# Patient Record
Sex: FEMALE | Race: WHITE | NOT HISPANIC OR LATINO | Employment: OTHER | URBAN - NONMETROPOLITAN AREA
[De-identification: names, ages, dates, MRNs, and addresses within clinical notes are randomized per-mention and may not be internally consistent; named-entity substitution may affect disease eponyms.]

---

## 2017-01-30 ENCOUNTER — COMMUNICATION - GICH (OUTPATIENT)
Dept: FAMILY MEDICINE | Facility: OTHER | Age: 77
End: 2017-01-30

## 2017-01-30 DIAGNOSIS — K21.00 GASTRO-ESOPHAGEAL REFLUX DISEASE WITH ESOPHAGITIS: ICD-10-CM

## 2017-03-16 ENCOUNTER — HISTORY (OUTPATIENT)
Dept: FAMILY MEDICINE | Facility: OTHER | Age: 77
End: 2017-03-16

## 2017-03-16 ENCOUNTER — OFFICE VISIT - GICH (OUTPATIENT)
Dept: FAMILY MEDICINE | Facility: OTHER | Age: 77
End: 2017-03-16

## 2017-03-16 DIAGNOSIS — M51.369 OTHER INTERVERTEBRAL DISC DEGENERATION, LUMBAR REGION: ICD-10-CM

## 2017-03-16 DIAGNOSIS — G89.29 OTHER CHRONIC PAIN: ICD-10-CM

## 2017-03-16 DIAGNOSIS — M25.561 PAIN IN RIGHT KNEE: ICD-10-CM

## 2017-03-16 DIAGNOSIS — M25.562 PAIN IN LEFT KNEE: ICD-10-CM

## 2017-03-16 DIAGNOSIS — E78.5 HYPERLIPIDEMIA: ICD-10-CM

## 2017-03-16 DIAGNOSIS — E55.9 VITAMIN D DEFICIENCY: ICD-10-CM

## 2017-03-16 DIAGNOSIS — E11.9 TYPE 2 DIABETES MELLITUS WITHOUT COMPLICATIONS (H): ICD-10-CM

## 2017-03-16 DIAGNOSIS — K21.9 GASTRO-ESOPHAGEAL REFLUX DISEASE WITHOUT ESOPHAGITIS: ICD-10-CM

## 2017-03-16 DIAGNOSIS — Z96.653 PRESENCE OF BOTH ARTIFICIAL KNEE JOINTS: ICD-10-CM

## 2017-03-16 DIAGNOSIS — Z12.31 ENCOUNTER FOR SCREENING MAMMOGRAM FOR MALIGNANT NEOPLASM OF BREAST: ICD-10-CM

## 2017-03-16 DIAGNOSIS — L82.0 INFLAMED SEBORRHEIC KERATOSIS: ICD-10-CM

## 2017-03-16 DIAGNOSIS — M79.672 PAIN OF LEFT FOOT: ICD-10-CM

## 2017-03-16 LAB
A/G RATIO - HISTORICAL: 1.5 (ref 1–2)
ABSOLUTE BASOPHILS - HISTORICAL: 0.1 THOU/CU MM
ABSOLUTE EOSINOPHILS - HISTORICAL: 0.2 THOU/CU MM
ABSOLUTE LYMPHOCYTES - HISTORICAL: 2.7 THOU/CU MM (ref 0.9–2.9)
ABSOLUTE MONOCYTES - HISTORICAL: 0.5 THOU/CU MM
ABSOLUTE NEUTROPHILS - HISTORICAL: 3.1 THOU/CU MM (ref 1.7–7)
ALBUMIN SERPL-MCNC: 4.8 G/DL (ref 3.5–5.7)
ALP SERPL-CCNC: 55 IU/L (ref 34–104)
ALT (SGPT) - HISTORICAL: 21 IU/L (ref 7–52)
ANION GAP - HISTORICAL: 8 (ref 5–18)
AST SERPL-CCNC: 20 IU/L (ref 13–39)
BASOPHILS # BLD AUTO: 1.3 %
BILIRUB SERPL-MCNC: 0.4 MG/DL (ref 0.3–1)
BUN SERPL-MCNC: 13 MG/DL (ref 7–25)
BUN/CREAT RATIO - HISTORICAL: 24
CALCIUM SERPL-MCNC: 9.9 MG/DL (ref 8.6–10.3)
CHLORIDE SERPLBLD-SCNC: 102 MMOL/L (ref 98–107)
CHOL/HDL RATIO - HISTORICAL: 4.36
CHOLESTEROL TOTAL: 257 MG/DL
CO2 SERPL-SCNC: 26 MMOL/L (ref 21–31)
CREAT SERPL-MCNC: 0.55 MG/DL (ref 0.7–1.3)
EOSINOPHIL NFR BLD AUTO: 2.9 %
ERYTHROCYTE [DISTWIDTH] IN BLOOD BY AUTOMATED COUNT: 11.4 % (ref 11.5–15.5)
ESTIMATED AVERAGE GLUCOSE: 134 MG/DL
GFR IF NOT AFRICAN AMERICAN - HISTORICAL: >60 ML/MIN/1.73M2
GLOBULIN - HISTORICAL: 3.3 G/DL (ref 2–3.7)
GLUCOSE SERPL-MCNC: 117 MG/DL (ref 70–105)
HCT VFR BLD AUTO: 40.8 % (ref 33–51)
HDLC SERPL-MCNC: 59 MG/DL (ref 23–92)
HEMOGLOBIN A1C MONITORING (POCT) - HISTORICAL: 6.3 % (ref 4–6.2)
HEMOGLOBIN: 14.2 G/DL (ref 12–16)
LDLC SERPL CALC-MCNC: 165 MG/DL
LYMPHOCYTES NFR BLD AUTO: 41.2 % (ref 20–44)
MCH RBC QN AUTO: 31 PG (ref 26–34)
MCHC RBC AUTO-ENTMCNC: 34.8 G/DL (ref 32–36)
MCV RBC AUTO: 89 FL (ref 80–100)
MONOCYTES NFR BLD AUTO: 8.1 %
NEUTROPHILS NFR BLD AUTO: 46.5 % (ref 42–72)
NON-HDL CHOLESTEROL - HISTORICAL: 198 MG/DL
PATIENT STATUS - HISTORICAL: ABNORMAL
PLATELET # BLD AUTO: 244 THOU/CU MM (ref 140–440)
PMV BLD: 8.3 FL (ref 6.5–11)
POTASSIUM SERPL-SCNC: 3.4 MMOL/L (ref 3.5–5.1)
PROT SERPL-MCNC: 8.1 G/DL (ref 6.4–8.9)
RED BLOOD COUNT - HISTORICAL: 4.58 MIL/CU MM (ref 4–5.2)
SODIUM SERPL-SCNC: 136 MMOL/L (ref 133–143)
TRIGL SERPL-MCNC: 163 MG/DL
TSH - HISTORICAL: 1.98 UIU/ML (ref 0.34–5.6)
VITAMIN D TOTAL - HISTORICAL: 31.4 NG/ML
WHITE BLOOD COUNT - HISTORICAL: 6.6 THOU/CU MM (ref 4.5–11)

## 2017-03-17 LAB
ALB RAND URINE - HISTORICAL: 17.4 MG/L
CREATININE, URINE - HISTORICAL: 1.09 G/L
MICROALBUMIN, RAND UR - HISTORICAL: 16 MG/G CREAT

## 2017-03-21 ENCOUNTER — HISTORY (OUTPATIENT)
Dept: RADIOLOGY | Facility: OTHER | Age: 77
End: 2017-03-21

## 2017-03-21 ENCOUNTER — HOSPITAL ENCOUNTER (OUTPATIENT)
Dept: RADIOLOGY | Facility: OTHER | Age: 77
End: 2017-03-21
Attending: FAMILY MEDICINE

## 2017-03-21 DIAGNOSIS — Z12.31 ENCOUNTER FOR SCREENING MAMMOGRAM FOR MALIGNANT NEOPLASM OF BREAST: ICD-10-CM

## 2017-03-31 ENCOUNTER — AMBULATORY - GICH (OUTPATIENT)
Dept: SCHEDULING | Facility: OTHER | Age: 77
End: 2017-03-31

## 2017-05-03 ENCOUNTER — COMMUNICATION - GICH (OUTPATIENT)
Dept: FAMILY MEDICINE | Facility: OTHER | Age: 77
End: 2017-05-03

## 2017-08-06 ENCOUNTER — COMMUNICATION - GICH (OUTPATIENT)
Dept: FAMILY MEDICINE | Facility: OTHER | Age: 77
End: 2017-08-06

## 2017-08-06 DIAGNOSIS — M94.0 CHONDROCOSTAL JUNCTION SYNDROME: ICD-10-CM

## 2017-08-06 DIAGNOSIS — K21.00 GASTRO-ESOPHAGEAL REFLUX DISEASE WITH ESOPHAGITIS: ICD-10-CM

## 2017-08-06 DIAGNOSIS — M51.369 OTHER INTERVERTEBRAL DISC DEGENERATION, LUMBAR REGION: ICD-10-CM

## 2017-08-06 DIAGNOSIS — M53.3 SACROCOCCYGEAL DISORDERS, NOT ELSEWHERE CLASSIFIED: ICD-10-CM

## 2017-09-05 ENCOUNTER — COMMUNICATION - GICH (OUTPATIENT)
Dept: FAMILY MEDICINE | Facility: OTHER | Age: 77
End: 2017-09-05

## 2017-09-14 ENCOUNTER — AMBULATORY - GICH (OUTPATIENT)
Dept: SCHEDULING | Facility: OTHER | Age: 77
End: 2017-09-14

## 2017-09-18 ENCOUNTER — OFFICE VISIT - GICH (OUTPATIENT)
Dept: FAMILY MEDICINE | Facility: OTHER | Age: 77
End: 2017-09-18

## 2017-09-18 ENCOUNTER — HISTORY (OUTPATIENT)
Dept: FAMILY MEDICINE | Facility: OTHER | Age: 77
End: 2017-09-18

## 2017-09-18 DIAGNOSIS — E11.9 TYPE 2 DIABETES MELLITUS WITHOUT COMPLICATIONS (H): ICD-10-CM

## 2017-09-18 DIAGNOSIS — M51.369 OTHER INTERVERTEBRAL DISC DEGENERATION, LUMBAR REGION: ICD-10-CM

## 2017-09-18 DIAGNOSIS — M54.16 RADICULOPATHY OF LUMBAR REGION: ICD-10-CM

## 2017-09-18 LAB
ESTIMATED AVERAGE GLUCOSE: 134 MG/DL
HEMOGLOBIN A1C MONITORING (POCT) - HISTORICAL: 6.3 % (ref 4–6.2)

## 2017-09-18 ASSESSMENT — PATIENT HEALTH QUESTIONNAIRE - PHQ9: SUM OF ALL RESPONSES TO PHQ QUESTIONS 1-9: 8

## 2017-09-27 ENCOUNTER — HOSPITAL ENCOUNTER (OUTPATIENT)
Dept: RADIOLOGY | Facility: OTHER | Age: 77
End: 2017-09-27
Attending: FAMILY MEDICINE

## 2017-09-27 ENCOUNTER — AMBULATORY - GICH (OUTPATIENT)
Dept: FAMILY MEDICINE | Facility: OTHER | Age: 77
End: 2017-09-27

## 2017-09-27 DIAGNOSIS — M51.369 OTHER INTERVERTEBRAL DISC DEGENERATION, LUMBAR REGION: ICD-10-CM

## 2017-09-27 DIAGNOSIS — M54.16 RADICULOPATHY OF LUMBAR REGION: ICD-10-CM

## 2017-12-27 NOTE — PROGRESS NOTES
Patient Information     Patient Name MRN Sex Maliha Medley [id number] Female 1940      Progress Notes by Paulina Joseph at 2017  8:31 AM     Author:  Paulina Joseph Service:  (none) Author Type:  (none)     Filed:  2017  8:31 AM Date of Service:  2017  8:31 AM Status:  Signed     :  Paulina Joseph            [place] Protocol    A. Pre-procedure verification complete yes  1-relevant information / documentation available, reviewed and properly matched to the patient; 2-consent accurate and complete, 3-equipment and supplies available    B. Site marking complete Yes  Site marked if not in continuous attendance with patient    C. TIME OUT completed yes  Time Out was conducted just prior to starting procedure to verify the eight required elements: 1-patient identity, 2-consent accurate and complete, 3-position, 4-correct side/site marked (if applicable), 5-procedure, 6-relevant images / results properly labeled and displayed (if applicable), 7-antibiotics / irrigation fluids (if applicable), 8-safety precautions.

## 2017-12-27 NOTE — PROGRESS NOTES
Patient Information     Patient Name MRN Sex Maliha Medley [id number] Female 1940      Progress Notes by Paulina Joseph at 2017  8:31 AM     Author:  Paulina Joseph Service:  (none) Author Type:  (none)     Filed:  2017  8:31 AM Date of Service:  2017  8:31 AM Status:  Signed     :  Paulina Joseph            Falls Risk Criteria:    Age 65 and older or under age 4        Sensory deficits    Poor vision    Use of ambulatory aides    Impaired judgment    Unable to walk independently    Meets High Risk criteria for falls:  Yes             1.  Do you have dizziness or vertigo?    no                    2.  Do you need help standing or walking?   no                 3.  Have you fallen within the last 6 months?    no           4.  Has the patient been fasting?      no       If any risks are marked Yes, the following interventions are utilized:    Do not leave patient unattended     Assist patient in the dressing room and bathroom    Have ambulatory aides available throughout procedure    Involve patient s family if available

## 2017-12-28 NOTE — TELEPHONE ENCOUNTER
Patient Information     Patient Name MRN Maliha Chamberlain [id number] Female 1940      Telephone Encounter by Florence Machuca RN at 2017  9:24 AM     Author:  Florence Machuca RN Service:  (none) Author Type:  NURS- Registered Nurse     Filed:  2017  9:26 AM Encounter Date:  2017 Status:  Signed     :  Florence Machuca RN (NURS- Registered Nurse)            Proton Pump Inhibitors    Office visit in the past 12 months or per provider note.    Last visit with YOKO CHAVARRIA was on: 2017 in [facility]  Next visit with YOKO CHAVARRIA is on: No future appointment listed with this provider  Next visit with Family Practice is on: No future appointment listed in this department    Max refill for 12 months from last office visit or per provider note.  Nsaids    Office visit in the past 12 months or per provider note.    Max refill for 12 months from last office visit or per provider note.  Prescription refilled per RN Medication Refill Policy.................... FLORENCE MACHUCA RN ....................  2017   9:25 AM      '

## 2017-12-28 NOTE — TELEPHONE ENCOUNTER
Patient Information     Patient Name MRN Sex Maliha Medley [id number] Female 1940      Telephone Encounter by Leena Francis at 2017  4:54 PM     Author:  Leena Francis Service:  (none) Author Type:  NURS- Student Practical Nurse     Filed:  2017  4:55 PM Encounter Date:  2017 Status:  Signed     :  Leena Francis (NURS- Student Practical Nurse)            Left message to call back  ....................  2017   4:54 PM   Leena Francis LPN............................ 2017 4:55 PM

## 2017-12-28 NOTE — TELEPHONE ENCOUNTER
Patient Information     Patient Name MRN Sex Maliha Medley [id number] Female 1940      Telephone Encounter by Tuan Cui LPN at 2017  8:46 AM     Author:  Tuan Cui LPN Service:  (none) Author Type:  NURS- Licensed Practical Nurse     Filed:  2017  8:48 AM Encounter Date:  2017 Status:  Signed     :  Tuan Cui LPN (NURS- Licensed Practical Nurse)            Patient called back and I let her know the below information and transferred her to the appointment line to schedule.  Tuan Cui LPN ..............2017 8:47 AM

## 2017-12-28 NOTE — TELEPHONE ENCOUNTER
Patient Information     Patient Name MRN Maliha Chamberlain [id number] Female 1940      Telephone Encounter by Sofia Vargas at 2017  2:20 PM     Author:  Sofia Vargas Service:  (none) Author Type:  (none)     Filed:  2017  2:21 PM Encounter Date:  2017 Status:  Signed     :  Sofia Vargas            Called pt in regards to the message. No answer at this time will try again later today.  Sofia Pablo LPN

## 2017-12-28 NOTE — PROGRESS NOTES
Patient Information     Patient Name MRN Maliha Chamberlain [id number] Female 1940      Progress Notes by Paulina Joseph at 2017  8:31 AM     Author:  Paulina Joseph Service:  (none) Author Type:  (none)     Filed:  2017  8:31 AM Date of Service:  2017  8:31 AM Status:  Signed     :  Paulina Joseph            RECOVERY TIME  Some numbness may be present 2-4 hours after the injection, which could impair your normal driving, reflexes.  You will need someone to drive you home from your exam due to the effects of certain medications.    You may experience numbness and/or relief of your pain for up to 4-6 hours after the injection.  Your usual symptoms may return the night of the procedure and may possible be more severe than usual a day or two following.  Please keep track of your pain over the next several days and report how long the relief lasts to the doctor who referred you for this procedure.    The beneficial effects of the steroids usually require 2 to 3 days to take effect, buy may take as long as 5 to 7 days.  If there is no change in the pain, then investigation can be focused on other possible sources of your pain.  In either case, the information is useful to the doctor who referred you for this procedure.    POSSIBLE SIDE EFFECTS  Facial flushing (redness), occasional low grade fevers of 99.5F or less, hiccups, insomnia, headaches, increased heart rate, abdominal cramping, and/or a bloating feeling are side effects of the steroid medications and will go away 3 to 4 days after the injection.    Diabetic Patients  The steroids you have received may significantly increase your blood sugar levels.  Monitor your blood sugar level closely (4-6 times per day) for a period of 4 days or until your blood sugar level normalizes.  If your blood sugar level elevates significantly or you experience confusion, dizziness, sweating, please notify our primary physician and make him/her  aware that you have received steroids.

## 2017-12-28 NOTE — PROGRESS NOTES
Patient Information     Patient Name MRN Sex Maliha Medley [id number] Female 1940      Progress Notes by Nicanor Mike MD at 2017  3:15 PM     Author:  Nicanor Mike MD Service:  (none) Author Type:  Physician     Filed:  2017  5:26 PM Encounter Date:  2017 Status:  Signed     :  Nicanor Mike MD (Physician)            Nursing Notes:   Olivia Jean-Baptiste  2017  3:35 PM  Signed  She is here as she would like to get orders for a back injection and to follow up on her diabetes.  Previous A1C is at goal of <8  HEMOGLOBIN A1C MONITORING (POCT) (%)    Date Value   2017 6.3 (H)     Urine microalbumin:creatine: 2017  Foot exam 2017  Eye exam 2017    Patient is not a current smoker  Patient is not on a daily aspirin  Patient is not on a Statin.  Blood pressure today of   is not at the goal of <139/89 for diabetics.    Olivia Jerilyn LPN..............2017 3:26 PM        SUBJECTIVE:  Maliha Rivera  is a 77 y.o. female who comes in today for follow-up of diabetes. I last saw her in March. Last hemoglobin A1c was 6.3%. Labs at that time were fine except for an elevated cholesterol and LDL. She has not tolerated statins in the past. She has not been checking as her meter .     We discussed sending her to Dr. Carson for consultation for her knees. She saw him in April and was told that her knees look fine and no further intervention was necessary.    She wonders about an injection for her back. She was seen at [facility] in neurosurgery for lumbar foraminal stenosis causing radiculopathy on the left side and had left L4-5 and L5-S1 transforaminal epidural steroid injection done on . Surgically, Dr. oB recommended a 2 level TLIF at L4-L5 and L5-S1.  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 Her busy season is coming up and she like to repeat her shot before work starts social get through the season okay. She  did not want to wait as she could not get into [facility] for the injections for right some time until she was well into the season and would be too busy. She would like to do the injection at [facility] here if possible.    Past Medical, Family, and Social History reviewed and updated as noted below.   ROS is negative except as noted above       Allergies     Allergen  Reactions     Lipitor [Atorvastatin] Myalgia   ,   Family History       Problem   Relation Age of Onset     Heart Disease  Mother      Heart Disease  Father      Heart Disease  Sister      Cancer  Other      female cancer     ,   Current Outpatient Prescriptions on File Prior to Visit       Medication  Sig Dispense Refill     Melatonin 5 mg tab Take 1 tablet by mouth at bedtime if needed for Sleep.  0     meloxicam 15 mg tablet TAKE ONE TABLET BY MOUTH ONCE DAILY 90 tablet 1     MISCELLANEOUS MEDICAL SUPPLY (GRADUATED COMPRESSION STOCKINGS) For personal use. Length: calf Strength: 16-20 mmHg Please measure. 1 Packet 1     omeprazole (PRILOSEC) 40 mg Delayed-Release capsule TAKE ONE CAPSULE BY MOUTH ONCE DAILY 90 capsule 1     Psyllium Seed-Sucrose (METAMUCIL, SUGAR,) powder Take 3 tsp by mouth once daily if needed for Constipation.  0     No current facility-administered medications on file prior to visit.    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  She is taking fish oil + other ingredients she states helps with DM  On ASA  Patient does not want to take lisinopril  Does not tolerate statins and has discontinued pravastatin; try niacin  No retinopathy on eye exam 4/2013 and 12/2014        Delayed  "gastric emptying  03/26/2014     Gastroesophageal reflux disease  12/13/2013     Degeneration of intervertebral disc of lumbar region  01/15/2010     History of artificial joint  04/24/2008     HLD (hyperlipidemia)  07/22/2004     Overview:   IMO Update 10/11      ,   Past Surgical History:      Procedure  Laterality Date     APPENDECTOMY       Arthroscopic debridement and subacromial decompression, left shoulder Left 1/31/07     CARDIAC CATHETERIZATION  2004    mild disease       CARPAL TUNNEL RELEASE Bilateral 2006     CATARACT EXTRACTION W/  INTRAOCULAR LENS IMPLANT Bilateral 1996     COLONOSCOPY  2003,2011    normal       ESOPHAGOGASTRODUODENOSCOPY  2/3/2016            KNEE ARTHROSCOPY Right 2007    Joseluis Thomas M.D.        LAP CHOLECYSTECTOMY  3/11/01    Dr. Salas       Lumbar L4-5, L5-S1 DIANNE Left 06/05/2017    Dr. Joiner, [facility]       PA ERCP BILIARY OR PANC DUCT STENT EXCHANGE W DIL AND WIRE  2001    stent removed.        TONSILLECTOMY      age 12       TOTAL KNEE ARTHROPLASTY Right 02/27/2008      TOTAL KNEE ARTHROPLASTY Left 2010    Dr. Carson       TUBAL LIGATION      and   Social History     Substance Use Topics       Smoking status: Never Smoker     Smokeless tobacco: Never Used     Alcohol use No     OBJECTIVE:  /84  Pulse 60  Ht 1.594 m (5' 2.75\")  Wt 80.3 kg (177 lb)  Breastfeeding? No  BMI 31.6 kg/m2   EXAM:  Alert and cooperative, no distress. Back exam was not repeated.    Results for orders placed or performed in visit on 09/18/17      HEMOGLOBIN A1C MONITORING (POCT)      Result  Value Ref Range    HEMOGLOBIN A1C MONITORING (POCT) 6.3 (H) 4.0 - 6.2 %    ESTIMATED AVERAGE GLUCOSE  134 mg/dL      ASSESSMENT/Plan :      Maliha was seen today for follow up.    Diagnoses and all orders for this visit:    Controlled type 2 diabetes mellitus without complication, without long-term current use of insulin (HC)  -     HEMOGLOBIN A1C MONITORING (POCT); Future  -     HEMOGLOBIN A1C MONITORING " (POCT)    Degeneration of intervertebral disc of lumbar region  -     XR INJ EPIDURAL TRANSFORAMINAL LUMBAR 2 LEV LEFT; Future    Lumbar radiculopathy  -     XR INJ EPIDURAL TRANSFORAMINAL LUMBAR 2 LEV LEFT; Future      outside workup and notes were reviewed. Referred for L4-5 and L5-S1 left transforaminal epidural steroid injection. Discussed this in detail with her.    Will notify of lab results when available. Continue current medications.     A total of 15 minutes was spent with the patient, greater than 50% of the time was spent in counseling/discussion of the aforementioned concerns.     Nicanor Mike MD

## 2017-12-28 NOTE — TELEPHONE ENCOUNTER
Patient Information     Patient Name MRN Sex Maliha Medley [id number] Female 1940      Telephone Encounter by Sofia Vargas at 2017  3:18 PM     Author:  Sofia Vargas Service:  (none) Author Type:  (none)     Filed:  2017  3:19 PM Encounter Date:  2017 Status:  Signed     :  Sofia Vargas            Please advise. Pt is wanting a referral for back injections.  Sofia Pablo LPN

## 2017-12-28 NOTE — TELEPHONE ENCOUNTER
Patient Information     Patient Name MRN Sex Maliha Medley [id number] Female 1940      Telephone Encounter by Nicanor Mike MD at 2017  4:26 PM     Author:  Nicanor Mike MD Service:  (none) Author Type:  Physician     Filed:  2017  4:27 PM Encounter Date:  2017 Status:  Signed     :  Nicanor Mike MD (Physician)            She would need to be seen and evaluated first. She is also due for 6 month follow up of diabetes.   Nicanor Mike MD ....................  2017   4:27 PM

## 2017-12-30 NOTE — NURSING NOTE
Patient Information     Patient Name MRN Sex Maliha Medley [id number] Female 1940      Nursing Note by Olivia Jean-Baptiste at 2017  3:15 PM     Author:  Olivia Jean-Baptiste Service:  (none) Author Type:  (none)     Filed:  2017  3:35 PM Encounter Date:  2017 Status:  Signed     :  Olivia Jean-Baptiste            She is here as she would like to get orders for a back injection and to follow up on her diabetes.  Previous A1C is at goal of <8  HEMOGLOBIN A1C MONITORING (POCT) (%)    Date Value   2017 6.3 (H)     Urine microalbumin:creatine: 2017  Foot exam 2017  Eye exam 2017    Patient is not a current smoker  Patient is not on a daily aspirin  Patient is not on a Statin.  Blood pressure today of   is not at the goal of <139/89 for diabetics.    Olivia Jean-Baptiste LPN..............2017 3:26 PM

## 2018-01-03 NOTE — TELEPHONE ENCOUNTER
Patient Information     Patient Name MRN Sex Maliha Medley [id number] Female 1940      Telephone Encounter by Lance Lee RN at 2017  9:56 AM     Author:  Lance Lee RN Service:  (none) Author Type:  NURS- Registered Nurse     Filed:  2017 10:00 AM Encounter Date:  2017 Status:  Signed     :  Lance Lee RN (NURS- Registered Nurse)            Proton Pump Inhibitors    Office visit in the past 12 months or per provider note.    Last visit with [facility]: [facility]  Next visit with YOKO MIKE is on: No future appointment listed with this provider  Next visit with Family Practice is on: No future appointment listed in this department    Max refill for 12 months from last office visit or per provider note.    Last office visit to address GERD as well as omeprazole was on 2016 with Dr. Mike. No changes with regards to omeprazole or GERD noted in office visit notes. Will refill rx as per protocol.    Prescription refilled per RN Medication Refill Policy.................... Lance Lee RN ....................  2017   9:59 AM

## 2018-01-03 NOTE — PROGRESS NOTES
Patient Information     Patient Name MRN Sex Maliha Medley [id number] Female 1940      Progress Notes by Nicanor Mike MD at 3/16/2017  3:15 PM     Author:  Nicanor Mike MD Service:  (none) Author Type:  Physician     Filed:  3/16/2017  6:17 PM Encounter Date:  3/16/2017 Status:  Signed     :  Nicanor Mike MD (Physician)            There are no exam notes on file for this visit.    SUBJECTIVE:  Maliha Rivera  is a 76 y.o. female who comes in today for complete evaluation. She has a history of diet-controlled diabetes. She has GERD and is on omeprazole 40 mg daily. She was referred last year for general surgery consultation and had EGD that was essentially negative and they recommended she continue with PPI.    She has a history in the past of chronic low back pain for which she takes meloxicam. We referred her to physical therapy for right SI joint dysfunction and costochondritis last summer.    She is up-to-date on immunizations but has not had Zostavax. She has not had a mammogram since .    She was seen around [place] time in the emergency room at [place] for abdominal pain and was found to have a mild enteritis. It got better. She still takes metamucil regularly. She rarely takes a laxative.     She has some right sided chest discomfort.     She is having some left foot pain. It will be sore after sitting. It can be on the bottom or across the instep. Some swelling.     She has trouble with her hips.    She will have a shooting pain down her right arm, worse when her arm is out away from her body. Worse with straightening it. She did fall a month ago but didn't think she injured it.     She did go to [place] for awhile.     Past Medical, Family, and Social History reviewed and updated as noted below.   ROS is negative except as noted above       Allergies     Allergen  Reactions     Lipitor [Atorvastatin] Myalgia   ,   Family History       Problem   Relation Age of  Onset     Heart Disease  Mother      Heart Disease  Father      Heart Disease  Sister      Cancer  Other      female cancer     ,   Current Outpatient Prescriptions on File Prior to Visit       Medication  Sig Dispense Refill     meloxicam 15 mg tablet Take 1 tablet by mouth once daily. 90 tablet 3     MISCELLANEOUS MEDICAL SUPPLY (GRADUATED COMPRESSION STOCKINGS) For personal use. Length: calf Strength: 16-20 mmHg Please measure. 1 Packet 1     omeprazole (PRILOSEC) 40 mg Delayed-Release capsule TAKE ONE CAPSULE BY MOUTH ONCE DAILY 90 capsule 0     No current facility-administered medications on file prior to visit.    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  She is taking fish oil + other ingredients she states helps with DM  On ASA  Patient does not want to take lisinopril  Does not tolerate statins and has discontinued pravastatin; try niacin  No retinopathy on eye exam 4/2013 and 12/2014        Delayed gastric emptying  03/26/2014     Gastroesophageal reflux disease  12/13/2013     Degeneration of intervertebral disc of lumbar region  01/15/2010     History of artificial joint  04/24/2008     HLD (hyperlipidemia)  07/22/2004     Overview:   IMO Update 10/11      ,   Past Surgical History       Procedure   Laterality Date     Cataract extraction w/  intraocular lens implant  Bilateral 1996     Lap cholecystectomy   3/11/01     Dr. Salas       Appendectomy        Tubal ligation        Pr ercp biliary or panc duct stent exchange w dil and wire   2001     stent removed.        Cardiac catheterization   2004     mild disease       Colonoscopy    "2003,2011     normal       Knee arthroscopy  Right 2007     Joseluis Thomas M.D.        Carpal tunnel release  Bilateral 2006     Tonsillectomy        age 12       Arthroscopic debridement and subacromial decompression, left shoulder  Left 1/31/07     Total knee arthroplasty  Right 02/27/2008      Total knee arthroplasty  Left 2010     Dr. Carson       Esophagogastroduodenoscopy   2/3/2016            and   Social History     Substance Use Topics       Smoking status: Never Smoker     Smokeless tobacco: Never Used     Alcohol use No     OBJECTIVE:  Visit Vitals       /84     Pulse 70     Ht 1.594 m (5' 2.75\")     Wt 83.9 kg (185 lb)     Breastfeeding No     BMI 33.03 kg/m2      EXAM:  General Appearance: Pleasant, alert, appropriate appearance for age. No acute distress  Head Exam: Normal. Normocephalic, atraumatic.  Eye Exam: PERRLA, EOMI, conjunctiva, sclerae normal.  Ear Exam: Normal TM's bilaterally. Normal auditory canals and external ears. Non-tender.  Nose Exam: Normal external nose, mucus membranes, and septum.  OroPharynx Exam:  Dental hygiene adequate. Normal buccal mucose. Normal pharynx.  Neck Exam:  Supple, no masses or nodes. No bruits  Thyroid Exam: No nodules or enlargement.  Chest/Respiratory Exam: Normal chest wall and respirations. Clear to auscultation. She has decreased rotational range of motion of the thoracic spine to the left and this report reproduces her right chest symptoms.  Breast Exam: No dimpling, nipple retraction or discharge. No masses or nodes.  Cardiovascular Exam: Regular rate and rhythm. S1, S2, no murmur, click, gallop, or rubs.  Gastrointestinal Exam: Soft, non-tender, no masses or organomegaly.  Lymphatic Exam: Non-palpable nodes in neck, clavicular, axillary, or inguinal regions.  Musculoskeletal Exam: Back is straight and non-tender, full ROM of upper and lower extremities. Knee exam appears normal. Some discomfort with internal/external rotation of the hips.  Foot " Exam: Left and right foot: good pedal pulses, no lesions, nail hygiene good.  Some tenderness to palpation over the left foot in the area of the proximal dorsum.  Skin: no rash or abnormalities, other than an irritated seborrheic keratosis on her left upper arm that was frozen serially times 2 with liquid nitrogen. She has 2 irritated nevi on dorsum of her neck. After informed consent obtained, these areas were cleansed with ChloraPrep, skin anesthesia with 1% lidocaine with epinephrine, and removed at the skin surface with iris scissors and the base treated lightly with hand-held cautery. They did not appear worrisome so were not submitted for pathology. Wound was dressed with a Band-Aid and wound instructions given.  Neurologic Exam:  normal gross motor, tone coordination and no tremor.  Psychiatric Exam: Alert and oriented - appropriate affect.     Results for orders placed or performed in visit on 03/16/17      COMP METABOLIC PANEL      Result  Value Ref Range    SODIUM 136 133 - 143 mmol/L    POTASSIUM 3.4 (L) 3.5 - 5.1 mmol/L    CHLORIDE 102 98 - 107 mmol/L    CO2,TOTAL 26 21 - 31 mmol/L    ANION GAP 8 5 - 18                    GLUCOSE 117 (H) 70 - 105 mg/dL    CALCIUM 9.9 8.6 - 10.3 mg/dL    BUN 13 7 - 25 mg/dL    CREATININE 0.55 (L) 0.70 - 1.30 mg/dL    BUN/CREAT RATIO           24                    GFR if African American >60 >60 ml/min/1.73m2    GFR if not African American >60 >60 ml/min/1.73m2    ALBUMIN 4.8 3.5 - 5.7 g/dL    PROTEIN,TOTAL 8.1 6.4 - 8.9 g/dL    GLOBULIN                  3.3 2.0 - 3.7 g/dL    A/G RATIO 1.5 1.0 - 2.0                    BILIRUBIN,TOTAL 0.4 0.3 - 1.0 mg/dL    ALK PHOSPHATASE 55 34 - 104 IU/L    ALT (SGPT) 21 7 - 52 IU/L    AST (SGOT) 20 13 - 39 IU/L   LIPID PANEL      Result  Value Ref Range    CHOLESTEROL,TOTAL 257 (H) <200 mg/dL    TRIGLYCERIDES 163 (H) <150 mg/dL    HDL CHOLESTEROL 59 23 - 92 mg/dL    NON-HDL CHOLESTEROL 198 (H) <145 mg/dl    CHOL/HDL RATIO             4.36 <4.50                    LDL CHOLESTEROL 165 (H) <100 mg/dL    PATIENT STATUS            FASTING                   TSH      Result  Value Ref Range    TSH 1.98 0.34 - 5.60 uIU/mL   VITAMIN D 25 (DEFICIENCY)      Result  Value Ref Range    VITAMIN D TOTAL AFL 31.4   ng/mL   CBC WITH AUTO DIFFERENTIAL      Result  Value Ref Range    WHITE BLOOD COUNT         6.6 4.5 - 11.0 thou/cu mm    RED BLOOD COUNT           4.58 4.00 - 5.20 mil/cu mm    HEMOGLOBIN                14.2 12.0 - 16.0 g/dL    HEMATOCRIT                40.8 33.0 - 51.0 %    MCV                       89 80 - 100 fL    MCH                       31.0 26.0 - 34.0 pg    MCHC                      34.8 32.0 - 36.0 g/dL    RDW                       11.4 (L) 11.5 - 15.5 %    PLATELET COUNT            244 140 - 440 thou/cu mm    MPV                       8.3 6.5 - 11.0 fL    NEUTROPHILS               46.5 42.0 - 72.0 %    LYMPHOCYTES               41.2 20.0 - 44.0 %    MONOCYTES                 8.1 <12.0 %    EOSINOPHILS               2.9 <8.0 %    BASOPHILS                 1.3 <3.0 %    ABSOLUTE NEUTROPHILS      3.1 1.7 - 7.0 thou/cu mm    ABSOLUTE LYMPHOCYTES      2.7 0.9 - 2.9 thou/cu mm    ABSOLUTE MONOCYTES        0.5 <0.9 thou/cu mm    ABSOLUTE EOSINOPHILS      0.2 <0.5 thou/cu mm    ABSOLUTE BASOPHILS        0.1 <0.3 thou/cu mm      ASSESSMENT/Plan :      Maliha was seen today for medication management.    Diagnoses and all orders for this visit:    Controlled type 2 diabetes mellitus without complication, without long-term current use of insulin (HC)  -     CBC AND DIFFERENTIAL; Future  -     COMP METABOLIC PANEL; Future  -     HEMOGLOBIN A1C MONITORING (POCT); Future  -     LIPID PANEL; Future  -     TSH; Future  -     MICROALBUMIN RANDOM URINE; Future  -     CBC AND DIFFERENTIAL  -     COMP METABOLIC PANEL  -     HEMOGLOBIN A1C MONITORING (POCT)  -     LIPID PANEL  -     TSH  -     CBC WITH AUTO DIFFERENTIAL  -     MICROALBUMIN RANDOM  URINE    Hyperlipidemia, unspecified hyperlipidemia type  -     COMP METABOLIC PANEL; Future  -     LIPID PANEL; Future  -     COMP METABOLIC PANEL  -     LIPID PANEL    Degeneration of intervertebral disc of lumbar region  -     CBC AND DIFFERENTIAL; Future  -     CBC AND DIFFERENTIAL  -     CBC WITH AUTO DIFFERENTIAL    Gastroesophageal reflux disease without esophagitis  -     CBC AND DIFFERENTIAL; Future  -     CBC AND DIFFERENTIAL  -     CBC WITH AUTO DIFFERENTIAL    Screening mammogram, encounter for  -     XR MAMMO BILAT SCREENING; Future    Left foot pain  -     AMB CONSULT TO PODIATRY/ANKLE AND FOOT SURGERY; Future    Chronic pain of both knees  -     AMB CONSULT TO ORTHOPEDICS - AFFILIATE ONLY; Future    History of total knee arthroplasty, bilateral  -     AMB CONSULT TO ORTHOPEDICS - AFFILIATE ONLY; Future    Vitamin D deficiency  -     VITAMIN D 25 (DEFICIENCY); Future  -     VITAMIN D 25 (DEFICIENCY)    Seborrheic keratoses, inflamed  -     DE DESTROY BENIGN LESIONS UP TO 14 LESIONS  -     DE REMOVAL OF SKIN TAGS UP TO 15    Will notify of lab results when available. Discussed diet, exercise and healthy lifestyle changes. Continue current medications.     Dr. Carson for her knee and hip pain.    Referral to podiatry for her left foot pain.    Mammogram is scheduled.    A total of 40 minutes was spent with the patient, greater than 50% of the time was spent in counseling/discussion of the aforementioned concerns.     Nicanor Mike MD

## 2018-01-03 NOTE — PROGRESS NOTES
Patient Information     Patient Name MRN Sex Maliha Medley [id number] Female 1940      Progress Notes by Hannah Roper at 3/21/2017 10:53 AM     Author:  Hannah Roper Service:  (none) Author Type:  (none)     Filed:  3/21/2017 10:53 AM Date of Service:  3/21/2017 10:53 AM Status:  Signed     :  Hannah Roper            Falls Risk Criteria:    Age 65 and older or under age 4        Sensory deficits    Poor vision    Use of ambulatory aides    Impaired judgment    Unable to walk independently    Meets High Risk criteria for falls:  Yes             1.  Do you have dizziness or vertigo?    no                    2.  Do you need help standing or walking?   no                 3.  Have you fallen within the last 6 months?    no           4.  Has the patient been fasting?      no       If any risks are marked Yes, the following interventions are utilized:    Do not leave patient unattended     Assist patient in the dressing room and bathroom    Have ambulatory aides available throughout procedure    Involve patient s family if available

## 2018-01-04 NOTE — TELEPHONE ENCOUNTER
Patient Information     Patient Name MRN Sex Maliha Medley [id number] Female 1940      Telephone Encounter by Matilda Steward at 5/3/2017  8:39 AM     Author:  Matilda Steward Service:  (none) Author Type:  (none)     Filed:  5/3/2017  8:42 AM Encounter Date:  5/3/2017 Status:  Signed     :  Matilda Steward            Patient calling and states that she saw Dr. Carson for her knees.  She states that he sent her to another doctor who did an MRI and is now talking about having surgery done.  Patient would like to discuss this with Nicanor Mike MD and get his opinion on this.  Advised patient to get records from the other provider she saw and make follow up appointment with Nicanor Mike MD.  Matilda Steward LPN........................5/3/2017  8:42 AM

## 2018-01-23 ENCOUNTER — OFFICE VISIT - GICH (OUTPATIENT)
Dept: FAMILY MEDICINE | Facility: OTHER | Age: 78
End: 2018-01-23

## 2018-01-23 ENCOUNTER — HISTORY (OUTPATIENT)
Dept: FAMILY MEDICINE | Facility: OTHER | Age: 78
End: 2018-01-23

## 2018-01-23 DIAGNOSIS — L60.0 INGROWING NAIL: ICD-10-CM

## 2018-01-23 DIAGNOSIS — M19.072 PRIMARY OSTEOARTHRITIS, LEFT ANKLE AND FOOT: ICD-10-CM

## 2018-01-23 DIAGNOSIS — E11.9 TYPE 2 DIABETES MELLITUS WITHOUT COMPLICATIONS (H): ICD-10-CM

## 2018-01-23 DIAGNOSIS — M79.2 NEURALGIA AND NEURITIS, UNSPECIFIED (CODE): ICD-10-CM

## 2018-01-23 DIAGNOSIS — M54.16 RADICULOPATHY OF LUMBAR REGION: ICD-10-CM

## 2018-01-23 DIAGNOSIS — I10 ESSENTIAL (PRIMARY) HYPERTENSION: ICD-10-CM

## 2018-01-23 DIAGNOSIS — M51.369 OTHER INTERVERTEBRAL DISC DEGENERATION, LUMBAR REGION: ICD-10-CM

## 2018-01-23 LAB
A/G RATIO - HISTORICAL: 1.2 (ref 1–2)
ALBUMIN SERPL-MCNC: 4.1 G/DL (ref 3.5–5.7)
ALP SERPL-CCNC: 55 IU/L (ref 34–104)
ALT (SGPT) - HISTORICAL: 17 IU/L (ref 7–52)
ANION GAP - HISTORICAL: 8 (ref 5–18)
AST SERPL-CCNC: 17 IU/L (ref 13–39)
BILIRUB SERPL-MCNC: 0.3 MG/DL (ref 0.3–1)
BUN SERPL-MCNC: 12 MG/DL (ref 7–25)
BUN/CREAT RATIO - HISTORICAL: 18
CALCIUM SERPL-MCNC: 8.8 MG/DL (ref 8.6–10.3)
CHLORIDE SERPLBLD-SCNC: 105 MMOL/L (ref 98–107)
CHOL/HDL RATIO - HISTORICAL: 5.94
CHOLESTEROL TOTAL: 208 MG/DL
CO2 SERPL-SCNC: 25 MMOL/L (ref 21–31)
CREAT SERPL-MCNC: 0.65 MG/DL (ref 0.7–1.3)
ESTIMATED AVERAGE GLUCOSE: 160 MG/DL
GFR IF NOT AFRICAN AMERICAN - HISTORICAL: >60 ML/MIN/1.73M2
GLOBULIN - HISTORICAL: 3.4 G/DL (ref 2–3.7)
GLUCOSE SERPL-MCNC: 181 MG/DL (ref 70–105)
HDLC SERPL-MCNC: 35 MG/DL (ref 23–92)
HEMOGLOBIN A1C MONITORING (POCT) - HISTORICAL: 7.2 % (ref 4–6.2)
LDLC SERPL CALC-MCNC: 122 MG/DL
NON-HDL CHOLESTEROL - HISTORICAL: 173 MG/DL
POTASSIUM SERPL-SCNC: 4.1 MMOL/L (ref 3.5–5.1)
PROT SERPL-MCNC: 7.5 G/DL (ref 6.4–8.9)
PROVIDER ORDERDED STATUS - HISTORICAL: ABNORMAL
SODIUM SERPL-SCNC: 138 MMOL/L (ref 133–143)
TRIGL SERPL-MCNC: 257 MG/DL

## 2018-01-24 ENCOUNTER — COMMUNICATION - GICH (OUTPATIENT)
Dept: FAMILY MEDICINE | Facility: OTHER | Age: 78
End: 2018-01-24

## 2018-01-24 DIAGNOSIS — E11.9 TYPE 2 DIABETES MELLITUS WITHOUT COMPLICATIONS (H): ICD-10-CM

## 2018-01-26 ENCOUNTER — DOCUMENTATION ONLY (OUTPATIENT)
Dept: FAMILY MEDICINE | Facility: OTHER | Age: 78
End: 2018-01-26

## 2018-01-26 VITALS
BODY MASS INDEX: 32.78 KG/M2 | SYSTOLIC BLOOD PRESSURE: 126 MMHG | DIASTOLIC BLOOD PRESSURE: 84 MMHG | WEIGHT: 185 LBS | HEART RATE: 70 BPM | HEIGHT: 63 IN

## 2018-01-26 VITALS
HEIGHT: 63 IN | WEIGHT: 177 LBS | DIASTOLIC BLOOD PRESSURE: 84 MMHG | HEART RATE: 60 BPM | SYSTOLIC BLOOD PRESSURE: 152 MMHG | BODY MASS INDEX: 31.36 KG/M2

## 2018-01-26 RX ORDER — MELOXICAM 15 MG/1
1 TABLET ORAL DAILY
COMMUNITY
Start: 2017-08-08 | End: 2018-12-05

## 2018-01-26 RX ORDER — OMEPRAZOLE 40 MG/1
1 CAPSULE, DELAYED RELEASE ORAL DAILY
COMMUNITY
Start: 2017-08-08 | End: 2020-02-19

## 2018-01-28 ASSESSMENT — PATIENT HEALTH QUESTIONNAIRE - PHQ9: SUM OF ALL RESPONSES TO PHQ QUESTIONS 1-9: 8

## 2018-01-29 ENCOUNTER — COMMUNICATION - GICH (OUTPATIENT)
Dept: FAMILY MEDICINE | Facility: OTHER | Age: 78
End: 2018-01-29

## 2018-02-01 ENCOUNTER — HISTORY (OUTPATIENT)
Dept: FAMILY MEDICINE | Facility: OTHER | Age: 78
End: 2018-02-01

## 2018-02-01 ENCOUNTER — OFFICE VISIT - GICH (OUTPATIENT)
Dept: FAMILY MEDICINE | Facility: OTHER | Age: 78
End: 2018-02-01

## 2018-02-01 DIAGNOSIS — I10 ESSENTIAL (PRIMARY) HYPERTENSION: ICD-10-CM

## 2018-02-01 DIAGNOSIS — M54.16 RADICULOPATHY OF LUMBAR REGION: ICD-10-CM

## 2018-02-01 DIAGNOSIS — E11.9 TYPE 2 DIABETES MELLITUS WITHOUT COMPLICATIONS (H): ICD-10-CM

## 2018-02-01 DIAGNOSIS — M51.369 OTHER INTERVERTEBRAL DISC DEGENERATION, LUMBAR REGION: ICD-10-CM

## 2018-02-08 ENCOUNTER — TRANSFERRED RECORDS (OUTPATIENT)
Dept: HEALTH INFORMATION MANAGEMENT | Facility: OTHER | Age: 78
End: 2018-02-08

## 2018-02-09 VITALS
BODY MASS INDEX: 31.96 KG/M2 | WEIGHT: 179 LBS | HEART RATE: 80 BPM | DIASTOLIC BLOOD PRESSURE: 74 MMHG | SYSTOLIC BLOOD PRESSURE: 172 MMHG

## 2018-02-09 VITALS
HEART RATE: 68 BPM | SYSTOLIC BLOOD PRESSURE: 126 MMHG | DIASTOLIC BLOOD PRESSURE: 62 MMHG | WEIGHT: 176.6 LBS | BODY MASS INDEX: 31.53 KG/M2

## 2018-02-12 ENCOUNTER — TRANSFERRED RECORDS (OUTPATIENT)
Dept: HEALTH INFORMATION MANAGEMENT | Facility: OTHER | Age: 78
End: 2018-02-12

## 2018-02-13 NOTE — NURSING NOTE
Patient Information     Patient Name MRN Sex Maliha Medley [id number] Female 1940      Nursing Note by Olivia Jean-Baptiste at 2018  4:30 PM     Author:  Olivia Jean-Baptiste Service:  (none) Author Type:  (none)     Filed:  2018  4:41 PM Encounter Date:  2018 Status:  Signed     :  Olivia Jean-Baptiste            Previous A1C is at goal of <8  HEMOGLOBIN A1C MONITORING (POCT) (%)    Date Value   2017 6.3 (H)     Urine microalbumin:creatine: 2017  Foot exam 2017  Eye exam a little less than a year ago    Tobacco User no  Patient is not on a daily aspirin  Patient is not on a Statin.  Blood pressure today of   is not at the goal of <139/89 for diabetics.    Olivia Jean-Baptiste LPN..............2018 4:36 PM

## 2018-02-13 NOTE — PATIENT INSTRUCTIONS
Patient Information     Patient Name MRN Sex Maliha Medley [id number] Female 1940      Patient Instructions by Nicanor Mike MD at 2018  4:30 PM     Author:  Nicanor Mike MD Service:  (none) Author Type:  Physician     Filed:  2018  5:39 PM Encounter Date:  2018 Status:  Signed     :  Nicanor Mike MD (Physician)            Check blood pressure at home.  140/90 or less is good. 130/80 or less is great.     Begin Lisinopril 10 mg daily for blood pressure.     Begin gabapentin 300 mg twice daily, but start that in a week, for the back and the leg.    They will call about the appointment at [facility] and the foot doctor.

## 2018-02-13 NOTE — PROGRESS NOTES
Patient Information     Patient Name MRN Sex Maliha Medley [id number] Female 1940      Progress Notes by Nicanor Mike MD at 2018 11:30 AM     Author:  Nicanor Mike MD Service:  (none) Author Type:  Physician     Filed:  2018  6:27 PM Encounter Date:  2018 Status:  Signed     :  Nicanor Mike MD (Physician)            Nursing Notes:   Olivia Jean-Baptiste  2018 11:53 AM  Signed  She is here today to follow up on her BP.  Olivia Jean-Baptiste LPN..................2018   11:49 AM    Previous A1C is at goal of <8  HEMOGLOBIN A1C MONITORING (POCT) (%)    Date Value   2018 7.2 (H)     Urine microalbumin:creatine: 3/16/2017  Foot exam 2018  Eye exam 2017    Tobacco User no  Patient is on a daily aspirin  Patient is not on a Statin.  Blood pressure today of 126/62 is at the goal of <139/89 for diabetics.    Olivia Jean-Baptiste LPN..............2018 11:52 AM        SUBJECTIVE:  Maliha Rivera  is a 77 y.o. female who comes in today for follow-up of her blood pressure. He is quite high when she was here last time and because of diabetes with elected to place her on 10 mg of lisinopril daily. She's been checking her blood pressure at home and has been running fairly low. In the first week that she was on the medication, her blood pressures got down as low as 70 systolic and sometimes were in the 80s and 90s. Now they seem to be running in the range of 100-120 systolic. She really hasn't had much for lightheadedness. On further history, she had been taking TheraFlu for a respiratory infection and now is off of that. That has a decongestant in it which likely was falsely elevating her blood pressure.    Past Medical, Family, and Social History reviewed and updated as noted below.   ROS is negative except as noted above       Allergies     Allergen  Reactions     Lipitor [Atorvastatin] Myalgia   ,   Family History       Problem   Relation Age of Onset     Heart Disease   Mother      Heart Disease  Father      Heart Disease  Sister      Cancer  Other      female cancer     ,   Current Outpatient Prescriptions on File Prior to Visit       Medication  Sig Dispense Refill     aspirin (ECOTRIN) 81 mg enteric coated tablet Take 1 tablet by mouth once daily with a meal.  0     blood sugar diagnostic (FREESTYLE LITE STRIPS) strip Dispense item covered by pt ins. E11.9 NIDDM type II - Test 1 time/day 100 Each 3     blood-glucose meter (FREESTYLE SYSTEM KIT) Dispense meter, test strips, lancets covered by pt ins. E11.9 NIDDM type II - Test 1 time/day 1 Device 0     gabapentin (NEURONTIN) 300 mg capsule Take 1 capsule by mouth 2 times daily. 60 capsule 5     lancets (FREESTYLE LANCETS) 28 gauge misc As directed. Dispense item covered by pt ins. E11.9 NIDDM type II - Test 1 time/day 100 Each 3     Melatonin 5 mg tab Take 1 tablet by mouth at bedtime if needed for Sleep.  0     meloxicam 15 mg tablet TAKE ONE TABLET BY MOUTH ONCE DAILY 90 tablet 1     MISCELLANEOUS MEDICAL SUPPLY (GRADUATED COMPRESSION STOCKINGS) For personal use. Length: calf Strength: 16-20 mmHg Please measure. 1 Packet 1     omeprazole (PRILOSEC) 40 mg Delayed-Release capsule TAKE ONE CAPSULE BY MOUTH ONCE DAILY 90 capsule 1     Psyllium Seed-Sucrose (METAMUCIL, SUGAR,) powder Take 3 tsp by mouth once daily if needed for Constipation.  0     No current facility-administered medications on file prior to visit.    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  She is taking fish oil + other ingredients she states helps with DM  On  ASA  Patient does not want to take lisinopril  Does not tolerate statins and has discontinued pravastatin; try niacin  No retinopathy on eye exam 4/2013 and 12/2014        Delayed gastric emptying  03/26/2014     Gastroesophageal reflux disease  12/13/2013     Degeneration of intervertebral disc of lumbar region  01/15/2010     History of artificial joint  04/24/2008     HLD (hyperlipidemia)  07/22/2004     Overview:   IMO Update 10/11      ,   Past Surgical History:      Procedure  Laterality Date     APPENDECTOMY       Arthroscopic debridement and subacromial decompression, left shoulder Left 1/31/07     CARDIAC CATHETERIZATION  2004    mild disease       CARPAL TUNNEL RELEASE Bilateral 2006     CATARACT EXTRACTION W/  INTRAOCULAR LENS IMPLANT Bilateral 1996     COLONOSCOPY  2003,2011    normal       ESOPHAGOGASTRODUODENOSCOPY  2/3/2016            KNEE ARTHROSCOPY Right 2007    Joseluis Thomas M.D.        LAP CHOLECYSTECTOMY  3/11/01    Dr. Salas       Lumbar L4-5, L5-S1 DIANNE Left 06/05/2017    Dr. Joiner, [facility]       OR ERCP BILIARY OR PANC DUCT STENT EXCHANGE W DIL AND WIRE  2001    stent removed.        TONSILLECTOMY      age 12       TOTAL KNEE ARTHROPLASTY Right 02/27/2008      TOTAL KNEE ARTHROPLASTY Left 2010    Dr. Carson       TUBAL LIGATION      and   Social History     Substance Use Topics       Smoking status: Never Smoker     Smokeless tobacco: Never Used     Alcohol use No     OBJECTIVE:  /62 (Cuff Site: Right Arm, Position: Sitting, Cuff Size: Adult Large)  Pulse 68  Wt 80.1 kg (176 lb 9.6 oz)  Breastfeeding? No  BMI 31.53 kg/m2   EXAM:  Alert and cooperative, no distress. Blood pressure today is normal. Log is reviewed.  ASSESSMENT/Plan :      Maliha was seen today for follow up.    Diagnoses and all orders for this visit:    Degeneration of intervertebral disc of lumbar region    Hypertension  -     lisinopril (PRINIVIL; ZESTRIL) 5 mg tablet; Take 1 tablet by mouth once  daily.    Lumbar radiculopathy    Controlled type 2 diabetes mellitus without complication, without long-term current use of insulin (HC)      at this point, she would benefit from being on an Ace because of having diabetes but likely would get by with a lower dose so prescription for 5 mg tablets is given. She'll continue to monitor blood pressure at home and keep in touch with her progress.    She has an appointment coming up with the [facility] for consultation pursuant to her last visit which was reviewed with her again today.    Nicanor iMke MD

## 2018-02-13 NOTE — TELEPHONE ENCOUNTER
Patient Information     Patient Name MRN Sex Maliha Medley [id number] Female 1940      Telephone Encounter by Adela Bhagat RN at 2018  1:30 PM     Author:  Adela Bhagat RN Service:  (none) Author Type:  NURS- Registered Nurse     Filed:  2018  1:35 PM Encounter Date:  2018 Status:  Signed     :  Adela Bhagat RN (NURS- Registered Nurse)            Diabetic Supplies    Office visit in the past 12 months.    Last visit with YOKO CHAVARRIA was on: 2018 [facility]  Next visit with YOKO CHAVARRIA is on: No future appointment listed with this provider  Next visit with Family Practice is on: No future appointment listed in this department    Max refill for 12 months from last office visit.  Always add ICD-9 code.    Needs not be listed on Med List to fill.  Need new RX every 12 months or if exceeds the limitations set by Medicare, then every 6 months.  Also when testing frequency is changed is there a need to obtain a new order.  A refill request does not need to be approved by the ordering physician-a beneficiary or their caregiver may request refills.  Physicians are not required to fill out additional forms such as home testing results for suppliers or provide additional documentation unless the supplier is audited and the  is requesting such documentation.      Prescription refilled per RN Medication Refill Policy.................... Adela Bhagat RN ....................  2018   1:35 PM

## 2018-02-13 NOTE — PROGRESS NOTES
"Patient Information     Patient Name MRN Sex Maliha Medley [id number] Female 1940      Progress Notes by Nicanor Mike MD at 2018  4:30 PM     Author:  Nicanor Mike MD Service:  (none) Author Type:  Physician     Filed:  2018  6:52 PM Encounter Date:  2018 Status:  Signed     :  Nicanor Mike MD (Physician)            Nursing Notes:   Olivia Jean-Baptiste  2018  4:41 PM  Signed  Previous A1C is at goal of <8  HEMOGLOBIN A1C MONITORING (POCT) (%)    Date Value   2017 6.3 (H)     Urine microalbumin:creatine: 2017  Foot exam 2017  Eye exam a little less than a year ago    Tobacco User no  Patient is not on a daily aspirin  Patient is not on a Statin.  Blood pressure today of   is not at the goal of <139/89 for diabetics.    Olivia Jean-Baptiste LPN..............2018 4:36 PM      SUBJECTIVE:  Maliha Rivera  is a 77 y.o. female who comes in today for evaluation of back pain. I last saw her in September. At that time her history was as follows: \"She wonders about an injection for her back. 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 Surgically, Dr. Bo recommended a 2 level TLIF at L4-L5 and L5-S1.  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 Her busy season is coming up and she like to repeat her shot before work starts social get through the season okay. She did not want to wait as she could not get into [facility] for the injections for right some time until she was well into the season and would be too busy.\"  We referred her for left L5-S1 transforaminal epidural steroid injection on 2017.    She is having pain in her left foot.     She has type 2 diabetes and her last A1c was 6.3% in March.  She has not been checking a lot, but it has been high. Her blood " pressure has been high as    Past Medical, Family, and Social History reviewed and updated as noted below.   ROS is negative except as noted above       Allergies     Allergen  Reactions     Lipitor [Atorvastatin] Myalgia   ,   Family History       Problem   Relation Age of Onset     Heart Disease  Mother      Heart Disease  Father      Heart Disease  Sister      Cancer  Other      female cancer     ,   Current Outpatient Prescriptions on File Prior to Visit       Medication  Sig Dispense Refill     Melatonin 5 mg tab Take 1 tablet by mouth at bedtime if needed for Sleep.  0     meloxicam 15 mg tablet TAKE ONE TABLET BY MOUTH ONCE DAILY 90 tablet 1     MISCELLANEOUS MEDICAL SUPPLY (GRADUATED COMPRESSION STOCKINGS) For personal use. Length: calf Strength: 16-20 mmHg Please measure. 1 Packet 1     omeprazole (PRILOSEC) 40 mg Delayed-Release capsule TAKE ONE CAPSULE BY MOUTH ONCE DAILY 90 capsule 1     Psyllium Seed-Sucrose (METAMUCIL, SUGAR,) powder Take 3 tsp by mouth once daily if needed for Constipation.  0     No current facility-administered medications on file prior to visit.    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  She is taking fish oil + other ingredients she states helps with DM  On ASA  Patient does not want to take lisinopril  Does not tolerate statins and has discontinued pravastatin; try niacin  No retinopathy on eye exam 4/2013 and 12/2014        Delayed gastric emptying  03/26/2014     Gastroesophageal reflux disease  12/13/2013     Degeneration of intervertebral disc of lumbar region  01/15/2010      History of artificial joint  04/24/2008     HLD (hyperlipidemia)  07/22/2004     Overview:   IMO Update 10/11      ,   Past Surgical History:      Procedure  Laterality Date     APPENDECTOMY       Arthroscopic debridement and subacromial decompression, left shoulder Left 1/31/07     CARDIAC CATHETERIZATION  2004    mild disease       CARPAL TUNNEL RELEASE Bilateral 2006     CATARACT EXTRACTION W/  INTRAOCULAR LENS IMPLANT Bilateral 1996     COLONOSCOPY  2003,2011    normal       ESOPHAGOGASTRODUODENOSCOPY  2/3/2016            KNEE ARTHROSCOPY Right 2007    Joseluis Thomas M.D.        LAP CHOLECYSTECTOMY  3/11/01    Dr. Salas       Lumbar L4-5, L5-S1 DIANNE Left 06/05/2017    Dr. Joiner, [facility]       [facility] ERCP BILIARY OR PANC DUCT STENT EXCHANGE W DIL AND WIRE  2001    stent removed.        TONSILLECTOMY      age 12       TOTAL KNEE ARTHROPLASTY Right 02/27/2008      TOTAL KNEE ARTHROPLASTY Left 2010    Dr. Carson       TUBAL LIGATION      and   Social History     Substance Use Topics       Smoking status: Never Smoker     Smokeless tobacco: Never Used     Alcohol use No     OBJECTIVE:  /74 (Cuff Site: Right Arm, Position: Sitting, Cuff Size: Adult Large)  Pulse 80  Wt 81.2 kg (179 lb)  Breastfeeding? No  BMI 31.96 kg/m2   EXAM:  Alert and cooperative, no distress. Examination of the left foot reveals some arthritic changes but. She has a ridged onychomycotic great toenail on the left with some tunnel deformity laterally.Normal sensory testing with #10 monofilament. Back exam is unchanged.    Results for orders placed or performed in visit on 01/23/18      HEMOGLOBIN A1C MONITORING (POCT)      Result  Value Ref Range    HEMOGLOBIN A1C MONITORING (POCT) 7.2 (H) 4.0 - 6.2 %    ESTIMATED AVERAGE GLUCOSE  160 mg/dL   COMP METABOLIC PANEL      Result  Value Ref Range    SODIUM 138 133 - 143 mmol/L    POTASSIUM 4.1 3.5 - 5.1 mmol/L    CHLORIDE 105 98 - 107 mmol/L    CO2,TOTAL 25 21 - 31 mmol/L    ANION GAP 8 5 - 18                     GLUCOSE 181 (H) 70 - 105 mg/dL    CALCIUM 8.8 8.6 - 10.3 mg/dL    BUN 12 7 - 25 mg/dL    CREATININE 0.65 (L) 0.70 - 1.30 mg/dL    BUN/CREAT RATIO           18                    GFR if African American >60 >60 ml/min/1.73m2    GFR if not African American >60 >60 ml/min/1.73m2    ALBUMIN 4.1 3.5 - 5.7 g/dL    PROTEIN,TOTAL 7.5 6.4 - 8.9 g/dL    GLOBULIN                  3.4 2.0 - 3.7 g/dL    A/G RATIO 1.2 1.0 - 2.0                    BILIRUBIN,TOTAL 0.3 0.3 - 1.0 mg/dL    ALK PHOSPHATASE 55 34 - 104 IU/L    ALT (SGPT) 17 7 - 52 IU/L    AST (SGOT) 17 13 - 39 IU/L   LIPID PANEL      Result  Value Ref Range    CHOLESTEROL,TOTAL 208 (H) <200 mg/dL    TRIGLYCERIDES 257 (H) <150 mg/dL    HDL CHOLESTEROL 35 23 - 92 mg/dL    NON-HDL CHOLESTEROL 173 (H) <145 mg/dl    CHOL/HDL RATIO            5.94 (H) <4.50                    LDL CHOLESTEROL 122 (H) <100 mg/dL    PROVIDER ORDERED STATUS RANDOM       ASSESSMENT/Plan :      Maliha was seen today for follow up.    Diagnoses and all orders for this visit:    Controlled type 2 diabetes mellitus without complication, without long-term current use of insulin (HC)  -     HEMOGLOBIN A1C MONITORING (POCT); Future  -     COMP METABOLIC PANEL; Future  -     LIPID PANEL; Future  -     HEMOGLOBIN A1C MONITORING (POCT)  -     COMP METABOLIC PANEL  -     LIPID PANEL    Degeneration of intervertebral disc of lumbar region    Lumbar radiculopathy  -     AMB CONSULT TO SPINE SURGEON; Future  -     gabapentin (NEURONTIN) 300 mg capsule; Take 1 capsule by mouth 2 times daily.    Ingrown left big toenail  -     AMB CONSULT TO PODIATRY/ANKLE AND FOOT SURGERY; Future    Arthritis of left foot  -     AMB CONSULT TO PODIATRY/ANKLE AND FOOT SURGERY; Future    Neuropathic pain  -     gabapentin (NEURONTIN) 300 mg capsule; Take 1 capsule by mouth 2 times daily.    Hypertension  -     lisinopril (PRINIVIL; ZESTRIL) 10 mg tablet; Take 1 tablet by mouth once daily.      Will notify of lab  results when available. Discussed diet, exercise and healthy lifestyle changes. We'll begin lisinopril 10 mg daily for hypertension. Discussed low salt diet.    Encouraged her to check her blood sugars regularly. She has reasonable control with diet alone but we did discuss starting metformin. Recommended beginning an aspirin today. She has had an intolerance to statins in the past but might consider that again in the future with perhaps something like pravastatin which might be better tolerated. Since her starting 2 new medications this time, we'll discuss at her next visit.    Referred back to podiatry for evaluation of her toenail and consideration of other intervention for her foot as the injections that she Did not seem to be helpful.    She had no help from the injections into her lumbar spine and so she is interested in proceeding with consultation with the spine surgeon. She requests a referral to the [facility] and that is arranged today and referral is made.    A total of 25 minutes was spent with the patient, greater than 50% of the time was spent in counseling/discussion of the aforementioned concerns.     Nicanor Mike MD

## 2018-02-13 NOTE — NURSING NOTE
Patient Information     Patient Name MRN Maliha Chamberlain [id number] Female 1940      Nursing Note by Olivia Jean-Baptiste at 2018 11:30 AM     Author:  Olivia Jean-Baptiste Service:  (none) Author Type:  (none)     Filed:  2018 11:53 AM Encounter Date:  2018 Status:  Signed     :  Olivia Jean-Baptiste            She is here today to follow up on her BP.  Olivia Jean-Baptiste LPN..................2018   11:49 AM    Previous A1C is at goal of <8  HEMOGLOBIN A1C MONITORING (POCT) (%)    Date Value   2018 7.2 (H)     Urine microalbumin:creatine: 3/16/2017  Foot exam 2018  Eye exam 2017    Tobacco User no  Patient is on a daily aspirin  Patient is not on a Statin.  Blood pressure today of 126/62 is at the goal of <139/89 for diabetics.    Olivia Jean-Baptiste LPN..............2018 11:52 AM

## 2018-02-13 NOTE — TELEPHONE ENCOUNTER
Patient Information     Patient Name MRN Maliha Chamberlain [id number] Female 1940      Telephone Encounter by Sofia Novoa at 2018 10:43 AM     Author:  Sofia Novoa Service:  (none) Author Type:  (none)     Filed:  2018 10:56 AM Encounter Date:  2018 Status:  Signed     :  Sofia Novoa            Pt is wondering if Dr. Mike can recommend someone that removes toenails. Please advise.  Sofia Novoa

## 2018-02-13 NOTE — TELEPHONE ENCOUNTER
Patient Information     Patient Name MRN Sex Maliha Medley [id number] Female 1940      Telephone Encounter by Sofia Novoa at 2018  1:02 PM     Author:  Sofia Novoa Service:  (none) Author Type:  (none)     Filed:  2018  1:03 PM Encounter Date:  2018 Status:  Signed     :  Sofia Novoa            After verifying patients name and date of birth with pt. Pt was notified of message below.  Sofia Novoa

## 2018-02-13 NOTE — TELEPHONE ENCOUNTER
Patient Information     Patient Name MRN Sex Maliha Medley [id number] Female 1940      Telephone Encounter by Nicanor Mike MD at 2018 11:50 AM     Author:  Nicanor Mike MD Service:  (none) Author Type:  Physician     Filed:  2018 11:50 AM Encounter Date:  2018 Status:  Signed     :  Nicanor Mike MD (Physician)            [facility] in [place].  Nicanor Mike MD ....................  2018   11:50 AM

## 2018-02-14 ENCOUNTER — TELEPHONE (OUTPATIENT)
Dept: FAMILY MEDICINE | Facility: OTHER | Age: 78
End: 2018-02-14

## 2018-02-14 NOTE — TELEPHONE ENCOUNTER
The patient was given an appointment on Feb 22 nd.  Olivia Jean-Baptiste LPN..................2/14/2018   3:39 PM

## 2018-02-14 NOTE — TELEPHONE ENCOUNTER
[facility]- Patient requesting a work in appointment for preop for surgery on 2/28/28. [place]    Abimbola Amado

## 2018-02-20 ENCOUNTER — DOCUMENTATION ONLY (OUTPATIENT)
Dept: FAMILY MEDICINE | Facility: OTHER | Age: 78
End: 2018-02-20

## 2018-02-22 ENCOUNTER — OFFICE VISIT (OUTPATIENT)
Dept: FAMILY MEDICINE | Facility: OTHER | Age: 78
End: 2018-02-22
Attending: FAMILY MEDICINE
Payer: MEDICARE

## 2018-02-22 VITALS
RESPIRATION RATE: 18 BRPM | TEMPERATURE: 98.6 F | HEIGHT: 63 IN | BODY MASS INDEX: 31.36 KG/M2 | DIASTOLIC BLOOD PRESSURE: 84 MMHG | WEIGHT: 177 LBS | HEART RATE: 60 BPM | SYSTOLIC BLOOD PRESSURE: 124 MMHG | OXYGEN SATURATION: 99 %

## 2018-02-22 DIAGNOSIS — E11.9 CONTROLLED TYPE 2 DIABETES MELLITUS WITHOUT COMPLICATION, WITHOUT LONG-TERM CURRENT USE OF INSULIN (H): ICD-10-CM

## 2018-02-22 DIAGNOSIS — Z01.818 PRE-OP EXAM: Primary | ICD-10-CM

## 2018-02-22 DIAGNOSIS — M51.369 DEGENERATION OF INTERVERTEBRAL DISC OF LUMBAR REGION: ICD-10-CM

## 2018-02-22 DIAGNOSIS — K21.9 GASTROESOPHAGEAL REFLUX DISEASE WITHOUT ESOPHAGITIS: ICD-10-CM

## 2018-02-22 DIAGNOSIS — Z01.812 ENCOUNTER FOR PREPROCEDURAL LABORATORY EXAMINATION: ICD-10-CM

## 2018-02-22 DIAGNOSIS — E78.5 HYPERLIPIDEMIA, UNSPECIFIED HYPERLIPIDEMIA TYPE: ICD-10-CM

## 2018-02-22 DIAGNOSIS — D68.9 COAGULATION DEFECT (H): ICD-10-CM

## 2018-02-22 LAB
ALBUMIN UR-MCNC: NEGATIVE MG/DL
ANION GAP SERPL CALCULATED.3IONS-SCNC: 9 MMOL/L (ref 3–14)
APPEARANCE UR: CLEAR
APTT PPP: 31 SEC (ref 26–39)
BACTERIA #/AREA URNS HPF: ABNORMAL /HPF
BASOPHILS # BLD AUTO: 0.1 10E9/L (ref 0–0.2)
BASOPHILS NFR BLD AUTO: 0.9 %
BILIRUB UR QL STRIP: NEGATIVE
BUN SERPL-MCNC: 12 MG/DL (ref 7–25)
CALCIUM SERPL-MCNC: 9.8 MG/DL (ref 8.6–10.3)
CHLORIDE SERPL-SCNC: 104 MMOL/L (ref 98–107)
CO2 SERPL-SCNC: 28 MMOL/L (ref 21–31)
COLOR UR AUTO: YELLOW
CREAT SERPL-MCNC: 0.58 MG/DL (ref 0.6–1.2)
DIFFERENTIAL METHOD BLD: NORMAL
EOSINOPHIL # BLD AUTO: 0.2 10E9/L (ref 0–0.7)
EOSINOPHIL NFR BLD AUTO: 4 %
ERYTHROCYTE [DISTWIDTH] IN BLOOD BY AUTOMATED COUNT: 12.6 % (ref 10–15)
GFR SERPL CREATININE-BSD FRML MDRD: >90 ML/MIN/1.7M2
GLUCOSE SERPL-MCNC: 115 MG/DL (ref 70–105)
GLUCOSE UR STRIP-MCNC: NEGATIVE MG/DL
HCT VFR BLD AUTO: 37.2 % (ref 35–47)
HGB BLD-MCNC: 12.9 G/DL (ref 11.7–15.7)
HGB UR QL STRIP: NEGATIVE
IMM GRANULOCYTES # BLD: 0 10E9/L (ref 0–0.4)
IMM GRANULOCYTES NFR BLD: 0.2 %
INR PPP: 1.05 (ref 0–1.3)
KETONES UR STRIP-MCNC: NEGATIVE MG/DL
LEUKOCYTE ESTERASE UR QL STRIP: ABNORMAL
LYMPHOCYTES # BLD AUTO: 2 10E9/L (ref 0.8–5.3)
LYMPHOCYTES NFR BLD AUTO: 36.5 %
MCH RBC QN AUTO: 29.8 PG (ref 26.5–33)
MCHC RBC AUTO-ENTMCNC: 34.7 G/DL (ref 31.5–36.5)
MCV RBC AUTO: 86 FL (ref 78–100)
MONOCYTES # BLD AUTO: 0.4 10E9/L (ref 0–1.3)
MONOCYTES NFR BLD AUTO: 7.6 %
NEUTROPHILS # BLD AUTO: 2.8 10E9/L (ref 1.6–8.3)
NEUTROPHILS NFR BLD AUTO: 50.8 %
NITRATE UR QL: NEGATIVE
PH UR STRIP: 5.5 PH (ref 5–7)
PLATELET # BLD AUTO: 188 10E9/L (ref 150–450)
POTASSIUM SERPL-SCNC: 4 MMOL/L (ref 3.5–5.1)
RBC # BLD AUTO: 4.33 10E12/L (ref 3.8–5.2)
RBC #/AREA URNS AUTO: ABNORMAL /HPF
SODIUM SERPL-SCNC: 141 MMOL/L (ref 134–144)
SOURCE: ABNORMAL
SP GR UR STRIP: 1.01 (ref 1–1.03)
UROBILINOGEN UR STRIP-ACNC: 0.2 EU/DL (ref 0.2–1)
WBC # BLD AUTO: 5.5 10E9/L (ref 4–11)
WBC #/AREA URNS AUTO: ABNORMAL /HPF

## 2018-02-22 PROCEDURE — G0463 HOSPITAL OUTPT CLINIC VISIT: HCPCS | Mod: 25

## 2018-02-22 PROCEDURE — 93005 ELECTROCARDIOGRAM TRACING: CPT | Performed by: FAMILY MEDICINE

## 2018-02-22 PROCEDURE — 85610 PROTHROMBIN TIME: CPT | Performed by: FAMILY MEDICINE

## 2018-02-22 PROCEDURE — 99214 OFFICE O/P EST MOD 30 MIN: CPT | Mod: 25 | Performed by: FAMILY MEDICINE

## 2018-02-22 PROCEDURE — 85730 THROMBOPLASTIN TIME PARTIAL: CPT | Performed by: FAMILY MEDICINE

## 2018-02-22 PROCEDURE — 80048 BASIC METABOLIC PNL TOTAL CA: CPT | Performed by: FAMILY MEDICINE

## 2018-02-22 PROCEDURE — 81001 URINALYSIS AUTO W/SCOPE: CPT | Performed by: FAMILY MEDICINE

## 2018-02-22 PROCEDURE — 85025 COMPLETE CBC W/AUTO DIFF WBC: CPT | Performed by: FAMILY MEDICINE

## 2018-02-22 PROCEDURE — 36415 COLL VENOUS BLD VENIPUNCTURE: CPT | Performed by: FAMILY MEDICINE

## 2018-02-22 PROCEDURE — 93010 ELECTROCARDIOGRAM REPORT: CPT | Performed by: FAMILY MEDICINE

## 2018-02-22 RX ORDER — ASPIRIN 81 MG/1
81 TABLET ORAL
COMMUNITY
Start: 2018-01-23 | End: 2020-02-19

## 2018-02-22 RX ORDER — LANCETS 28 GAUGE
EACH MISCELLANEOUS
Status: ON HOLD | COMMUNITY
Start: 2018-01-24 | End: 2023-07-19

## 2018-02-22 RX ORDER — GABAPENTIN 300 MG/1
300 CAPSULE ORAL
COMMUNITY
Start: 2018-01-23 | End: 2018-12-05

## 2018-02-22 RX ORDER — LISINOPRIL 5 MG/1
5 TABLET ORAL
COMMUNITY
Start: 2018-02-01 | End: 2018-04-18

## 2018-02-22 RX ORDER — INSULIN PUMP SYRINGE, 3 ML
EACH MISCELLANEOUS
Status: ON HOLD | COMMUNITY
Start: 2018-01-24 | End: 2023-07-19

## 2018-02-22 ASSESSMENT — PAIN SCALES - GENERAL: PAINLEVEL: MODERATE PAIN (5)

## 2018-02-22 NOTE — Clinical Note
Marko France MD  [facility].  [facility] FAX:[phone]

## 2018-02-22 NOTE — PROGRESS NOTES
"Nursing Notes:   Olivia Jean-Baptiste LPN  2/22/2018 11:22 AM  Signed  Date of Surgery: 02/28/2018  Type of Surgery: Decompression levels L4-S1  Surgeon: Dr France  Hospital:  [facility]  Fax: [phone]    Fever/Chills or other infectious symptoms in pastmonth: no  >10lb weight loss in past two months: no    Health Care Directive/Code status:  no  Hx of blood transfusions:    no   Td up to date:  yes  History of VRE/MRSA:   no     Preoperative Evaluation: Obstructive Sleep Apnea screening    S:Snore -  Do you snore loudly? (louder than talking or loud enough to be heard through closed doors) yes  T: Tired - Do you often feel tired, fatigued, or sleepy during the daytime? yes  O: Observed - Has anyone ever observed you stop breathing during your sleep? no  P: Pressure - Do you have or are you being treated for high blood pressure? yes  B: BMI - BMI greater than 35kg/m2? no  A: Age - Age over 50 years old? yes  N: Neck - Neck circumference greater than 40 cm? no  G: Gender - Gender: Male? no    Total number of \"YES\" responses:  4    Scoring: Low risk of GRADY 0-2  At Risk of GRADY: >3 High Risk of GRADY: 5-8    Olivia Jean-Baptiste LPN..................2/22/2018   11:13 AM      Previous A1C is at goal of <8  No results found for: A1C  Urine microalbumin:creatine: 03/16/2017  Foot exam 01/23/2018  Eye exam 03/23/2017    Tobacco User no  Patient is on a daily aspirin  Patient is not on a Statin.  Blood pressure today of BP:    HR:  is at the goal of <139/89 for diabetics.    Olivia Jean-Baptiste LPN..................2/22/2018   11:19 AM              ----------------- PREOPERATIVE EXAM ------------------  2/22/2018    SUBJECTIVE:  Maliha Rivera is a 77 year old female here for preoperative optimization.    Preoperative risk assessment consultationwas requested on Maliha Rivera  by Marko France MD  prior to lumbar decompression L4-S1.   This is scheduled for February 28, 2018 at [facility]. I am asked to " see this patient for preoperative clearance prior to this procedure.     She has a history of lumbar foraminal stenosis causing left-sided radiculopathy she has had multiple injections.  She was seen at [facility] and Dr. France has recommended decompression from L4-S1.    She has type 2 diabetes it is reasonably well-controlled with a hemoglobin A1c of 7.2% done a month ago. She has hypertension. She has stopped aspirin.     Patient Active Problem List    Diagnosis Date Noted     Controlled diabetes mellitus type II without complication (H) 09/22/2015     Priority: Medium     Overview:   Overview:   3/2014:  A1c fine off metformin - will discontinue as patient does not want to take it.  She is taking fish oil + other ingredients she states helps with DM  On ASA  Patient does not want to take lisinopril  Does not tolerate statins and has discontinued pravastatin; try niacin  No retinopathy on eye exam 4/2013 and 12/2014       Neurofibroma of thigh 09/22/2015     Priority: Medium     Delayed gastric emptying 03/26/2014     Priority: Medium     Gastroesophageal reflux disease 12/13/2013     Priority: Medium     Degeneration of intervertebral disc of lumbar region 01/15/2010     Priority: Medium     History of artificial joint 04/24/2008     Priority: Medium     HLD (hyperlipidemia) 07/22/2004     Priority: Medium     Overview:   Overview:   IMO Update 10/11         Past Medical History:   Diagnosis Date     Encounter for screening for cardiovascular disorders     4/8/15,Lexiscan cardiolite [facility]       Past Surgical History:   Procedure Laterality Date     APPENDECTOMY OPEN      No Comments Provided     ARTHROSCOPY KNEE      2007,Joseluis Thomas M.D.     COLONOSCOPY      2003,2011,normal     ESOPHAGOSCOPY, GASTROSCOPY, DUODENOSCOPY (EGD), COMBINED      2/3/2016     LAPAROSCOPIC CHOLECYSTECTOMY      3/11/01,Dr. Salas     LAPAROSCOPIC TUBAL LIGATION      No Comments Provided     OTHER SURGICAL  HISTORY      1996,[id number],CATARACT EXTRACTION W/  INTRAOCULAR LENS IMPLANT,Bilateral     OTHER SURGICAL HISTORY      2001,55624.0,[facility] ERCP BILIARY OR PANC DUCT STENT EXCHANGE W DIL AND WIRE,stent removed.     OTHER SURGICAL HISTORY      2004,[id number],CARDIAC CATHETERIZATION,mild disease     OTHER SURGICAL HISTORY      1/31/07,[id number],OTHER,Left     OTHER SURGICAL HISTORY      02/27/2008,[id number],TOTAL KNEE ARTHROPLASTY,Right     OTHER SURGICAL HISTORY      2010,[id number],TOTAL KNEE ARTHROPLASTY,Left,Dr. Carson     OTHER SURGICAL HISTORY      06/05/2017,[id number],OTHER,Left,Dr. Joiner, [facility]     RELEASE CARPAL TUNNEL      2006     TONSILLECTOMY      age 12       Current Outpatient Prescriptions   Medication Sig Dispense Refill     aspirin EC 81 MG EC tablet Take 81 mg by mouth       blood glucose monitoring (FREESTYLE LITE) test strip Dispense item covered by pt ins. E11.9 NIDDM type II - Test 1 time/day       Blood Glucose Monitoring Suppl (FIFTY50 GLUCOSE METER 2.0) W/DEVICE KIT Dispense meter, test strips, lancets covered by pt ins. E11.9 NIDDM type II - Test 1 time/day       gabapentin (NEURONTIN) 300 MG capsule Take 300 mg by mouth       blood glucose monitoring (FREESTYLE) lancets As directed. Dispense item covered by pt ins. E11.9 NIDDM type II - Test 1 time/day       lisinopril (PRINIVIL/ZESTRIL) 5 MG tablet Take 5 mg by mouth       meloxicam (MOBIC) 15 MG tablet Take 1 tablet by mouth daily       COMPRESSION STOCKINGS For personal use. Length: calf Strength: 16-20 mmHg Please measure.       omeprazole (PRILOSEC) 40 MG capsule Take 1 capsule by mouth daily       PSYLLIUM PO        Recent use of: aspirin    Allergies:  Allergies   Allergen Reactions     Atorvastatin Muscle Pain (Myalgia)     Latex allergy  No    Family History   Problem Relation Age of Onset     HEART DISEASE Mother      Heart Disease     HEART DISEASE Father      Heart Disease     HEART DISEASE Sister      Heart Disease     CANCER Other       "Cancer,female cancer       Denies family hx of bleeding tendencies,anesthesia complications, or other problems with surgery.    Social History   Substance Use Topics     Smoking status: Never Smoker     Smokeless tobacco: Never Used     Alcohol use No       ROS:    Surgical:  patient denies previous complications from prior surgeries including but not limited to prolonged bleeding, anesthesiacomplications, dysrhythmias, surgical wound infections, or prolonged hospital stay.       -------------------------------------------------------------    PHYSICAL EXAM:  /84 (BP Location: Right arm, Patient Position: Sitting, Cuff Size: Adult Large)  Pulse 60  Temp 98.6  F (37  C) (Tympanic)  Resp 18  Ht 5' 2.5\" (1.588 m)  Wt 177 lb (80.3 kg)  SpO2 99%  Breastfeeding? No  BMI 31.86 kg/m2    EXAM:  GeneralAppearance: Pleasant, alert, appropriate appearance for age. No acute distress  Head Exam: Normal. Normocephalic, atraumatic.  Eyes: PERRL, EOMI  Ears: Normal TM's bilaterally. Normal auditory canals and externalears.   OroPharynx: Dental hygiene adequate. Normal buccal mucosa. Normal pharynx.  Neck: Supple, no masses or nodes, no lymphadenopathy.  No thyromegaly.  Lungs: Normal chest wall and respirations. Clear toauscultation, no wheezes or crackles.  Cardiovascular: Regular rate and rhythm. S1, S2, no murmurs.  Gastrointestinal: Soft, nontender, no abnormal masses or organomegaly. BS normal   Musculoskeletal: No edema.  Skin: no concerning or new rashes.  Neurologic Exam: CN 2-12 grossly intact.  Normal gait. normal gross motor movement, tone, and coordination. No tremor.  Psychiatric Exam: Alert and oriented,appropriate affect.      EKG: Normal sinus rhythm with a rate of 65.  Old Q waves noted in 3 and aVF but no acute ST or T-wave changes noted.  Normal axis and intervals.  ---------------------------------------------------------------  Results for orders placed or performed in visit on 02/22/18   CBC with " platelets differential   Result Value Ref Range    WBC 5.5 4.0 - 11.0 10e9/L    RBC Count 4.33 3.8 - 5.2 10e12/L    Hemoglobin 12.9 11.7 - 15.7 g/dL    Hematocrit 37.2 35.0 - 47.0 %    MCV 86 78 - 100 fl    MCH 29.8 26.5 - 33.0 pg    MCHC 34.7 31.5 - 36.5 g/dL    RDW 12.6 10.0 - 15.0 %    Platelet Count 188 150 - 450 10e9/L    Diff Method Automated Method     % Neutrophils 50.8 %    % Lymphocytes 36.5 %    % Monocytes 7.6 %    % Eosinophils 4.0 %    % Basophils 0.9 %    % Immature Granulocytes 0.2 %    Absolute Neutrophil 2.8 1.6 - 8.3 10e9/L    Absolute Lymphocytes 2.0 0.8 - 5.3 10e9/L    Absolute Monocytes 0.4 0.0 - 1.3 10e9/L    Absolute Eosinophils 0.2 0.0 - 0.7 10e9/L    Absolute Basophils 0.1 0.0 - 0.2 10e9/L    Abs Immature Granulocytes 0.0 0 - 0.4 10e9/L   Basic metabolic panel   Result Value Ref Range    Sodium 141 134 - 144 mmol/L    Potassium 4.0 3.5 - 5.1 mmol/L    Chloride 104 98 - 107 mmol/L    Carbon Dioxide 28 21 - 31 mmol/L    Anion Gap 9 3 - 14 mmol/L    Glucose 115 (H) 70 - 105 mg/dL    Urea Nitrogen 12 7 - 25 mg/dL    Creatinine 0.58 (L) 0.60 - 1.20 mg/dL    GFR Estimate >90 >60 mL/min/1.7m2    GFR Estimate If Black >90 >60 mL/min/1.7m2    Calcium 9.8 8.6 - 10.3 mg/dL   *UA reflex to Microscopic   Result Value Ref Range    Color Urine Yellow     Appearance Urine Clear     Glucose Urine Negative NEG^Negative mg/dL    Bilirubin Urine Negative NEG^Negative    Ketones Urine Negative NEG^Negative mg/dL    Specific Gravity Urine 1.015 1.003 - 1.035    Blood Urine Negative NEG^Negative    pH Urine 5.5 5.0 - 7.0 pH    Protein Albumin Urine Negative NEG^Negative mg/dL    Urobilinogen Urine 0.2 0.2 - 1.0 EU/dL    Nitrite Urine Negative NEG^Negative    Leukocyte Esterase Urine Small (A) NEG^Negative    Source Midstream Urine    Partial thromboplastin time   Result Value Ref Range    PTT 31 26 - 39 sec   INR   Result Value Ref Range    INR 1.05 0 - 1.3   Urine Microscopic   Result Value Ref Range    WBC  Urine O - 2 OTO2^O - 2 /HPF    RBC Urine O - 2 OTO2^O - 2 /HPF    Bacteria Urine Few (A) NEG^Negative /HPF       ASSESSEMENT AND PLAN:    No family history of problems with bleeding or anesthetia. Patient is able to tolerate greater than 4 METs of activity without any cardiopulmonary symptoms. ASA PS class 2 and nocardiopulmonary workup is neccessary for the current procedure. Please contact the office with any questions or concerns.    Diagnoses and all orders for this visit:    Pre-op exam  -     CBC with platelets differential  -     Basic metabolic panel  -     EKG 12-lead, tracing only  -     *UA reflex to Microscopic  -     Urine Microscopic    Degeneration of intervertebral disc of lumbar region    Controlled type 2 diabetes mellitus without complication, without long-term current use of insulin (H)  -     CBC with platelets differential  -     Basic metabolic panel  -     EKG 12-lead, tracing only  -     *UA reflex to Microscopic    Gastroesophageal reflux disease without esophagitis    Hyperlipidemia, unspecified hyperlipidemia type    Encounter for preprocedural laboratory examination  -     Partial thromboplastin time  -     INR    Coagulation defect (H)   -     Partial thromboplastin time  -     INR    Other orders  -     Cancel: Hemoglobin A1c; Future        PRE OPRECOMMENDATIONS:  Patient is on chronic pain medications NO;   Patient is onantiplatlet/anticoagulation aspirin  Other medications that need adjustment perioperatively YES.    Stop aspirin 1 week prior to surgery.  OK to take regularmedications with a sip of water the  morning of surgery.     Other:  Patient was advised to call our office and the surgical services with any change in condition or new symptoms if they were to develop between today and their surgical date.  Especially any cardiopulmonarysymptoms or symptoms concerning for an infection.          Nicanor Mike

## 2018-02-22 NOTE — LETTER
February 26, 2018      Maliha Rivera  [address] [place] [id number]        Dear Maliha,     Your lab tests were all fine.  Your complete blood count and urinalysis are normal. Your comprehensive metabolic panel (a test that looks at liver and kidney function, blood sugar, electrolytes, and nutritional status) was normal. Your coagulation tests requested by the surgeon were fine.    It was a pleasure seeing you the other day.  If you have any questions, please don't hesitate to call us.       Sincerely,        Nicanor Mike MD                                      Results for orders placed or performed in visit on 02/22/18   CBC with platelets differential   Result Value Ref Range    WBC 5.5 4.0 - 11.0 10e9/L    RBC Count 4.33 3.8 - 5.2 10e12/L    Hemoglobin 12.9 11.7 - 15.7 g/dL    Hematocrit 37.2 35.0 - 47.0 %    MCV 86 78 - 100 fl    MCH 29.8 26.5 - 33.0 pg    MCHC 34.7 31.5 - 36.5 g/dL    RDW 12.6 10.0 - 15.0 %    Platelet Count 188 150 - 450 10e9/L    Diff Method Automated Method     % Neutrophils 50.8 %    % Lymphocytes 36.5 %    % Monocytes 7.6 %    % Eosinophils 4.0 %    % Basophils 0.9 %    % Immature Granulocytes 0.2 %    Absolute Neutrophil 2.8 1.6 - 8.3 10e9/L    Absolute Lymphocytes 2.0 0.8 - 5.3 10e9/L    Absolute Monocytes 0.4 0.0 - 1.3 10e9/L    Absolute Eosinophils 0.2 0.0 - 0.7 10e9/L    Absolute Basophils 0.1 0.0 - 0.2 10e9/L    Abs Immature Granulocytes 0.0 0 - 0.4 10e9/L   Basic metabolic panel   Result Value Ref Range    Sodium 141 134 - 144 mmol/L    Potassium 4.0 3.5 - 5.1 mmol/L    Chloride 104 98 - 107 mmol/L    Carbon Dioxide 28 21 - 31 mmol/L    Anion Gap 9 3 - 14 mmol/L    Glucose 115 (H) 70 - 105 mg/dL    Urea Nitrogen 12 7 - 25 mg/dL    Creatinine 0.58 (L) 0.60 - 1.20 mg/dL    GFR Estimate >90 >60 mL/min/1.7m2    GFR Estimate If Black >90 >60 mL/min/1.7m2    Calcium 9.8 8.6 - 10.3 mg/dL   *UA reflex to Microscopic   Result Value Ref Range    Color Urine Yellow      Appearance Urine Clear     Glucose Urine Negative NEG^Negative mg/dL    Bilirubin Urine Negative NEG^Negative    Ketones Urine Negative NEG^Negative mg/dL    Specific Gravity Urine 1.015 1.003 - 1.035    Blood Urine Negative NEG^Negative    pH Urine 5.5 5.0 - 7.0 pH    Protein Albumin Urine Negative NEG^Negative mg/dL    Urobilinogen Urine 0.2 0.2 - 1.0 EU/dL    Nitrite Urine Negative NEG^Negative    Leukocyte Esterase Urine Small (A) NEG^Negative    Source Midstream Urine    Partial thromboplastin time   Result Value Ref Range    PTT 31 26 - 39 sec   INR   Result Value Ref Range    INR 1.05 0 - 1.3   Urine Microscopic   Result Value Ref Range    WBC Urine O - 2 OTO2^O - 2 /HPF    RBC Urine O - 2 OTO2^O - 2 /HPF    Bacteria Urine Few (A) NEG^Negative /HPF   EKG 12-lead, tracing only    Narrative    EKG Interpretation:   Rhythm: Sinus  Rate: 65  Axis: Normal  Conduction: Normal  QRS: Q waves in lead III, aVF  ST Segments: Normal  T-wave: Normal  Chambers: Normal  PACs Present: No  PVCs Present: No    Impression:   Abnormal EKG.  Inferior infarct pattern.  Comparison unavailable.    Signed, Duran Meza MD  Internal Medicine & Pediatrics  2/23/2018  4:14 PM

## 2018-02-22 NOTE — MR AVS SNAPSHOT
"              After Visit Summary   2/22/2018    Maliha Rivera    MRN: [id number]           Patient Information     Date Of Birth          1940        Visit Information        Provider Department      2/22/2018 11:00 AM Nicanor Mike MD [facility]        Today's Diagnoses     Pre-op exam    -  1    Degeneration of intervertebral disc of lumbar region        Controlled type 2 diabetes mellitus without complication, without long-term current use of insulin (H)        Gastroesophageal reflux disease without esophagitis        Hyperlipidemia, unspecified hyperlipidemia type        Encounter for preprocedural laboratory examination        Coagulation defect (H)            Follow-ups after your visit        Who to contact     If you have questions or need follow up information about today's clinic visit or your schedule please contact [facility] directly at [phone].  Normal or non-critical lab and imaging results will be communicated to you by OrbFlexhart, letter or phone within 4 business days after the clinic has received the results. If you do not hear from us within 7 days, please contact the clinic through MyChart or phone. If you have a critical or abnormal lab result, we will notify you by phone as soon as possible.  Submit refill requests through [employer] or call your pharmacy and they will forward the refill request to us. Please allow 3 business days for your refill to be completed.          Additional Information About Your Visit        MyChart Information     [employer] lets you send messages to your doctor, view your test results, renew your prescriptions, schedule appointments and more. To sign up, go to [email or web address] . Click on \"Log in\" on the left side of the screen, which will take you to the Welcome page. Then click on \"Sign up Now\" on the right side of the page.     You will be asked to enter the access code listed below, as well as some " "personal information. Please follow the directions to create your username and password.     Your access code is: [email or web address]  Expires: 2018  1:04 PM     Your access code will  in 90 days. If you need help or a new code, please call your [place] clinic or [phone].        Care EveryWhere ID     This is your Care EveryWhere ID. This could be used by other organizations to access your [place] medical records  [id number]        Your Vitals Were     Pulse Temperature Respirations Height Pulse Oximetry Breastfeeding?    60 98.6  F (37  C) (Tympanic) 18 5' 2.5\" (1.588 m) 99% No    BMI (Body Mass Index)                   31.86 kg/m2            Blood Pressure from Last 3 Encounters:   18 124/84   18 126/62   18 172/74    Weight from Last 3 Encounters:   18 177 lb (80.3 kg)   18 176 lb 9.6 oz (80.1 kg)   18 179 lb (81.2 kg)              We Performed the Following     *UA reflex to Microscopic     Basic metabolic panel     CBC with platelets differential     EKG 12-lead, tracing only     INR     Partial thromboplastin time     Urine Microscopic        Primary Care Provider Office Phone # Fax #    Nicanor DON MD Rashid [phone] [address] [facility] [address]        Equal Access to Services     [facility]: Hadii aad humberto hadasho Soenrique, waaxda luqadaha, qaybta kaalmada tiny, henry lentz . So [facility] [phone].    ATENCIÓN: Si habla español, tiene a khan disposición servicios gratuitos de asistencia lingüística. Llame al [phone].    We comply with applicable federal civil rights laws and [place] laws. We do not discriminate on the basis of race, color, national origin, age, disability, sex, sexual orientation, or gender identity.            Thank you!     Thank you for choosing [facility] AND [facility]  for your care. Our goal is always to provide you with excellent care. Hearing back from " our patients is one way we can continue to improve our services. Please take a few minutes to complete the written survey that you may receive in the mail after your visit with us. Thank you!             Your Updated Medication List - Protect others around you: Learn how to safely use, store and throw away your medicines at www.disposemymeds.org.          This list is accurate as of 2/22/18  1:04 PM.  Always use your most recent med list.                   Brand Name Dispense Instructions for use Diagnosis    aspirin EC 81 MG EC tablet      Take 81 mg by mouth        blood glucose monitoring lancets      As directed. Dispense item covered by pt ins. E11.9 NIDDM type II - Test 1 time/day        COMPRESSION STOCKINGS      For personal use. Length: calf Strength: 16-20 mmHg Please measure.        FIFTY50 GLUCOSE METER 2.0 W/DEVICE Kit      Dispense meter, test strips, lancets covered by pt ins. E11.9 NIDDM type II - Test 1 time/day        FREESTYLE LITE test strip   Generic drug:  blood glucose monitoring      Dispense item covered by pt ins. E11.9 NIDDM type II - Test 1 time/day        gabapentin 300 MG capsule    NEURONTIN     Take 300 mg by mouth        lisinopril 5 MG tablet    PRINIVIL/ZESTRIL     Take 5 mg by mouth        meloxicam 15 MG tablet    MOBIC     Take 1 tablet by mouth daily        omeprazole 40 MG capsule    priLOSEC     Take 1 capsule by mouth daily        PSYLLIUM PO

## 2018-02-22 NOTE — NURSING NOTE
"Date of Surgery: 02/28/2018  Type of Surgery: Decompression levels L4-S1  Surgeon: Dr France  Hospital:  [facility]  Fax: [phone]    Fever/Chills or other infectious symptoms in pastmonth: no  >10lb weight loss in past two months: no    Health Care Directive/Code status:  no  Hx of blood transfusions:    no   Td up to date:  yes  History of VRE/MRSA:   no     Preoperative Evaluation: Obstructive Sleep Apnea screening    S:Snore -  Do you snore loudly? (louder than talking or loud enough to be heard through closed doors) yes  T: Tired - Do you often feel tired, fatigued, or sleepy during the daytime? yes  O: Observed - Has anyone ever observed you stop breathing during your sleep? no  P: Pressure - Do you have or are you being treated for high blood pressure? yes  B: BMI - BMI greater than 35kg/m2? no  A: Age - Age over 50 years old? yes  N: Neck - Neck circumference greater than 40 cm? no  G: Gender - Gender: Male? no    Total number of \"YES\" responses:  4    Scoring: Low risk of GRADY 0-2  At Risk of GRADY: >3 High Risk of GRADY: 5-8    Olivia Jean-Baptiste LPN..................2/22/2018   11:13 AM      Previous A1C is at goal of <8  No results found for: A1C  Urine microalbumin:creatine: 03/16/2017  Foot exam 01/23/2018  Eye exam 03/23/2017    Tobacco User no  Patient is on a daily aspirin  Patient is not on a Statin.  Blood pressure today of BP:    HR:  is at the goal of <139/89 for diabetics.    Olivia Jean-Baptiste LPN..................2/22/2018   11:19 AM            "

## 2018-03-01 ENCOUNTER — TELEPHONE (OUTPATIENT)
Dept: FAMILY MEDICINE | Facility: OTHER | Age: 78
End: 2018-03-01

## 2018-03-01 NOTE — TELEPHONE ENCOUNTER
Please call patient regarding hospital follow up work in, per Carlotta RUIZ. Preferably by 3-6-18.

## 2018-03-01 NOTE — TELEPHONE ENCOUNTER
Spoke with  and gave patient apt on 3/6 at 2 pm. Instructed  to call if that doesn't work.  Judy Dupont LPN .............3/1/2018  9:01 AM

## 2018-03-06 ENCOUNTER — OFFICE VISIT (OUTPATIENT)
Dept: FAMILY MEDICINE | Facility: OTHER | Age: 78
End: 2018-03-06
Attending: FAMILY MEDICINE
Payer: COMMERCIAL

## 2018-03-06 VITALS
DIASTOLIC BLOOD PRESSURE: 60 MMHG | HEART RATE: 76 BPM | WEIGHT: 183 LBS | BODY MASS INDEX: 32.94 KG/M2 | SYSTOLIC BLOOD PRESSURE: 132 MMHG

## 2018-03-06 DIAGNOSIS — E11.9 CONTROLLED TYPE 2 DIABETES MELLITUS WITHOUT COMPLICATION, WITHOUT LONG-TERM CURRENT USE OF INSULIN (H): Primary | ICD-10-CM

## 2018-03-06 DIAGNOSIS — Z48.89 ENCOUNTER FOR POSTOPERATIVE WOUND CHECK: ICD-10-CM

## 2018-03-06 DIAGNOSIS — Z12.31 ENCOUNTER FOR SCREENING MAMMOGRAM FOR BREAST CANCER: ICD-10-CM

## 2018-03-06 PROCEDURE — G0463 HOSPITAL OUTPT CLINIC VISIT: HCPCS

## 2018-03-06 PROCEDURE — 99213 OFFICE O/P EST LOW 20 MIN: CPT | Performed by: FAMILY MEDICINE

## 2018-03-06 RX ORDER — AMOXICILLIN 250 MG
1 CAPSULE ORAL
COMMUNITY
Start: 2018-03-01 | End: 2018-04-18

## 2018-03-06 RX ORDER — DIAZEPAM 5 MG
TABLET ORAL
COMMUNITY
Start: 2017-03-20 | End: 2018-04-18

## 2018-03-06 RX ORDER — OXYCODONE HYDROCHLORIDE 5 MG/1
2.5-5 TABLET ORAL
COMMUNITY
Start: 2018-03-01 | End: 2018-04-18

## 2018-03-06 RX ORDER — METHOCARBAMOL 500 MG/1
250 TABLET, FILM COATED ORAL
COMMUNITY
Start: 2018-03-01 | End: 2018-04-18

## 2018-03-06 RX ORDER — ACETAMINOPHEN 325 MG/1
650 TABLET ORAL
COMMUNITY
Start: 2018-03-01 | End: 2019-04-25

## 2018-03-06 ASSESSMENT — PAIN SCALES - GENERAL: PAINLEVEL: NO PAIN (0)

## 2018-03-06 NOTE — NURSING NOTE
She is here today for a hospital follow up after surgery.  Olivia Jean-Baptiste LPN..................3/6/2018   2:04 PM      Previous A1C is at goal of <8  No results found for: A1C  Urine microalbumin:creatine: 03/16/2017  Foot exam 01/23/2018  Eye exam 03/23/2017    Tobacco User no  Patient is on a daily aspirin  Patient is on a Statin.  Blood pressure today of BP:    HR:  is not at the goal of <139/89 for diabetics.    Olivia Jean-Baptiste LPN..................3/6/2018   2:07 PM

## 2018-03-06 NOTE — PROGRESS NOTES
Nursing Notes:   Olivia Jean-Baptiste LPN  3/6/2018  2:08 PM  Signed  She is here today for a hospital follow up after surgery.  Olivia BURNETTRosales Jerilynlatha GOMEZ..................3/6/2018   2:04 PM      Previous A1C is at goal of <8  No results found for: A1C  Urine microalbumin:creatine: 03/16/2017  Foot exam 01/23/2018  Eye exam 03/23/2017    Tobacco User no  Patient is on a daily aspirin  Patient is on a Statin.  Blood pressure today of BP:    HR:  is not at the goal of <139/89 for diabetics.    Olivia Jean-Baptiste LPN..................3/6/2018   2:07 PM            SUBJECTIVE:  Maliha Rivera  is a 77 year old female Iwho comes in today for evaluation of his right ear.  Who comes in today for follow-up after recent hospitalization for lumbar decompression at L4-5 on 2/28/2018.  She did well and went home from the hospital sooner than was expected.  She did not need any insulin while she was hospitalized.  Blood sugars are reviewed from her hospitalization.    She is feeling well. She has no more leg pain.  She has still been taking the pain medication.   Blood sugars have been running 130-144.     Past Medical, Family, and Social History reviewed and updated as noted below.   ROS is negative except as noted above       Allergies   Allergen Reactions     Atorvastatin Muscle Pain (Myalgia)     Cefazolin Nausea and Vomiting   ,   Family History   Problem Relation Age of Onset     HEART DISEASE Mother      Heart Disease     HEART DISEASE Father      Heart Disease     HEART DISEASE Sister      Heart Disease     CANCER Other      Cancer,female cancer   ,   Current Outpatient Prescriptions   Medication     acetaminophen (TYLENOL) 325 MG tablet     diazepam (VALIUM) 5 MG tablet     methocarbamol (ROBAXIN) 500 MG tablet     oxyCODONE IR (ROXICODONE) 5 MG tablet     senna-docusate (SENOKOT-S;PERICOLACE) 8.6-50 MG per tablet     aspirin EC 81 MG EC tablet     blood glucose monitoring (FREESTYLE LITE) test strip     Blood Glucose  Monitoring Suppl (FIFTY50 GLUCOSE METER 2.0) W/DEVICE KIT     gabapentin (NEURONTIN) 300 MG capsule     blood glucose monitoring (FREESTYLE) lancets     lisinopril (PRINIVIL/ZESTRIL) 5 MG tablet     meloxicam (MOBIC) 15 MG tablet     COMPRESSION STOCKINGS     omeprazole (PRILOSEC) 40 MG capsule     PSYLLIUM PO     No current facility-administered medications for this visit.    ,   Past Medical History:   Diagnosis Date     Encounter for screening for cardiovascular disorders     4/8/15,Lexiscan cardiolite Phill Ruiz   ,   Patient Active Problem List    Diagnosis Date Noted     Controlled diabetes mellitus type II without complication (H) 09/22/2015     Priority: Medium     Overview:   Overview:   3/2014:  A1c fine off metformin - will discontinue as patient does not want to take it.  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     COLONOSCOPY      2003,2011,normal     ESOPHAGOSCOPY, GASTROSCOPY, DUODENOSCOPY (EGD), COMBINED      2/3/2016     LAPAROSCOPIC CHOLECYSTECTOMY      3/11/01,Dr. Salas     LAPAROSCOPIC TUBAL LIGATION      No Comments Provided     OTHER SURGICAL HISTORY      1996,[id number],CATARACT EXTRACTION W/  INTRAOCULAR  LENS IMPLANT,Bilateral     OTHER SURGICAL HISTORY      2001,62133.0,HI ERCP BILIARY OR PANC DUCT STENT EXCHANGE W DIL AND WIRE,stent removed.     OTHER SURGICAL HISTORY      2004,[id number],CARDIAC CATHETERIZATION,mild disease     OTHER SURGICAL HISTORY      1/31/07,449363,OTHER,Left     OTHER SURGICAL HISTORY      02/27/2008,[id number],TOTAL KNEE ARTHROPLASTY,Right     OTHER SURGICAL HISTORY      2010,[id number],TOTAL KNEE ARTHROPLASTY,Left,Dr. Carson     OTHER SURGICAL HISTORY      06/05/2017,[id number],OTHER,Left,Dr. Joiner, [facility]     RELEASE CARPAL TUNNEL      2006     TONSILLECTOMY      age 12    and   Social History   Substance Use Topics     Smoking status: Never Smoker     Smokeless tobacco: Never Used     Alcohol use No     OBJECTIVE:  /60 (BP Location: Right arm, Patient Position: Sitting, Cuff Size: Adult Large)  Pulse 76  Wt 183 lb (83 kg)  Breastfeeding? No  BMI 32.94 kg/m2   EXAM:  Alert and cooperative, no distress.  Examination of her low back reveals a healing laminectomy scar minimal erythema superior aspect of the scar.  2 Steri-Strips are still in place.  Wound appears to be healing well.  ASSESSMENT/Plan :    Maliha was seen today for hospital f/u.    Diagnoses and all orders for this visit:    Controlled type 2 diabetes mellitus without complication, without long-term current use of insulin (H)    Encounter for screening mammogram for breast cancer  -     MA Screening Digital Bilateral; Future    Encounter for postoperative wound check    She will follow-up with the surgeon as scheduled.  No change in medications today.  She is pleased with how she did.  Discussion today with regard to activity and recommended that she be extremely careful so she does not fall outside.  Advised her to follow the directions for activity as outlined by the surgical team.    A total of 15 minutes was spent with the patient, greater than50% of the time was spent in counseling/discussion of the aforementioned concerns.      Nicanor Mike MD

## 2018-03-06 NOTE — MR AVS SNAPSHOT
"              After Visit Summary   3/6/2018    Maliha Rivera    MRN: [id number]           Patient Information     Date Of Birth          1940        Visit Information        Provider Department      3/6/2018 2:00 PM Nicanor Mike MD [facility]        Today's Diagnoses     Controlled type 2 diabetes mellitus without complication, without long-term current use of insulin (H)    -  1    Encounter for screening mammogram for breast cancer        Encounter for postoperative wound check           Follow-ups after your visit        Future tests that were ordered for you today     Open Future Orders        Priority Expected Expires Ordered    MA Screening Digital Bilateral Routine  3/6/2019 3/6/2018            Who to contact     If you have questions or need follow up information about today's clinic visit or your schedule please contact [facility] directly at [phone].  Normal or non-critical lab and imaging results will be communicated to you by Viddseehart, letter or phone within 4 business days after the clinic has received the results. If you do not hear from us within 7 days, please contact the clinic through Viddseehart or phone. If you have a critical or abnormal lab result, we will notify you by phone as soon as possible.  Submit refill requests through [employer] or call your pharmacy and they will forward the refill request to us. Please allow 3 business days for your refill to be completed.          Additional Information About Your Visit        Viddseehart Information     [employer] lets you send messages to your doctor, view your test results, renew your prescriptions, schedule appointments and more. To sign up, go to [email or web address] . Click on \"Log in\" on the left side of the screen, which will take you to the Welcome page. Then click on \"Sign up Now\" on the right side of the page.     You will be asked to enter the access code listed below, as well as " some personal information. Please follow the directions to create your username and password.     Your access code is: [email or web address]  Expires: 2018  1:04 PM     Your access code will  in 90 days. If you need help or a new code, please call your [place] clinic or [phone].        Care EveryWhere ID     This is your Care EveryWhere ID. This could be used by other organizations to access your [place] medical records  [id number]        Your Vitals Were     Pulse Breastfeeding? BMI (Body Mass Index)             76 No 32.94 kg/m2          Blood Pressure from Last 3 Encounters:   18 132/60   18 124/84   18 126/62    Weight from Last 3 Encounters:   18 183 lb (83 kg)   18 177 lb (80.3 kg)   18 176 lb 9.6 oz (80.1 kg)               Primary Care Provider Office Phone # Fax #    Nicanor DON MD Rashid [phone]       [address] [facility] [address]        Equal Access to Services     [facility]: Hadii aad ku hadasho Soenrique, waaxda luqadaha, qaybta kaalmada tiny, henry lentz . So [facility] [phone].    ATENCIÓN: Si habla español, tiene a khan disposición servicios gratuitos de asistencia lingüística. LlSelect [facility] Specialty [facility] [phone].    We comply with applicable federal civil rights laws and [place] laws. We do not discriminate on the basis of race, color, national origin, age, disability, sex, sexual orientation, or gender identity.            Thank you!     Thank you for choosing [facility]  for your care. Our goal is always to provide you with excellent care. Hearing back from our patients is one way we can continue to improve our services. Please take a few minutes to complete the written survey that you may receive in the mail after your visit with us. Thank you!             Your Updated Medication List - Protect others around you: Learn how to safely use, store and throw away your medicines at  www.disposemymeds.org.          This list is accurate as of 3/6/18  5:27 PM.  Always use your most recent med list.                   Brand Name Dispense Instructions for use Diagnosis    acetaminophen 325 MG tablet    TYLENOL     Take 650 mg by mouth        aspirin EC 81 MG EC tablet      Take 81 mg by mouth        blood glucose monitoring lancets      As directed. Dispense item covered by pt ins. E11.9 NIDDM type II - Test 1 time/day        COMPRESSION STOCKINGS      For personal use. Length: calf Strength: 16-20 mmHg Please measure.        diazepam 5 MG tablet    VALIUM          FIFTY50 GLUCOSE METER 2.0 W/DEVICE Kit      Dispense meter, test strips, lancets covered by pt ins. E11.9 NIDDM type II - Test 1 time/day        FREESTYLE LITE test strip   Generic drug:  blood glucose monitoring      Dispense item covered by pt ins. E11.9 NIDDM type II - Test 1 time/day        gabapentin 300 MG capsule    NEURONTIN     Take 300 mg by mouth        lisinopril 5 MG tablet    PRINIVIL/ZESTRIL     Take 5 mg by mouth        meloxicam 15 MG tablet    MOBIC     Take 1 tablet by mouth daily        methocarbamol 500 MG tablet    ROBAXIN     Take 250 mg by mouth        omeprazole 40 MG capsule    priLOSEC     Take 1 capsule by mouth daily        oxyCODONE IR 5 MG tablet    ROXICODONE     Take 2.5-5 mg by mouth        PSYLLIUM PO           senna-docusate 8.6-50 MG per tablet    SENOKOT-S;PERICOLACE     Take 1 tablet by mouth

## 2018-03-16 ENCOUNTER — HOSPITAL ENCOUNTER (OUTPATIENT)
Dept: MAMMOGRAPHY | Facility: OTHER | Age: 78
Discharge: HOME | End: 2018-03-16
Attending: FAMILY MEDICINE | Admitting: FAMILY MEDICINE
Payer: MEDICARE

## 2018-03-16 DIAGNOSIS — Z12.31 ENCOUNTER FOR SCREENING MAMMOGRAM FOR BREAST CANCER: ICD-10-CM

## 2018-03-16 PROCEDURE — 77067 SCR MAMMO BI INCL CAD: CPT

## 2018-04-09 ENCOUNTER — TRANSFERRED RECORDS (OUTPATIENT)
Dept: HEALTH INFORMATION MANAGEMENT | Facility: OTHER | Age: 78
End: 2018-04-09

## 2018-04-16 ENCOUNTER — NURSE TRIAGE (OUTPATIENT)
Dept: FAMILY MEDICINE | Facility: OTHER | Age: 78
End: 2018-04-16

## 2018-04-16 NOTE — TELEPHONE ENCOUNTER
"Patient presents as a walk in for a blood pressure check. States blood pressure today 140/70. Was running high all day, up to the 200's systolic. Writer took vitals as follows: Blood pressure-158/76 Pulse-69, O2 sats-98% on RA. Triage completed as noted below:    Reason for Disposition    [1] BP  >= 140/90 AND [2] taking BP medications    Additional Information    Negative: Difficult to awaken or acting confused  (e.g., disoriented, slurred speech)    Negative: Severe difficulty breathing (e.g., struggling for each breath, speaks in single words)    Negative: [1] Weakness of the face, arm or leg on one side of the body AND [2] new onset    Negative: [1] Numbness (i.e., loss of sensation) of the face, arm or leg on one side of the body AND [2] new onset    Negative: [1] Chest pain lasts > 5 minutes AND [2] history of heart disease  (i.e., heart attack, bypass surgery, angina, angioplasty, CHF)    Negative: [1] Chest pain AND [2] took nitrogylcerin AND [3] pain was not relieved    Negative: Sounds like a life-threatening emergency to the triager    Negative: Symptom is main concern  (e.g., headache, chest pain)    Negative: Low blood pressure is main concern    Negative: [1] BP  >= 160 / 100 AND [2] cardiac or neurologic symptoms    (e.g., chest pain, difficulty breathing, unsteady gait, blurred vision)    Negative: [1] Pregnant AND [2] new hand or face swelling    Negative: [1] Pregnant > 20 weeks AND [2] BP  >= 140/90    Negative: [1] BP  >= 200/120  AND [2] having NO cardiac or neurologic symptoms    Negative: [1] BP  >= 180/110 AND [2] missed most recent dose of blood pressure medication    Negative: BP  >= 180/110    Negative: Ran out of BP medications    Negative: BP  >= 160/100    Negative: [1] Taking BP medications AND [2] feels is having side effects (e.g., impotence, cough, dizzy upon standing)    Answer Assessment - Initial Assessment Questions  1. BLOOD PRESSURE: \"What is the blood pressure?\" \"Did you take " "at least two measurements 5 minutes apart?\"      140/70, has taken several measurements. Did take pressure when here-146/81 with her cuff at time in exam room  2. ONSET: \"When did you take your blood pressure?\"      See above  3. HOW: \"How did you obtain the blood pressure?\" (e.g., visiting nurse, automatic home BP monitor)      Wrist blood pressure cuff  4. HISTORY: \"Do you have a history of high blood pressure?\"      Not really. Just the last 2 or 3 months.   5. MEDICATIONS: \"Are you taking any medications for blood pressure?\" \"Have you missed any doses recently?\"      Lisinopril 5 mg daily-hasn't missed any doses recently  6. OTHER SYMPTOMS: \"Do you have any symptoms?\" (e.g., headache, chest pain, blurred vision, difficulty breathing, weakness)      Weakness, lightheaded  7. PREGNANCY: \"Is there any chance you are pregnant?\" \"When was your last menstrual period?\"      N/A    Protocols used: HIGH BLOOD PRESSURE-ADULT-AH    Disposition is for patient to be seen by PCP for a routine office visit. Patient is in agreement with plan of care. Was assisted in scheduling an appointment with scheduling staff for 0945 on Wednesday 4/18/18 with PCP. Patient happy with plan of care. Will call back as needed as noted in care advice, or present to the ED if after clinic hours. Patient's blood pressure cuff wasn't functioning well while she was here. Writer did suggest patient obtain a new cuff. Patient states she will do so. Writer will close this encounter at this time.    Lance Lee RN on 4/16/2018 at 3:45 PM  "

## 2018-04-16 NOTE — TELEPHONE ENCOUNTER
Patient presents as a walk in for a blood pressure check. States blood pressure today 140/70. Was running high all day, up to the 200's systolic. Writer took vitals as follows: Blood pressure-158/76 Pulse-69, O2 sats-98% on RA. Triage completed as noted below:

## 2018-04-18 ENCOUNTER — OFFICE VISIT (OUTPATIENT)
Dept: FAMILY MEDICINE | Facility: OTHER | Age: 78
End: 2018-04-18
Attending: FAMILY MEDICINE
Payer: COMMERCIAL

## 2018-04-18 VITALS
SYSTOLIC BLOOD PRESSURE: 148 MMHG | BODY MASS INDEX: 32.4 KG/M2 | HEART RATE: 64 BPM | WEIGHT: 180 LBS | DIASTOLIC BLOOD PRESSURE: 70 MMHG

## 2018-04-18 DIAGNOSIS — E11.9 CONTROLLED TYPE 2 DIABETES MELLITUS WITHOUT COMPLICATION, WITHOUT LONG-TERM CURRENT USE OF INSULIN (H): ICD-10-CM

## 2018-04-18 DIAGNOSIS — I10 BENIGN ESSENTIAL HYPERTENSION: Primary | ICD-10-CM

## 2018-04-18 PROCEDURE — 99213 OFFICE O/P EST LOW 20 MIN: CPT | Performed by: FAMILY MEDICINE

## 2018-04-18 PROCEDURE — G0463 HOSPITAL OUTPT CLINIC VISIT: HCPCS

## 2018-04-18 RX ORDER — NEBULIZER AND COMPRESSOR
EACH MISCELLANEOUS
Qty: 1 KIT | Refills: 0 | Status: SHIPPED | OUTPATIENT
Start: 2018-04-18

## 2018-04-18 RX ORDER — LISINOPRIL 10 MG/1
10 TABLET ORAL DAILY
Qty: 90 TABLET | Refills: 11 | Status: SHIPPED | OUTPATIENT
Start: 2018-04-18 | End: 2018-12-05

## 2018-04-18 NOTE — MR AVS SNAPSHOT
After Visit Summary   4/18/2018    Maliha Rivera    MRN: [id number]           Patient Information     Date Of Birth          1940        Visit Information        Provider Department      4/18/2018 9:45 AM Nicanor Mike MD [facility]        Today's Diagnoses     Benign essential hypertension    -  1    Controlled type 2 diabetes mellitus without complication, without long-term current use of insulin (H)           Follow-ups after your visit        Who to contact     If you have questions or need follow up information about today's clinic visit or your schedule please contact [facility] directly at [phone].  Normal or non-critical lab and imaging results will be communicated to you by MyChart, letter or phone within 4 business days after the clinic has received the results. If you do not hear from us within 7 days, please contact the clinic through MyChart or phone. If you have a critical or abnormal lab result, we will notify you by phone as soon as possible.  Submit refill requests through [employer] or call your pharmacy and they will forward the refill request to us. Please allow 3 business days for your refill to be completed.          Additional Information About Your Visit        Care EveryWhere ID     This is your Care EveryWhere ID. This could be used by other organizations to access your [facility] medical records  [id number]        Your Vitals Were     Pulse BMI (Body Mass Index)                64 32.4 kg/m2           Blood Pressure from Last 3 Encounters:   04/18/18 148/70   03/06/18 132/60   02/22/18 124/84    Weight from Last 3 Encounters:   04/18/18 180 lb (81.6 kg)   03/06/18 183 lb (83 kg)   02/22/18 177 lb (80.3 kg)              Today, you had the following     No orders found for display         Today's Medication Changes          These changes are accurate as of 4/18/18 11:05 AM.  If you have any questions, ask your nurse or  doctor.               Start taking these medicines.        Dose/Directions    Adult Blood Pressure Cuff Lg Kit   Used for:  Benign essential hypertension   Started by:  Nicanor Mike MD        Use for home blood pressure monitoring as directed.   Quantity:  1 kit   Refills:  0         These medicines have changed or have updated prescriptions.        Dose/Directions    lisinopril 10 MG tablet   Commonly known as:  PRINIVIL/ZESTRIL   This may have changed:    - medication strength  - how much to take  - when to take this   Used for:  Benign essential hypertension   Changed by:  Niacnor Mike MD        Dose:  10 mg   Take 1 tablet (10 mg) by mouth daily   Quantity:  90 tablet   Refills:  11         Stop taking these medicines if you haven't already. Please contact your care team if you have questions.     diazepam 5 MG tablet   Commonly known as:  VALIUM   Stopped by:  Nicanor Mike MD           methocarbamol 500 MG tablet   Commonly known as:  ROBAXIN   Stopped by:  Nicanor Mike MD           oxyCODONE IR 5 MG tablet   Commonly known as:  ROXICODONE   Stopped by:  Nicanor Mike MD           PSYLLIUM PO   Stopped by:  Nicanro Mike MD           senna-docusate 8.6-50 MG per tablet   Commonly known as:  SENOKOT-S;PERICOLACE   Stopped by:  Nicanor Mike MD                Where to get your medicines      These medications were sent to [facility] [address] [place] [id number]     Phone:  [phone]     Adult Blood Pressure Cuff Lg Kit    lisinopril 10 MG tablet                Primary Care Provider Office Phone # Fax #    Nicanor Mike [phone]       [address] [facility] [address]        Equal Access to Services     [facility] Hospital[facility] : Hadii veronica Saenz, izzy guzman, qahenry yarbrough. So [facility] [phone].    ATENCIÓN: Si  sravan dennis, tiene a khan disposición servicios gratuitos de asistencia lingüística. Nitin davis [phone].    We comply with applicable federal civil rights laws and [place] laws. We do not discriminate on the basis of race, color, national origin, age, disability, sex, sexual orientation, or gender identity.            Thank you!     Thank you for choosing [facility] AND [facility]  for your care. Our goal is always to provide you with excellent care. Hearing back from our patients is one way we can continue to improve our services. Please take a few minutes to complete the written survey that you may receive in the mail after your visit with us. Thank you!             Your Updated Medication List - Protect others around you: Learn how to safely use, store and throw away your medicines at www.disposemymeds.org.          This list is accurate as of 4/18/18 11:05 AM.  Always use your most recent med list.                   Brand Name Dispense Instructions for use Diagnosis    acetaminophen 325 MG tablet    TYLENOL     Take 650 mg by mouth        Adult Blood Pressure Cuff Lg Kit     1 kit    Use for home blood pressure monitoring as directed.    Benign essential hypertension       aspirin EC 81 MG EC tablet      Take 81 mg by mouth        blood glucose monitoring lancets      As directed. Dispense item covered by pt ins. E11.9 NIDDM type II - Test 1 time/day        COMPRESSION STOCKINGS      For personal use. Length: calf Strength: 16-20 mmHg Please measure.        FIFTY50 GLUCOSE METER 2.0 w/Device Kit      Dispense meter, test strips, lancets covered by pt ins. E11.9 NIDDM type II - Test 1 time/day        FREESTYLE LITE test strip   Generic drug:  blood glucose monitoring      Dispense item covered by pt ins. E11.9 NIDDM type II - Test 1 time/day        gabapentin 300 MG capsule    NEURONTIN     Take 300 mg by mouth        lisinopril 10 MG tablet    PRINIVIL/ZESTRIL    90 tablet    Take 1 tablet (10 mg) by  mouth daily    Benign essential hypertension       meloxicam 15 MG tablet    MOBIC     Take 1 tablet by mouth daily        omeprazole 40 MG capsule    priLOSEC     Take 1 capsule by mouth daily

## 2018-04-18 NOTE — NURSING NOTE
Previous A1C is at goal of <8    Urine microalbumin:creatine: 3/16/17  Foot exam 1/23/18  Eye exam 3/23/17    Tobacco User NO  Patient is not on a daily aspirin  Patient is on a Statin.  Blood pressure today of:     BP Readings from Last 1 Encounters:   04/18/18 154/80      is not at the goal of <139/89 for diabetics.    Deanna Cruz LPN on 4/18/2018 at 10:17 AM

## 2018-04-18 NOTE — PROGRESS NOTES
Nursing Notes:   Deanna Cruz LPN  4/18/2018 10:15 AM  Signed  Patient comes in to the clinic today to follow up on her blood pressure.    Deanna Cruz LPN  4/18/2018 10:18 AM  Signed  Previous A1C is at goal of <8    Urine microalbumin:creatine: 3/16/17  Foot exam 1/23/18  Eye exam 3/23/17    Tobacco User NO  Patient is not on a daily aspirin  Patient is on a Statin.  Blood pressure today of:     BP Readings from Last 1 Encounters:   04/18/18 154/80      is not at the goal of <139/89 for diabetics.    Deanna Cruz LPN on 4/18/2018 at 10:17 AM        SUBJECTIVE:  Maliha Rivera  is a 77 year old female who comes in today for concerns about blood pressure.  She is currently on lisinopril 5 mg daily. She has been checking her blood pressure at home and it is been elevated.    She has type 2 diabetes which has been well controlled.  Her last hemoglobin A1c was 7.2% on January 23, 2018.      I last saw her about 6 weeks ago after she had lumbar decompression L4-5 on 2/20/2018.  She was no longer having leg pain and was actually doing extremely well.  She had left great toenail removal done by the podiatrist on April 9.    Past Medical, Family, and Social History reviewed and updated as noted below.   ROS is negative except as noted above       Allergies   Allergen Reactions     Atorvastatin Muscle Pain (Myalgia)     Cefazolin Nausea and Vomiting   ,   Family History   Problem Relation Age of Onset     HEART DISEASE Mother      Heart Disease     HEART DISEASE Father      Heart Disease     HEART DISEASE Sister      Heart Disease     CANCER Other      Cancer,female cancer   ,   Current Outpatient Prescriptions   Medication     acetaminophen (TYLENOL) 325 MG tablet     aspirin EC 81 MG EC tablet     blood glucose monitoring (FREESTYLE LITE) test strip     blood glucose monitoring (FREESTYLE) lancets     Blood Glucose Monitoring Suppl (FIFTY50 GLUCOSE METER 2.0) W/DEVICE KIT     Blood Pressure Monitoring  (ADULT BLOOD PRESSURE CUFF LG) KIT     COMPRESSION STOCKINGS     gabapentin (NEURONTIN) 300 MG capsule     lisinopril (PRINIVIL/ZESTRIL) 10 MG tablet     meloxicam (MOBIC) 15 MG tablet     omeprazole (PRILOSEC) 40 MG capsule     [DISCONTINUED] lisinopril (PRINIVIL/ZESTRIL) 5 MG tablet     No current facility-administered medications for this visit.    ,   Past Medical History:   Diagnosis Date     Encounter for screening for cardiovascular disorders     4/8/15,Lexiscan cardiolite Phill Ruiz   ,   Patient Active Problem List    Diagnosis Date Noted     Benign essential hypertension 04/18/2018     Priority: Medium     Controlled diabetes mellitus type II without complication (H) 09/22/2015     Priority: Medium     Overview:   Overview:   3/2014:  A1c fine off metformin - will discontinue as patient does not want to take it.  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     COLONOSCOPY      2003,2011,normal     ESOPHAGOSCOPY, GASTROSCOPY, DUODENOSCOPY (EGD), COMBINED      2/3/2016     LAPAROSCOPIC CHOLECYSTECTOMY      3/11/01,Dr. Salas     LAPAROSCOPIC TUBAL LIGATION      No Comments Provided     OTHER SURGICAL HISTORY       1996,[id number],CATARACT EXTRACTION W/  INTRAOCULAR LENS IMPLANT,Bilateral     OTHER SURGICAL HISTORY      2001,37337.0,[place] [facility] BILIARY OR PANC DUCT STENT EXCHANGE W DIL AND WIRE,stent removed.     OTHER SURGICAL HISTORY      2004,[id number],CARDIAC CATHETERIZATION,mild disease     OTHER SURGICAL HISTORY      1/31/07,[id number],OTHER,Left     OTHER SURGICAL HISTORY      02/27/2008,[id number],TOTAL KNEE ARTHROPLASTY,Right     OTHER SURGICAL HISTORY      2010,[id number],TOTAL KNEE ARTHROPLASTY,Left,Dr. Carson     OTHER SURGICAL HISTORY      06/05/2017,[id number],OTHER,Left,Dr. Joiner, [facility]     RELEASE CARPAL TUNNEL      2006     TONSILLECTOMY      age 12    and   Social History   Substance Use Topics     Smoking status: Never Smoker     Smokeless tobacco: Never Used     Alcohol use No     OBJECTIVE:  /70 (BP Location: Right arm, Patient Position: Sitting, Cuff Size: Adult Large)  Pulse 64  Wt 180 lb (81.6 kg)  BMI 32.4 kg/m2   EXAM:  Alert cooperative, no distress.  Repeat blood pressures as noted.  Lungs are clear, no rales rhonchi or wheezes are heard.  Cardiac RRR without murmur.  She has no pedal edema.  ASSESSMENT/Plan :    Maliha was seen today for hypertension.    Diagnoses and all orders for this visit:    Benign essential hypertension  -     lisinopril (PRINIVIL/ZESTRIL) 10 MG tablet; Take 1 tablet (10 mg) by mouth daily  -     Blood Pressure Monitoring (ADULT BLOOD PRESSURE CUFF LG) KIT; Use for home blood pressure monitoring as directed.    Controlled type 2 diabetes mellitus without complication, without long-term current use of insulin (H)    Will increase lisinopril to 10 mg daily.  She will monitor blood pressure at home with a goal of 130/80 or less.  She will keep in touch with her progress.      Nicanor Mike MD

## 2018-05-17 ENCOUNTER — TRANSFERRED RECORDS (OUTPATIENT)
Dept: HEALTH INFORMATION MANAGEMENT | Facility: OTHER | Age: 78
End: 2018-05-17

## 2018-06-07 PROBLEM — Z96.653 STATUS POST TOTAL BILATERAL KNEE REPLACEMENT: Status: ACTIVE | Noted: 2017-03-31

## 2018-06-07 PROBLEM — M17.11 PRIMARY OSTEOARTHRITIS OF RIGHT KNEE: Status: ACTIVE | Noted: 2017-03-23

## 2018-06-07 PROBLEM — M54.16 LUMBAR RADICULOPATHY: Status: ACTIVE | Noted: 2018-01-23

## 2018-06-07 PROBLEM — M48.061 LUMBAR STENOSIS: Status: ACTIVE | Noted: 2018-03-01

## 2018-06-07 PROBLEM — M17.12 PRIMARY OSTEOARTHRITIS OF LEFT KNEE: Status: ACTIVE | Noted: 2017-03-23

## 2018-06-07 RX ORDER — LISINOPRIL 5 MG/1
TABLET ORAL
COMMUNITY
Start: 2018-02-01 | End: 2018-12-05

## 2018-06-08 ENCOUNTER — OFFICE VISIT (OUTPATIENT)
Dept: FAMILY MEDICINE | Facility: OTHER | Age: 78
End: 2018-06-08
Attending: PHYSICIAN ASSISTANT
Payer: MEDICARE

## 2018-06-08 VITALS
DIASTOLIC BLOOD PRESSURE: 50 MMHG | BODY MASS INDEX: 31.61 KG/M2 | SYSTOLIC BLOOD PRESSURE: 138 MMHG | WEIGHT: 175.6 LBS | HEART RATE: 72 BPM

## 2018-06-08 DIAGNOSIS — R73.09 ELEVATED GLUCOSE: ICD-10-CM

## 2018-06-08 DIAGNOSIS — I10 BENIGN ESSENTIAL HYPERTENSION: Primary | ICD-10-CM

## 2018-06-08 DIAGNOSIS — R53.83 FATIGUE, UNSPECIFIED TYPE: ICD-10-CM

## 2018-06-08 DIAGNOSIS — E78.5 HYPERLIPIDEMIA, UNSPECIFIED HYPERLIPIDEMIA TYPE: ICD-10-CM

## 2018-06-08 LAB
ANION GAP SERPL CALCULATED.3IONS-SCNC: 7 MMOL/L (ref 3–14)
BUN SERPL-MCNC: 12 MG/DL (ref 7–25)
CALCIUM SERPL-MCNC: 9.5 MG/DL (ref 8.6–10.3)
CHLORIDE SERPL-SCNC: 105 MMOL/L (ref 98–107)
CO2 SERPL-SCNC: 28 MMOL/L (ref 21–31)
CREAT SERPL-MCNC: 0.57 MG/DL (ref 0.6–1.2)
GFR SERPL CREATININE-BSD FRML MDRD: >90 ML/MIN/1.7M2
GLUCOSE SERPL-MCNC: 223 MG/DL (ref 70–105)
POTASSIUM SERPL-SCNC: 3.8 MMOL/L (ref 3.5–5.1)
SODIUM SERPL-SCNC: 140 MMOL/L (ref 134–144)
TSH SERPL DL<=0.05 MIU/L-ACNC: 1.63 IU/ML (ref 0.34–5.6)
VIT B12 SERPL-MCNC: 201 PG/ML (ref 180–914)

## 2018-06-08 PROCEDURE — 82607 VITAMIN B-12: CPT | Performed by: PHYSICIAN ASSISTANT

## 2018-06-08 PROCEDURE — 84443 ASSAY THYROID STIM HORMONE: CPT | Performed by: PHYSICIAN ASSISTANT

## 2018-06-08 PROCEDURE — 36415 COLL VENOUS BLD VENIPUNCTURE: CPT | Performed by: PHYSICIAN ASSISTANT

## 2018-06-08 PROCEDURE — 99213 OFFICE O/P EST LOW 20 MIN: CPT | Performed by: PHYSICIAN ASSISTANT

## 2018-06-08 PROCEDURE — 80048 BASIC METABOLIC PNL TOTAL CA: CPT | Performed by: PHYSICIAN ASSISTANT

## 2018-06-08 PROCEDURE — G0463 HOSPITAL OUTPT CLINIC VISIT: HCPCS

## 2018-06-08 PROCEDURE — 83036 HEMOGLOBIN GLYCOSYLATED A1C: CPT | Performed by: PHYSICIAN ASSISTANT

## 2018-06-08 RX ORDER — PRAVASTATIN SODIUM 20 MG
20 TABLET ORAL DAILY
Qty: 90 TABLET | Refills: 1 | Status: SHIPPED | OUTPATIENT
Start: 2018-06-08 | End: 2018-12-05

## 2018-06-08 ASSESSMENT — PATIENT HEALTH QUESTIONNAIRE - PHQ9: 5. POOR APPETITE OR OVEREATING: NOT AT ALL

## 2018-06-08 ASSESSMENT — ANXIETY QUESTIONNAIRES
5. BEING SO RESTLESS THAT IT IS HARD TO SIT STILL: NOT AT ALL
2. NOT BEING ABLE TO STOP OR CONTROL WORRYING: NOT AT ALL
7. FEELING AFRAID AS IF SOMETHING AWFUL MIGHT HAPPEN: NOT AT ALL
GAD7 TOTAL SCORE: 1
IF YOU CHECKED OFF ANY PROBLEMS ON THIS QUESTIONNAIRE, HOW DIFFICULT HAVE THESE PROBLEMS MADE IT FOR YOU TO DO YOUR WORK, TAKE CARE OF THINGS AT HOME, OR GET ALONG WITH OTHER PEOPLE: SOMEWHAT DIFFICULT
6. BECOMING EASILY ANNOYED OR IRRITABLE: SEVERAL DAYS
3. WORRYING TOO MUCH ABOUT DIFFERENT THINGS: NOT AT ALL
1. FEELING NERVOUS, ANXIOUS, OR ON EDGE: NOT AT ALL

## 2018-06-08 NOTE — PATIENT INSTRUCTIONS
Encouraged to monitor blood pressure a couple times a week. Return in 2-3 months for monitoring. Work on diet and exercise to decrease blood pressure. Weight loss is helpful.  Decrease salt intake.       Hyperlipidemia: Started on pravastatin 20 mg.  Monitor for muscle aches, body aches, upset stomach, diarrhea.  Return for recheck in 2-3 months with Dr. Mike.

## 2018-06-08 NOTE — NURSING NOTE
Patient presents to clinic for blood pressure, checking at home as been high.  Gris Roberts LPN ...... 6/8/2018 10:18 AM
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
## 2018-06-08 NOTE — MR AVS SNAPSHOT
After Visit Summary   6/8/2018    Maliha Rivera    MRN: [id number]           Patient Information     Date Of Birth          1940        Visit Information        Provider Department      6/8/2018 10:45 AM Juliet Chavez PA-C [facility]        Today's Diagnoses     Benign essential hypertension    -  1    Fatigue, unspecified type        Hyperlipidemia, unspecified hyperlipidemia type          Care Instructions    Encouraged to monitor blood pressure a couple times a week. Return in 2-3 months for monitoring. Work on diet and exercise to decrease blood pressure. Weight loss is helpful.  Decrease salt intake.       Hyperlipidemia: Started on pravastatin 20 mg.  Monitor for muscle aches, body aches, upset stomach, diarrhea.  Return for recheck in 2-3 months with Dr. Mike.            Follow-ups after your visit        Follow-up notes from your care team     Return if symptoms worsen or fail to improve.      Who to contact     If you have questions or need follow up information about today's clinic visit or your schedule please contact [facility] directly at [phone].  Normal or non-critical lab and imaging results will be communicated to you by MyChart, letter or phone within 4 business days after the clinic has received the results. If you do not hear from us within 7 days, please contact the clinic through MyChart or phone. If you have a critical or abnormal lab result, we will notify you by phone as soon as possible.  Submit refill requests through [employer] or call your pharmacy and they will forward the refill request to us. Please allow 3 business days for your refill to be completed.          Additional Information About Your Visit        Care EveryWhere ID     This is your Care EveryWhere ID. This could be used by other organizations to access your [facility] medical records  [id number]        Your Vitals Were     Pulse BMI (Body Mass  Index)                72 31.61 kg/m2           Blood Pressure from Last 3 Encounters:   06/08/18 138/50   04/18/18 148/70   03/06/18 132/60    Weight from Last 3 Encounters:   06/08/18 175 lb 9.6 oz (79.7 kg)   04/18/18 180 lb (81.6 kg)   03/06/18 183 lb (83 kg)              We Performed the Following     Basic metabolic panel     TSH     Vitamin B12          Today's Medication Changes          These changes are accurate as of 6/8/18 10:48 AM.  If you have any questions, ask your nurse or doctor.               Start taking these medicines.        Dose/Directions    pravastatin 20 MG tablet   Commonly known as:  PRAVACHOL   Used for:  Hyperlipidemia, unspecified hyperlipidemia type   Started by:  Juliet Chavez PA-C        Dose:  20 mg   Take 1 tablet (20 mg) by mouth daily   Quantity:  90 tablet   Refills:  1            Where to get your medicines      These medications were sent to [facility] [address] [place] [id number]     Phone:  [phone]     pravastatin 20 MG tablet                Primary Care Provider Office Phone # Fax #    Nicanor DON MD Rashid [phone]       [address] [facility] [address]        Equal Access to Services     NEELA MCCAIN AH: Hadii aad ku hadasho Soomaali, waaxda luqadaha, qaybta kaalmada adeegyada, henry morton. So [facility] [phone].    ATENCIÓN: Si habla español, tiene a khan disposición servicios gratuitos de asistencia lingüística. Llame al [phone].    We comply with applicable federal civil rights laws and [place] laws. We do not discriminate on the basis of race, color, national origin, age, disability, sex, sexual orientation, or gender identity.            Thank you!     Thank you for choosing [facility] [place]  for your care. Our goal is always to provide you with excellent care. Hearing back from our patients is one way we can  continue to improve our services. Please take a few minutes to complete the written survey that you may receive in the mail after your visit with us. Thank you!             Your Updated Medication List - Protect others around you: Learn how to safely use, store and throw away your medicines at www.disposemymeds.org.          This list is accurate as of 6/8/18 10:48 AM.  Always use your most recent med list.                   Brand Name Dispense Instructions for use Diagnosis    acetaminophen 325 MG tablet    TYLENOL     Take 650 mg by mouth        Adult Blood Pressure Cuff Lg Kit     1 kit    Use for home blood pressure monitoring as directed.    Benign essential hypertension       aspirin 81 MG EC tablet      Take 81 mg by mouth        blood glucose monitoring lancets      As directed. Dispense item covered by pt ins. E11.9 NIDDM type II - Test 1 time/day        COMPRESSION STOCKINGS      For personal use. Length: calf Strength: 16-20 mmHg Please measure.        FIFTY50 GLUCOSE METER 2.0 w/Device Kit      Dispense meter, test strips, lancets covered by pt ins. E11.9 NIDDM type II - Test 1 time/day        FREESTYLE LITE test strip   Generic drug:  blood glucose monitoring      Dispense item covered by pt ins. E11.9 NIDDM type II - Test 1 time/day        gabapentin 300 MG capsule    NEURONTIN     Take 300 mg by mouth        * lisinopril 5 MG tablet    PRINIVIL/ZESTRIL          * lisinopril 10 MG tablet    PRINIVIL/ZESTRIL    90 tablet    Take 1 tablet (10 mg) by mouth daily    Benign essential hypertension       meloxicam 15 MG tablet    MOBIC     Take 1 tablet by mouth daily        omeprazole 40 MG capsule    priLOSEC     Take 1 capsule by mouth daily        pravastatin 20 MG tablet    PRAVACHOL    90 tablet    Take 1 tablet (20 mg) by mouth daily    Hyperlipidemia, unspecified hyperlipidemia type       * Notice:  This list has 2 medication(s) that are the same as other medications prescribed for you. Read the  directions carefully, and ask your doctor or other care provider to review them with you.

## 2018-06-09 ASSESSMENT — ANXIETY QUESTIONNAIRES: GAD7 TOTAL SCORE: 1

## 2018-07-15 ENCOUNTER — TRANSFERRED RECORDS (OUTPATIENT)
Dept: HEALTH INFORMATION MANAGEMENT | Facility: OTHER | Age: 78
End: 2018-07-15

## 2018-07-24 NOTE — PROGRESS NOTES
Patient Information     Patient Name  Maliha Rivera MRN  [id number] Sex  Female   1940      Letter by Nicanor Mike MD at      Author:  Nicanor Mike MD Service:  (none) Author Type:  (none)    Filed:   Encounter Date:  2017 Status:  (Other)           Maliha Rivera  [address] [facility] [address]          2017    Dear Maliha:    Your blood test was fine and indicates good control of diabetes.    It was a pleasure seeing you the other day.  If you have any questions, please don't hesitate to call us.     Sincerely,          Nicanor Mike MD      Results for orders placed or performed in visit on 17      HEMOGLOBIN A1C MONITORING (POCT)      Result  Value Ref Range    HEMOGLOBIN A1C MONITORING (POCT) 6.3 (H) 4.0 - 6.2 %    ESTIMATED AVERAGE GLUCOSE  134 mg/dL

## 2018-07-24 NOTE — PROGRESS NOTES
Patient Information     Patient Name  Maliha Rivera MRN  [id number] Sex  Female   1940      Letter by Nicanor Mike MD at      Author:  Nicanor Mike MD Service:  (none) Author Type:  (none)    Filed:   Encounter Date:  2018 Status:  (Other)           Maliha Rivera  [address] [facility] [address]          2018    Dear Ms. Rivera:    Your labs are back. Your comprehensive metabolic panel (a test that looks at liver and kidney function, blood sugar, electrolytes, and nutritional status) was normal with the exception of an elevated blood sugar.  Your diabetes control is fair, but is not as good as our last check, so we may consider adding metformin as a diabetes medication in the near future, but we will keep an eye on things for now.    Your cholesterol is a bit better but still high.    We'd like to see you for follow up in a month or so to make sure your blood pressure is coming down.    It was a pleasure seeing you the other day.  If you have any questions, please don't hesitate to call us.     Sincerely,          Nicanor Mike MD                          .  Results for orders placed or performed in visit on 18      HEMOGLOBIN A1C MONITORING (POCT)      Result  Value Ref Range    HEMOGLOBIN A1C MONITORING (POCT) 7.2 (H) 4.0 - 6.2 %    ESTIMATED AVERAGE GLUCOSE  160 mg/dL   COMP METABOLIC PANEL      Result  Value Ref Range    SODIUM 138 133 - 143 mmol/L    POTASSIUM 4.1 3.5 - 5.1 mmol/L    CHLORIDE 105 98 - 107 mmol/L    CO2,TOTAL 25 21 - 31 mmol/L    ANION GAP 8 5 - 18                    GLUCOSE 181 (H) 70 - 105 mg/dL    CALCIUM 8.8 8.6 - 10.3 mg/dL    BUN 12 7 - 25 mg/dL    CREATININE 0.65 (L) 0.70 - 1.30 mg/dL    BUN/CREAT RATIO           18                    GFR if African American >60 >60 ml/min/1.73m2    GFR if not African American >60 >60 ml/min/1.73m2    ALBUMIN 4.1 3.5 - 5.7 g/dL    PROTEIN,TOTAL 7.5 6.4 - 8.9 g/dL    GLOBULIN                  3.4  2.0 - 3.7 g/dL    A/G RATIO 1.2 1.0 - 2.0                    BILIRUBIN,TOTAL 0.3 0.3 - 1.0 mg/dL    ALK PHOSPHATASE 55 34 - 104 IU/L    ALT (SGPT) 17 7 - 52 IU/L    AST (SGOT) 17 13 - 39 IU/L   LIPID PANEL      Result  Value Ref Range    CHOLESTEROL,TOTAL 208 (H) <200 mg/dL    TRIGLYCERIDES 257 (H) <150 mg/dL    HDL CHOLESTEROL 35 23 - 92 mg/dL    NON-HDL CHOLESTEROL 173 (H) <145 mg/dl    CHOL/HDL RATIO            5.94 (H) <4.50                    LDL CHOLESTEROL 122 (H) <100 mg/dL    PROVIDER ORDERED STATUS RANDOM

## 2018-07-24 NOTE — PROGRESS NOTES
Patient Information     Patient Name  Maliha Rivera MRN  [id number] Sex  Female   1940      Letter by Nicanor Mike MD at      Author:  Nicanor Mike MD Service:  (none) Author Type:  (none)    Filed:   Encounter Date:  3/16/2017 Status:  (Other)           Maliha Rivera  [address] [facility] [address]          2017    Dear Ms. Rivera:    Your blood tests looked ok. Your complete blood count was normal. Your comprehensive metabolic panel (a test that looks at liver and kidney function, blood sugar, electrolytes, and nutritional status) was normal. Your thyroid is fine.  Your diabetes control is good.  Your cholesterol is high, and I know that you have had trouble tolerating statin medications in the past.  Following a low cholesterol diet and trying to increase your exercise as you can will be helpful. We can talk about other options for cholesterol medications in the future.    It was a pleasure seeing you the other day.  If you have any questions, please don't hesitate to call us.     Sincerely,          Nicanor Mike MD                                  Results for orders placed or performed in visit on 17      COMP METABOLIC PANEL      Result  Value Ref Range    SODIUM 136 133 - 143 mmol/L    POTASSIUM 3.4 (L) 3.5 - 5.1 mmol/L    CHLORIDE 102 98 - 107 mmol/L    CO2,TOTAL 26 21 - 31 mmol/L    ANION GAP 8 5 - 18                    GLUCOSE 117 (H) 70 - 105 mg/dL    CALCIUM 9.9 8.6 - 10.3 mg/dL    BUN 13 7 - 25 mg/dL    CREATININE 0.55 (L) 0.70 - 1.30 mg/dL    BUN/CREAT RATIO           24                    GFR if African American >60 >60 ml/min/1.73m2    GFR if not African American >60 >60 ml/min/1.73m2    ALBUMIN 4.8 3.5 - 5.7 g/dL    PROTEIN,TOTAL 8.1 6.4 - 8.9 g/dL    GLOBULIN                  3.3 2.0 - 3.7 g/dL    A/G RATIO 1.5 1.0 - 2.0                    BILIRUBIN,TOTAL 0.4 0.3 - 1.0 mg/dL    ALK PHOSPHATASE 55 34 - 104 IU/L    ALT (SGPT) 21 7 - 52 IU/L    AST  (SGOT) 20 13 - 39 IU/L   HEMOGLOBIN A1C MONITORING (POCT)      Result  Value Ref Range    HEMOGLOBIN A1C MONITORING (POCT) 6.3 (H) 4.0 - 6.2 %    ESTIMATED AVERAGE GLUCOSE  134 mg/dL   LIPID PANEL      Result  Value Ref Range    CHOLESTEROL,TOTAL 257 (H) <200 mg/dL    TRIGLYCERIDES 163 (H) <150 mg/dL    HDL CHOLESTEROL 59 23 - 92 mg/dL    NON-HDL CHOLESTEROL 198 (H) <145 mg/dl    CHOL/HDL RATIO            4.36 <4.50                    LDL CHOLESTEROL 165 (H) <100 mg/dL    PATIENT STATUS            FASTING                   TSH      Result  Value Ref Range    TSH 1.98 0.34 - 5.60 uIU/mL   VITAMIN D 25 (DEFICIENCY)      Result  Value Ref Range    VITAMIN D TOTAL AFL 31.4   ng/mL   CBC WITH AUTO DIFFERENTIAL      Result  Value Ref Range    WHITE BLOOD COUNT         6.6 4.5 - 11.0 thou/cu mm    RED BLOOD COUNT           4.58 4.00 - 5.20 mil/cu mm    HEMOGLOBIN                14.2 12.0 - 16.0 g/dL    HEMATOCRIT                40.8 33.0 - 51.0 %    MCV                       89 80 - 100 fL    MCH                       31.0 26.0 - 34.0 pg    MCHC                      34.8 32.0 - 36.0 g/dL    RDW                       11.4 (L) 11.5 - 15.5 %    PLATELET COUNT            244 140 - 440 thou/cu mm    MPV                       8.3 6.5 - 11.0 fL    NEUTROPHILS               46.5 42.0 - 72.0 %    LYMPHOCYTES               41.2 20.0 - 44.0 %    MONOCYTES                 8.1 <12.0 %    EOSINOPHILS               2.9 <8.0 %    BASOPHILS                 1.3 <3.0 %    ABSOLUTE NEUTROPHILS      3.1 1.7 - 7.0 thou/cu mm    ABSOLUTE LYMPHOCYTES      2.7 0.9 - 2.9 thou/cu mm    ABSOLUTE MONOCYTES        0.5 <0.9 thou/cu mm    ABSOLUTE EOSINOPHILS      0.2 <0.5 thou/cu mm    ABSOLUTE BASOPHILS        0.1 <0.3 thou/cu mm

## 2018-12-05 ENCOUNTER — OFFICE VISIT (OUTPATIENT)
Dept: FAMILY MEDICINE | Facility: OTHER | Age: 78
End: 2018-12-05
Attending: FAMILY MEDICINE
Payer: MEDICARE

## 2018-12-05 VITALS
HEART RATE: 70 BPM | BODY MASS INDEX: 30.42 KG/M2 | SYSTOLIC BLOOD PRESSURE: 128 MMHG | TEMPERATURE: 98.2 F | WEIGHT: 169 LBS | DIASTOLIC BLOOD PRESSURE: 72 MMHG

## 2018-12-05 DIAGNOSIS — E78.5 HYPERLIPIDEMIA, UNSPECIFIED HYPERLIPIDEMIA TYPE: ICD-10-CM

## 2018-12-05 DIAGNOSIS — K21.9 GASTROESOPHAGEAL REFLUX DISEASE WITHOUT ESOPHAGITIS: ICD-10-CM

## 2018-12-05 DIAGNOSIS — N32.81 OVERACTIVE BLADDER: ICD-10-CM

## 2018-12-05 DIAGNOSIS — E53.8 VITAMIN B12 DEFICIENCY (NON ANEMIC): ICD-10-CM

## 2018-12-05 DIAGNOSIS — Z96.659 LOOSE TOTAL KNEE ARTHROPLASTY, SUBSEQUENT ENCOUNTER: ICD-10-CM

## 2018-12-05 DIAGNOSIS — Z96.653 STATUS POST TOTAL BILATERAL KNEE REPLACEMENT: ICD-10-CM

## 2018-12-05 DIAGNOSIS — E11.9 TYPE 2 DIABETES MELLITUS WITHOUT COMPLICATION, WITHOUT LONG-TERM CURRENT USE OF INSULIN (H): Primary | ICD-10-CM

## 2018-12-05 DIAGNOSIS — M19.072 ARTHRITIS OF FOOT, LEFT: ICD-10-CM

## 2018-12-05 DIAGNOSIS — M54.16 LUMBAR RADICULOPATHY: ICD-10-CM

## 2018-12-05 DIAGNOSIS — T84.038D LOOSE TOTAL KNEE ARTHROPLASTY, SUBSEQUENT ENCOUNTER: ICD-10-CM

## 2018-12-05 DIAGNOSIS — G62.9 PERIPHERAL POLYNEUROPATHY: ICD-10-CM

## 2018-12-05 DIAGNOSIS — I10 BENIGN ESSENTIAL HYPERTENSION: ICD-10-CM

## 2018-12-05 LAB
ALBUMIN SERPL-MCNC: 4.3 G/DL (ref 3.5–5.7)
ALBUMIN UR-MCNC: NEGATIVE MG/DL
ALP SERPL-CCNC: 64 U/L (ref 34–104)
ALT SERPL W P-5'-P-CCNC: 13 U/L (ref 7–52)
ANION GAP SERPL CALCULATED.3IONS-SCNC: 8 MMOL/L (ref 3–14)
APPEARANCE UR: CLEAR
AST SERPL W P-5'-P-CCNC: 16 U/L (ref 13–39)
BASOPHILS # BLD AUTO: 0 10E9/L (ref 0–0.2)
BASOPHILS NFR BLD AUTO: 0.6 %
BILIRUB SERPL-MCNC: 0.3 MG/DL (ref 0.3–1)
BILIRUB UR QL STRIP: NEGATIVE
BUN SERPL-MCNC: 11 MG/DL (ref 7–25)
CALCIUM SERPL-MCNC: 9.1 MG/DL (ref 8.6–10.3)
CHLORIDE SERPL-SCNC: 102 MMOL/L (ref 98–107)
CHOLEST SERPL-MCNC: 231 MG/DL
CO2 SERPL-SCNC: 29 MMOL/L (ref 21–31)
COLOR UR AUTO: YELLOW
CREAT SERPL-MCNC: 0.68 MG/DL (ref 0.6–1.2)
DIFFERENTIAL METHOD BLD: NORMAL
EOSINOPHIL # BLD AUTO: 0.2 10E9/L (ref 0–0.7)
EOSINOPHIL NFR BLD AUTO: 3.6 %
ERYTHROCYTE [DISTWIDTH] IN BLOOD BY AUTOMATED COUNT: 12.5 % (ref 10–15)
GFR SERPL CREATININE-BSD FRML MDRD: ABNORMAL ML/MIN/1.7M2
GLUCOSE SERPL-MCNC: 215 MG/DL (ref 70–105)
GLUCOSE UR STRIP-MCNC: NEGATIVE MG/DL
HBA1C MFR BLD: 6.1 % (ref 4–6)
HCT VFR BLD AUTO: 36.6 % (ref 35–47)
HDLC SERPL-MCNC: 50 MG/DL (ref 23–92)
HGB BLD-MCNC: 12.2 G/DL (ref 11.7–15.7)
HGB UR QL STRIP: NEGATIVE
IMM GRANULOCYTES # BLD: 0 10E9/L (ref 0–0.4)
IMM GRANULOCYTES NFR BLD: 0.2 %
KETONES UR STRIP-MCNC: NEGATIVE MG/DL
LDLC SERPL CALC-MCNC: ABNORMAL MG/DL
LEUKOCYTE ESTERASE UR QL STRIP: ABNORMAL
LYMPHOCYTES # BLD AUTO: 1.9 10E9/L (ref 0.8–5.3)
LYMPHOCYTES NFR BLD AUTO: 36.3 %
MCH RBC QN AUTO: 28.9 PG (ref 26.5–33)
MCHC RBC AUTO-ENTMCNC: 33.3 G/DL (ref 31.5–36.5)
MCV RBC AUTO: 87 FL (ref 78–100)
MONOCYTES # BLD AUTO: 0.4 10E9/L (ref 0–1.3)
MONOCYTES NFR BLD AUTO: 7.2 %
NEUTROPHILS # BLD AUTO: 2.8 10E9/L (ref 1.6–8.3)
NEUTROPHILS NFR BLD AUTO: 52.1 %
NITRATE UR QL: NEGATIVE
NON-SQ EPI CELLS #/AREA URNS LPF: NORMAL /LPF
NONHDLC SERPL-MCNC: 181 MG/DL
PH UR STRIP: 5.5 PH (ref 5–9)
PLATELET # BLD AUTO: 220 10E9/L (ref 150–450)
POTASSIUM SERPL-SCNC: 3.7 MMOL/L (ref 3.5–5.1)
PROT SERPL-MCNC: 7.2 G/DL (ref 6.4–8.9)
RBC # BLD AUTO: 4.22 10E12/L (ref 3.8–5.2)
RBC #/AREA URNS AUTO: NORMAL /HPF
SODIUM SERPL-SCNC: 139 MMOL/L (ref 134–144)
SOURCE: ABNORMAL
SP GR UR STRIP: 1.02 (ref 1–1.03)
TRIGL SERPL-MCNC: 428 MG/DL
TSH SERPL DL<=0.05 MIU/L-ACNC: 2.42 IU/ML (ref 0.34–5.6)
UROBILINOGEN UR STRIP-ACNC: 0.2 EU/DL (ref 0.2–1)
VIT B12 SERPL-MCNC: 241 PG/ML (ref 180–914)
WBC # BLD AUTO: 5.3 10E9/L (ref 4–11)
WBC #/AREA URNS AUTO: NORMAL /HPF

## 2018-12-05 PROCEDURE — 81001 URINALYSIS AUTO W/SCOPE: CPT | Performed by: FAMILY MEDICINE

## 2018-12-05 PROCEDURE — G0463 HOSPITAL OUTPT CLINIC VISIT: HCPCS

## 2018-12-05 PROCEDURE — 83921 ORGANIC ACID SINGLE QUANT: CPT | Performed by: FAMILY MEDICINE

## 2018-12-05 PROCEDURE — 83036 HEMOGLOBIN GLYCOSYLATED A1C: CPT | Performed by: FAMILY MEDICINE

## 2018-12-05 PROCEDURE — 99214 OFFICE O/P EST MOD 30 MIN: CPT | Performed by: FAMILY MEDICINE

## 2018-12-05 PROCEDURE — 80053 COMPREHEN METABOLIC PANEL: CPT | Performed by: FAMILY MEDICINE

## 2018-12-05 PROCEDURE — 82607 VITAMIN B-12: CPT | Performed by: FAMILY MEDICINE

## 2018-12-05 PROCEDURE — 36415 COLL VENOUS BLD VENIPUNCTURE: CPT | Performed by: FAMILY MEDICINE

## 2018-12-05 PROCEDURE — 82043 UR ALBUMIN QUANTITATIVE: CPT | Performed by: FAMILY MEDICINE

## 2018-12-05 PROCEDURE — 80061 LIPID PANEL: CPT | Performed by: FAMILY MEDICINE

## 2018-12-05 PROCEDURE — 85025 COMPLETE CBC W/AUTO DIFF WBC: CPT | Performed by: FAMILY MEDICINE

## 2018-12-05 PROCEDURE — 84443 ASSAY THYROID STIM HORMONE: CPT | Performed by: FAMILY MEDICINE

## 2018-12-05 RX ORDER — GABAPENTIN 300 MG/1
300 CAPSULE ORAL 3 TIMES DAILY
Qty: 90 CAPSULE | Status: SHIPPED | OUTPATIENT
Start: 2018-12-05 | End: 2020-01-28

## 2018-12-05 RX ORDER — OMEPRAZOLE 40 MG/1
40 CAPSULE, DELAYED RELEASE ORAL
COMMUNITY
End: 2018-12-05

## 2018-12-05 RX ORDER — OXYBUTYNIN CHLORIDE 5 MG/1
5 TABLET, EXTENDED RELEASE ORAL DAILY
Qty: 30 TABLET | Refills: 1 | Status: SHIPPED | OUTPATIENT
Start: 2018-12-05 | End: 2021-03-23

## 2018-12-05 RX ORDER — MELOXICAM 15 MG/1
15 TABLET ORAL
COMMUNITY
End: 2018-12-05

## 2018-12-05 RX ORDER — PRAVASTATIN SODIUM 20 MG
20 TABLET ORAL DAILY
Qty: 90 TABLET | Refills: 3 | Status: SHIPPED | OUTPATIENT
Start: 2018-12-05 | End: 2019-04-25

## 2018-12-05 RX ORDER — GABAPENTIN 300 MG/1
300 CAPSULE ORAL
COMMUNITY
End: 2018-12-05

## 2018-12-05 RX ORDER — LISINOPRIL 5 MG/1
5 TABLET ORAL
COMMUNITY
End: 2018-12-05

## 2018-12-05 RX ORDER — LISINOPRIL 10 MG/1
10 TABLET ORAL DAILY
Qty: 90 TABLET | Refills: 11 | Status: SHIPPED | OUTPATIENT
Start: 2018-12-05 | End: 2019-04-25 | Stop reason: DRUGHIGH

## 2018-12-05 ASSESSMENT — PAIN SCALES - GENERAL: PAINLEVEL: MODERATE PAIN (4)

## 2018-12-05 NOTE — PROGRESS NOTES
Nursing Notes:   Marlene Castillo LPN  12/5/2018  1:51 PM  Signed  Patient here today for med refills. She is unsure what she needs to take or not. Also c/o R knee pain. Was seen by Alli and he said bone is deteriorating. Pt wants PCP opinion.     Previous A1C is at goal of <8  No results found for: A1C  Urine microalbumin:creatine: 3/2017  Foot exam 4/2018  Eye exam 2018    Tobacco User   Patient is not on a daily aspirin  Patient is on a Statin.  Blood pressure today of:     BP Readings from Last 1 Encounters:   12/05/18 128/72      is at the goal of <139/89 for diabetics.    Chief Complaint   Patient presents with     RECHECK     medication and also R knee pain          Medication Reconciliation: complete    JASON Lyn LPN on 12/5/2018 at 1:14 PM        SUBJECTIVE:  Maliha Rivera  is a 78 year old female who comes in today for complete evaluation renewal of medications.  She has type 2 diabetes and is currently not on medication for that.  She needs follow-up of same.  She has hyperlipidemia for which she is on pravastatin.  She takes gabapentin because of a chronic lumbar radiculopathy.  She has been following her blood pressure at home and has been off the lisinopril    She has right knee pain and saw Dr. Carson.  After a fall, there was some concern of loosening and bone scan demonstrated that possibility.  Would be a big procedure to have a revision and they elected to go with a brace. She got it at the clinic and it wore a sore on her leg.  Her leg hurts a lot to start when she gets up and then    She is up-to-date on immunizations.  She is up-to-date on colon screening.  She is up-to-date on mammogram.    She has pain in her left foot. She saw Dr. Vilchis and he did xray and she had arthritis. Did shots that didn't work and offered surgery.     Past Medical, Family, and Social History reviewed and updated as noted below.   ROS is negative except as noted above        Allergies   Allergen Reactions     Atorvastatin Muscle Pain (Myalgia)     Cefazolin Nausea and Vomiting   ,   Family History   Problem Relation Age of Onset     Heart Disease Mother      Heart Disease     Heart Disease Father      Heart Disease     Heart Disease Sister      Heart Disease     Cancer Other      Cancer,female cancer   ,   Current Outpatient Prescriptions   Medication     acetaminophen (TYLENOL) 325 MG tablet     aspirin EC 81 MG EC tablet     blood glucose monitoring (FREESTYLE LITE) test strip     blood glucose monitoring (FREESTYLE) lancets     Blood Glucose Monitoring Suppl (FIFTY50 GLUCOSE METER 2.0) W/DEVICE KIT     Blood Pressure Monitoring (ADULT BLOOD PRESSURE CUFF LG) KIT     COMPRESSION STOCKINGS     gabapentin (NEURONTIN) 300 MG capsule     lisinopril (PRINIVIL/ZESTRIL) 10 MG tablet     omeprazole (PRILOSEC) 40 MG capsule     order for DME     order for DME     oxybutynin ER (DITROPAN XL) 5 MG 24 hr tablet     pravastatin (PRAVACHOL) 20 MG tablet     PSYLLIUM PO     [DISCONTINUED] gabapentin (NEURONTIN) 300 MG capsule     [DISCONTINUED] gabapentin (NEURONTIN) 300 MG capsule     [DISCONTINUED] lisinopril (PRINIVIL/ZESTRIL) 10 MG tablet     [DISCONTINUED] lisinopril (PRINIVIL/ZESTRIL) 5 MG tablet     [DISCONTINUED] lisinopril (PRINIVIL/ZESTRIL) 5 MG tablet     [DISCONTINUED] pravastatin (PRAVACHOL) 20 MG tablet     No current facility-administered medications for this visit.    ,   Past Medical History:   Diagnosis Date     Encounter for screening for cardiovascular disorders     4/8/15,Lexiscan cardiolite [facility]   ,   Patient Active Problem List    Diagnosis Date Noted     Benign essential hypertension 04/18/2018     Priority: Medium     Lumbar stenosis 03/01/2018     Priority: Medium     Lumbar radiculopathy 01/23/2018     Priority: Medium     Status post total bilateral knee replacement 03/31/2017     Priority: Medium     Primary osteoarthritis of left knee 03/23/2017      Priority: Medium     DM w/o complication type II (H) 09/22/2015     Priority: Medium     Overview:   3/2014:  A1c fine off metformin - will discontinue as patient does not want to take it.  She is taking fish oil + other ingredients she states helps with DM  On ASA  Patient does not want to take lisinopril  Does not tolerate statins and has discontinued pravastatin; try niacin  No retinopathy on eye exam 4/2013 and 12/2014  Overview:   Overview:   3/2014:  A1c fine off metformin - will discontinue as patient does not want to take it.  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  Lipids:; declines statin - will try niacin and is on fish oil.  Mammogram:schedule  Pap: 3/2013 WNL - does not need again.  Tdap:3/2013  Vitamin D: takes supplement  pneumovax done.  Consider zoster vaccine.       Degenerative arthritis of knee 10/21/2010     Priority: Medium     Overview:   IMO Update 10/11       Cervicalgia 05/11/2010     Priority: Medium     Overview:   IMO Update 10/11       DDD (degenerative disc disease), lumbar 01/15/2010     Priority: Medium     Primary localized osteoarthrosis, lower leg 01/31/2008     Priority: Medium     Hyperlipidemia 07/22/2004     Priority: Medium     Overview:   IMO Update 10/11  Overview:   Overview:   IMO Update 10/11     ,   Past Surgical History:   Procedure Laterality Date     APPENDECTOMY OPEN      No Comments Provided      ARTHROSCOPY KNEE      2007,Joseluis Thomas M.D.     COLONOSCOPY      2003,2011,normal     ESOPHAGOSCOPY, GASTROSCOPY, DUODENOSCOPY (EGD), COMBINED      2/3/2016     LAPAROSCOPIC CHOLECYSTECTOMY      3/11/01,Dr. Salas     LAPAROSCOPIC TUBAL LIGATION      No Comments Provided     lumbar decompression L4-5  02/28/2018    Dr. France     OTHER SURGICAL HISTORY      1996,[id number],CATARACT EXTRACTION W/  INTRAOCULAR LENS IMPLANT,Bilateral     OTHER SURGICAL HISTORY      2001,36577.0,GA ERCP BILIARY OR PANC DUCT STENT EXCHANGE W DIL AND WIRE,stent removed.     OTHER SURGICAL HISTORY      2004,[id number],CARDIAC CATHETERIZATION,mild disease     OTHER SURGICAL HISTORY      1/31/07,[id number],OTHER,Left     OTHER SURGICAL HISTORY      02/27/2008,[id number],TOTAL KNEE ARTHROPLASTY,Right     OTHER SURGICAL HISTORY      2010,[id number],TOTAL KNEE ARTHROPLASTY,Left,Dr. Carson     OTHER SURGICAL HISTORY      06/05/2017,[id number],OTHER,Left,Dr. Joiner, [facility]     RELEASE CARPAL TUNNEL      2006     TONSILLECTOMY      age 12    and   Social History   Substance Use Topics     Smoking status: Never Smoker     Smokeless tobacco: Never Used     Alcohol use No     OBJECTIVE:  /72 (BP Location: Right arm, Patient Position: Sitting, Cuff Size: Adult Regular)  Pulse 70  Temp 98.2  F (36.8  C) (Temporal)  Wt 169 lb (76.7 kg)  BMI 30.42 kg/m2   EXAM:  General Appearance: Pleasant, alert, appropriate appearance for age. No acute distress  Head Exam: Normal. Normocephalic, atraumatic.  Eye Exam: PERRLA, EOMI, conjunctiva, sclerae normal.  Ear Exam: Normal TM's bilaterally. Normal auditory canals and externalears. Non-tender.  Nose Exam: Normal external nose, mucus membranes, and septum.  OroPharynx Exam:  Dental hygiene adequate. Normal buccal mucosa. Normal pharynx.  Neck Exam:  Supple, no masses or nodes. No bruits  Thyroid Exam: No nodules or enlargement.  Chest/Respiratory Exam: Normal chest wall and respirations. Clear to auscultation.  Breast Exam:  No dimpling, nipple retraction or discharge. No masses or nodes.  Cardiovascular Exam: Regular rate and rhythm. S1, S2, no murmur, click, gallop, or rubs.  Gastrointestinal Exam: Soft, non-tender, no masses or organomegaly..  Lymphatic Exam: Non-palpable nodes in neck,clavicular, axillary, or inguinal regions.  Musculoskeletal Exam: Back is straight and non-tender, full ROM of upper and lower extremities.  Foot Exam: Left and right foot: good pedal pulses, no lesions, nail hygiene good. Normal sensory testing with #10 monofilament.   Skin: no rash or abnormalities  Neurologic Exam:  normal gross motor, tone coordination and no tremor.  Psychiatric Exam: Alert and oriented - appropriate affect.     Results for orders placed or performed in visit on 12/05/18   *UA reflex to Microscopic   Result Value Ref Range    Color Urine Yellow     Appearance Urine Clear     Glucose Urine Negative NEG^Negative mg/dL    Bilirubin Urine Negative NEG^Negative    Ketones Urine Negative NEG^Negative mg/dL    Specific Gravity Urine 1.020 1.000 - 1.030    Blood Urine Negative NEG^Negative    pH Urine 5.5 5.0 - 9.0 pH    Protein Albumin Urine Negative NEG^Negative mg/dL    Urobilinogen Urine 0.2 0.2 - 1.0 EU/dL    Nitrite Urine Negative NEG^Negative    Leukocyte Esterase Urine Small (A) NEG^Negative    Source Midstream Urine    CBC with platelets differential   Result Value Ref Range    WBC 5.3 4.0 - 11.0 10e9/L    RBC Count 4.22 3.8 - 5.2 10e12/L    Hemoglobin 12.2 11.7 - 15.7 g/dL    Hematocrit 36.6 35.0 - 47.0 %    MCV 87 78 - 100 fl    MCH 28.9 26.5 - 33.0 pg    MCHC 33.3 31.5 - 36.5 g/dL    RDW 12.5 10.0 - 15.0 %    Platelet Count 220 150 - 450 10e9/L    Diff Method Automated Method     % Neutrophils 52.1 %    % Lymphocytes 36.3 %    % Monocytes 7.2 %    % Eosinophils 3.6 %    % Basophils 0.6 %    % Immature Granulocytes 0.2 %    Absolute Neutrophil 2.8 1.6 - 8.3 10e9/L    Absolute Lymphocytes 1.9 0.8 - 5.3 10e9/L    Absolute  Monocytes 0.4 0.0 - 1.3 10e9/L    Absolute Eosinophils 0.2 0.0 - 0.7 10e9/L    Absolute Basophils 0.0 0.0 - 0.2 10e9/L    Abs Immature Granulocytes 0.0 0 - 0.4 10e9/L   Comprehensive metabolic panel   Result Value Ref Range    Sodium 139 134 - 144 mmol/L    Potassium 3.7 3.5 - 5.1 mmol/L    Chloride 102 98 - 107 mmol/L    Carbon Dioxide 29 21 - 31 mmol/L    Anion Gap 8 3 - 14 mmol/L    Glucose 215 (H) 70 - 105 mg/dL    Urea Nitrogen 11 7 - 25 mg/dL    Creatinine 0.68 0.60 - 1.20 mg/dL    GFR Estimate Not Calculated >60 mL/min/1.7m2    GFR Estimate If Black Not Calculated >60 mL/min/1.7m2    Calcium 9.1 8.6 - 10.3 mg/dL    Bilirubin Total 0.3 0.3 - 1.0 mg/dL    Albumin 4.3 3.5 - 5.7 g/dL    Protein Total 7.2 6.4 - 8.9 g/dL    Alkaline Phosphatase 64 34 - 104 U/L    ALT 13 7 - 52 U/L    AST 16 13 - 39 U/L   Hemoglobin A1c   Result Value Ref Range    Hemoglobin A1C 6.1 (H) 4.0 - 6.0 %   Thyrotropin GH   Result Value Ref Range    Thyrotropin 2.42 0.34 - 5.60 IU/mL   Lipid Profile   Result Value Ref Range    Cholesterol 231 (H) <200 mg/dL    Triglycerides 428 (H) <150 mg/dL    HDL Cholesterol 50 23 - 92 mg/dL    LDL Cholesterol Calculated  <100 mg/dL     Cannot estimate LDL when triglyceride exceeds 400 mg/dL    Non HDL Cholesterol 181 (H) <130 mg/dL   Vitamin B12   Result Value Ref Range    Vitamin B12 241 180 - 914 pg/mL   Urine Microscopic   Result Value Ref Range    WBC Urine 0 - 5 OTO5^0 - 5 /HPF    RBC Urine O - 2 OTO2^O - 2 /HPF    Squamous Epithelial /LPF Urine Few FEW^Few /LPF      ASSESSMENT/Plan :    Maliha was seen today for recheck.    Diagnoses and all orders for this visit:    Type 2 diabetes mellitus without complication, without long-term current use of insulin (H)  -     CBC with platelets differential; Future  -     Comprehensive metabolic panel; Future  -     Hemoglobin A1c; Future  -     Albumin Random Urine Quantitative with Creat Ratio; Future  -     Thyrotropin GH; Future  -     Lipid Profile;  Future  -     order for DME; [facility] and/or insoles  -     CBC with platelets differential  -     Comprehensive metabolic panel  -     Hemoglobin A1c  -     Albumin Random Urine Quantitative with Creat Ratio  -     Thyrotropin GH  -     Lipid Profile  -     Urine Microscopic    Benign essential hypertension  -     lisinopril (PRINIVIL/ZESTRIL) 10 MG tablet; Take 1 tablet (10 mg) by mouth daily  -     CBC with platelets differential; Future  -     CBC with platelets differential    Hyperlipidemia, unspecified hyperlipidemia type  -     pravastatin (PRAVACHOL) 20 MG tablet; Take 1 tablet (20 mg) by mouth daily  -     Lipid Profile; Future  -     Lipid Profile    Lumbar radiculopathy  -     gabapentin (NEURONTIN) 300 MG capsule; Take 1 capsule (300 mg) by mouth 3 times daily    Status post total bilateral knee replacement    Loose total knee arthroplasty, subsequent encounter, right  -     order for DME; Equipment being ordered: [facility] knee brace    Peripheral polyneuropathy  -     order for DME; [facility] for diabetic shoes and/or insoles    Arthritis of foot, left  -     order for DME; [facility] diabetic shoes and/or insoles    Overactive bladder  -     oxybutynin ER (DITROPAN XL) 5 MG 24 hr tablet; Take 1 tablet (5 mg) by mouth daily  -     *UA reflex to Microscopic    Vitamin B12 deficiency (non anemic)  -     Vitamin B12; Future  -     Methylmalonic acid; Future  -     Vitamin B12  -     Methylmalonic acid    Gastroesophageal reflux disease without esophagitis      Will notify of lab results when available. Discussed diet, exercise and healthy lifestyle changes. Continue current medications.  She is on gabapentin for lumbar radiculopathy which does affect her feet.  She has significant arthritis in her left foot and I think  she would benefit from diabetic shoes and/or insoles.  For the loosening of her right total knee arthroplasty, will send her to [facility] to try a different type of brace.    For overactive bladder symptoms will do a trial of Ditropan XL and instructed on use.    A total of 25 minutes was spent with the patient, greater than 50% of the time was spent in counseling/discussion of the aforementioned concerns.     Nicanor Mike MD

## 2018-12-05 NOTE — NURSING NOTE
Patient here today for med refills. She is unsure what she needs to take or not. Also c/o R knee pain. Was seen by Alli and he said bone is deteriorating. Pt wants PCP opinion.     Previous A1C is at goal of <8  No results found for: A1C  Urine microalbumin:creatine: 3/2017  Foot exam 4/2018  Eye exam 2018    Tobacco User   Patient is not on a daily aspirin  Patient is on a Statin.  Blood pressure today of:     BP Readings from Last 1 Encounters:   12/05/18 128/72      is at the goal of <139/89 for diabetics.    Chief Complaint   Patient presents with     RECHECK     medication and also R knee pain          Medication Reconciliation: complete    JASON Lyn LPN on 12/5/2018 at 1:14 PM

## 2018-12-05 NOTE — MR AVS SNAPSHOT
"              After Visit Summary   12/5/2018    Maliha Rivera    MRN: [id number]           Patient Information     Date Of Birth          1940        Visit Information        Provider Department      12/5/2018 1:00 PM Nicanor Mike MD [facility]        Today's Diagnoses     Type 2 diabetes mellitus without complication, without long-term current use of insulin (H)    -  1    Benign essential hypertension        Hyperlipidemia, unspecified hyperlipidemia type        Lumbar radiculopathy        Status post total bilateral knee replacement        Loose total knee arthroplasty, subsequent encounter, right        Peripheral polyneuropathy        Arthritis of foot, left        Overactive bladder        Vitamin B12 deficiency (non anemic)        Gastroesophageal reflux disease without esophagitis           Follow-ups after your visit        Who to contact     If you have questions or need follow up information about today's clinic visit or your schedule please contact [facility] directly at [phone].  Normal or non-critical lab and imaging results will be communicated to you by MyChart, letter or phone within 4 business days after the clinic has received the results. If you do not hear from us within 7 days, please contact the clinic through [employer] or phone. If you have a critical or abnormal lab result, we will notify you by phone as soon as possible.  Submit refill requests through [employer] or call your pharmacy and they will forward the refill request to us. Please allow 3 business days for your refill to be completed.          Additional Information About Your Visit        MyChart Information     [employer] lets you send messages to your doctor, view your test results, renew your prescriptions, schedule appointments and more. To sign up, go to [email or web address] . Click on \"Log in\" on the left side of the screen, which will take you to the Welcome page. " "Then click on \"Sign up Now\" on the right side of the page.     You will be asked to enter the access code listed below, as well as some personal information. Please follow the directions to create your username and password.     Your access code is: [email or web address]  Expires: 3/5/2019  7:37 PM     Your access code will  in 90 days. If you need help or a new code, please call your [place] clinic or [phone].        Care EveryWhere ID     This is your Care EveryWhere ID. This could be used by other organizations to access your [place] medical records  [id number]        Your Vitals Were     Pulse Temperature BMI (Body Mass Index)             70 98.2  F (36.8  C) (Temporal) 30.42 kg/m2          Blood Pressure from Last 3 Encounters:   18 128/72   18 138/50   18 148/70    Weight from Last 3 Encounters:   18 169 lb (76.7 kg)   18 175 lb 9.6 oz (79.7 kg)   18 180 lb (81.6 kg)              We Performed the Following     *UA reflex to Microscopic     Albumin Random Urine Quantitative with Creat Ratio     CBC with platelets differential     Comprehensive metabolic panel     Hemoglobin A1c     Lipid Profile     Methylmalonic acid     Thyrotropin GH     Urine Microscopic     Vitamin B12          Today's Medication Changes          These changes are accurate as of 18  7:37 PM.  If you have any questions, ask your nurse or doctor.               Start taking these medicines.        Dose/Directions    order for DME   Used for:  Loose total knee arthroplasty, subsequent encounter   Started by:  Nicanor Mike MD        Equipment being ordered: Evaluate at [facility] for a knee brace   Quantity:  1 Units   Refills:  0       order for DME   Used for:  Type 2 diabetes mellitus without complication, without long-term current use of insulin (H), Peripheral polyneuropathy, Arthritis of foot, left   Started by:  Nicanor Mike MD        Equipment being ordered: [employer] [facility] [employer] to evaluate for diabetic shoes and/or insoles   Quantity:  1 Units   Refills:  prn       oxybutynin ER 5 MG 24 hr tablet   Commonly known as:  DITROPAN XL   Used for:  Overactive bladder   Started by:  Nicanor Mike MD        Dose:  5 mg   Take 1 tablet (5 mg) by mouth daily   Quantity:  30 tablet   Refills:  1         These medicines have changed or have updated prescriptions.        Dose/Directions    gabapentin 300 MG capsule   Commonly known as:  NEURONTIN   This may have changed:  when to take this   Used for:  Lumbar radiculopathy   Changed by:  Nicanor Mike MD        Dose:  300 mg   Take 1 capsule (300 mg) by mouth 3 times daily   Quantity:  90 capsule   Refills:  prn         Stop taking these medicines if you haven't already. Please contact your care team if you have questions.     meloxicam 15 MG tablet   Commonly known as:  MOBIC   Stopped by:  Nicanor Mike MD                Where to get your medicines      These medications were sent to [facility] [address] [place] [id number]     Phone:  [phone]     gabapentin 300 MG capsule    lisinopril 10 MG tablet    oxybutynin ER 5 MG 24 hr tablet    pravastatin 20 MG tablet         Some of these will need a paper prescription and others can be bought over the counter.  Ask your nurse if you have questions.     Bring a paper prescription for each of these medications     order for DME    order for DME                Primary Care Provider Office Phone # Fax #    Nicanor DON [phone]       [address] [facility] [address]        Equal Access to Services     [facility] AH: Hadii veronica yadavo Soenrique, waaxda luqadaha, qaybta kaalmada adeegyada, henry morton. So [facility] [phone].    ATENCIÓN: Si habla español, tiene a khan disposición servicios gratuitos de asistencia lingüística. Llame al  [phone].    We comply with applicable federal civil rights laws and [place] laws. We do not discriminate on the basis of race, color, national origin, age, disability, sex, sexual orientation, or gender identity.            Thank you!     Thank you for choosing [facility]  for your care. Our goal is always to provide you with excellent care. Hearing back from our patients is one way we can continue to improve our services. Please take a few minutes to complete the written survey that you may receive in the mail after your visit with us. Thank you!             Your Updated Medication List - Protect others around you: Learn how to safely use, store and throw away your medicines at www.disposemymeds.org.          This list is accurate as of 12/5/18  7:37 PM.  Always use your most recent med list.                   Brand Name Dispense Instructions for use Diagnosis    acetaminophen 325 MG tablet    TYLENOL     Take 650 mg by mouth        Adult Blood Pressure Cuff Lg Kit     1 kit    Use for home blood pressure monitoring as directed.    Benign essential hypertension       aspirin 81 MG EC tablet      Take 81 mg by mouth        blood glucose monitoring lancets      As directed. Dispense item covered by pt ins. E11.9 NIDDM type II - Test 1 time/day        COMPRESSION STOCKINGS      For personal use. Length: calf Strength: 16-20 mmHg Please measure.        FIFTY50 GLUCOSE METER 2.0 w/Device Kit      Dispense meter, test strips, lancets covered by pt ins. E11.9 NIDDM type II - Test 1 time/day        FREESTYLE LITE test strip   Generic drug:  blood glucose monitoring      Dispense item covered by pt ins. E11.9 NIDDM type II - Test 1 time/day        gabapentin 300 MG capsule    NEURONTIN    90 capsule    Take 1 capsule (300 mg) by mouth 3 times daily    Lumbar radiculopathy       lisinopril 10 MG tablet    PRINIVIL/ZESTRIL    90 tablet    Take 1 tablet (10 mg) by mouth daily    Benign essential  hypertension       omeprazole 40 MG DR capsule    priLOSEC     Take 1 capsule by mouth daily        order for DME     1 Units    Equipment being ordered: Evaluate at [facility] for a knee brace    Loose total knee arthroplasty, subsequent encounter       order for DME     1 Units    Equipment being ordered: [facility] to evaluate for diabetic shoes and/or insoles    Type 2 diabetes mellitus without complication, without long-term current use of insulin (H), Peripheral polyneuropathy, Arthritis of foot, left       oxybutynin ER 5 MG 24 hr tablet    DITROPAN XL    30 tablet    Take 1 tablet (5 mg) by mouth daily    Overactive bladder       pravastatin 20 MG tablet    PRAVACHOL    90 tablet    Take 1 tablet (20 mg) by mouth daily    Hyperlipidemia, unspecified hyperlipidemia type       PSYLLIUM PO      Take 3 tsp. by mouth

## 2018-12-05 NOTE — LETTER
December 5, 2018      Maliha Rivera  [address] [facility] [address]        Dear Maliha,     Your labs look good. Your complete blood count and urinalysis are normal. Your comprehensive metabolic panel (a test that looks at liver and kidney function, blood sugar, electrolytes, and nutritional status) was normal. Your cholesterol is ok. Your thyroid is fine.  Your diabetes control is good. Your vitamin B12 is normal.    It was a pleasure seeing you the other day.  If you have any questions, please don't hesitate to call us.       Sincerely,        Nicanor Mike MD                                      Results for orders placed or performed in visit on 12/05/18   *UA reflex to Microscopic   Result Value Ref Range    Color Urine Yellow     Appearance Urine Clear     Glucose Urine Negative NEG^Negative mg/dL    Bilirubin Urine Negative NEG^Negative    Ketones Urine Negative NEG^Negative mg/dL    Specific Gravity Urine 1.020 1.000 - 1.030    Blood Urine Negative NEG^Negative    pH Urine 5.5 5.0 - 9.0 pH    Protein Albumin Urine Negative NEG^Negative mg/dL    Urobilinogen Urine 0.2 0.2 - 1.0 EU/dL    Nitrite Urine Negative NEG^Negative    Leukocyte Esterase Urine Small (A) NEG^Negative    Source Midstream Urine    CBC with platelets differential   Result Value Ref Range    WBC 5.3 4.0 - 11.0 10e9/L    RBC Count 4.22 3.8 - 5.2 10e12/L    Hemoglobin 12.2 11.7 - 15.7 g/dL    Hematocrit 36.6 35.0 - 47.0 %    MCV 87 78 - 100 fl    MCH 28.9 26.5 - 33.0 pg    MCHC 33.3 31.5 - 36.5 g/dL    RDW 12.5 10.0 - 15.0 %    Platelet Count 220 150 - 450 10e9/L    Diff Method Automated Method     % Neutrophils 52.1 %    % Lymphocytes 36.3 %    % Monocytes 7.2 %    % Eosinophils 3.6 %    % Basophils 0.6 %    % Immature Granulocytes 0.2 %    Absolute Neutrophil 2.8 1.6 - 8.3 10e9/L    Absolute Lymphocytes 1.9 0.8 - 5.3 10e9/L    Absolute Monocytes 0.4 0.0 - 1.3 10e9/L    Absolute Eosinophils 0.2 0.0 - 0.7 10e9/L    Absolute  Basophils 0.0 0.0 - 0.2 10e9/L    Abs Immature Granulocytes 0.0 0 - 0.4 10e9/L   Comprehensive metabolic panel   Result Value Ref Range    Sodium 139 134 - 144 mmol/L    Potassium 3.7 3.5 - 5.1 mmol/L    Chloride 102 98 - 107 mmol/L    Carbon Dioxide 29 21 - 31 mmol/L    Anion Gap 8 3 - 14 mmol/L    Glucose 215 (H) 70 - 105 mg/dL    Urea Nitrogen 11 7 - 25 mg/dL    Creatinine 0.68 0.60 - 1.20 mg/dL    GFR Estimate Not Calculated >60 mL/min/1.7m2    GFR Estimate If Black Not Calculated >60 mL/min/1.7m2    Calcium 9.1 8.6 - 10.3 mg/dL    Bilirubin Total 0.3 0.3 - 1.0 mg/dL    Albumin 4.3 3.5 - 5.7 g/dL    Protein Total 7.2 6.4 - 8.9 g/dL    Alkaline Phosphatase 64 34 - 104 U/L    ALT 13 7 - 52 U/L    AST 16 13 - 39 U/L   Hemoglobin A1c   Result Value Ref Range    Hemoglobin A1C 6.1 (H) 4.0 - 6.0 %   Thyrotropin GH   Result Value Ref Range    Thyrotropin 2.42 0.34 - 5.60 IU/mL   Lipid Profile   Result Value Ref Range    Cholesterol 231 (H) <200 mg/dL    Triglycerides 428 (H) <150 mg/dL    HDL Cholesterol 50 23 - 92 mg/dL    LDL Cholesterol Calculated  <100 mg/dL     Cannot estimate LDL when triglyceride exceeds 400 mg/dL    Non HDL Cholesterol 181 (H) <130 mg/dL   Vitamin B12   Result Value Ref Range    Vitamin B12 241 180 - 914 pg/mL   Urine Microscopic   Result Value Ref Range    WBC Urine 0 - 5 OTO5^0 - 5 /HPF    RBC Urine O - 2 OTO2^O - 2 /HPF    Squamous Epithelial /LPF Urine Few FEW^Few /LPF

## 2018-12-06 LAB
CREAT UR-MCNC: 50 MG/DL
MICROALBUMIN UR-MCNC: <5 MG/L
MICROALBUMIN/CREAT UR: NORMAL MG/G CR (ref 0–25)

## 2018-12-08 LAB — METHYLMALONATE SERPL-SCNC: 0.16 UMOL/L (ref 0–0.4)

## 2018-12-11 NOTE — TELEPHONE ENCOUNTER
Patient Information     Patient Name MRN Maliha Chamberlain [id number] Female 1940      Telephone Encounter by Olivia Jean-Baptiste at 2018  9:49 AM     Author:  Olivia Jean-Baptiste Service:  (none) Author Type:  (none)     Filed:  2018  9:50 AM Encounter Date:  2018 Status:  Signed     :  Olivia Jean-Baptiste            The patient called and she talked to the pharmacist. She would like a freestyle meter and supplies faxed to the pharmacy.  Olivia Jean-Baptiste LPN..................2018   9:50 AM          
denies pain/discomfort

## 2018-12-19 ENCOUNTER — TRANSFERRED RECORDS (OUTPATIENT)
Dept: HEALTH INFORMATION MANAGEMENT | Facility: OTHER | Age: 78
End: 2018-12-19

## 2019-04-04 ENCOUNTER — TRANSFERRED RECORDS (OUTPATIENT)
Dept: HEALTH INFORMATION MANAGEMENT | Facility: OTHER | Age: 79
End: 2019-04-04

## 2019-04-10 ENCOUNTER — OFFICE VISIT (OUTPATIENT)
Dept: FAMILY MEDICINE | Facility: OTHER | Age: 79
End: 2019-04-10
Attending: FAMILY MEDICINE
Payer: MEDICARE

## 2019-04-10 VITALS
HEART RATE: 80 BPM | SYSTOLIC BLOOD PRESSURE: 156 MMHG | TEMPERATURE: 97.5 F | BODY MASS INDEX: 30.05 KG/M2 | RESPIRATION RATE: 16 BRPM | OXYGEN SATURATION: 98 % | HEIGHT: 63 IN | WEIGHT: 169.6 LBS | DIASTOLIC BLOOD PRESSURE: 78 MMHG

## 2019-04-10 DIAGNOSIS — E78.5 HYPERLIPIDEMIA, UNSPECIFIED HYPERLIPIDEMIA TYPE: ICD-10-CM

## 2019-04-10 DIAGNOSIS — E11.9 TYPE 2 DIABETES MELLITUS WITHOUT COMPLICATION, WITHOUT LONG-TERM CURRENT USE OF INSULIN (H): ICD-10-CM

## 2019-04-10 DIAGNOSIS — Z96.659 LOOSENING OF KNEE JOINT PROSTHESIS, SUBSEQUENT ENCOUNTER: ICD-10-CM

## 2019-04-10 DIAGNOSIS — M25.561 CHRONIC PAIN OF RIGHT KNEE: ICD-10-CM

## 2019-04-10 DIAGNOSIS — R82.71 ASYMPTOMATIC BACTERIURIA: ICD-10-CM

## 2019-04-10 DIAGNOSIS — G89.29 CHRONIC PAIN OF RIGHT KNEE: ICD-10-CM

## 2019-04-10 DIAGNOSIS — I10 BENIGN ESSENTIAL HYPERTENSION: ICD-10-CM

## 2019-04-10 DIAGNOSIS — Z96.653 STATUS POST TOTAL BILATERAL KNEE REPLACEMENT: ICD-10-CM

## 2019-04-10 DIAGNOSIS — K21.9 GASTROESOPHAGEAL REFLUX DISEASE WITHOUT ESOPHAGITIS: ICD-10-CM

## 2019-04-10 DIAGNOSIS — Z01.818 PREOPERATIVE EXAMINATION: Primary | ICD-10-CM

## 2019-04-10 DIAGNOSIS — T84.038D LOOSENING OF KNEE JOINT PROSTHESIS, SUBSEQUENT ENCOUNTER: ICD-10-CM

## 2019-04-10 LAB
ALBUMIN SERPL-MCNC: 4.4 G/DL (ref 3.5–5.7)
ALBUMIN UR-MCNC: NEGATIVE MG/DL
ALP SERPL-CCNC: 58 U/L (ref 34–104)
ALT SERPL W P-5'-P-CCNC: 14 U/L (ref 7–52)
ANION GAP SERPL CALCULATED.3IONS-SCNC: 8 MMOL/L (ref 3–14)
APPEARANCE UR: CLEAR
AST SERPL W P-5'-P-CCNC: 17 U/L (ref 13–39)
BACTERIA #/AREA URNS HPF: ABNORMAL /HPF
BASOPHILS # BLD AUTO: 0 10E9/L (ref 0–0.2)
BASOPHILS NFR BLD AUTO: 0.7 %
BILIRUB SERPL-MCNC: 0.3 MG/DL (ref 0.3–1)
BILIRUB UR QL STRIP: NEGATIVE
BUN SERPL-MCNC: 16 MG/DL (ref 7–25)
CALCIUM SERPL-MCNC: 9.4 MG/DL (ref 8.6–10.3)
CHLORIDE SERPL-SCNC: 102 MMOL/L (ref 98–107)
CO2 SERPL-SCNC: 29 MMOL/L (ref 21–31)
COLOR UR AUTO: YELLOW
CREAT SERPL-MCNC: 0.72 MG/DL (ref 0.6–1.2)
DIFFERENTIAL METHOD BLD: NORMAL
EOSINOPHIL # BLD AUTO: 0.2 10E9/L (ref 0–0.7)
EOSINOPHIL NFR BLD AUTO: 2.7 %
ERYTHROCYTE [DISTWIDTH] IN BLOOD BY AUTOMATED COUNT: 12.7 % (ref 10–15)
GFR SERPL CREATININE-BSD FRML MDRD: 78 ML/MIN/{1.73_M2}
GLUCOSE SERPL-MCNC: 221 MG/DL (ref 70–105)
GLUCOSE UR STRIP-MCNC: NEGATIVE MG/DL
HBA1C MFR BLD: 6.3 % (ref 4–6)
HCT VFR BLD AUTO: 38.7 % (ref 35–47)
HGB BLD-MCNC: 13 G/DL (ref 11.7–15.7)
HGB UR QL STRIP: NEGATIVE
IMM GRANULOCYTES # BLD: 0 10E9/L (ref 0–0.4)
IMM GRANULOCYTES NFR BLD: 0.2 %
KETONES UR STRIP-MCNC: NEGATIVE MG/DL
LEUKOCYTE ESTERASE UR QL STRIP: ABNORMAL
LYMPHOCYTES # BLD AUTO: 2 10E9/L (ref 0.8–5.3)
LYMPHOCYTES NFR BLD AUTO: 35.3 %
MCH RBC QN AUTO: 29.6 PG (ref 26.5–33)
MCHC RBC AUTO-ENTMCNC: 33.6 G/DL (ref 31.5–36.5)
MCV RBC AUTO: 88 FL (ref 78–100)
MONOCYTES # BLD AUTO: 0.5 10E9/L (ref 0–1.3)
MONOCYTES NFR BLD AUTO: 8.6 %
NEUTROPHILS # BLD AUTO: 2.9 10E9/L (ref 1.6–8.3)
NEUTROPHILS NFR BLD AUTO: 52.5 %
NITRATE UR QL: NEGATIVE
NON-SQ EPI CELLS #/AREA URNS LPF: ABNORMAL /LPF
PH UR STRIP: 5.5 PH (ref 5–9)
PLATELET # BLD AUTO: 219 10E9/L (ref 150–450)
POTASSIUM SERPL-SCNC: 3.6 MMOL/L (ref 3.5–5.1)
PROT SERPL-MCNC: 7.4 G/DL (ref 6.4–8.9)
RBC # BLD AUTO: 4.39 10E12/L (ref 3.8–5.2)
RBC #/AREA URNS AUTO: ABNORMAL /HPF
SODIUM SERPL-SCNC: 139 MMOL/L (ref 134–144)
SOURCE: ABNORMAL
SP GR UR STRIP: 1.02 (ref 1–1.03)
UROBILINOGEN UR STRIP-ACNC: 0.2 EU/DL (ref 0.2–1)
WBC # BLD AUTO: 5.6 10E9/L (ref 4–11)
WBC #/AREA URNS AUTO: ABNORMAL /HPF

## 2019-04-10 PROCEDURE — 87086 URINE CULTURE/COLONY COUNT: CPT | Performed by: FAMILY MEDICINE

## 2019-04-10 PROCEDURE — 93010 ELECTROCARDIOGRAM REPORT: CPT | Performed by: INTERNAL MEDICINE

## 2019-04-10 PROCEDURE — 93005 ELECTROCARDIOGRAM TRACING: CPT | Performed by: FAMILY MEDICINE

## 2019-04-10 PROCEDURE — 81001 URINALYSIS AUTO W/SCOPE: CPT | Performed by: FAMILY MEDICINE

## 2019-04-10 PROCEDURE — G0463 HOSPITAL OUTPT CLINIC VISIT: HCPCS | Mod: 25

## 2019-04-10 PROCEDURE — 36415 COLL VENOUS BLD VENIPUNCTURE: CPT | Performed by: FAMILY MEDICINE

## 2019-04-10 PROCEDURE — 83036 HEMOGLOBIN GLYCOSYLATED A1C: CPT | Performed by: FAMILY MEDICINE

## 2019-04-10 PROCEDURE — 85025 COMPLETE CBC W/AUTO DIFF WBC: CPT | Performed by: FAMILY MEDICINE

## 2019-04-10 PROCEDURE — G0463 HOSPITAL OUTPT CLINIC VISIT: HCPCS

## 2019-04-10 PROCEDURE — 80053 COMPREHEN METABOLIC PANEL: CPT | Performed by: FAMILY MEDICINE

## 2019-04-10 PROCEDURE — 99214 OFFICE O/P EST MOD 30 MIN: CPT | Performed by: FAMILY MEDICINE

## 2019-04-10 ASSESSMENT — PAIN SCALES - GENERAL: PAINLEVEL: EXTREME PAIN (9)

## 2019-04-10 ASSESSMENT — MIFFLIN-ST. JEOR: SCORE: 1215.17

## 2019-04-10 NOTE — LETTER
April 17, 2019      Maliha Rivera  [address] [facility] [address]        Dear Maliha,     Your urine culture did not grow any bacteria to indicate any infection.  This makes sense, since you weren't having any urinary symptoms.    It was a pleasure seeing you the other day.  If you have any questions, please don't hesitate to call us.       Sincerely,        Nicanor Mike MD

## 2019-04-10 NOTE — NURSING NOTE
"Chief Complaint   Patient presents with     Pre-Op Exam     second knee replacement 4/18/19       Initial /78   Pulse 80   Temp 97.5  F (36.4  C) (Temporal)   Resp 16   Ht 1.595 m (5' 2.8\")   Wt 76.9 kg (169 lb 9.6 oz)   SpO2 98%   Breastfeeding? No   BMI 30.24 kg/m   Estimated body mass index is 30.24 kg/m  as calculated from the following:    Height as of this encounter: 1.595 m (5' 2.8\").    Weight as of this encounter: 76.9 kg (169 lb 9.6 oz).  Medication Reconciliation: complete    Paty Erickson LPN    "

## 2019-04-10 NOTE — NURSING NOTE
"Chief Complaint   Patient presents with     Pre-Op Exam     second knee replacement 4/18/19       Initial /78   Pulse 80   Temp 97.5  F (36.4  C) (Temporal)   Resp 16   Ht 1.595 m (5' 2.8\")   Wt 76.9 kg (169 lb 9.6 oz)   SpO2 98%   Breastfeeding? No   BMI 30.24 kg/m   Estimated body mass index is 30.24 kg/m  as calculated from the following:    Height as of this encounter: 1.595 m (5' 2.8\").    Weight as of this encounter: 76.9 kg (169 lb 9.6 oz).  Medication Reconciliation: complete    Olivia Jean-Baptiste LPN     Date of Surgery: 4/18/19  Type of Surgery: knee replacement  Surgeon: Dr. Carson  [facility]:  [facility]    Fever/Chills or other infectious symptoms in pastmonth: no  >10lb weight loss in past two months: no    Health Care Directive/Code status:  no  Hx of blood transfusions:   yes   Td up to date:  yes  History of VRE/MRSA:  no     Preoperative Evaluation: Obstructive Sleep Apnea screening    S:Snore -  Do you snore loudly? (louder than talking or loud enough to be heard through closed doors)yes  T: Tired - Do you often feel tired, fatigued, or sleepy during the daytime?no  O: Observed - Has anyone ever observed you stop breathing during your sleep?no  P: Pressure - Do you have or are you being treated for high blood pressure?yes  B: BMI - BMI greater than 35kg/m2?no  A: Age - Age over 50 years old?yes  N: Neck - Neck circumference greater than 40 cm?no  G: Gender - Gender: Male?no    Total number of \"YES\" responses:  3    Scoring: Low risk of GRADY 0-2  At Risk of GRADY: >3 High Risk of GRADY: 5-8    Olivia Jean-Baptiste LPN........................4/10/2019  4:16 PM    Previous A1C is at goal of <8  Lab Results   Component Value Date    A1C 6.1 12/05/2018     Urine microalbumin:creatine: 12/5/18  Foot exam 4/18  Eye exam due    Tobacco User no  Patient is on a daily aspirin  Patient is on a Statin.  Blood pressure today of:     BP Readings from Last 1 Encounters:   04/10/19 156/78      is not " at the goal of <139/89 for diabetics.    Olivia Jean-Baptiste LPN on 4/10/2019 at 4:20 PM

## 2019-04-10 NOTE — LETTER
April 11, 2019      Maliha Rivera  [address] [facility] [address]        Dear Maliha,     Your labs look fine.  Your complete blood count and urinalysis are normal. Your comprehensive metabolic panel (a test that looks at liver and kidney function, blood sugar, electrolytes, and nutritional status) was normal, but your blood sugar was a bit elevated.  The 3 month blood sugar test was ok, so I don't think there is anything more we need to do.    You had a few bacteria in the urine, so I had them add a urine culture just to be sure you weren't percolating a bladder infection.    It was a pleasure seeing you the other day.  If you have any questions, please don't hesitate to call us.       Sincerely,        Nicanor Mike MD                                Results for orders placed or performed in visit on 04/10/19   *UA reflex to Microscopic   Result Value Ref Range    Color Urine Yellow     Appearance Urine Clear     Glucose Urine Negative NEG^Negative mg/dL    Bilirubin Urine Negative NEG^Negative    Ketones Urine Negative NEG^Negative mg/dL    Specific Gravity Urine 1.025 1.000 - 1.030    Blood Urine Negative NEG^Negative    pH Urine 5.5 5.0 - 9.0 pH    Protein Albumin Urine Negative NEG^Negative mg/dL    Urobilinogen Urine 0.2 0.2 - 1.0 EU/dL    Nitrite Urine Negative NEG^Negative    Leukocyte Esterase Urine Moderate (A) NEG^Negative    Source Midstream Urine    Comprehensive metabolic panel   Result Value Ref Range    Sodium 139 134 - 144 mmol/L    Potassium 3.6 3.5 - 5.1 mmol/L    Chloride 102 98 - 107 mmol/L    Carbon Dioxide 29 21 - 31 mmol/L    Anion Gap 8 3 - 14 mmol/L    Glucose 221 (H) 70 - 105 mg/dL    Urea Nitrogen 16 7 - 25 mg/dL    Creatinine 0.72 0.60 - 1.20 mg/dL    GFR Estimate 78 >60 mL/min/[1.73_m2]    GFR Estimate If Black >90 >60 mL/min/[1.73_m2]    Calcium 9.4 8.6 - 10.3 mg/dL    Bilirubin Total 0.3 0.3 - 1.0 mg/dL    Albumin 4.4 3.5 - 5.7 g/dL    Protein Total 7.4 6.4 - 8.9  g/dL    Alkaline Phosphatase 58 34 - 104 U/L    ALT 14 7 - 52 U/L    AST 17 13 - 39 U/L   Hemoglobin A1c   Result Value Ref Range    Hemoglobin A1C 6.3 (H) 4.0 - 6.0 %   CBC with platelets differential   Result Value Ref Range    WBC 5.6 4.0 - 11.0 10e9/L    RBC Count 4.39 3.8 - 5.2 10e12/L    Hemoglobin 13.0 11.7 - 15.7 g/dL    Hematocrit 38.7 35.0 - 47.0 %    MCV 88 78 - 100 fl    MCH 29.6 26.5 - 33.0 pg    MCHC 33.6 31.5 - 36.5 g/dL    RDW 12.7 10.0 - 15.0 %    Platelet Count 219 150 - 450 10e9/L    Diff Method Automated Method     % Neutrophils 52.5 %    % Lymphocytes 35.3 %    % Monocytes 8.6 %    % Eosinophils 2.7 %    % Basophils 0.7 %    % Immature Granulocytes 0.2 %    Absolute Neutrophil 2.9 1.6 - 8.3 10e9/L    Absolute Lymphocytes 2.0 0.8 - 5.3 10e9/L    Absolute Monocytes 0.5 0.0 - 1.3 10e9/L    Absolute Eosinophils 0.2 0.0 - 0.7 10e9/L    Absolute Basophils 0.0 0.0 - 0.2 10e9/L    Abs Immature Granulocytes 0.0 0 - 0.4 10e9/L   Urine Microscopic   Result Value Ref Range    WBC Urine 10-25 (A) OTO5^0 - 5 /HPF    RBC Urine O - 2 OTO2^O - 2 /HPF    Squamous Epithelial /LPF Urine Few FEW^Few /LPF    Bacteria Urine Many (A) NEG^Negative /HPF

## 2019-04-10 NOTE — PROGRESS NOTES
"Nursing Notes:   Paty Erickson LPN  4/10/2019  4:15 PM  Signed  Chief Complaint   Patient presents with     Pre-Op Exam     second knee replacement 4/18/19       Initial /78   Pulse 80   Temp 97.5  F (36.4  C) (Temporal)   Resp 16   Ht 1.595 m (5' 2.8\")   Wt 76.9 kg (169 lb 9.6 oz)   SpO2 98%   Breastfeeding? No   BMI 30.24 kg/m    Estimated body mass index is 30.24 kg/m  as calculated from the following:    Height as of this encounter: 1.595 m (5' 2.8\").    Weight as of this encounter: 76.9 kg (169 lb 9.6 oz).  Medication Reconciliation: complete    JASON Bridges Kelly K., LPN  4/10/2019  4:55 PM  Signed  Chief Complaint   Patient presents with     Pre-Op Exam     second knee replacement 4/18/19       Initial /78   Pulse 80   Temp 97.5  F (36.4  C) (Temporal)   Resp 16   Ht 1.595 m (5' 2.8\")   Wt 76.9 kg (169 lb 9.6 oz)   SpO2 98%   Breastfeeding? No   BMI 30.24 kg/m    Estimated body mass index is 30.24 kg/m  as calculated from the following:    Height as of this encounter: 1.595 m (5' 2.8\").    Weight as of this encounter: 76.9 kg (169 lb 9.6 oz).  Medication Reconciliation: complete    Olivia Jean-Baptiste LPN     Date of Surgery: 4/18/19  Type of Surgery: knee replacement  Surgeon: Dr. Carson  [facility]:  [facility]    Fever/Chills or other infectious symptoms in pastmonth: no  >10lb weight loss in past two months: no    Health Care Directive/Code status:  no  Hx of blood transfusions:   yes   Td up to date:  yes  History of VRE/MRSA:  no     Preoperative Evaluation: Obstructive Sleep Apnea screening    S:Snore -  Do you snore loudly? (louder than talking or loud enough to be heard through closed doors)yes  T: Tired - Do you often feel tired, fatigued, or sleepy during the daytime?no  O: Observed - Has anyone ever observed you stop breathing during your sleep?no  P: Pressure - Do you have or are you being treated for high blood pressure?yes  B: BMI - BMI " "greater than 35kg/m2?no  A: Age - Age over 50 years old?yes  N: Neck - Neck circumference greater than 40 cm?no  G: Gender - Gender: Male?no    Total number of \"YES\" responses:  3    Scoring: Low risk of GRADY 0-2  At Risk of GRADY: >3 High Risk of GRADY: 5-8    Olivia Jean-Baptiste LPN........................4/10/2019  4:16 PM    Previous A1C is at goal of <8  Lab Results   Component Value Date    A1C 6.1 12/05/2018     Urine microalbumin:creatine: 12/5/18  Foot exam 4/18  Eye exam due    Tobacco User no  Patient is on a daily aspirin  Patient is on a Statin.  Blood pressure today of:     BP Readings from Last 1 Encounters:   04/10/19 156/78      is not at the goal of <139/89 for diabetics.    Olivia Jean-Baptiste LPN on 4/10/2019 at 4:20 PM                  ----------------- PREOPERATIVE EXAM ------------------  4/10/2019    SUBJECTIVE:  Maliha Rivera is a 78 year old female here for preoperative optimization.    Preoperative risk assessment consultation was requested on Maliha Rivera by Shawn Carson MD prior to revision right total knee arthroplasty due to loosening of the tibial component.   This is scheduled for 4/18/19 at [facility] in [place]. I am asked to see this patient for preoperative clearance prior to this procedure.     She has controlled hypertension, GERD and hyperlipidemia. She has prediabetes. Hemoglobin A1C was 6.1%    Patient Active Problem List    Diagnosis Date Noted     Benign essential hypertension 04/18/2018     Priority: Medium     Lumbar stenosis 03/01/2018     Priority: Medium     Lumbar radiculopathy 01/23/2018     Priority: Medium     Status post total bilateral knee replacement 03/31/2017     Priority: Medium     Primary osteoarthritis of left knee 03/23/2017     Priority: Medium     DM w/o complication type II (H) 09/22/2015     Priority: Medium     Overview:   3/2014:  A1c fine off metformin - will discontinue as patient does not want to take it.  She is taking fish oil + other " ingredients she states helps with DM  On ASA  Patient does not want to take lisinopril  Does not tolerate statins and has discontinued pravastatin; try niacin  No retinopathy on eye exam 4/2013 and 12/2014  Overview:   Overview:   3/2014:  A1c fine off metformin - will discontinue as patient does not want to take it.  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  Lipids:; declines statin - will try niacin and is on fish oil.  Mammogram:schedule  Pap: 3/2013 WNL - does not need again.  Tdap:3/2013  Vitamin D: takes supplement  pneumovax done.  Consider zoster vaccine.       Degenerative arthritis of knee 10/21/2010     Priority: Medium     Overview:   IMO Update 10/11       Cervicalgia 05/11/2010     Priority: Medium     Overview:   IMO Update 10/11       DDD (degenerative disc disease), lumbar 01/15/2010     Priority: Medium     Primary localized osteoarthrosis, lower leg 01/31/2008     Priority: Medium     Hyperlipidemia 07/22/2004     Priority: Medium     Overview:   IMO Update 10/11  Overview:   Overview:   IMO Update 10/11         Past Medical History:   Diagnosis Date     Encounter for screening for cardiovascular disorders     4/8/15,Lexiscan cardiolite [facility]       Past Surgical History:   Procedure Laterality Date     APPENDECTOMY OPEN      No Comments Provided     ARTHROSCOPY KNEE      2007,Joseluis Thomas M.D.     COLONOSCOPY       2003,2011,normal     ESOPHAGOSCOPY, GASTROSCOPY, DUODENOSCOPY (EGD), COMBINED      2/3/2016     LAPAROSCOPIC CHOLECYSTECTOMY      3/11/01,Dr. Salas     LAPAROSCOPIC TUBAL LIGATION      No Comments Provided     lumbar decompression L4-5  02/28/2018    Dr. France     OTHER SURGICAL HISTORY      1996,[id number],CATARACT EXTRACTION W/  INTRAOCULAR LENS IMPLANT,Bilateral     OTHER SURGICAL HISTORY      2001,89656.0,ND ERCP BILIARY OR PANC DUCT STENT EXCHANGE W DIL AND WIRE,stent removed.     OTHER SURGICAL HISTORY      2004,[id number],CARDIAC CATHETERIZATION,mild disease     OTHER SURGICAL HISTORY      1/31/07,[id number],OTHER,Left     OTHER SURGICAL HISTORY      02/27/2008,[id number],TOTAL KNEE ARTHROPLASTY,Right     OTHER SURGICAL HISTORY      2010,[id number],TOTAL KNEE ARTHROPLASTY,Left,Dr. Carson     OTHER SURGICAL HISTORY      06/05/2017,[id number],OTHER,Left,Dr. Joiner, [facility]     RELEASE CARPAL TUNNEL      2006     TONSILLECTOMY      age 12       Family History   Problem Relation Age of Onset     Heart Disease Mother         Heart Disease     Heart Disease Father         Heart Disease     Heart Disease Sister         Heart Disease     Cancer Other         Cancer,female cancer       Social History     Tobacco Use     Smoking status: Never Smoker     Smokeless tobacco: Never Used   Substance Use Topics     Alcohol use: No     Drug use: Never     Comment: Drug use: No       Current Outpatient Medications   Medication Sig Dispense Refill     acetaminophen (TYLENOL) 325 MG tablet Take 650 mg by mouth       aspirin EC 81 MG EC tablet Take 81 mg by mouth       blood glucose monitoring (FREESTYLE LITE) test strip Dispense item covered by pt ins. E11.9 NIDDM type II - Test 1 time/day       blood glucose monitoring (FREESTYLE) lancets As directed. Dispense item covered by pt ins. E11.9 NIDDM type II - Test 1 time/day       Blood Glucose Monitoring Suppl (FIFTY50 GLUCOSE METER 2.0) W/DEVICE KIT Dispense meter, test strips, lancets covered by pt  "ins. E11.9 NIDDM type II - Test 1 time/day       Blood Pressure Monitoring (ADULT BLOOD PRESSURE CUFF LG) KIT Use for home blood pressure monitoring as directed. 1 kit 0     COMPRESSION STOCKINGS For personal use. Length: calf Strength: 16-20 mmHg Please measure.       gabapentin (NEURONTIN) 300 MG capsule Take 1 capsule (300 mg) by mouth 3 times daily 90 capsule prn     lisinopril (PRINIVIL/ZESTRIL) 10 MG tablet Take 1 tablet (10 mg) by mouth daily 90 tablet 11     omeprazole (PRILOSEC) 40 MG capsule Take 1 capsule by mouth daily       order for DME Equipment being ordered: Evaluate at [facility] for a knee brace 1 Units 0     order for DME Equipment being ordered: [facility] to evaluate for diabetic shoes and/or insoles 1 Units prn     oxybutynin ER (DITROPAN XL) 5 MG 24 hr tablet Take 1 tablet (5 mg) by mouth daily 30 tablet 1     pravastatin (PRAVACHOL) 20 MG tablet Take 1 tablet (20 mg) by mouth daily 90 tablet 3     PSYLLIUM PO Take 3 tsp. by mouth         Allergies:  Allergies   Allergen Reactions     Atorvastatin Muscle Pain (Myalgia)     Cefazolin Nausea and Vomiting       ROS:    Surgical:  patient denies previous complications from prior surgeries including but not limited to prolonged bleeding, anesthesia complications, dysrhythmias, surgical wound infections, or prolonged hospital stay.    Denies family hx of bleeding tendencies, anesthesia complications, or other problems with surgery.     -------------------------------------------------------------    PHYSICAL EXAM:  /78   Pulse 80   Temp 97.5  F (36.4  C) (Temporal)   Resp 16   Ht 1.595 m (5' 2.8\")   Wt 76.9 kg (169 lb 9.6 oz)   SpO2 98%   Breastfeeding? No   BMI 30.24 kg/m      EXAM:  General Appearance: Pleasant, alert, appropriate appearance for age. No acute distress  Head Exam: Normal. Normocephalic, atraumatic.  Eyes: PERRL, EOMI  Ears: Normal TM's bilaterally. Normal auditory canals and external ears. "   OroPharynx: Normal buccal mucosa. Normal pharynx.  Neck: Supple, no masses or nodes, no lymphadenopathy.  No thyromegaly.  Lungs: Normal chest wall and respirations. Clear to auscultation, no wheezes or crackles.  Cardiovascular: Regular rate and rhythm. S1, S2, no murmurs.  Gastrointestinal: Soft, nontender, no abnormal masses or organomegaly. BS normal   Musculoskeletal: No edema. Remainder per Dr. Carson.   Skin: no concerning or new rashes.  Neurologic Exam: CN 2-12 grossly intact.  Normal gait.  normal gross motor movement, tone, and coordination. No tremor.  Psychiatric Exam: Alert and oriented, appropriate affect.      EKG:  normal EKG, normal sinus rhythm  ---------------------------------------------------------------  LABS  Results for orders placed or performed in visit on 04/10/19   *UA reflex to Microscopic   Result Value Ref Range    Color Urine Yellow     Appearance Urine Clear     Glucose Urine Negative NEG^Negative mg/dL    Bilirubin Urine Negative NEG^Negative    Ketones Urine Negative NEG^Negative mg/dL    Specific Gravity Urine 1.025 1.000 - 1.030    Blood Urine Negative NEG^Negative    pH Urine 5.5 5.0 - 9.0 pH    Protein Albumin Urine Negative NEG^Negative mg/dL    Urobilinogen Urine 0.2 0.2 - 1.0 EU/dL    Nitrite Urine Negative NEG^Negative    Leukocyte Esterase Urine Moderate (A) NEG^Negative    Source Midstream Urine    Comprehensive metabolic panel   Result Value Ref Range    Sodium 139 134 - 144 mmol/L    Potassium 3.6 3.5 - 5.1 mmol/L    Chloride 102 98 - 107 mmol/L    Carbon Dioxide 29 21 - 31 mmol/L    Anion Gap 8 3 - 14 mmol/L    Glucose 221 (H) 70 - 105 mg/dL    Urea Nitrogen 16 7 - 25 mg/dL    Creatinine 0.72 0.60 - 1.20 mg/dL    GFR Estimate 78 >60 mL/min/[1.73_m2]    GFR Estimate If Black >90 >60 mL/min/[1.73_m2]    Calcium 9.4 8.6 - 10.3 mg/dL    Bilirubin Total 0.3 0.3 - 1.0 mg/dL    Albumin 4.4 3.5 - 5.7 g/dL    Protein Total 7.4 6.4 - 8.9 g/dL    Alkaline Phosphatase 58 34 -  104 U/L    ALT 14 7 - 52 U/L    AST 17 13 - 39 U/L   CBC with platelets differential   Result Value Ref Range    WBC 5.6 4.0 - 11.0 10e9/L    RBC Count 4.39 3.8 - 5.2 10e12/L    Hemoglobin 13.0 11.7 - 15.7 g/dL    Hematocrit 38.7 35.0 - 47.0 %    MCV 88 78 - 100 fl    MCH 29.6 26.5 - 33.0 pg    MCHC 33.6 31.5 - 36.5 g/dL    RDW 12.7 10.0 - 15.0 %    Platelet Count 219 150 - 450 10e9/L    Diff Method Automated Method     % Neutrophils 52.5 %    % Lymphocytes 35.3 %    % Monocytes 8.6 %    % Eosinophils 2.7 %    % Basophils 0.7 %    % Immature Granulocytes 0.2 %    Absolute Neutrophil 2.9 1.6 - 8.3 10e9/L    Absolute Lymphocytes 2.0 0.8 - 5.3 10e9/L    Absolute Monocytes 0.5 0.0 - 1.3 10e9/L    Absolute Eosinophils 0.2 0.0 - 0.7 10e9/L    Absolute Basophils 0.0 0.0 - 0.2 10e9/L    Abs Immature Granulocytes 0.0 0 - 0.4 10e9/L   Urine Microscopic   Result Value Ref Range    WBC Urine 10-25 (A) OTO5^0 - 5 /HPF    RBC Urine O - 2 OTO2^O - 2 /HPF    Squamous Epithelial /LPF Urine Few FEW^Few /LPF    Bacteria Urine Many (A) NEG^Negative /HPF       ASSESSEMENT AND PLAN:    Maliha was seen today for pre-op exam.    Diagnoses and all orders for this visit:    Preoperative examination  -     CBC with platelets differential; Future  -     Comprehensive metabolic panel; Future  -     EKG 12-lead, tracing only  -     *UA reflex to Microscopic  -     Comprehensive metabolic panel  -     CBC with platelets differential  -     Urine Microscopic  -     Urine Culture Aerobic Bacterial    Status post total bilateral knee replacement    Chronic pain of right knee    Benign essential hypertension  -     Comprehensive metabolic panel; Future  -     EKG 12-lead, tracing only  -     Comprehensive metabolic panel    Hyperlipidemia, unspecified hyperlipidemia type    Gastroesophageal reflux disease without esophagitis  -     CBC with platelets differential; Future  -     CBC with platelets differential    Loosening of knee joint prosthesis,  subsequent encounter    Asymptomatic bacteriuria  -     Urine Culture Aerobic Bacterial    Type 2 diabetes mellitus without complication, without long-term current use of insulin (H)  -     Hemoglobin A1c; Future  -     Hemoglobin A1c        PRE OP RECOMMENDATIONS:  Patient is on chronic pain medications no   Patient is on antiplatlet/anticoagulation medication yes  Other medications that need adjustment perioperatively no    Stop aspirin 1 week prior to surgery.  OK to take regular medications with a sip of water the  morning of surgery.     Other:  Patient was advised to call our office and the surgical services with any change in condition or new symptoms if they were to develop between today and their surgical date.  Especially any cardiopulmonary symptoms or symptoms concerning for an infection.    Should be at low risk for planned procedure.    Will do urine culture since has asymptomatic bacteruria and is having joint revision.    Random glucose is over 200 but A1C has been fine with diet alone.  May be worthwhile perioperatively to follow blood sugars and use correction insulin if needed.      Nicanor Mike 4/10/2019

## 2019-04-12 LAB
BACTERIA SPEC CULT: NORMAL
SPECIMEN SOURCE: NORMAL

## 2019-04-18 ENCOUNTER — TRANSFERRED RECORDS (OUTPATIENT)
Dept: HEALTH INFORMATION MANAGEMENT | Facility: OTHER | Age: 79
End: 2019-04-18

## 2019-04-25 ENCOUNTER — OFFICE VISIT (OUTPATIENT)
Dept: FAMILY MEDICINE | Facility: OTHER | Age: 79
End: 2019-04-25
Attending: FAMILY MEDICINE
Payer: COMMERCIAL

## 2019-04-25 VITALS
TEMPERATURE: 98.1 F | SYSTOLIC BLOOD PRESSURE: 138 MMHG | DIASTOLIC BLOOD PRESSURE: 60 MMHG | RESPIRATION RATE: 16 BRPM | WEIGHT: 166.6 LBS | BODY MASS INDEX: 29.7 KG/M2 | HEART RATE: 92 BPM

## 2019-04-25 DIAGNOSIS — E11.9 TYPE 2 DIABETES MELLITUS WITHOUT COMPLICATION, WITHOUT LONG-TERM CURRENT USE OF INSULIN (H): ICD-10-CM

## 2019-04-25 DIAGNOSIS — Z96.659 HISTORY OF REVISION OF TOTAL KNEE ARTHROPLASTY: Primary | ICD-10-CM

## 2019-04-25 DIAGNOSIS — Z98.890 POSTOPERATIVE STATE: ICD-10-CM

## 2019-04-25 DIAGNOSIS — I10 BENIGN ESSENTIAL HYPERTENSION: ICD-10-CM

## 2019-04-25 DIAGNOSIS — G89.18 ACUTE POST-OPERATIVE PAIN: ICD-10-CM

## 2019-04-25 PROCEDURE — 99213 OFFICE O/P EST LOW 20 MIN: CPT | Performed by: FAMILY MEDICINE

## 2019-04-25 PROCEDURE — G0463 HOSPITAL OUTPT CLINIC VISIT: HCPCS

## 2019-04-25 RX ORDER — MELOXICAM 15 MG/1
15 TABLET ORAL DAILY
COMMUNITY
End: 2019-06-05

## 2019-04-25 RX ORDER — AMOXICILLIN 250 MG
1 CAPSULE ORAL
COMMUNITY
Start: 2019-04-19 | End: 2020-02-19

## 2019-04-25 RX ORDER — LISINOPRIL 5 MG/1
5 TABLET ORAL DAILY
COMMUNITY
End: 2019-04-25

## 2019-04-25 RX ORDER — HYDROMORPHONE HYDROCHLORIDE 2 MG/1
TABLET ORAL
COMMUNITY
Start: 2019-04-20 | End: 2019-04-25

## 2019-04-25 RX ORDER — ACETAMINOPHEN 325 MG/1
650 TABLET ORAL
Status: ON HOLD | COMMUNITY
Start: 2019-04-19 | End: 2023-07-19

## 2019-04-25 RX ORDER — HYDROMORPHONE HYDROCHLORIDE 2 MG/1
2 TABLET ORAL EVERY 6 HOURS PRN
Qty: 30 TABLET | Refills: 0 | Status: SHIPPED | OUTPATIENT
Start: 2019-04-25 | End: 2020-02-19

## 2019-04-25 RX ORDER — LISINOPRIL 10 MG/1
10 TABLET ORAL DAILY
COMMUNITY
Start: 2019-04-25 | End: 2020-01-28

## 2019-04-25 RX ORDER — CALCIUM CARBONATE 500 MG/1
TABLET, CHEWABLE ORAL
COMMUNITY
Start: 2019-04-20 | End: 2020-02-19

## 2019-04-25 RX ORDER — TRAMADOL HYDROCHLORIDE 50 MG/1
TABLET ORAL
COMMUNITY
Start: 2019-04-20 | End: 2020-02-19

## 2019-04-25 ASSESSMENT — PAIN SCALES - GENERAL: PAINLEVEL: SEVERE PAIN (7)

## 2019-04-25 NOTE — PROGRESS NOTES
"Nursing Notes:   Olivia Jean-Baptiste LPN  4/25/2019  3:42 PM  Signed  Chief Complaint   Patient presents with     RECHECK     after right TKA       Initial /70   Pulse 92   Temp 98.1  F (36.7  C) (Temporal)   Resp 16   Wt 75.6 kg (166 lb 9.6 oz)   Breastfeeding? No   BMI 29.70 kg/m    Estimated body mass index is 29.7 kg/m  as calculated from the following:    Height as of 4/10/19: 1.595 m (5' 2.8\").    Weight as of this encounter: 75.6 kg (166 lb 9.6 oz).  Medication Reconciliation: complete    JASON Colmenares Kelly K., LPN  4/25/2019  3:58 PM  Signed  Chief Complaint   Patient presents with     RECHECK     after right TKA       Initial /70   Pulse 92   Temp 98.1  F (36.7  C) (Temporal)   Resp 16   Wt 75.6 kg (166 lb 9.6 oz)   Breastfeeding? No   BMI 29.70 kg/m    Estimated body mass index is 29.7 kg/m  as calculated from the following:    Height as of 4/10/19: 1.595 m (5' 2.8\").    Weight as of this encounter: 75.6 kg (166 lb 9.6 oz).  Medication Reconciliation: complete    Olivia Jean-Baptiste LPN     Previous A1C is at goal of <8  Lab Results   Component Value Date    A1C 6.3 04/10/2019    A1C 6.1 12/05/2018     Urine microalbumin:creatine: 12/05/2018  Foot exam 12/05/2018  Eye exam due    Tobacco User no  Patient is on a daily aspirin  Patient is on a Statin.  Blood pressure today of:     BP Readings from Last 1 Encounters:   04/25/19 150/70      is not at the goal of <139/89 for diabetics.    Olivia Jean-Baptiste LPN on 4/25/2019 at 3:57 PM        SUBJECTIVE:  Maliha Rivera  is a 78 year old female who comes in today for follow-up after recent revision of total knee arthroplasty done by Dr. Carson for loosening hardware.  This was done a week ago.  Her postop hemoglobin was 9.9.  She was sent home on some Dilaudid as well as tramadol.    Her blood sugars have been good. She has still been taking her pain medications and only has a few left. She sees Dr. Carson " assistant in a week.     Past Medical, Family, and Social History reviewed and updated as noted below.   ROS is negative except as noted above       Allergies   Allergen Reactions     Atorvastatin Muscle Pain (Myalgia)     Cefazolin Nausea and Vomiting   ,   Family History   Problem Relation Age of Onset     Heart Disease Mother         Heart Disease     Heart Disease Father         Heart Disease     Heart Disease Sister         Heart Disease     Cancer Other         Cancer,female cancer   ,   Current Outpatient Medications   Medication     acetaminophen (TYLENOL) 325 MG tablet     ANTACID CALCIUM 500 MG chewable tablet     aspirin EC 81 MG EC tablet     blood glucose monitoring (FREESTYLE LITE) test strip     blood glucose monitoring (FREESTYLE) lancets     Blood Glucose Monitoring Suppl (FIFTY50 GLUCOSE METER 2.0) W/DEVICE KIT     Blood Pressure Monitoring (ADULT BLOOD PRESSURE CUFF LG) KIT     COMPRESSION STOCKINGS     gabapentin (NEURONTIN) 300 MG capsule     HYDROmorphone (DILAUDID) 2 MG tablet     lisinopril (PRINIVIL/ZESTRIL) 5 MG tablet     melatonin 5 MG tablet     meloxicam (MOBIC) 15 MG tablet     omeprazole (PRILOSEC) 40 MG capsule     order for DME     order for DME     oxybutynin ER (DITROPAN XL) 5 MG 24 hr tablet     PSYLLIUM PO     senna-docusate (SENOKOT-S/PERICOLACE) 8.6-50 MG tablet     traMADol (ULTRAM) 50 MG tablet     No current facility-administered medications for this visit.    ,   Past Medical History:   Diagnosis Date     Encounter for screening for cardiovascular disorders     4/8/15,Lexiscan cardiolite [facility]   ,   Patient Active Problem List    Diagnosis Date Noted     Benign essential hypertension 04/18/2018     Priority: Medium     Lumbar stenosis 03/01/2018     Priority: Medium     Lumbar radiculopathy 01/23/2018     Priority: Medium     Status post total bilateral knee replacement 03/31/2017     Priority: Medium     Primary osteoarthritis of left knee 03/23/2017      Priority: Medium     DM w/o complication type II (H) 09/22/2015     Priority: Medium     Overview:   3/2014:  A1c fine off metformin - will discontinue as patient does not want to take it.  She is taking fish oil + other ingredients she states helps with DM  On ASA  Patient does not want to take lisinopril  Does not tolerate statins and has discontinued pravastatin; try niacin  No retinopathy on eye exam 4/2013 and 12/2014  Overview:   Overview:   3/2014:  A1c fine off metformin - will discontinue as patient does not want to take it.  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  Lipids:; declines statin - will try niacin and is on fish oil.  Mammogram:schedule  Pap: 3/2013 WNL - does not need again.  Tdap:3/2013  Vitamin D: takes supplement  pneumovax done.  Consider zoster vaccine.       Degenerative arthritis of knee 10/21/2010     Priority: Medium     Overview:   IMO Update 10/11       Cervicalgia 05/11/2010     Priority: Medium     Overview:   IMO Update 10/11       DDD (degenerative disc disease), lumbar 01/15/2010     Priority: Medium     Primary localized osteoarthrosis, lower leg 01/31/2008     Priority: Medium     Hyperlipidemia 07/22/2004     Priority: Medium     Overview:   IMO Update 10/11  Overview:   Overview:   IMO Update 10/11     ,   Past Surgical History:   Procedure Laterality Date     APPENDECTOMY OPEN      No Comments Provided      ARTHROSCOPY KNEE      2007,Joseluis Thomas M.D.     COLONOSCOPY      2003,2011,normal     ESOPHAGOSCOPY, GASTROSCOPY, DUODENOSCOPY (EGD), COMBINED      2/3/2016     LAPAROSCOPIC CHOLECYSTECTOMY      3/11/01,Dr. Salas     LAPAROSCOPIC TUBAL LIGATION      No Comments Provided     lumbar decompression L4-5  02/28/2018    Dr. France     OTHER SURGICAL HISTORY      1996,[id number],CATARACT EXTRACTION W/  INTRAOCULAR LENS IMPLANT,Bilateral     OTHER SURGICAL HISTORY      2001,75251.0,[place] ERCP BILIARY OR PANC DUCT STENT EXCHANGE W DIL AND WIRE,stent removed.     OTHER SURGICAL HISTORY      2004,[id number],CARDIAC CATHETERIZATION,mild disease     OTHER SURGICAL HISTORY      1/31/07,[id number],OTHER,Left     OTHER SURGICAL HISTORY      02/27/2008,[id number],TOTAL KNEE ARTHROPLASTY,Right     OTHER SURGICAL HISTORY      2010,[id number],TOTAL KNEE ARTHROPLASTY,Left,Dr. Carson     OTHER SURGICAL HISTORY      06/05/2017,[id number],OTHER,Left,Dr. Joiner, [facility]     RELEASE CARPAL TUNNEL      2006     Revision right total knee arthroplasty   04/18/2019    Dr. Carson.  Due to loose implant tibial component     TONSILLECTOMY      age 12    and   Social History     Tobacco Use     Smoking status: Never Smoker     Smokeless tobacco: Never Used   Substance Use Topics     Alcohol use: No     OBJECTIVE:  /60   Pulse 92   Temp 98.1  F (36.7  C) (Temporal)   Resp 16   Wt 75.6 kg (166 lb 9.6 oz)   Breastfeeding? No   BMI 29.70 kg/m     EXAM:  Alert cooperative, no distress.  Lungs are clear, no rales rhonchi or wheezes are heard.  Cardiac RRR without murmur.  Right knee dressing is intact and dry.  She has some postop swelling as expected.  Posterior thigh ecchymosis is noted.  Wearing compression socks on her right lower leg with good control of edema.  ASSESSMENT/Plan :    Maliha was seen today for recheck.    Diagnoses and all orders for this visit:    History of revision of total knee arthroplasty, right  -     HYDROmorphone (DILAUDID) 2 MG  tablet; Take 1 tablet (2 mg) by mouth every 6 hours as needed for severe pain    Postoperative state  -     HYDROmorphone (DILAUDID) 2 MG tablet; Take 1 tablet (2 mg) by mouth every 6 hours as needed for severe pain    Acute post-operative pain  -     HYDROmorphone (DILAUDID) 2 MG tablet; Take 1 tablet (2 mg) by mouth every 6 hours as needed for severe pain    Type 2 diabetes mellitus without complication, without long-term current use of insulin (H)    Benign essential hypertension      Renewed hydromorphone 2 mg #30.  Follow-up with orthopedics next week.  Continue with her other medications.  There was some confusion about her lisinopril but she should be on 10 mg.  That was corrected in her medication list.    A total of 15 minutes was spent with the patient, greater than 50% of the time was spent in counseling/discussion of the aforementioned concerns.     Nicanor Mike MD

## 2019-04-25 NOTE — NURSING NOTE
"Chief Complaint   Patient presents with     RECHECK     after right TKA       Initial /70   Pulse 92   Temp 98.1  F (36.7  C) (Temporal)   Resp 16   Wt 75.6 kg (166 lb 9.6 oz)   Breastfeeding? No   BMI 29.70 kg/m   Estimated body mass index is 29.7 kg/m  as calculated from the following:    Height as of 4/10/19: 1.595 m (5' 2.8\").    Weight as of this encounter: 75.6 kg (166 lb 9.6 oz).  Medication Reconciliation: complete    Olivia Jean-Baptiste LPN     Previous A1C is at goal of <8  Lab Results   Component Value Date    A1C 6.3 04/10/2019    A1C 6.1 12/05/2018     Urine microalbumin:creatine: 12/05/2018  Foot exam 12/05/2018  Eye exam due    Tobacco User no  Patient is on a daily aspirin  Patient is on a Statin.  Blood pressure today of:     BP Readings from Last 1 Encounters:   04/25/19 150/70      is not at the goal of <139/89 for diabetics.    Olivia Jean-Baptiste LPN on 4/25/2019 at 3:57 PM      "

## 2019-04-25 NOTE — NURSING NOTE
"Chief Complaint   Patient presents with     RECHECK     after right TKA       Initial /70   Pulse 92   Temp 98.1  F (36.7  C) (Temporal)   Resp 16   Wt 75.6 kg (166 lb 9.6 oz)   Breastfeeding? No   BMI 29.70 kg/m   Estimated body mass index is 29.7 kg/m  as calculated from the following:    Height as of 4/10/19: 1.595 m (5' 2.8\").    Weight as of this encounter: 75.6 kg (166 lb 9.6 oz).  Medication Reconciliation: complete    Olivia Jean-Baptiste LPN  "

## 2019-04-30 ENCOUNTER — TRANSFERRED RECORDS (OUTPATIENT)
Dept: HEALTH INFORMATION MANAGEMENT | Facility: OTHER | Age: 79
End: 2019-04-30

## 2019-06-04 ENCOUNTER — HOSPITAL ENCOUNTER (EMERGENCY)
Facility: OTHER | Age: 79
Discharge: HOME | End: 2019-06-05
Attending: EMERGENCY MEDICINE | Admitting: EMERGENCY MEDICINE
Payer: MEDICARE

## 2019-06-04 DIAGNOSIS — M79.18 MYOFASCIAL PAIN ON RIGHT SIDE: ICD-10-CM

## 2019-06-04 DIAGNOSIS — J98.11 ATELECTASIS: ICD-10-CM

## 2019-06-04 DIAGNOSIS — J06.9 UPPER RESPIRATORY TRACT INFECTION, UNSPECIFIED TYPE: ICD-10-CM

## 2019-06-04 LAB
ALBUMIN UR-MCNC: NEGATIVE MG/DL
APPEARANCE UR: CLEAR
BACTERIA #/AREA URNS HPF: ABNORMAL /HPF
BILIRUB UR QL STRIP: NEGATIVE
COLOR UR AUTO: YELLOW
GLUCOSE UR STRIP-MCNC: 500 MG/DL
HGB UR QL STRIP: NEGATIVE
KETONES UR STRIP-MCNC: NEGATIVE MG/DL
LEUKOCYTE ESTERASE UR QL STRIP: ABNORMAL
MUCOUS THREADS #/AREA URNS LPF: PRESENT /LPF
NITRATE UR QL: NEGATIVE
NON-SQ EPI CELLS #/AREA URNS LPF: ABNORMAL /LPF
PH UR STRIP: 6.5 PH (ref 5–9)
RBC #/AREA URNS AUTO: ABNORMAL /HPF
SOURCE: ABNORMAL
SP GR UR STRIP: 1.02 (ref 1–1.03)
UROBILINOGEN UR STRIP-ACNC: 0.2 EU/DL (ref 0.2–1)
WBC #/AREA URNS AUTO: ABNORMAL /HPF

## 2019-06-04 PROCEDURE — 87086 URINE CULTURE/COLONY COUNT: CPT | Performed by: EMERGENCY MEDICINE

## 2019-06-04 PROCEDURE — 93005 ELECTROCARDIOGRAM TRACING: CPT | Performed by: EMERGENCY MEDICINE

## 2019-06-04 PROCEDURE — 81001 URINALYSIS AUTO W/SCOPE: CPT | Performed by: EMERGENCY MEDICINE

## 2019-06-04 PROCEDURE — 99284 EMERGENCY DEPT VISIT MOD MDM: CPT | Mod: Z6 | Performed by: EMERGENCY MEDICINE

## 2019-06-04 PROCEDURE — 99285 EMERGENCY DEPT VISIT HI MDM: CPT | Mod: 25 | Performed by: EMERGENCY MEDICINE

## 2019-06-04 ASSESSMENT — MIFFLIN-ST. JEOR: SCORE: 1193.03

## 2019-06-04 NOTE — ED AVS SNAPSHOT
[facility]  [address]  [place] [address]  Phone:  [phone]  Fax:  [phone]                                    Maliha Rivera   MRN: [id number]    Department:  [facility]   Date of Visit:  6/4/2019           After Visit Summary Signature Page    I have received my discharge instructions, and my questions have been answered. I have discussed any challenges I see with this plan with the nurse or doctor.    ..........................................................................................................................................  Patient/Patient Representative Signature      ..........................................................................................................................................  Patient Representative Print Name and Relationship to Patient    ..................................................               ................................................  Date                                   Time    ..........................................................................................................................................  Reviewed by Signature/Title    ...................................................              ..............................................  Date                                               Time          22EPIC Rev 08/18

## 2019-06-05 ENCOUNTER — TELEPHONE (OUTPATIENT)
Dept: FAMILY MEDICINE | Facility: OTHER | Age: 79
End: 2019-06-05

## 2019-06-05 ENCOUNTER — APPOINTMENT (OUTPATIENT)
Dept: CT IMAGING | Facility: OTHER | Age: 79
End: 2019-06-05
Attending: EMERGENCY MEDICINE
Payer: MEDICARE

## 2019-06-05 VITALS
BODY MASS INDEX: 29.06 KG/M2 | WEIGHT: 164 LBS | OXYGEN SATURATION: 98 % | HEART RATE: 93 BPM | HEIGHT: 63 IN | TEMPERATURE: 100.2 F | SYSTOLIC BLOOD PRESSURE: 158 MMHG | RESPIRATION RATE: 18 BRPM | DIASTOLIC BLOOD PRESSURE: 67 MMHG

## 2019-06-05 DIAGNOSIS — M94.0 COSTOCHONDRITIS: ICD-10-CM

## 2019-06-05 DIAGNOSIS — M51.369 DEGENERATION OF INTERVERTEBRAL DISC OF LUMBAR REGION: ICD-10-CM

## 2019-06-05 DIAGNOSIS — M53.3 SACROILIAC DYSFUNCTION: Primary | ICD-10-CM

## 2019-06-05 LAB
ALBUMIN SERPL-MCNC: 4.1 G/DL (ref 3.5–5.7)
ALBUMIN UR-MCNC: NEGATIVE MG/DL
ALP SERPL-CCNC: 73 U/L (ref 34–104)
ALT SERPL W P-5'-P-CCNC: 11 U/L (ref 7–52)
ANION GAP SERPL CALCULATED.3IONS-SCNC: 11 MMOL/L (ref 3–14)
APPEARANCE UR: CLEAR
AST SERPL W P-5'-P-CCNC: 13 U/L (ref 13–39)
BACTERIA #/AREA URNS HPF: ABNORMAL /HPF
BASOPHILS # BLD AUTO: 0 10E9/L (ref 0–0.2)
BASOPHILS NFR BLD AUTO: 0.4 %
BILIRUB SERPL-MCNC: 0.4 MG/DL (ref 0.3–1)
BILIRUB UR QL STRIP: NEGATIVE
BUN SERPL-MCNC: 7 MG/DL (ref 7–25)
CALCIUM SERPL-MCNC: 9.3 MG/DL (ref 8.6–10.3)
CHLORIDE SERPL-SCNC: 103 MMOL/L (ref 98–107)
CO2 SERPL-SCNC: 23 MMOL/L (ref 21–31)
COLOR UR AUTO: YELLOW
CREAT SERPL-MCNC: 0.52 MG/DL (ref 0.6–1.2)
CRP SERPL-MCNC: 2 MG/L
D DIMER PPP DDU-MCNC: 728 NG/ML D-DU (ref 0–230)
DIFFERENTIAL METHOD BLD: ABNORMAL
EOSINOPHIL # BLD AUTO: 0.1 10E9/L (ref 0–0.7)
EOSINOPHIL NFR BLD AUTO: 1.2 %
ERYTHROCYTE [DISTWIDTH] IN BLOOD BY AUTOMATED COUNT: 13 % (ref 10–15)
GFR SERPL CREATININE-BSD FRML MDRD: >90 ML/MIN/{1.73_M2}
GLUCOSE SERPL-MCNC: 145 MG/DL (ref 70–105)
GLUCOSE UR STRIP-MCNC: NEGATIVE MG/DL
HCT VFR BLD AUTO: 32.5 % (ref 35–47)
HGB BLD-MCNC: 11.1 G/DL (ref 11.7–15.7)
HGB UR QL STRIP: NEGATIVE
IMM GRANULOCYTES # BLD: 0.1 10E9/L (ref 0–0.4)
IMM GRANULOCYTES NFR BLD: 0.6 %
KETONES UR STRIP-MCNC: NEGATIVE MG/DL
LEUKOCYTE ESTERASE UR QL STRIP: ABNORMAL
LYMPHOCYTES # BLD AUTO: 2.3 10E9/L (ref 0.8–5.3)
LYMPHOCYTES NFR BLD AUTO: 22.7 %
MCH RBC QN AUTO: 28.8 PG (ref 26.5–33)
MCHC RBC AUTO-ENTMCNC: 34.2 G/DL (ref 31.5–36.5)
MCV RBC AUTO: 84 FL (ref 78–100)
MONOCYTES # BLD AUTO: 0.7 10E9/L (ref 0–1.3)
MONOCYTES NFR BLD AUTO: 6.7 %
NEUTROPHILS # BLD AUTO: 6.9 10E9/L (ref 1.6–8.3)
NEUTROPHILS NFR BLD AUTO: 68.4 %
NITRATE UR QL: NEGATIVE
NON-SQ EPI CELLS #/AREA URNS LPF: ABNORMAL /LPF
PH UR STRIP: 7 PH (ref 5–9)
PLATELET # BLD AUTO: 237 10E9/L (ref 150–450)
POTASSIUM SERPL-SCNC: 3.4 MMOL/L (ref 3.5–5.1)
PROT SERPL-MCNC: 7 G/DL (ref 6.4–8.9)
RBC # BLD AUTO: 3.86 10E12/L (ref 3.8–5.2)
RBC #/AREA URNS AUTO: ABNORMAL /HPF
SODIUM SERPL-SCNC: 137 MMOL/L (ref 134–144)
SOURCE: ABNORMAL
SP GR UR STRIP: 1.01 (ref 1–1.03)
UROBILINOGEN UR STRIP-ACNC: 0.2 EU/DL (ref 0.2–1)
WBC # BLD AUTO: 10 10E9/L (ref 4–11)
WBC #/AREA URNS AUTO: ABNORMAL /HPF

## 2019-06-05 PROCEDURE — 85025 COMPLETE CBC W/AUTO DIFF WBC: CPT | Performed by: EMERGENCY MEDICINE

## 2019-06-05 PROCEDURE — 25000132 ZZH RX MED GY IP 250 OP 250 PS 637: Mod: GY | Performed by: EMERGENCY MEDICINE

## 2019-06-05 PROCEDURE — A9270 NON-COVERED ITEM OR SERVICE: HCPCS | Mod: GY | Performed by: EMERGENCY MEDICINE

## 2019-06-05 PROCEDURE — 93010 ELECTROCARDIOGRAM REPORT: CPT | Performed by: INTERNAL MEDICINE

## 2019-06-05 PROCEDURE — 25500064 ZZH RX 255 OP 636: Performed by: EMERGENCY MEDICINE

## 2019-06-05 PROCEDURE — 40000275 ZZH STATISTIC RCP TIME EA 10 MIN

## 2019-06-05 PROCEDURE — 85379 FIBRIN DEGRADATION QUANT: CPT | Performed by: EMERGENCY MEDICINE

## 2019-06-05 PROCEDURE — 36415 COLL VENOUS BLD VENIPUNCTURE: CPT | Performed by: EMERGENCY MEDICINE

## 2019-06-05 PROCEDURE — 80053 COMPREHEN METABOLIC PANEL: CPT | Performed by: EMERGENCY MEDICINE

## 2019-06-05 PROCEDURE — 81001 URINALYSIS AUTO W/SCOPE: CPT | Performed by: EMERGENCY MEDICINE

## 2019-06-05 PROCEDURE — 71260 CT THORAX DX C+: CPT

## 2019-06-05 PROCEDURE — 86140 C-REACTIVE PROTEIN: CPT | Performed by: EMERGENCY MEDICINE

## 2019-06-05 RX ORDER — METAXALONE 800 MG/1
400 TABLET ORAL 2 TIMES DAILY
Qty: 3 TABLET | Refills: 0 | Status: SHIPPED | OUTPATIENT
Start: 2019-06-05 | End: 2020-02-19

## 2019-06-05 RX ORDER — DIAZEPAM 10 MG/2ML
3 INJECTION, SOLUTION INTRAMUSCULAR; INTRAVENOUS ONCE
Status: DISCONTINUED | OUTPATIENT
Start: 2019-06-05 | End: 2019-06-05 | Stop reason: RX

## 2019-06-05 RX ADMIN — IOHEXOL 100 ML: 350 INJECTION, SOLUTION INTRAVENOUS at 03:25

## 2019-06-05 RX ADMIN — DIAZEPAM 2.5 MG: 5 TABLET ORAL at 03:29

## 2019-06-05 NOTE — ED NOTES
Patient daughter came out of room and stated patient was having pain in chest.  Provider notified.   EKG completed.

## 2019-06-05 NOTE — ED TRIAGE NOTES
Patient states she has had high BP recently, has not been taking her lisinopril. States her doctor said not to take it if she doesn't have too. Matilda Han RN on 6/4/2019 at 8:21 PM

## 2019-06-05 NOTE — ED NOTES
Patient having increased difficulty breathing and having a hard time getting her words out. Tachypnic, will slow breathing down with reminders to take slow deep breaths. LS clear. No signs of airway obstruction, provider at bedside to assess patient. Matilda Han RN on 6/5/2019 at 1:35 AM

## 2019-06-05 NOTE — ED NOTES
Patient got lightheaded and nauseas and CT. Provider updated, will give oral valium. IV not available. Matilda Han RN on 6/5/2019 at 3:26 AM

## 2019-06-05 NOTE — ED PROVIDER NOTES
Insurance does not pay for medication prescribed last evening.  Change it to Flexeril 5 mg 3 times daily #9 given.  She will follow-up if any problems.     Citlalli Escoto MD  06/05/19 1123

## 2019-06-05 NOTE — ED NOTES
Patient up to ambulate, feeling steady and ready to go home. Matilda Han RN on 6/5/2019 at 5:21 AM

## 2019-06-05 NOTE — DISCHARGE INSTRUCTIONS
Using incentive spirometry every hour while awake.  Use your walker while ambulating.  Metaxalone 800 mg take 1/2 tablet up to twice a day as needed for management of myofascial pain.  Return to the emergency department for increased shortness of breath, cough productive of purulent sputum, the development of a vesicular rash along your right flank, motor or sensory loss into the right leg

## 2019-06-05 NOTE — ED PROVIDER NOTES
"  History     Chief Complaint   Patient presents with     Back Pain     HPI  Maliha Rivera is a 78 year old female who is in the emergency department by private vehicle for evaluation of paralumbar pain.    Patient reports that she felt \"fine all day\".  She reports that around 1800 (roughly 30 minutes after her evening meal) she had acute onset of right paralumbar pain.  This came on while \"walking around the house\" on level terrain.  She describes the pain as sharp in its character.  It occupied a space that was approximately \"the size of my (open) and\".  And she reports that it resolves when she is lying down \"if I do not move\".  It is worst with lying on her right side.  There is no radiation across the pain to the midline.  There is no radiation up into the dorsal thorax.  There is no radiation into the inguinal crease or predental or the lower extremity.  Indeed she notes that she had previously been experiencing some pain just rostral to the knee and she reports the pain caused her to the side lying down and when she did she reports the pain abruptly left of the leg and moved into the paralumbar distribution.  Patient denies any new motor or sensory changes in the lower extremities.    No change in urinary frequency or urgency.  No dysuria.  No hematuria.  She reports maximum temperature tonight is been 102 degrees.  She has had chills and equivocates about the presence of dirk rigors..  No nausea or vomiting.  No abdominal distention.  She is not aware of any anterior abdominal pain.  Stools have reportedly been unremarkable.  There is been no recent productive cough and she reports the back pain is unchanged by deep breath.    Patient reports that a little bit later this evening she developed a right parasternal chest pain that is sharp in character.  It is made worse by deep breath and by thoracolumbar torsion although she cannot decide if it is worse to the right or to the left.  But she reports it " "is \"not really\" increased by chest palpation.  She reports that later still she felt that she had some swelling inside her throat and felt that it became \"very hard\".  \"It would not move\" the patient says referring to her trachea.  She denies dysphasia or diet aphasia she denies dysphonia and she denies dyspnea.  There is no stridor described by the patient.  She reports that sensation was relatively transient but it was accompanied by a sense of facial flushing which lasted for at least 1 hour.    There is no known history for coronary artery disease.  Risk factors are limited to borderline diabetes, hypertension managed on lisinopril which she takes on only a as needed basis and hyperlipidemia which is not medically managed (presumably it was at one point since she lists an allergy to Lipitor).  She is never been a smoker.  There is been no marked change in exercise tolerance that is not otherwise attributed to knee pain-the knee pain was present before her arthroplasty and has certainly been present since then.  3 weeks ago is the pain in the leg escalated she did have concern about a possible blood clot in the leg.  Ultrasound was performed no clot was found.  There is no prior history for thrombophilia.    The surgery to the knee was performed on 4/18/2019.  Patient is currently ambulating without a walker or cane, per family.  I would also add that the patient is believed by family to have not used her incentive spirometer after the surgery.        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  She is taking fish oil + other ingredients she states helps with DM  On ASA  Patient does not want to take lisinopril  Does not tolerate statins and has discontinued pravastatin; try niacin  No retinopathy on eye exam 4/2013 and 12/2014  Overview:   Overview:   3/2014:  A1c fine off metformin - will discontinue as patient does not want to take it.  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  Lipids:; declines statin - will try niacin and is on fish oil.  Mammogram:schedule  Pap: 3/2013 WNL - does not need again.  Tdap:3/2013  Vitamin D: takes supplement  pneumovax done.  Consider zoster vaccine.       Degenerative arthritis of knee 10/21/2010     Priority: Medium     Overview:   IMO Update 10/11       Cervicalgia 05/11/2010     Priority: Medium     Overview:   IMO Update 10/11       DDD (degenerative disc disease), lumbar 01/15/2010     Priority: Medium     Primary localized osteoarthrosis, lower leg 01/31/2008     Priority: Medium     Hyperlipidemia 07/22/2004     Priority: Medium     Overview:   IMO Update 10/11  Overview:   Overview:   IMO Update 10/11          Past Medical History:    Past Medical History:   Diagnosis  Date     Encounter for screening for cardiovascular disorders        Past Surgical History:    Past Surgical History:   Procedure Laterality Date     APPENDECTOMY OPEN      No Comments Provided     ARTHROSCOPY KNEE      2007,Joseluis Thomas M.D.     COLONOSCOPY      2003,2011,normal     ESOPHAGOSCOPY, GASTROSCOPY, DUODENOSCOPY (EGD), COMBINED      2/3/2016     LAPAROSCOPIC CHOLECYSTECTOMY      3/11/01,Dr. Salas     LAPAROSCOPIC TUBAL LIGATION      No Comments Provided     lumbar decompression L4-5  02/28/2018    Dr. France     OTHER SURGICAL HISTORY      1996,[id number],CATARACT EXTRACTION W/  INTRAOCULAR LENS IMPLANT,Bilateral     OTHER SURGICAL HISTORY      2001,60613.0,[facility] BILIARY OR PANC DUCT STENT EXCHANGE W DIL AND WIRE,stent removed.     OTHER SURGICAL HISTORY      2004,[id number],CARDIAC CATHETERIZATION,mild disease     OTHER SURGICAL HISTORY      1/31/07,[id number],OTHER,Left     OTHER SURGICAL HISTORY      02/27/2008,[id number],TOTAL KNEE ARTHROPLASTY,Right     OTHER SURGICAL HISTORY      2010,[id number],TOTAL KNEE ARTHROPLASTY,Left,Dr. Carson     OTHER SURGICAL HISTORY      06/05/2017,[id number],OTHER,Left,Dr. Joiner, [facility]     RELEASE CARPAL TUNNEL      2006     Revision right total knee arthroplasty   04/18/2019    Dr. Carson.  Due to loose implant tibial component     TONSILLECTOMY      age 12       Family History:    Family History   Problem Relation Age of Onset     Heart Disease Mother         Heart Disease     Heart Disease Father         Heart Disease     Heart Disease Sister         Heart Disease     Cancer Other         Cancer,female cancer       Social History:  Marital Status:  Unknown [6]  Social History     Tobacco Use     Smoking status: Never Smoker     Smokeless tobacco: Never Used   Substance Use Topics     Alcohol use: No     Drug use: Never     Comment: Drug use: No        Medications:      acetaminophen (TYLENOL) 325 MG tablet   aspirin EC 81 MG EC tablet   HYDROmorphone (DILAUDID) 2 MG tablet  "  metaxalone (SKELAXIN) 800 MG tablet   senna-docusate (SENOKOT-S/PERICOLACE) 8.6-50 MG tablet   ANTACID CALCIUM 500 MG chewable tablet   blood glucose monitoring (FREESTYLE LITE) test strip   blood glucose monitoring (FREESTYLE) lancets   Blood Glucose Monitoring Suppl (FIFTY50 GLUCOSE METER 2.0) W/DEVICE KIT   Blood Pressure Monitoring (ADULT BLOOD PRESSURE CUFF LG) KIT   COMPRESSION STOCKINGS   gabapentin (NEURONTIN) 300 MG capsule   lisinopril (PRINIVIL/ZESTRIL) 10 MG tablet   melatonin 5 MG tablet   meloxicam (MOBIC) 15 MG tablet   omeprazole (PRILOSEC) 40 MG capsule   order for DME   order for DME   oxybutynin ER (DITROPAN XL) 5 MG 24 hr tablet   PSYLLIUM PO   traMADol (ULTRAM) 50 MG tablet         Review of Systems as described above.    Physical Exam   BP: (!) 201/78  Pulse: 103  Temp: 100.7  F (38.2  C)  Resp: 18  Height: 160 cm (5' 3\")  Weight: 74.4 kg (164 lb)  SpO2: 98 %      Physical Exam The patient's skin is warm and dry. There are no petechiae or purpurae. There is no pallor. There is no jaundice. The skin is intact.  Nailbed capillary refill is brisk. Radial pulses are palpable and are 2+ bilaterally. Dorsalis pedis pulses are also brisk and equal. There is no peripheral edema.  Heart is regular in its rhythm. It is regular in its rate. The patient has a normal S1 and S2. No murmur is noted.   Lungs are clear to auscultation. Airflow is adequate. A few bibasilar rales are noted. These largely cleared once the patient sat up straight and increased tidal volumes. No other adventitious airway sounds noted. The patient is able to speak in full sentences. No lip pursing or expiratory grunting is noted. No accessory muscle use was noted.  Bowel tones are present. There is no high-pitched tinkling. There are no continuous rushes. Belly is not bloated or distended. There is no guarding. There is focal tenderness for the abdomen (see the attached diagram).  No suprapubic distention or tenderness is noted. " Meza sign is negative. McBurney's sign is negative.  Focal tenderness seemed rostral from and lateral to McBurney's point.  It is possible that this corresponds to the course of the right external oblique.  However given the patient's body habitus I could not confidently identify the edge of the external Bleich here.  Rovsing's sign is negative. Anthony's sign is negative, bilaterally. No discrete mass was identified. I did not appreciate any hepatomegaly  by palpation or by percussion. No ventral hernia was identified.   I can reproduce her back pain by palpation of the trigger points for the right gluteus medius and the right quadratus lumborum.  Percussion over the thoracic and lumbar vertebral segments is unremarkable.  Carotids are palpable. There is no auscultable bruit. Cervical AROM is adequate for flexion and rotation. There is no prominent cervical adenopathy or submandibular adenopathy present tonight.   The patient's mandible opened in a symmetric fashion. There is no trismus. There is no swelling of the lips, tongue, or the uvula. There were no intraoral mucosal lesions identified.  Conjunctivae are neither injected nor pallid. Sclerae are anicteric. Pupils are equal in size. They are 3 mm, OU. Gaze is conjugate. Purkinje images are well aligned.   Patient is awake and alert. Speech is fluent and clear. Cognition is sufficient to construct a linear narrative.. Affect is constricted. The patient is cooperative and follows simple commands    ED Course      ProceduresTwelve-lead EKG showed the patient to be in a sinus tachycardia with a ventricular response rate of 106.  No evidence for atrial or ventricular ectopy was identified.  The patient otherwise had normal axes and normal intervals.  Comparison is made with a prior tracing dated 4/10/2019.  She has a Q wave in lead III and a.  This is unchanged from the prior tracing.  The amplitude of the R waves in V5 and V6 is slightly increased today relative  to the prior tracing.  Otherwise no changes are appreciated.                   Results for orders placed or performed during the hospital encounter of 06/04/19 (from the past 24 hour(s))   UA reflex to Microscopic and Culture   Result Value Ref Range    Color Urine Yellow     Appearance Urine Clear     Glucose Urine 500 (A) NEG^Negative mg/dL    Bilirubin Urine Negative NEG^Negative    Ketones Urine Negative NEG^Negative mg/dL    Specific Gravity Urine 1.020 1.000 - 1.030    Blood Urine Negative NEG^Negative    pH Urine 6.5 5.0 - 9.0 pH    Protein Albumin Urine Negative NEG^Negative mg/dL    Urobilinogen Urine 0.2 0.2 - 1.0 EU/dL    Nitrite Urine Negative NEG^Negative    Leukocyte Esterase Urine Small (A) NEG^Negative    Source Midstream Urine    Urine Microscopic   Result Value Ref Range    WBC Urine 10-25 (A) OTO5^0 - 5 /HPF    RBC Urine O - 2 OTO2^O - 2 /HPF    Squamous Epithelial /LPF Urine Moderate (A) FEW^Few /LPF    Bacteria Urine Moderate (A) NEG^Negative /HPF    Mucous Urine Present (A) NEG^Negative /LPF   CBC with platelets differential   Result Value Ref Range    WBC 10.0 4.0 - 11.0 10e9/L    RBC Count 3.86 3.8 - 5.2 10e12/L    Hemoglobin 11.1 (L) 11.7 - 15.7 g/dL    Hematocrit 32.5 (L) 35.0 - 47.0 %    MCV 84 78 - 100 fl    MCH 28.8 26.5 - 33.0 pg    MCHC 34.2 31.5 - 36.5 g/dL    RDW 13.0 10.0 - 15.0 %    Platelet Count 237 150 - 450 10e9/L    Diff Method Automated Method     % Neutrophils 68.4 %    % Lymphocytes 22.7 %    % Monocytes 6.7 %    % Eosinophils 1.2 %    % Basophils 0.4 %    % Immature Granulocytes 0.6 %    Absolute Neutrophil 6.9 1.6 - 8.3 10e9/L    Absolute Lymphocytes 2.3 0.8 - 5.3 10e9/L    Absolute Monocytes 0.7 0.0 - 1.3 10e9/L    Absolute Eosinophils 0.1 0.0 - 0.7 10e9/L    Absolute Basophils 0.0 0.0 - 0.2 10e9/L    Abs Immature Granulocytes 0.1 0 - 0.4 10e9/L   D-Dimer (HI,GH)   Result Value Ref Range    D-Dimer ng/mL 728 (H) 0 - 230 ng/ml D-DU   Comprehensive metabolic panel   Result  Value Ref Range    Sodium 137 134 - 144 mmol/L    Potassium 3.4 (L) 3.5 - 5.1 mmol/L    Chloride 103 98 - 107 mmol/L    Carbon Dioxide 23 21 - 31 mmol/L    Anion Gap 11 3 - 14 mmol/L    Glucose 145 (H) 70 - 105 mg/dL    Urea Nitrogen 7 7 - 25 mg/dL    Creatinine 0.52 (L) 0.60 - 1.20 mg/dL    GFR Estimate >90 >60 mL/min/[1.73_m2]    GFR Estimate If Black >90 >60 mL/min/[1.73_m2]    Calcium 9.3 8.6 - 10.3 mg/dL    Bilirubin Total 0.4 0.3 - 1.0 mg/dL    Albumin 4.1 3.5 - 5.7 g/dL    Protein Total 7.0 6.4 - 8.9 g/dL    Alkaline Phosphatase 73 34 - 104 U/L    ALT 11 7 - 52 U/L    AST 13 13 - 39 U/L   CRP inflammation   Result Value Ref Range    CRP Inflammation 2.0 (H) <0.5 mg/L   *UA reflex to Microscopic   Result Value Ref Range    Color Urine Yellow     Appearance Urine Clear     Glucose Urine Negative NEG^Negative mg/dL    Bilirubin Urine Negative NEG^Negative    Ketones Urine Negative NEG^Negative mg/dL    Specific Gravity Urine 1.010 1.000 - 1.030    Blood Urine Negative NEG^Negative    pH Urine 7.0 5.0 - 9.0 pH    Protein Albumin Urine Negative NEG^Negative mg/dL    Urobilinogen Urine 0.2 0.2 - 1.0 EU/dL    Nitrite Urine Negative NEG^Negative    Leukocyte Esterase Urine Small (A) NEG^Negative    Source Midstream Urine    Urine Microscopic   Result Value Ref Range    WBC Urine 10-25 (A) OTO5^0 - 5 /HPF    RBC Urine O - 2 OTO2^O - 2 /HPF    Squamous Epithelial /LPF Urine Moderate (A) FEW^Few /LPF    Bacteria Urine Moderate (A) NEG^Negative /HPF   CT Chest Pulmonary Embolism w Contrast    Narrative    EXAM:    CT Angiography Chest With Contrast     EXAM DATE/TIME:    6/5/2019 3:07 AM     CLINICAL HISTORY:    78 years old, female; Signs and symptoms and abnormal findings; Abnormal   diagnostic tests; Elevated d-dimer; Shortness of breath; Additional info: Pe   suspected, intermediate prob, positive d-dimer     TECHNIQUE:    Imaging protocol: Axial computed tomographic angiography images of the chest   with intravenous  contrast using CT angiography protocol. Coronal and sagittal   reformatted images were created and reviewed.    3D rendering: MIP reconstructed images were created and reviewed.    Radiation optimization: All CT scans at this facility use at least one of   these dose optimization techniques: automated exposure control; mA and/or kV   adjustment per patient size (includes targeted exams where dose is matched to   clinical indication); or iterative reconstruction.    Contrast material: OMNIPAQUE 320; Contrast volume: 100 ml; Contrast route: IV;     COMPARISON:    No relevant prior studies available.     FINDINGS:    Pulmonary arteries: No intraluminal pulmonary arterial filling defect.    Aorta: Atherosclerotic arterial calcifications present. No aortic aneurysm. No   acute aortic dissection.    Lungs: Gravitational atelectatic changes are noted within the lungs   bilaterally. Scattered bilateral air trapping with hazy ground glass lung   parenchymal densities and scattered atelectasis consistent with expiratory   phase imaging. No acute focal dense air space consolidation or abnormal lung   parenchymal mass.    Pleural space: No right pleural effusion. No left pleural effusion. No   pneumothorax.    Heart: No significant pericardial effusion.    Mediastinum: No abnormal mediastinal mass or fluid collection.Small hiatal   hernia.    Liver: Fatty infiltration of liver.    Gallbladder and bile ducts: Evidence of prior cholecystectomy.    Stomach and bowel: Stomach is incompletely distended with secondary wall   prominence/thickening.    Lymph nodes: No pathologically enlarged lymph nodes.    Bones/joints: Osteopenia. Multilevel degenerative changes present within   spine. No acute fracture or neoplastic osseous lesion.    Soft tissues: Unremarkable.       Impression    IMPRESSION:   1. No pulmonary embolus.   2. Remainder of findings as described above.     THIS DOCUMENT HAS BEEN ELECTRONICALLY SIGNED BY RAYMOND ADKINS  "MD       Medications   iohexol (OMNIPAQUE) 350 mg/mL solution 100 mL (100 mLs Intravenous Given 6/5/19 0325)   diazepam (VALIUM) half-tab 2.5 mg (2.5 mg Oral Given 6/5/19 0329)       Assessments & Plan (with Medical Decision Making)   On recheck, the patient seemed to be sleeping comfortably.  It was the family's assessment that overall there had been some net improvement after receiving the diazepam.  We talked about dosing of the diazepam.  They explained that the patient at times is \"wobbly\" with ambulation.  So staying towards the lower end of the acceptable dosing schedule does seem appropriate here.    I reviewed the results of the CT scan with the patient and her family.  I reviewed the results of tonight's laboratory assessments with the patient and her family.  The patient did have some modest leukocyturia present tonight but we were never able to get a specimen that did not also have a large number of squamous epithelial cells.  Given the patient's absolute insistence that she has had no LUTS , I did not see that there was a great probative value in obtaining a catheterized urine at this time, since the current standard would be against the use of empiric antibiotics in asymptomatic leukocyturia/bacteriuria.  I did mention the possibility that the leukocytes in the urine could represent something going on in the lower got, but that seems to be refuted by the patient's history.  The one area in the abdomen that seems to be tender I think most likely corresponds to the lateral edge of the right external oblique rather than to any structure with in the abdominal viscera.  The CT scan did not show any evidence for pneumonic infiltrate nor for effusion or pneumothorax.  The groundglass areas were not, I think, consistent with developing ARDS..  She does have scattered areas of atelectasis.  Daughter reports that postoperatively the patient declined to make use of incentive spirometer.  We have discussed tonight " why I think that is important in minimizing the patient's risk for subsequent progression to pneumonia.    Also discussed the paralumbar discomfort the patient has denied.  There is no evidence for pyelonephritis based on history.  There is no evidence for ureteral stone based on the absence of colicky pain as well as the absence of hematuria or crystal urea tonight.  The CT did show the kidneys and although the study was done with contrast the proximal ureters do not show any evidence for hydronephrosis.  The patient is able to tolerate bringing her hips into a neutral position.  There is nothing I am finding tonight that seems suggestive of a psoas abscess.  Nor I finding anything causing me to be concerned that the patient's right paralumbar pain might be a manifestation of otherwise occult aortic dissection.  The pain is far enough away from the midline that I think discitis and epidural abscess can be comfortably excluded.    Family did apparently mentioned to the nurse that some of the patient's symptoms seemed, to them, to potentially be consistent with anxiety.  On this point I cannot completely disagree.    After the administration of the 2.5 mg of oral diazepam, the patient was much more comfortable.  We were able to have her sit up on the bedside and then walk.  She was able to do so with almost complete resolution of pain behavior and pain speech.  And while she seemed fairly stable and her gait I did suggest the family that having her return to the use of a walker while on muscle relaxants probably make some sense.  I would also note that the patient's blood pressure seemed to be resolving after administration of the diazepam/decrease in her discomfort -- with a decrease in her blood pressure down to 137/75.    I have reviewed the nursing notes.    I have reviewed the findings, diagnosis, plan and need for follow up with the patient.         Medication List      Started    metaxalone 800 MG  tablet  Commonly known as:  SKELAXIN  400 mg, Oral, 2 TIMES DAILY            Final diagnoses:   Myofascial pain on right side - Right quadratus lumborum and right gluteus medius.   Upper respiratory tract infection, unspecified type - Thought to most likely be viral   Atelectasis     Plan: Using incentive spirometry every hour while awake.  Use your walker while ambulating.  Metaxalone 800 mg take 1/2 tablet up to twice a day as needed for management of myofascial pain.  Return to the emergency department for increased shortness of breath, cough productive of purulent sputum, the development of a vesicular rash along your right flank, motor or sensory loss into the right leg    6/4/2019   [facility]     Gamaliel Jurado DO  06/05/19 1598

## 2019-06-05 NOTE — PROGRESS NOTES
Incentive Spirometry education completed.  Pt goal 1950 mls.  Pt achieved 1250 mls.  Pt instructed to perform 10/hr while awake with at least one deep breath and cough per hour until able to perform baseline activity.  RT will follow and re-assess as need.      Mary Rosenberg, RT on 6/5/2019 at 5:24 AM

## 2019-06-05 NOTE — ED TRIAGE NOTES
Patient states she had a R knee replacement about 5 weeks ago and had increased knee pain today and that has now resolved. Patient states she had sudden onset of R flank back pain. States she has been going to the bathroom okay, denies hx of kidney stones. Patient took dilaudid 2mg about 1800 and tylenol about 1330. Has been eating and drinking. Matilda Han RN on 6/4/2019 at 8:16 PM

## 2019-06-05 NOTE — TELEPHONE ENCOUNTER
I tried to call the phone number 3 times and it said it is not in service. I called Tarah who took the message and she stated that was the phone number she was given.  Olivia Jean-Baptiste LPN..................6/5/2019   1:20 PM

## 2019-06-05 NOTE — TELEPHONE ENCOUNTER
Tried calling again and the phone number is not in service.  Olivia Jean-Baptiste LPN..................6/5/2019   3:50 PM

## 2019-06-05 NOTE — ED NOTES
Patients family is concerned about a blood clot, states she had one a couple weeks after surgery. Matilda Han RN on 6/5/2019 at 12:59 AM

## 2019-06-06 LAB
BACTERIA SPEC CULT: NORMAL
SPECIMEN SOURCE: NORMAL

## 2019-06-06 NOTE — TELEPHONE ENCOUNTER
Nicanor Mike MD is out of the office until June 17 th so would need to seen another provider.  Olivia Jean-Baptiste LPN..................6/6/2019   1:07 PM

## 2019-06-06 NOTE — RESULT ENCOUNTER NOTE
Final urine culture report is NEGATIVE per [facility] Result protocol.    If NEGATIVE result, no change in treatment, per [facility] protocol.

## 2019-06-07 ENCOUNTER — OFFICE VISIT (OUTPATIENT)
Dept: FAMILY MEDICINE | Facility: OTHER | Age: 79
End: 2019-06-07
Attending: NURSE PRACTITIONER
Payer: COMMERCIAL

## 2019-06-07 VITALS
WEIGHT: 159 LBS | HEIGHT: 63 IN | RESPIRATION RATE: 20 BRPM | TEMPERATURE: 98.7 F | SYSTOLIC BLOOD PRESSURE: 112 MMHG | BODY MASS INDEX: 28.17 KG/M2 | DIASTOLIC BLOOD PRESSURE: 52 MMHG | OXYGEN SATURATION: 97 % | HEART RATE: 90 BPM

## 2019-06-07 DIAGNOSIS — B34.9 VIRAL ILLNESS: Primary | ICD-10-CM

## 2019-06-07 DIAGNOSIS — R07.0 THROAT PAIN: ICD-10-CM

## 2019-06-07 LAB
DEPRECATED S PYO AG THROAT QL EIA: NORMAL
SPECIMEN SOURCE: NORMAL

## 2019-06-07 PROCEDURE — 99213 OFFICE O/P EST LOW 20 MIN: CPT | Performed by: NURSE PRACTITIONER

## 2019-06-07 PROCEDURE — 87880 STREP A ASSAY W/OPTIC: CPT | Mod: ZL | Performed by: NURSE PRACTITIONER

## 2019-06-07 PROCEDURE — G0463 HOSPITAL OUTPT CLINIC VISIT: HCPCS

## 2019-06-07 RX ORDER — CYCLOBENZAPRINE HCL 5 MG
TABLET ORAL
Refills: 0 | COMMUNITY
Start: 2019-06-05 | End: 2020-02-19

## 2019-06-07 RX ORDER — PRAVASTATIN SODIUM 20 MG
20 TABLET ORAL DAILY
Refills: 3 | COMMUNITY
Start: 2019-06-05 | End: 2020-01-28

## 2019-06-07 ASSESSMENT — ENCOUNTER SYMPTOMS
SINUS PRESSURE: 0
FEVER: 0
SHORTNESS OF BREATH: 0
CHILLS: 0
WHEEZING: 0
SORE THROAT: 1
SINUS PAIN: 0
COUGH: 1
FATIGUE: 1

## 2019-06-07 ASSESSMENT — PAIN SCALES - GENERAL: PAINLEVEL: MILD PAIN (2)

## 2019-06-07 ASSESSMENT — MIFFLIN-ST. JEOR: SCORE: 1170.35

## 2019-06-07 NOTE — PROGRESS NOTES
SUBJECTIVE:   Maliha Rivera is a 78 year old female who presents to clinic today for the following health issues:    HPI  Patient presents for evaluation of ER follow-up on 6/4 for myofascial pain on right side and suspected upper respiratory tract infection. She had a thorough work up at that visit and they think symptoms were most likely related to anxiety. No acute infection seen. She had a CT chest that showed some air trapping with hazy ground glass lung parenchymal densities and scattered atelectasis.   Today she reports pain has improved. She does note a cough x 1 day and throat pain x 2 weeks. She denies fever/chills. No history of COPD. Not a smoker. She notes no SOB with activity. Her cough is non-productive. She notes some recent family members that were ill. Requesting strep test at this visit.     Patient Active Problem List    Diagnosis Date Noted     Benign essential hypertension 04/18/2018     Priority: Medium     Lumbar stenosis 03/01/2018     Priority: Medium     Lumbar radiculopathy 01/23/2018     Priority: Medium     Status post total bilateral knee replacement 03/31/2017     Priority: Medium     Primary osteoarthritis of left knee 03/23/2017     Priority: Medium     DM w/o complication type II (H) 09/22/2015     Priority: Medium     Overview:   3/2014:  A1c fine off metformin - will discontinue as patient does not want to take it.  She is taking fish oil + other ingredients she states helps with DM  On ASA  Patient does not want to take lisinopril  Does not tolerate statins and has discontinued pravastatin; try niacin  No retinopathy on eye exam 4/2013 and 12/2014  Overview:   Overview:   3/2014:  A1c fine off metformin - will discontinue as patient does not want to take it.  She is taking fish oil + other ingredients she states helps with DM  On ASA  Patient does not want to take lisinopril  Does not tolerate statins and has discontinued pravastatin; try niacin  No retinopathy on eye  exam 4/2013 and 12/2014       Neurofibroma of thigh 09/22/2015     Priority: Medium     Esophageal reflux 12/13/2013     Priority: Medium     Dysphagia 12/13/2013     Priority: Medium     Healthcare maintenance 03/26/2013     Priority: Medium     Overview:   Calcium: takes supplement  Colonoscopy: WNL 2011  DEXA: schedule - if has osteoporosis will have to look at risk of fosamax on her reflux.  Lipids:; declines statin - will try niacin and is on fish oil.  Mammogram:schedule  Pap: 3/2013 WNL - does not need again.  Tdap:3/2013  Vitamin D: takes supplement  pneumovax done.  Consider zoster vaccine.       Degenerative arthritis of knee 10/21/2010     Priority: Medium     Overview:   IMO Update 10/11       Cervicalgia 05/11/2010     Priority: Medium     Overview:   IMO Update 10/11       DDD (degenerative disc disease), lumbar 01/15/2010     Priority: Medium     Primary localized osteoarthrosis, lower leg 01/31/2008     Priority: Medium     Hyperlipidemia 07/22/2004     Priority: Medium     Overview:   IMO Update 10/11  Overview:   Overview:   IMO Update 10/11       Past Medical History:   Diagnosis Date     Encounter for screening for cardiovascular disorders     4/8/15,Lexiscan cardiolite [facility]      Past Surgical History:   Procedure Laterality Date     APPENDECTOMY OPEN      No Comments Provided     ARTHROSCOPY KNEE      2007,Joseluis Thomas M.D.     COLONOSCOPY      2003,2011,normal     ESOPHAGOSCOPY, GASTROSCOPY, DUODENOSCOPY (EGD), COMBINED      2/3/2016     LAPAROSCOPIC CHOLECYSTECTOMY      3/11/01,Dr. Salas     LAPAROSCOPIC TUBAL LIGATION      No Comments Provided     lumbar decompression L4-5  02/28/2018    Dr. France     OTHER SURGICAL HISTORY      1996,[id number],CATARACT EXTRACTION W/  INTRAOCULAR LENS IMPLANT,Bilateral     OTHER SURGICAL HISTORY      2001,72561.0,DC ERCP BILIARY OR PANC DUCT STENT EXCHANGE W DIL AND WIRE,stent removed.     OTHER SURGICAL HISTORY      2004,[id number],CARDIAC  "CATHETERIZATION,mild disease     OTHER SURGICAL HISTORY      1/31/07,[id number],OTHER,Left     OTHER SURGICAL HISTORY      02/27/2008,[id number],TOTAL KNEE ARTHROPLASTY,Right     OTHER SURGICAL HISTORY      2010,[id number],TOTAL KNEE ARTHROPLASTY,Left,Dr. Carson     OTHER SURGICAL HISTORY      06/05/2017,[id number],OTHER,Left,Dr. Joiner, [facility]     RELEASE CARPAL TUNNEL      2006     Revision right total knee arthroplasty   04/18/2019    Dr. Carson.  Due to loose implant tibial component     TONSILLECTOMY      age 12       Review of Systems   Constitutional: Positive for fatigue. Negative for chills and fever.   HENT: Positive for postnasal drip and sore throat. Negative for congestion, ear discharge, ear pain, sinus pressure and sinus pain.    Respiratory: Positive for cough. Negative for shortness of breath and wheezing.         OBJECTIVE:     /52 (BP Location: Right arm, Patient Position: Sitting, Cuff Size: Adult Regular)   Pulse 90   Temp 98.7  F (37.1  C) (Tympanic)   Resp 20   Ht 1.6 m (5' 3\")   Wt 72.1 kg (159 lb)   SpO2 97%   Breastfeeding? No   BMI 28.17 kg/m    Body mass index is 28.17 kg/m .  Physical Exam   Constitutional: She appears well-developed and well-nourished. No distress.   HENT:   Right Ear: Tympanic membrane and external ear normal.   Left Ear: Tympanic membrane and external ear normal.   Mouth/Throat: Uvula is midline, oropharynx is clear and moist and mucous membranes are normal. No oropharyngeal exudate.   Cardiovascular: Regular rhythm and normal heart sounds.   Pulmonary/Chest: Effort normal and breath sounds normal. No respiratory distress. She has no wheezes. She has no rales.   Skin: Skin is warm and dry.     Diagnostic Test Results:  Results for orders placed or performed in visit on 06/07/19 (from the past 24 hour(s))   Strep, Rapid Screen   Result Value Ref Range    Specimen Description Throat     Rapid Strep A Screen       Negative presumptive for Group A Beta Streptococcus "       ASSESSMENT/PLAN:   1. Throat pain  Negative for strep. Consider throat lozenges and tea with honey for throat pain relief.   - Strep, Rapid Screen    2. Viral illness  Likely viral in nature with short onset and known sick contacts. One could also consider allergen cause. Suggested a trial of daily Zyrtec to see if this improves post nasal drip. If viral in nature she feel improved in 7-10 days. Treatment aimed at symptomatic cares. Follow-up if not improving or worsening.     Montserrat Goldsmith [facility] AND [facility]

## 2019-06-07 NOTE — PATIENT INSTRUCTIONS
Allergy: Claritin or Zyrtec once daily.     Cold symptoms: OTC cold medication, throat lozenges, tea with honey     Ensure shakes for protein

## 2019-06-07 NOTE — NURSING NOTE
Patient presents to clinic for follow up after ER visit on 06/04/19.  She is experiencing shortness of breath, dry cough and body aches.    Medication Reconciliation: complete    Samira Salas LPN

## 2019-06-10 RX ORDER — MELOXICAM 15 MG/1
TABLET ORAL
Qty: 90 TABLET | Refills: 0 | Status: SHIPPED | OUTPATIENT
Start: 2019-06-10 | End: 2020-02-19

## 2019-06-10 NOTE — TELEPHONE ENCOUNTER
RN refill protocol fails as patient is greater than 64 years of age. LOV with a provider was on 6/7/19 with RONAN Goldsmith NP for an acute issue. Writer will curtis up and route Rx request to NP for her consideration/approval. PCP is out of the office until 6/17/19 and patient is seeing PCP on 6/19/19.    Unable to complete prescription refill per RN Medication Refill Policy. Lance Lee 6/10/2019 11:36 AM

## 2019-06-14 ENCOUNTER — TRANSFERRED RECORDS (OUTPATIENT)
Dept: HEALTH INFORMATION MANAGEMENT | Facility: OTHER | Age: 79
End: 2019-06-14

## 2020-01-26 DIAGNOSIS — E78.5 HYPERLIPIDEMIA, UNSPECIFIED HYPERLIPIDEMIA TYPE: ICD-10-CM

## 2020-01-26 DIAGNOSIS — M54.16 LUMBAR RADICULOPATHY: ICD-10-CM

## 2020-01-26 DIAGNOSIS — I10 BENIGN ESSENTIAL HYPERTENSION: Primary | ICD-10-CM

## 2020-01-28 RX ORDER — PRAVASTATIN SODIUM 20 MG
TABLET ORAL
Qty: 90 TABLET | Refills: 11 | Status: SHIPPED | OUTPATIENT
Start: 2020-01-28 | End: 2022-03-24

## 2020-01-28 RX ORDER — LISINOPRIL 10 MG/1
TABLET ORAL
Qty: 90 TABLET | Refills: 11 | Status: SHIPPED | OUTPATIENT
Start: 2020-01-28 | End: 2022-01-20

## 2020-01-28 RX ORDER — GABAPENTIN 300 MG/1
CAPSULE ORAL
Qty: 90 CAPSULE | Refills: 11 | Status: SHIPPED | OUTPATIENT
Start: 2020-01-28 | End: 2020-02-19

## 2020-01-28 NOTE — TELEPHONE ENCOUNTER
Disp Refills Start End DAXA   gabapentin (NEURONTIN) 300 MG capsule 90 capsule prn 12/5/2018  No   Sig - Route: Take 1 capsule (300 mg) by mouth 3 times daily - Oral      Disp Refills Start End DAXA   lisinopril (PRINIVIL/ZESTRIL) 10 MG tablet   4/25/2019  No   Sig - Route: Take 1 tablet (10 mg) by mouth daily - Oral   Class: Historical      Disp Refills Start End DAXA   pravastatin (PRAVACHOL) 20 MG tablet  3 6/5/2019  --   Sig - Route: Take 20 mg by mouth daily - Oral   Class: Historical       LOV: 4/25/2019  Future Office visit:  No future appointment scheduled at this time.     Routing refill request to provider for review/approval because:  Drug not on the [facility], [facility] or [facility] refill protocol or controlled substance  Medication is reported/historical  Failed Protocol    Requested Prescriptions   Pending Prescriptions Disp Refills     gabapentin (NEURONTIN) 300 MG capsule [Pharmacy Med Name: Gabapentin 300 MG Oral Capsule]  0     Sig: TAKE ONE CAPSULE BY MOUTH TWICE DAILY       There is no refill protocol information for this order        lisinopril (PRINIVIL/ZESTRIL) 10 MG tablet [Pharmacy Med Name: Lisinopril 10 MG Oral Tablet]  0     Sig: TAKE 1 TABLET BY MOUTH ONCE DAILY       ACE Inhibitors (Including Combos) Protocol Failed - 1/26/2020  2:35 PM        Failed - Normal serum creatinine on file in past 12 months     Recent Labs   Lab Test 06/05/19  0134   CR 0.52*             Failed - Normal serum potassium on file in past 12 months     Recent Labs   Lab Test 06/05/19  0134   POTASSIUM 3.4*             Passed - Blood pressure under 140/90 in past 12 months     BP Readings from Last 3 Encounters:   06/07/19 112/52   06/05/19 158/67   04/25/19 138/60                 Passed - Recent (12 mo) or future (30 days) visit within the authorizing provider's specialty     Patient has had an office visit with the authorizing provider or a provider within the authorizing providers department within the previous 12 mos or has  "a future within next 30 days. See \"Patient Info\" tab in inbasket, or \"Choose Columns\" in Meds & Orders section of the refill encounter.              Passed - Medication is active on med list        Passed - Patient is age 18 or older        Passed - No active pregnancy on record        Passed - No positive pregnancy test within past 12 months        pravastatin (PRAVACHOL) 20 MG tablet [Pharmacy Med Name: Pravastatin Sodium 20 MG Oral Tablet]  0     Sig: TAKE 1 TABLET BY MOUTH ONCE DAILY       Statins Protocol Failed - 1/26/2020  2:35 PM        Failed - LDL on file in past 12 months     Recent Labs   Lab Test 12/05/18  1426   LDL Cannot estimate LDL when triglyceride exceeds 400 mg/dL             Passed - No abnormal creatine kinase in past 12 months     No lab results found.             Passed - Recent (12 mo) or future (30 days) visit within the authorizing provider's specialty     Patient has had an office visit with the authorizing provider or a provider within the authorizing providers department within the previous 12 mos or has a future within next 30 days. See \"Patient Info\" tab in inMusicshakeet, or \"Choose Columns\" in Meds & Orders section of the refill encounter.              Passed - Medication is active on med list        Passed - Patient is age 18 or older        Passed - No active pregnancy on record        Passed - No positive pregnancy test in past 12 months      Unable to complete prescription refill per RN Medication Refill Policy.................... Coni Alston RN ....................  1/28/2020   8:44 AM    "

## 2020-02-03 ENCOUNTER — HOSPITAL ENCOUNTER (OUTPATIENT)
Dept: GENERAL RADIOLOGY | Facility: OTHER | Age: 80
End: 2020-02-03
Attending: FAMILY MEDICINE
Payer: MEDICARE

## 2020-02-03 ENCOUNTER — OFFICE VISIT (OUTPATIENT)
Dept: FAMILY MEDICINE | Facility: OTHER | Age: 80
End: 2020-02-03
Attending: FAMILY MEDICINE
Payer: MEDICARE

## 2020-02-03 ENCOUNTER — HOSPITAL ENCOUNTER (OUTPATIENT)
Dept: GENERAL RADIOLOGY | Facility: OTHER | Age: 80
Discharge: HOME | End: 2020-02-03
Attending: FAMILY MEDICINE | Admitting: FAMILY MEDICINE
Payer: MEDICARE

## 2020-02-03 VITALS
SYSTOLIC BLOOD PRESSURE: 122 MMHG | WEIGHT: 172.8 LBS | DIASTOLIC BLOOD PRESSURE: 74 MMHG | BODY MASS INDEX: 30.61 KG/M2 | RESPIRATION RATE: 16 BRPM | TEMPERATURE: 97.8 F | HEART RATE: 65 BPM | OXYGEN SATURATION: 98 %

## 2020-02-03 DIAGNOSIS — M79.671 RIGHT FOOT PAIN: ICD-10-CM

## 2020-02-03 DIAGNOSIS — M25.561 ACUTE PAIN OF RIGHT KNEE: ICD-10-CM

## 2020-02-03 DIAGNOSIS — S89.91XA RIGHT KNEE INJURY, INITIAL ENCOUNTER: Primary | ICD-10-CM

## 2020-02-03 PROCEDURE — 73630 X-RAY EXAM OF FOOT: CPT | Mod: RT

## 2020-02-03 PROCEDURE — 99214 OFFICE O/P EST MOD 30 MIN: CPT | Performed by: FAMILY MEDICINE

## 2020-02-03 PROCEDURE — G0463 HOSPITAL OUTPT CLINIC VISIT: HCPCS | Mod: 25

## 2020-02-03 PROCEDURE — 73562 X-RAY EXAM OF KNEE 3: CPT | Mod: RT

## 2020-02-03 PROCEDURE — G0463 HOSPITAL OUTPT CLINIC VISIT: HCPCS

## 2020-02-03 RX ORDER — ASPIRIN 81 MG/1
TABLET, CHEWABLE ORAL
COMMUNITY
Start: 2019-04-20 | End: 2023-03-29

## 2020-02-03 ASSESSMENT — PAIN SCALES - GENERAL: PAINLEVEL: MODERATE PAIN (5)

## 2020-02-03 NOTE — NURSING NOTE
Chief Complaint   Patient presents with     Knee Pain     RIGHT      Fell off ladder on 1/26. States she fell about 4 feet. Unwitnessed. Denies hitting head. C/o Right knee and foot pain. Minimal relief with ice and tylenol.     Medication Reconciliation: complete    Marlene Castillo, LPN

## 2020-02-03 NOTE — PROGRESS NOTES
Nursing Notes:   Marlene Castillo LPN  2/3/2020  8:00 AM  Sign at exiting of workspace  Chief Complaint   Patient presents with     Knee Pain     RIGHT      Fell off ladder on 1/26. States she fell about 4 feet. Unwitnessed. Denies hitting head. C/o Right knee and foot pain. Minimal relief with ice and tylenol.     Medication Reconciliation: complete    Marlene HADDAD. JASON Castillo     SUBJECTIVE:  HPI: Maliha Rivera is a 79 year old female here for the above issues. She lost balance while she was shoveling snow off her roof and feel from a misa of 3-4 feet. Things happened quickly, so she'd not entirely sure what she hit, but when she was on the ground, her right knee and foot hurt after that. No LOC. No head injury.     Since 1/26, she notes right foot/heel pain, which feels like an electric shock. Pain worse w/ standing. Can't walk without shoe. Worse in AM. Tylenol only helping a little. Her knee has been swollen and painful on L + R aspects since her injury. Pain is midline and slightly to the R+L. She notes ongoing clicking, which is chronic since before the replacement. No shooting pain up or down the leg. No leg numbness, tingling, weakness.    PMHX/PSHx: 2019 right knee replacement, for the second time.     Has been under stress- another building they had collapsed recently from snow.    Allergies:  Allergies   Allergen Reactions     Atorvastatin Muscle Pain (Myalgia)     Cefazolin Nausea and Vomiting     ROS:  Patient denies CP/SOB.    OBJECTIVE:  /74 (BP Location: Right arm, Patient Position: Sitting, Cuff Size: Adult Regular)   Pulse 65   Temp 97.8  F (36.6  C) (Tympanic)   Resp 16   Wt 78.4 kg (172 lb 12.8 oz)   SpO2 98%   Breastfeeding No   BMI 30.61 kg/m      EXAM:  General Appearance: Pleasant, alert, appropriate appearance for age. No acute distress  Head: Normal. Normocephalic, atraumatic.  Eyes: PERRL  Lungs: Normal chest wall and respirations. Clear to auscultation, no wheezes or  crackles.  Cardiovascular: Regular rate and rhythm. S1, S2, no murmurs. No edema.  Abdominal: Soft, nontender, no abnormal masses or organomegaly.  GI: BS normal.  Skin: no bruising or lacs  Right Knee: full range of motion, no effusion, no laxity with Lachman's, varus or valgus stress, no joint line tenderness and negative Deng's. Left knee- unremarkable.  Right foot: pulses intact, warm, TTP of heel, normal ROM.  Musculoskeletal:  Skin: no concerning or new rashes.  Neurologic Exam: CN 2-12 grossly intact.  Normal gait.  Symmetric DTRs, No focal motor or sensory deficits. No tremor.  Psychiatric Exam: Alert and oriented, appropriate affect.    XR right knee- hardwear and bones intact, no fractures  XR right foot- no fractures or soft tissue swelling, arthritic changes  Radiologist read pending.    ASSESSEMENT AND PLAN:    1. Acute pain of right knee  2. Right knee injury, initial encounter  - XR Knee Right 3 Views; Future  -concern for injury, given her recent surgery/knee replacement  -XR w/o sign of fracture  -conservative management- IBU/tylenl, rest, ice  -may take 4-6 weeks for symptoms to improve    3. Right foot pain + injury  - XR Foot 3 Views Standing Right; Future  -XR w/o fracture  -likely contusion   -conservative management- IBU/tylenl, rest, ice  -may take 4-6 weeks for symptoms to improve    3. Foot and hand arthritis  -pt will follow up with PCP in 3-4 weeks for this and recheck of R knee/foot    Harriett Mott MD  [facility] AND [facility]

## 2020-02-03 NOTE — PATIENT INSTRUCTIONS
Patient Education     Treatment for Bone Bruise (Bone Contusion)  A bone bruise is an injury to a bone that is less severe than a bone fracture. Bone bruises are fairly common. They can happen to people of all ages. Any type of bone in your body can get a bone bruise. Other injuries often happen along with a bone bruise, such as damage to nearby ligaments.  Types of treatment  Treatment for a bone bruise may include:    Resting the bone or joint    Putting an ice pack on the area several times a day    Raising the injury above the level of your heart to reduce swelling    Taking medicine to reduce pain and swelling    Wearing a brace or other device to limit movement  Your healthcare provider may give you advice about your diet. This is because eating a diet that is rich in calcium, vitamin D, and protein can help you heal. Your healthcare provider may ask you not to use certain over-the-counter medicines for pain. Some of these may delay normal bone healing. If you smoke, your healthcare provider will advise you to stop smoking. Smoking can also delay bone healing.  Your healthcare provider will tell you how long you should not put weight on your bone. Most bone bruises slowly heal over 2 to 4 months. A larger bone bruise may take longer to heal. You may not be able to return to sports activities for weeks or months. If your symptoms don t go away, your healthcare provider may order an MRI.  Possible complications of a bone bruise  Most bone bruises heal without any problems. If your bone bruise is very large, your body may have trouble getting blood flow back to the area. This can cause avascular necrosis of the bone. This leads to death of that part of the bone.  When to call the healthcare provider  Call your healthcare provider if your symptoms don t start to get better in a few days. Call him or her right away if you have any severe symptoms, such as a high fever.  Date Last Reviewed: 5/1/2018 2000-2019  [employer]. [address] [place] [address], [place] [address]. All rights reserved. This information is not intended as a substitute for professional medical care. Always follow your healthcare professional's instructions.

## 2020-02-19 ENCOUNTER — OFFICE VISIT (OUTPATIENT)
Dept: FAMILY MEDICINE | Facility: OTHER | Age: 80
End: 2020-02-19
Attending: FAMILY MEDICINE
Payer: COMMERCIAL

## 2020-02-19 VITALS
SYSTOLIC BLOOD PRESSURE: 144 MMHG | RESPIRATION RATE: 16 BRPM | WEIGHT: 172 LBS | BODY MASS INDEX: 30.47 KG/M2 | HEART RATE: 76 BPM | DIASTOLIC BLOOD PRESSURE: 62 MMHG | OXYGEN SATURATION: 98 % | TEMPERATURE: 97.4 F

## 2020-02-19 DIAGNOSIS — K21.9 GASTROESOPHAGEAL REFLUX DISEASE WITHOUT ESOPHAGITIS: ICD-10-CM

## 2020-02-19 DIAGNOSIS — Z63.79 OTHER STRESSFUL LIFE EVENTS AFFECTING FAMILY AND HOUSEHOLD: ICD-10-CM

## 2020-02-19 DIAGNOSIS — S86.911D KNEE STRAIN, RIGHT, SUBSEQUENT ENCOUNTER: ICD-10-CM

## 2020-02-19 DIAGNOSIS — N64.4 BREAST PAIN, RIGHT: Primary | ICD-10-CM

## 2020-02-19 DIAGNOSIS — I10 BENIGN ESSENTIAL HYPERTENSION: ICD-10-CM

## 2020-02-19 DIAGNOSIS — M47.894 THORACIC FACET JOINT SYNDROME: ICD-10-CM

## 2020-02-19 PROCEDURE — 99214 OFFICE O/P EST MOD 30 MIN: CPT | Performed by: FAMILY MEDICINE

## 2020-02-19 PROCEDURE — G0463 HOSPITAL OUTPT CLINIC VISIT: HCPCS

## 2020-02-19 RX ORDER — OMEPRAZOLE 40 MG/1
40 CAPSULE, DELAYED RELEASE ORAL DAILY
Qty: 90 CAPSULE | Refills: 11 | Status: SHIPPED | OUTPATIENT
Start: 2020-02-19 | End: 2021-03-23

## 2020-02-19 RX ORDER — CALCIUM CARBONATE 500 MG/1
2 TABLET, CHEWABLE ORAL DAILY
Qty: 90 TABLET | Refills: 11 | Status: SHIPPED | OUTPATIENT
Start: 2020-02-19 | End: 2021-03-23

## 2020-02-19 ASSESSMENT — PAIN SCALES - GENERAL: PAINLEVEL: NO PAIN (0)

## 2020-02-19 NOTE — NURSING NOTE
"Chief Complaint   Patient presents with     Breast Pain     right breast pain for 3-4 months     She states she has had pain in her right breast for 3-4 months. It is a burning and prickly pain that radiates into her back.  Olivia Jean-Baptiste LPN..................2/19/2020   2:43 PM      Initial BP (!) 144/64   Pulse 76   Temp 97.4  F (36.3  C) (Temporal)   Resp 16   Wt 78 kg (172 lb)   SpO2 98%   Breastfeeding No   BMI 30.47 kg/m   Estimated body mass index is 30.47 kg/m  as calculated from the following:    Height as of 6/7/19: 1.6 m (5' 3\").    Weight as of this encounter: 78 kg (172 lb).  Medication Reconciliation: complete    Olivia Jean-Baptiste LPN   Previous A1C is at goal of <8  Lab Results   Component Value Date    A1C 6.3 04/10/2019    A1C 6.1 12/05/2018     Urine microalbumin:creatine: 12/05/2018  Foot exam 12/05/2018  Eye exam has an appointment in March    Tobacco User no  Patient is on a daily aspirin  Patient is on a Statin.  Blood pressure today of:     BP Readings from Last 1 Encounters:   02/19/20 (!) 144/64      is not at the goal of <139/89 for diabetics.    Olivia Jean-Baptiste LPN on 2/19/2020 at 2:43 PM      "

## 2020-02-19 NOTE — PROGRESS NOTES
"Nursing Notes:   Olivia Jean-Baptiste LPN  2/19/2020  3:00 PM  Signed  Chief Complaint   Patient presents with     Breast Pain     right breast pain for 3-4 months     She states she has had pain in her right breast for 3-4 months. It is a burning and prickly pain that radiates into her back.  Olivia Jean-Baptiste LPN..................2/19/2020   2:43 PM      Initial BP (!) 144/64   Pulse 76   Temp 97.4  F (36.3  C) (Temporal)   Resp 16   Wt 78 kg (172 lb)   SpO2 98%   Breastfeeding No   BMI 30.47 kg/m    Estimated body mass index is 30.47 kg/m  as calculated from the following:    Height as of 6/7/19: 1.6 m (5' 3\").    Weight as of this encounter: 78 kg (172 lb).  Medication Reconciliation: complete    Olivia Jean-Baptiste LPN   Previous A1C is at goal of <8  Lab Results   Component Value Date    A1C 6.3 04/10/2019    A1C 6.1 12/05/2018     Urine microalbumin:creatine: 12/05/2018  Foot exam 12/05/2018  Eye exam has an appointment in March    Tobacco User no  Patient is on a daily aspirin  Patient is on a Statin.  Blood pressure today of:     BP Readings from Last 1 Encounters:   02/19/20 (!) 144/64      is not at the goal of <139/89 for diabetics.    Olivia Jean-Baptiste LPN on 2/19/2020 at 2:43 PM        SUBJECTIVE:  Maliha Rivera  is a 79 year old female who comes in because of right breast pain. She fell off a roof 2 weeks ago and did not fracture anything but did come in for evaluation. Her breast soreness was there before she fell.  She has some scapular pain.     She still has some swelling of her right knee although it is much better than it was before.    They have had a lot of increased stressors.  Her 2-year-old little dog was attacked by the neighbor dogs including a pitbull and had a life-threatening injuries and they had to rush her to the [facility] for the trauma surgeon.  She thankfully has recovered.    Their wreath making building in [place] had a roof collapse because of the snow and that " still not totally taken care of.  Several vehicles in the building are at risk.  They were still trying to figure out how they are going to rectify this situation.    Past Medical, Family, and Social History reviewed and updated as noted below.   ROS is negative except as noted above       Allergies   Allergen Reactions     Atorvastatin Muscle Pain (Myalgia)     Cefazolin Nausea and Vomiting   ,   Family History   Problem Relation Age of Onset     Heart Disease Mother         Heart Disease     Heart Disease Father         Heart Disease     Heart Disease Sister         Heart Disease     Cancer Other         Cancer,female cancer   ,   Current Outpatient Medications   Medication     acetaminophen (TYLENOL) 325 MG tablet     ANTACID CALCIUM 500 MG PO chewable tablet     aspirin (ASA) 81 MG chewable tablet     blood glucose monitoring (FREESTYLE LITE) test strip     blood glucose monitoring (FREESTYLE) lancets     Blood Glucose Monitoring Suppl (FIFTY50 GLUCOSE METER 2.0) W/DEVICE KIT     Blood Pressure Monitoring (ADULT BLOOD PRESSURE CUFF LG) KIT     COMPRESSION STOCKINGS     lisinopril (PRINIVIL/ZESTRIL) 10 MG tablet     melatonin 5 MG tablet     omeprazole 40 MG PO DR capsule     order for DME     order for DME     oxybutynin ER (DITROPAN XL) 5 MG 24 hr tablet     pravastatin (PRAVACHOL) 20 MG tablet     PSYLLIUM PO     No current facility-administered medications for this visit.    ,   Past Medical History:   Diagnosis Date     Encounter for screening for cardiovascular disorders     4/8/15,[facility]   ,   Patient Active Problem List    Diagnosis Date Noted     Benign essential hypertension 04/18/2018     Priority: Medium     Lumbar stenosis 03/01/2018     Priority: Medium     Lumbar radiculopathy 01/23/2018     Priority: Medium     Status post total bilateral knee replacement 03/31/2017     Priority: Medium     Primary osteoarthritis of left knee 03/23/2017     Priority: Medium     DM w/o  complication type II (H) 09/22/2015     Priority: Medium     Overview:   3/2014:  A1c fine off metformin - will discontinue as patient does not want to take it.  She is taking fish oil + other ingredients she states helps with DM  On ASA  Patient does not want to take lisinopril  Does not tolerate statins and has discontinued pravastatin; try niacin  No retinopathy on eye exam 4/2013 and 12/2014  Overview:   Overview:   3/2014:  A1c fine off metformin - will discontinue as patient does not want to take it.  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  Lipids:; declines statin - will try niacin and is on fish oil.  Mammogram:schedule  Pap: 3/2013 WNL - does not need again.  Tdap:3/2013  Vitamin D: takes supplement  pneumovax done.  Consider zoster vaccine.       Degenerative arthritis of knee 10/21/2010     Priority: Medium     Overview:   IMO Update 10/11       Cervicalgia 05/11/2010     Priority: Medium     Overview:   IMO Update 10/11       DDD (degenerative disc disease), lumbar 01/15/2010     Priority: Medium     Primary localized osteoarthrosis, lower leg 01/31/2008     Priority: Medium     Hyperlipidemia 07/22/2004     Priority: Medium     Overview:   IMO Update 10/11  Overview:   Overview:   IMO Update 10/11     ,   Past Surgical History:   Procedure Laterality Date     APPENDECTOMY OPEN      No Comments Provided     ARTHROSCOPY KNEE       2007,Joseluis Thomas M.D.     COLONOSCOPY      2003,2011,normal     ESOPHAGOSCOPY, GASTROSCOPY, DUODENOSCOPY (EGD), COMBINED      2/3/2016     LAPAROSCOPIC CHOLECYSTECTOMY      3/11/01,Dr. Salas     LAPAROSCOPIC TUBAL LIGATION      No Comments Provided     lumbar decompression L4-5  02/28/2018    Dr. France     OTHER SURGICAL HISTORY      1996,[id number],CATARACT EXTRACTION W/  INTRAOCULAR LENS IMPLANT,Bilateral     OTHER SURGICAL HISTORY      2001,83574.0,[place] ERCP BILIARY OR PANC DUCT STENT EXCHANGE W DIL AND WIRE,stent removed.     OTHER SURGICAL HISTORY      2004,[id number],CARDIAC CATHETERIZATION,mild disease     OTHER SURGICAL HISTORY      1/31/07,[id number],OTHER,Left     OTHER SURGICAL HISTORY      02/27/2008,[id number],TOTAL KNEE ARTHROPLASTY,Right     OTHER SURGICAL HISTORY      2010,[id number],TOTAL KNEE ARTHROPLASTY,Left,Dr. Carson     OTHER SURGICAL HISTORY      06/05/2017,[id number],OTHER,Left,Dr. Joiner, [facility]     RELEASE CARPAL TUNNEL      2006     Revision right total knee arthroplasty   04/18/2019    Dr. aCrson.  Due to loose implant tibial component     TONSILLECTOMY      age 12    and   Social History     Tobacco Use     Smoking status: Never Smoker     Smokeless tobacco: Never Used   Substance Use Topics     Alcohol use: No     OBJECTIVE:  BP (!) 144/62   Pulse 76   Temp 97.4  F (36.3  C) (Temporal)   Resp 16   Wt 78 kg (172 lb)   SpO2 98%   Breastfeeding No   BMI 30.47 kg/m     EXAM:  Alert and cooperative, no distress.  She has some discomfort to palpation about the infra scapular region on the right.  She has decreased rotational range of motion to the right compared to the left consistent with thoracic facet block.  On examination, it is the posterior portion of the axilla where she has some muscular tenderness along the trapezius and really not in the breast tissue itself.  Breast exam is negative on the right and the left for soreness masses or other skin changes.  Lungs are clear, no rales rhonchi or  wheezes are heard.  Cardiac RRR without murmur    She still has some swelling about her right knee but is much better than it was  ASSESSMENT/Plan :    Maliha was seen today for breast pain.    Diagnoses and all orders for this visit:    Breast pain, right  -     MA Diagnostic Bilateral w/Raghav; Future  -     US Breast Right Complete 4 Quadrants; Future    Gastroesophageal reflux disease without esophagitis  -     ANTACID CALCIUM 500 MG PO chewable tablet; Take 2 tablets (1,000 mg) by mouth daily  -     omeprazole 40 MG PO DR capsule; Take 1 capsule (40 mg) by mouth daily    Thoracic facet joint syndrome  -     PHYSICAL THERAPY REFERRAL; Future    Knee strain, right, subsequent encounter    Other stressful life events affecting family and household    Benign essential hypertension      I think her primary issue with the breast pain is more coming from a muscular issue and probably thoracic facet joint lock.  She is referred for physical therapy to see 1 of the manual therapist to see if reducing this might relieve her symptoms.  We will go ahead with breast diagnostic mammogram with tomography and ultrasound to rule out any suspicious abnormalities in the right breast.    Apparently she called to get her stomach medicine renewed and someone renewed gabapentin.  She had been put on that I think in the past for nerve pain around the time of her back surgery.  She really needs omeprazole and her antacid renewed and those are done for her.  She should stop the gabapentin.  It is removed from her medication list.    Continue with ice for her right knee as it continues to get better and may take a few more weeks.    Blood pressure slightly elevated and will monitor this over time.  Continue with her current blood pressure medicines.    Support encouragement offered with regard to her other stressors.    A total of 25 minutes was spent with the patient, greater than 50% of the time was spent in counseling/discussion of the  aforementioned concerns.     Nicanor Mike MD

## 2020-02-27 ENCOUNTER — HOSPITAL ENCOUNTER (OUTPATIENT)
Dept: MAMMOGRAPHY | Facility: OTHER | Age: 80
End: 2020-02-27
Attending: FAMILY MEDICINE
Payer: MEDICARE

## 2020-02-27 ENCOUNTER — HOSPITAL ENCOUNTER (OUTPATIENT)
Dept: ULTRASOUND IMAGING | Facility: OTHER | Age: 80
End: 2020-02-27
Attending: FAMILY MEDICINE
Payer: MEDICARE

## 2020-02-27 DIAGNOSIS — N64.4 BREAST PAIN, RIGHT: ICD-10-CM

## 2020-02-27 PROCEDURE — G0279 TOMOSYNTHESIS, MAMMO: HCPCS

## 2020-02-27 PROCEDURE — 76641 ULTRASOUND BREAST COMPLETE: CPT | Mod: RT

## 2020-03-03 ENCOUNTER — HOSPITAL ENCOUNTER (OUTPATIENT)
Dept: PHYSICAL THERAPY | Facility: OTHER | Age: 80
Setting detail: THERAPIES SERIES
End: 2020-03-03
Attending: FAMILY MEDICINE
Payer: MEDICARE

## 2020-03-03 ENCOUNTER — OFFICE VISIT (OUTPATIENT)
Dept: FAMILY MEDICINE | Facility: OTHER | Age: 80
End: 2020-03-03
Attending: FAMILY MEDICINE
Payer: COMMERCIAL

## 2020-03-03 VITALS
HEART RATE: 72 BPM | TEMPERATURE: 97.3 F | BODY MASS INDEX: 31 KG/M2 | SYSTOLIC BLOOD PRESSURE: 128 MMHG | DIASTOLIC BLOOD PRESSURE: 72 MMHG | RESPIRATION RATE: 16 BRPM | WEIGHT: 175 LBS | OXYGEN SATURATION: 98 %

## 2020-03-03 DIAGNOSIS — S86.911D KNEE STRAIN, RIGHT, SUBSEQUENT ENCOUNTER: ICD-10-CM

## 2020-03-03 DIAGNOSIS — M47.894 THORACIC FACET JOINT SYNDROME: ICD-10-CM

## 2020-03-03 DIAGNOSIS — Z96.651 HISTORY OF TOTAL RIGHT KNEE REPLACEMENT: ICD-10-CM

## 2020-03-03 DIAGNOSIS — S86.911D KNEE STRAIN, RIGHT, SUBSEQUENT ENCOUNTER: Primary | ICD-10-CM

## 2020-03-03 PROCEDURE — G0463 HOSPITAL OUTPT CLINIC VISIT: HCPCS

## 2020-03-03 PROCEDURE — 97140 MANUAL THERAPY 1/> REGIONS: CPT | Mod: GP | Performed by: PHYSICAL THERAPIST

## 2020-03-03 PROCEDURE — 99213 OFFICE O/P EST LOW 20 MIN: CPT | Performed by: FAMILY MEDICINE

## 2020-03-03 PROCEDURE — 97161 PT EVAL LOW COMPLEX 20 MIN: CPT | Mod: GP | Performed by: PHYSICAL THERAPIST

## 2020-03-03 PROCEDURE — 97110 THERAPEUTIC EXERCISES: CPT | Mod: GP | Performed by: PHYSICAL THERAPIST

## 2020-03-03 ASSESSMENT — PAIN SCALES - GENERAL: PAINLEVEL: MODERATE PAIN (5)

## 2020-03-03 NOTE — PROGRESS NOTES
"Nursing Notes:   Olivia Jean-Baptiste LPN  3/3/2020  8:36 AM  Signed  Chief Complaint   Patient presents with     RECHECK     right knee and right foot pain       Initial /72   Pulse 72   Temp 97.3  F (36.3  C) (Temporal)   Resp 16   Wt 79.4 kg (175 lb)   SpO2 98%   Breastfeeding No   BMI 31.00 kg/m    Estimated body mass index is 31 kg/m  as calculated from the following:    Height as of 6/7/19: 1.6 m (5' 3\").    Weight as of this encounter: 79.4 kg (175 lb).  Medication Reconciliation: complete    Olivia Jean-Baptiste LPN     Previous A1C is at goal of <8  Lab Results   Component Value Date    A1C 6.3 04/10/2019    A1C 6.1 12/05/2018     Urine microalbumin:creatine: 12/05/2018  Foot exam 12/08/2018  Eye exam has an appointment 03/2020    Tobacco User no  Patient is on a daily aspirin  Patient is on a Statin.  Blood pressure today of:     BP Readings from Last 1 Encounters:   03/03/20 128/72      is at the goal of <139/89 for diabetics.    Olivia Jean-Baptiste LPN on 3/3/2020 at 8:13 AM        SUBJECTIVE:  Maliha Rivera  is a 79 year old female who comes in for right knee and foot pain.  She fell off her roof on 126.  She was seen on February 3.  Apparently she fell from a height of about 3 feet off a ladder.  She had pain in her right knee and her right foot.She was seen and evaluated and no fracture was noted on right knee x-ray and right foot x-ray. She is status post right knee arthroplasty.  I saw her 2 weeks ago for breast pain, and that was worked up and felt to be probably more muscular.  At that time, her knee and her foot seem to be getting better.    She fell the other day when she was cleaning at the depict. She felt like her right knee just gave out. It is still swelling.  It will burn and hurt.  Her foot is better.      She had a tick bite in September last fall. They removed it in [place]. She did get doxycycline.     Past Medical, Family, and Social History reviewed and updated " as noted below.   ROS is negative except as noted above       Allergies   Allergen Reactions     Atorvastatin Muscle Pain (Myalgia)     Cefazolin Nausea and Vomiting   ,   Family History   Problem Relation Age of Onset     Heart Disease Mother         Heart Disease     Heart Disease Father         Heart Disease     Heart Disease Sister         Heart Disease     Cancer Other         Cancer,female cancer   ,   Current Outpatient Medications   Medication     acetaminophen (TYLENOL) 325 MG tablet     ANTACID CALCIUM 500 MG PO chewable tablet     aspirin (ASA) 81 MG chewable tablet     blood glucose monitoring (FREESTYLE LITE) test strip     blood glucose monitoring (FREESTYLE) lancets     Blood Glucose Monitoring Suppl (FIFTY50 GLUCOSE METER 2.0) W/DEVICE KIT     Blood Pressure Monitoring (ADULT BLOOD PRESSURE CUFF LG) KIT     COMPRESSION STOCKINGS     lisinopril (PRINIVIL/ZESTRIL) 10 MG tablet     melatonin 5 MG tablet     omeprazole 40 MG PO DR capsule     order for DME     order for DME     oxybutynin ER (DITROPAN XL) 5 MG 24 hr tablet     pravastatin (PRAVACHOL) 20 MG tablet     PSYLLIUM PO     No current facility-administered medications for this visit.    ,   Past Medical History:   Diagnosis Date     Encounter for screening for cardiovascular disorders     [facility]   ,   Patient Active Problem List    Diagnosis Date Noted     Benign essential hypertension 04/18/2018     Priority: Medium     Lumbar stenosis 03/01/2018     Priority: Medium     Lumbar radiculopathy 01/23/2018     Priority: Medium     Status post total bilateral knee replacement 03/31/2017     Priority: Medium     Primary osteoarthritis of left knee 03/23/2017     Priority: Medium     DM w/o complication type II (H) 09/22/2015     Priority: Medium     Overview:   3/2014:  A1c fine off metformin - will discontinue as patient does not want to take it.  She is taking fish oil + other ingredients she states helps with  DM  On ASA  Patient does not want to take lisinopril  Does not tolerate statins and has discontinued pravastatin; try niacin  No retinopathy on eye exam 4/2013 and 12/2014  Overview:   Overview:   3/2014:  A1c fine off metformin - will discontinue as patient does not want to take it.  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  Lipids:; declines statin - will try niacin and is on fish oil.  Mammogram:schedule  Pap: 3/2013 WNL - does not need again.  Tdap:3/2013  Vitamin D: takes supplement  pneumovax done.  Consider zoster vaccine.       Degenerative arthritis of knee 10/21/2010     Priority: Medium     Overview:   IMO Update 10/11       Cervicalgia 05/11/2010     Priority: Medium     Overview:   IMO Update 10/11       DDD (degenerative disc disease), lumbar 01/15/2010     Priority: Medium     Primary localized osteoarthrosis, lower leg 01/31/2008     Priority: Medium     Hyperlipidemia 07/22/2004     Priority: Medium     Overview:   IMO Update 10/11  Overview:   Overview:   IMO Update 10/11     ,   Past Surgical History:   Procedure Laterality Date     APPENDECTOMY OPEN      No Comments Provided     ARTHROSCOPY KNEE      2007,Joseluis Thomas M.D.     COLONOSCOPY      2003,2011,normal     ESOPHAGOSCOPY, GASTROSCOPY, DUODENOSCOPY (EGD), COMBINED      2/3/2016     LAPAROSCOPIC CHOLECYSTECTOMY      3/11/01,Dr. Salas     LAPAROSCOPIC TUBAL LIGATION      No  Comments Provided     lumbar decompression L4-5  02/28/2018    Dr. France     OTHER SURGICAL HISTORY      1996,[id number],CATARACT EXTRACTION W/  INTRAOCULAR LENS IMPLANT,Bilateral     OTHER SURGICAL HISTORY      2001,36096.0,[place] ERCP BILIARY OR PANC DUCT STENT EXCHANGE W DIL AND WIRE,stent removed.     OTHER SURGICAL HISTORY      2004,[id number],CARDIAC CATHETERIZATION,mild disease     OTHER SURGICAL HISTORY      1/31/07,[id number],OTHER,Left     OTHER SURGICAL HISTORY      02/27/2008,[id number],TOTAL KNEE ARTHROPLASTY,Right     OTHER SURGICAL HISTORY      2010,[id number],TOTAL KNEE ARTHROPLASTY,Left,Dr. Carson     OTHER SURGICAL HISTORY      06/05/2017,[id number],OTHER,Left,Dr. Joiner, [facility]     RELEASE CARPAL TUNNEL      2006     Revision right total knee arthroplasty   04/18/2019    Dr. Carson.  Due to loose implant tibial component     TONSILLECTOMY      age 12    and   Social History     Tobacco Use     Smoking status: Never Smoker     Smokeless tobacco: Never Used   Substance Use Topics     Alcohol use: No     OBJECTIVE:  /72   Pulse 72   Temp 97.3  F (36.3  C) (Temporal)   Resp 16   Wt 79.4 kg (175 lb)   SpO2 98%   Breastfeeding No   BMI 31.00 kg/m     EXAM:  Alert cooperative, no distress.  Examination of her right knee reveals a well-healed TKA incision.  She has some minimal amount of fluid in the suprapatellar pouch and some mild tenderness over the MCL.  Some discomfort over the distal quadriceps mechanism and some discomfort over the hamstring insertion posteriorly.  ASSESSMENT/Plan :    Maliha was seen today for recheck.    Diagnoses and all orders for this visit:    Knee strain, right, subsequent encounter  -     PHYSICAL THERAPY REFERRAL; Future    History of total right knee replacement      At this point, I think her knee is coming along.  It certainly better than what it was.  Her foot is not bothering her anymore.  We reviewed her x-ray again and even though the ends of the components in the tibial  portion in the femoral portion were not completely viewed she really does not have any pain in those areas.  I think she has an MCL strain and probably a quadriceps and hamstring tendinitis.  She is going to physical therapy for her thoracic issues and will asked the therapist to see her for her knee as well.  I suspect that over the course of the next month she should gradually improve and she is not better by Marcelina, she will let us know.  We discussed having her go back to see Dr. Carson but at this point will hold off unless she has persistent issues.    A total of 15 minutes was spent with the patient, greater than 50% of the time was spent in counseling/discussion of the aforementioned concerns.     Nicanor Mike MD

## 2020-03-03 NOTE — NURSING NOTE
"Chief Complaint   Patient presents with     RECHECK     right knee and right foot pain       Initial /72   Pulse 72   Temp 97.3  F (36.3  C) (Temporal)   Resp 16   Wt 79.4 kg (175 lb)   SpO2 98%   Breastfeeding No   BMI 31.00 kg/m   Estimated body mass index is 31 kg/m  as calculated from the following:    Height as of 6/7/19: 1.6 m (5' 3\").    Weight as of this encounter: 79.4 kg (175 lb).  Medication Reconciliation: complete    Olivia Jean-Baptiste LPN     Previous A1C is at goal of <8  Lab Results   Component Value Date    A1C 6.3 04/10/2019    A1C 6.1 12/05/2018     Urine microalbumin:creatine: 12/05/2018  Foot exam 12/08/2018  Eye exam has an appointment 03/2020    Tobacco User no  Patient is on a daily aspirin  Patient is on a Statin.  Blood pressure today of:     BP Readings from Last 1 Encounters:   03/03/20 128/72      is at the goal of <139/89 for diabetics.    Olivia Jean-Baptiste LPN on 3/3/2020 at 8:13 AM      "

## 2020-03-03 NOTE — PROGRESS NOTES
[facility]          OUTPATIENT PHYSICAL THERAPY ORTHOPEDIC EVALUATION  PLAN OF TREATMENT FOR OUTPATIENT REHABILITATION  (COMPLETE FOR INITIAL CLAIMS ONLY)  Patient's Last Name, First Name, M.I.  YOB: 1940  Corbin Riverae  ROBBIE    Provider s Name:  [facility]   Medical Record No.  [id number]   Start of Care Date:  03/03/20   Onset Date:  02/03/20   Type:     _X__PT   ___OT   ___SLP Medical Diagnosis:  M47.814 (ICD-10-CM) - Thoracic facet joint syndrome; S86.911D (ICD-10-CM) - Knee strain, right, subsequent encounter     PT Diagnosis:  (P) scapular soft tissue dysfunction, R knee pain   Visits from SOC:  1      _________________________________________________________________________________  Plan of Treatment/Functional Goals:  (P) joint mobilization, manual therapy, motor coordination training, neuromuscular re-education, strengthening, stretching     (P) Cryotherapy, Hot packs, Electrical stimulation, Iontophoresis, Ultrasound     Goals  Goal Identifier: (P) Pain  Goal Description: (P) Pt to report a max pain level of 1/10 throughout the day for improved ADLs  Target Date: (P) 04/28/20    Goal Identifier: (P) Strength  Goal Description: (P) Pt to demo R LE strength of 5/5 for improved strength and endurance  Target Date: (P) 04/28/20    Goal Identifier: (P) ADLs  Goal Description: (P) Pt to subjectively report an improvement with home ADLs due to improved R UE symptoms related to scapula  Target Date: (P) 04/28/20     Therapy Frequency:  (P) 2 times/Week  Predicted Duration of Therapy Intervention:  (P) 8 weeks    Claudy Patterson, PT                 I CERTIFY THE NEED FOR THESE SERVICES FURNISHED UNDER        THIS PLAN OF TREATMENT AND WHILE UNDER MY CARE     (Physician co-signature of this document indicates review and certification of the therapy plan).                       Certification Date From:  03/03/20   Certification Date To:   04/28/20    Referring Provider:  LIYAH Mike    Initial Assessment        See Epic Evaluation Start of Care Date: 03/03/20

## 2020-03-03 NOTE — PROGRESS NOTES
03/03/20 1400   General Information   Type of Visit Initial OP Ortho PT Evaluation   Start of Care Date 03/03/20   Referring Physician LIYAH Mike   Patient/Family Goals Statement return to activity at prior level of function   Orders Evaluate and Treat   Date of Order 02/19/20   Certification Required? Yes   Medical Diagnosis M47.814 (ICD-10-CM) - Thoracic facet joint syndrome; S86.911D (ICD-10-CM) - Knee strain, right, subsequent encounter   Surgical/Medical history reviewed Yes   Precautions/Limitations no known precautions/limitations   Weight-Bearing Status - LLE full weight-bearing   Weight-Bearing Status - RLE full weight-bearing   General Information Comments please refer to pt's medical record for any additional information   Body Part(s)   Body Part(s) Knee;Cervical Spine   Presentation and Etiology   Pertinent history of current problem (include personal factors and/or comorbidities that impact the POC) Started to notice some shoulder blade regional pain that has over time crept into her R sided chest, no specific CHRISTOPH. She also twisted her R knee which she had TKA done approx 2 years ago. Knee slowly seems to be getting better but is still swollen at times and is on her mind. See pt's chart for additional information   Impairments A. Pain;F. Decreased strength and endurance;H. Impaired gait;C. Swelling   Functional Limitations perform activities of daily living;perform required work activities;perform desired leisure / sports activities   Symptom Location R scap region, R knee   How/Where did it occur At home;From insidious onset   Onset date of current episode/exacerbation 02/03/20   Chronicity New   Pain rating (0-10 point scale) Best (/10);Worst (/10)   Best (/10) 2   Worst (/10) 5   Pain quality C. Aching;D. Burning   Frequency of pain/symptoms B. Intermittent   Pain/symptoms exacerbated by G. Certain positions;H. Overhead reach;K. Home tasks;L. Work tasks   Pain/symptoms eased by E. Changing  positions;F. Certain positions;C. Rest;A. Sitting;I. OTC medication(s);H. Cold   Progression of symptoms since onset: Improved   Prior Level of Function   Prior Level of Function-Mobility Ind   Prior Level of Function-ADLs Ind   Current Level of Function   Current Community Support Family/friend caregiver  (lives with spouse)   Patient role/employment history A. Employed   Employment Comments semi retired   Living environment House/townhome   Current equipment-Gait/Locomotion None   Current equipment-ADL None   Fall Risk Screen   Fall screen completed by PT   Have you fallen 2 or more times in the past year? No   Have you fallen and had an injury in the past year? No   Is patient a fall risk? No   Abuse Screen (yes response referral indicated)   Feels Unsafe at Home or Work/School no   Feels Threatened by Someone no   Does Anyone Try to Keep You From Having Contact with Others or Doing Things Outside Your Home? no   Physical Signs of Abuse Present no   Functional Scales   Functional Scales Other   Other Scales  PSFS   Knee Objective Findings   Side (if bilateral, select both right and left) Right   Observation favors R LE slightly during ambulation   Integumentary  minimal swelling in R knee compared to L   Knee ROM Comment History of R TKA x2, L TKA x1   Knee Special Test Comments no special tests warranted at this time   Right Knee Extension AROM 5   Right Knee Flexion AROM 110   Right Knee Flexion Strength 4   Right Knee Extension Strength 4   Right Hip Abduction Strength 4   Cervical Spine   Posture forward head & rounded shoulders   Cervical/Thoracic/Shoulder ROM Comments normal range. normal R scap mobility. No signs of scapular dyskinesis   Planned Therapy Interventions   Planned Therapy Interventions joint mobilization;manual therapy;motor coordination training;neuromuscular re-education;strengthening;stretching   Planned Modality Interventions   Planned Modality Interventions Cryotherapy;Hot  packs;Electrical stimulation;Iontophoresis;Ultrasound   Clinical Impression   Criteria for Skilled Therapeutic Interventions Met yes, treatment indicated   PT Diagnosis scapular soft tissue dysfunction, R knee pain   Influenced by the following impairments swelling in R knee   Functional limitations due to impairments walking community distances   Clinical Presentation Stable/Uncomplicated   Clinical Decision Making (Complexity) Low complexity   Therapy Frequency 2 times/Week   Predicted Duration of Therapy Intervention (days/wks) 8 weeks   Risk & Benefits of therapy have been explained Yes   Patient, Family & other staff in agreement with plan of care Yes   Education Assessment   Preferred Learning Style Listening;Reading;Demonstration;Pictures/video   Barriers to Learning No barriers   ORTHO GOALS   PT Ortho Eval Goals 1;2;3   Ortho Goal 1   Goal Identifier Pain   Goal Description Pt to report a max pain level of 1/10 throughout the day for improved ADLs   Target Date 04/28/20   Ortho Goal 2   Goal Identifier Strength   Goal Description Pt to demo R LE strength of 5/5 for improved strength and endurance   Target Date 04/28/20   Ortho Goal 3   Goal Identifier ADLs   Goal Description Pt to subjectively report an improvement with home ADLs due to improved R UE symptoms related to scapula   Target Date 04/28/20   Total Evaluation Time   PT Eval, Low Complexity Minutes (13527) 20   Therapy Certification   Certification date from 03/03/20   Certification date to 04/28/20   Medical Diagnosis M47.814 (ICD-10-CM) - Thoracic facet joint syndrome; S86.911D (ICD-10-CM) - Knee strain, right, subsequent encounter

## 2020-03-05 ENCOUNTER — HOSPITAL ENCOUNTER (OUTPATIENT)
Dept: PHYSICAL THERAPY | Facility: OTHER | Age: 80
Setting detail: THERAPIES SERIES
End: 2020-03-05
Attending: FAMILY MEDICINE
Payer: MEDICARE

## 2020-03-05 PROCEDURE — 97140 MANUAL THERAPY 1/> REGIONS: CPT | Mod: GP | Performed by: PHYSICAL THERAPIST

## 2020-03-05 PROCEDURE — 97110 THERAPEUTIC EXERCISES: CPT | Mod: GP | Performed by: PHYSICAL THERAPIST

## 2020-07-01 NOTE — PROGRESS NOTES
Outpatient Physical Therapy Discharge Note     Patient: Maliha Rivera  : 1940    Beginning/End Dates:  3/3/20 to 3/5/20    Referring Provider: Dr Mike    Therapy Diagnosis: M47.814 (ICD-10-CM) - Thoracic facet joint syndrome; S86.911D (ICD-10-CM) - Knee strain, right, subsequent encounter     Plan:  Discharge from therapy.    Discharge:   Pt has not returned to physical therapy after a trial of self mgmt techniques indicating that she is doing well with HEP. Please refer to pt's chart for most recent information on objective measurements, goals or any additional information. This is an unplanned DC    Reason for Discharge:   Patient has not scheduled further appointments.    Discharge Plan: Patient to continue home program.

## 2020-07-20 ENCOUNTER — TRANSFERRED RECORDS (OUTPATIENT)
Dept: HEALTH INFORMATION MANAGEMENT | Facility: OTHER | Age: 80
End: 2020-07-20

## 2020-07-20 LAB — RETINOPATHY: NEGATIVE

## 2021-03-23 ENCOUNTER — OFFICE VISIT (OUTPATIENT)
Dept: FAMILY MEDICINE | Facility: OTHER | Age: 81
End: 2021-03-23
Attending: FAMILY MEDICINE
Payer: COMMERCIAL

## 2021-03-23 VITALS
HEART RATE: 79 BPM | OXYGEN SATURATION: 98 % | DIASTOLIC BLOOD PRESSURE: 82 MMHG | WEIGHT: 177 LBS | RESPIRATION RATE: 16 BRPM | TEMPERATURE: 98.1 F | BODY MASS INDEX: 32.57 KG/M2 | SYSTOLIC BLOOD PRESSURE: 132 MMHG | HEIGHT: 62 IN

## 2021-03-23 DIAGNOSIS — Z00.00 ENCOUNTER FOR MEDICARE ANNUAL WELLNESS EXAM: Primary | ICD-10-CM

## 2021-03-23 DIAGNOSIS — E11.9 TYPE 2 DIABETES MELLITUS WITHOUT COMPLICATION, WITHOUT LONG-TERM CURRENT USE OF INSULIN (H): ICD-10-CM

## 2021-03-23 DIAGNOSIS — I10 BENIGN ESSENTIAL HYPERTENSION: ICD-10-CM

## 2021-03-23 DIAGNOSIS — N32.81 OVERACTIVE BLADDER: ICD-10-CM

## 2021-03-23 DIAGNOSIS — E78.5 HYPERLIPIDEMIA, UNSPECIFIED HYPERLIPIDEMIA TYPE: ICD-10-CM

## 2021-03-23 DIAGNOSIS — K21.9 GASTROESOPHAGEAL REFLUX DISEASE WITHOUT ESOPHAGITIS: ICD-10-CM

## 2021-03-23 DIAGNOSIS — Z12.31 VISIT FOR SCREENING MAMMOGRAM: ICD-10-CM

## 2021-03-23 DIAGNOSIS — M19.91 PRIMARY OSTEOARTHRITIS, UNSPECIFIED SITE: ICD-10-CM

## 2021-03-23 LAB
ALBUMIN SERPL-MCNC: 4.3 G/DL (ref 3.5–5.7)
ALP SERPL-CCNC: 57 U/L (ref 34–104)
ALT SERPL W P-5'-P-CCNC: 19 U/L (ref 7–52)
ANION GAP SERPL CALCULATED.3IONS-SCNC: 6 MMOL/L (ref 3–14)
AST SERPL W P-5'-P-CCNC: 22 U/L (ref 13–39)
BASOPHILS # BLD AUTO: 0 10E9/L (ref 0–0.2)
BASOPHILS NFR BLD AUTO: 0.5 %
BILIRUB SERPL-MCNC: 0.3 MG/DL (ref 0.3–1)
BUN SERPL-MCNC: 13 MG/DL (ref 7–25)
CALCIUM SERPL-MCNC: 9.3 MG/DL (ref 8.6–10.3)
CHLORIDE SERPL-SCNC: 104 MMOL/L (ref 98–107)
CHOLEST SERPL-MCNC: 214 MG/DL
CO2 SERPL-SCNC: 29 MMOL/L (ref 21–31)
CREAT SERPL-MCNC: 0.69 MG/DL (ref 0.6–1.2)
DIFFERENTIAL METHOD BLD: ABNORMAL
EOSINOPHIL # BLD AUTO: 0.1 10E9/L (ref 0–0.7)
EOSINOPHIL NFR BLD AUTO: 2.3 %
ERYTHROCYTE [DISTWIDTH] IN BLOOD BY AUTOMATED COUNT: 13.1 % (ref 10–15)
GFR SERPL CREATININE-BSD FRML MDRD: 82 ML/MIN/{1.73_M2}
GLUCOSE SERPL-MCNC: 166 MG/DL (ref 70–105)
HBA1C MFR BLD: 6.5 % (ref 4–6)
HCT VFR BLD AUTO: 34 % (ref 35–47)
HDLC SERPL-MCNC: 46 MG/DL (ref 23–92)
HGB BLD-MCNC: 11.7 G/DL (ref 11.7–15.7)
IMM GRANULOCYTES # BLD: 0 10E9/L (ref 0–0.4)
IMM GRANULOCYTES NFR BLD: 0.2 %
LDLC SERPL CALC-MCNC: 114 MG/DL
LYMPHOCYTES # BLD AUTO: 3 10E9/L (ref 0.8–5.3)
LYMPHOCYTES NFR BLD AUTO: 49.7 %
MCH RBC QN AUTO: 29.9 PG (ref 26.5–33)
MCHC RBC AUTO-ENTMCNC: 34.4 G/DL (ref 31.5–36.5)
MCV RBC AUTO: 87 FL (ref 78–100)
MONOCYTES # BLD AUTO: 0.5 10E9/L (ref 0–1.3)
MONOCYTES NFR BLD AUTO: 7.7 %
NEUTROPHILS # BLD AUTO: 2.4 10E9/L (ref 1.6–8.3)
NEUTROPHILS NFR BLD AUTO: 39.6 %
NONHDLC SERPL-MCNC: 168 MG/DL
PLATELET # BLD AUTO: 215 10E9/L (ref 150–450)
POTASSIUM SERPL-SCNC: 3.8 MMOL/L (ref 3.5–5.1)
PROT SERPL-MCNC: 7.1 G/DL (ref 6.4–8.9)
RBC # BLD AUTO: 3.91 10E12/L (ref 3.8–5.2)
SODIUM SERPL-SCNC: 139 MMOL/L (ref 134–144)
TRIGL SERPL-MCNC: 270 MG/DL
TSH SERPL DL<=0.05 MIU/L-ACNC: 3.04 IU/ML (ref 0.34–5.6)
WBC # BLD AUTO: 6 10E9/L (ref 4–11)

## 2021-03-23 PROCEDURE — 83036 HEMOGLOBIN GLYCOSYLATED A1C: CPT | Mod: ZL | Performed by: FAMILY MEDICINE

## 2021-03-23 PROCEDURE — 36415 COLL VENOUS BLD VENIPUNCTURE: CPT | Mod: ZL | Performed by: FAMILY MEDICINE

## 2021-03-23 PROCEDURE — G0439 PPPS, SUBSEQ VISIT: HCPCS | Performed by: FAMILY MEDICINE

## 2021-03-23 PROCEDURE — 85025 COMPLETE CBC W/AUTO DIFF WBC: CPT | Mod: ZL | Performed by: FAMILY MEDICINE

## 2021-03-23 PROCEDURE — 84443 ASSAY THYROID STIM HORMONE: CPT | Mod: ZL | Performed by: FAMILY MEDICINE

## 2021-03-23 PROCEDURE — 99214 OFFICE O/P EST MOD 30 MIN: CPT | Mod: 25 | Performed by: FAMILY MEDICINE

## 2021-03-23 PROCEDURE — 80053 COMPREHEN METABOLIC PANEL: CPT | Mod: ZL | Performed by: FAMILY MEDICINE

## 2021-03-23 PROCEDURE — 80061 LIPID PANEL: CPT | Mod: ZL | Performed by: FAMILY MEDICINE

## 2021-03-23 PROCEDURE — G0463 HOSPITAL OUTPT CLINIC VISIT: HCPCS

## 2021-03-23 RX ORDER — CELECOXIB 200 MG/1
200 CAPSULE ORAL DAILY
Qty: 30 CAPSULE | Refills: 11 | Status: SHIPPED | OUTPATIENT
Start: 2021-03-23 | End: 2022-03-24

## 2021-03-23 RX ORDER — OMEPRAZOLE 40 MG/1
40 CAPSULE, DELAYED RELEASE ORAL DAILY
Qty: 90 CAPSULE | Refills: 11 | Status: SHIPPED | OUTPATIENT
Start: 2021-03-23 | End: 2022-03-24

## 2021-03-23 RX ORDER — OXYBUTYNIN CHLORIDE 5 MG/1
5 TABLET, EXTENDED RELEASE ORAL DAILY
Qty: 90 TABLET | Refills: 3 | Status: SHIPPED | OUTPATIENT
Start: 2021-03-23 | End: 2022-03-24

## 2021-03-23 ASSESSMENT — MIFFLIN-ST. JEOR: SCORE: 1226.12

## 2021-03-23 ASSESSMENT — PAIN SCALES - GENERAL: PAINLEVEL: NO PAIN (0)

## 2021-03-23 NOTE — PATIENT INSTRUCTIONS
Patient Education   Personalized Prevention Plan  You are due for the preventive services outlined below.  Your care team is available to assist you in scheduling these services.  If you have already completed any of these items, please share that information with your care team to update in your medical record.  Health Maintenance Due   Topic Date Due     Osteoporosis Screening  Never done     COVID-19 Vaccine (1) Never done     Zoster (Shingles) Vaccine (1 of 2) Never done     Kidney Microalbumin Urine Test  12/05/2019     Diabetic Foot Exam  12/05/2019     FALL RISK ASSESSMENT  03/03/2021

## 2021-03-23 NOTE — LETTER
March 23, 2021      Maliha Rivera  [address] [facility] [address]        Dear Maliha,     Your labs looked fine as we discussed at your appointment.  I thought you might want a copy of them.    It was a pleasure seeing you the other day.  If you have any questions, please don't hesitate to call us.         Sincerely,        Nicanor Mike MD                                        Results for orders placed or performed in visit on 03/23/21   Hemoglobin A1c     Status: Abnormal   Result Value Ref Range    Hemoglobin A1C 6.5 (H) 4.0 - 6.0 %   Thyrotropin GH     Status: None   Result Value Ref Range    Thyrotropin 3.04 0.34 - 5.60 IU/mL   Lipid Profile     Status: Abnormal   Result Value Ref Range    Cholesterol 214 (H) <200 mg/dL    Triglycerides 270 (H) <150 mg/dL    HDL Cholesterol 46 23 - 92 mg/dL    LDL Cholesterol Calculated 114 (H) <100 mg/dL    Non HDL Cholesterol 168 (H) <130 mg/dL   Comprehensive metabolic panel     Status: Abnormal   Result Value Ref Range    Sodium 139 134 - 144 mmol/L    Potassium 3.8 3.5 - 5.1 mmol/L    Chloride 104 98 - 107 mmol/L    Carbon Dioxide 29 21 - 31 mmol/L    Anion Gap 6 3 - 14 mmol/L    Glucose 166 (H) 70 - 105 mg/dL    Urea Nitrogen 13 7 - 25 mg/dL    Creatinine 0.69 0.60 - 1.20 mg/dL    GFR Estimate 82 >60 mL/min/[1.73_m2]    GFR Estimate If Black >90 >60 mL/min/[1.73_m2]    Calcium 9.3 8.6 - 10.3 mg/dL    Bilirubin Total 0.3 0.3 - 1.0 mg/dL    Albumin 4.3 3.5 - 5.7 g/dL    Protein Total 7.1 6.4 - 8.9 g/dL    Alkaline Phosphatase 57 34 - 104 U/L    ALT 19 7 - 52 U/L    AST 22 13 - 39 U/L   CBC with platelets differential     Status: Abnormal   Result Value Ref Range    WBC 6.0 4.0 - 11.0 10e9/L    RBC Count 3.91 3.8 - 5.2 10e12/L    Hemoglobin 11.7 11.7 - 15.7 g/dL    Hematocrit 34.0 (L) 35.0 - 47.0 %    MCV 87 78 - 100 fl    MCH 29.9 26.5 - 33.0 pg    MCHC 34.4 31.5 - 36.5 g/dL    RDW 13.1 10.0 - 15.0 %    Platelet Count 215 150 - 450 10e9/L    Diff Method  Automated Method     % Neutrophils 39.6 %    % Lymphocytes 49.7 %    % Monocytes 7.7 %    % Eosinophils 2.3 %    % Basophils 0.5 %    % Immature Granulocytes 0.2 %    Absolute Neutrophil 2.4 1.6 - 8.3 10e9/L    Absolute Lymphocytes 3.0 0.8 - 5.3 10e9/L    Absolute Monocytes 0.5 0.0 - 1.3 10e9/L    Absolute Eosinophils 0.1 0.0 - 0.7 10e9/L    Absolute Basophils 0.0 0.0 - 0.2 10e9/L    Abs Immature Granulocytes 0.0 0 - 0.4 10e9/L

## 2021-03-23 NOTE — PROGRESS NOTES
"Nursing Notes:   Olivia Jean-Baptiste LPN  3/23/2021  3:38 PM  Signed  Chief Complaint   Patient presents with     Medicare Visit     annual     Previous A1C is at goal of <8  Lab Results   Component Value Date    A1C 6.5 03/23/2021    A1C 6.3 04/10/2019    A1C 6.1 12/05/2018     Urine microalbumin:creatine: 03/23/2021  Foot exam 12/15/2018  Eye exam 07/20/20    Tobacco User no  Patient is on a daily aspirin  Patient is on a Statin.  Blood pressure today of:     BP Readings from Last 1 Encounters:   03/23/21 132/82      is at the goal of <139/89 for diabetics.    Olivia Jean-Baptiste LPN on 3/23/2021 at 3:23 PM        Initial /82   Pulse 79   Temp 98.1  F (36.7  C) (Temporal)   Resp 16   Ht 1.575 m (5' 2\")   Wt 80.3 kg (177 lb)   SpO2 98%   Breastfeeding No   BMI 32.37 kg/m   Estimated body mass index is 32.37 kg/m  as calculated from the following:    Height as of this encounter: 1.575 m (5' 2\").    Weight as of this encounter: 80.3 kg (177 lb).  Medication Reconciliation: complete    Olivia Jean-Baptiste LPN      SUBJECTIVE:  Maliha Rivera  is a 80 year old female who comes in today for Medicare wellness and follow-up of her other medical issues.  She has hypertension for which she takes 10 mg of lisinopril daily.  She uses oxybutynin ER for overactive bladder.  She is on 20 mg of pravastatin daily for hyperlipidemia.  She takes 40 mg of omeprazole daily for GERD.  She has diet-controlled type 2 diabetes.  I last saw her about a year ago.    She has a lot of arthritis in her foot. She doesn't take anything for it.  She has been using a copper bracelet.     She is due for mammogram.  She is up-to-date on immunizations but has not had COVID-19 vaccine.  Has not had Shingrix.    Past Medical, Family, and Social History reviewed and updated as noted below.   ROS is negative except as noted above       Allergies   Allergen Reactions     Atorvastatin Muscle Pain (Myalgia)     Cefazolin Nausea and Vomiting   , "   Family History   Problem Relation Age of Onset     Heart Disease Mother         Heart Disease     Heart Disease Father         Heart Disease     Heart Disease Sister         Heart Disease     Cancer Other         Cancer,female cancer   ,   Current Outpatient Medications   Medication     acetaminophen (TYLENOL) 325 MG tablet     aspirin (ASA) 81 MG chewable tablet     blood glucose monitoring (FREESTYLE LITE) test strip     blood glucose monitoring (FREESTYLE) lancets     Blood Glucose Monitoring Suppl (FIFTY50 GLUCOSE METER 2.0) W/DEVICE KIT     Blood Pressure Monitoring (ADULT BLOOD PRESSURE CUFF LG) KIT     celecoxib (CELEBREX) 200 MG capsule     COMPRESSION STOCKINGS     lisinopril (PRINIVIL/ZESTRIL) 10 MG tablet     melatonin 5 MG tablet     omeprazole (PRILOSEC) 40 MG DR capsule     order for DME     order for DME     oxybutynin ER (DITROPAN XL) 5 MG 24 hr tablet     pravastatin (PRAVACHOL) 20 MG tablet     PSYLLIUM PO     No current facility-administered medications for this visit.    ,   Past Medical History:   Diagnosis Date     Encounter for screening for cardiovascular disorders     4/8/15,Lexiscan cardiolite Phill Ruiz   ,   Patient Active Problem List    Diagnosis Date Noted     Benign essential hypertension 04/18/2018     Priority: Medium     Lumbar stenosis 03/01/2018     Priority: Medium     Lumbar radiculopathy 01/23/2018     Priority: Medium     Status post total bilateral knee replacement 03/31/2017     Priority: Medium     Primary osteoarthritis of left knee 03/23/2017     Priority: Medium     DM w/o complication type II (H) 09/22/2015     Priority: Medium     Overview:   3/2014:  A1c fine off metformin - will discontinue as patient does not want to take it.  She is taking fish oil + other ingredients she states helps with DM  On ASA  Patient does not want to take lisinopril  Does not tolerate statins and has discontinued pravastatin; try niacin  No retinopathy on eye exam 4/2013 and  12/2014  Overview:   Overview:   3/2014:  A1c fine off metformin - will discontinue as patient does not want to take it.  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  Lipids:; declines statin - will try niacin and is on fish oil.  Mammogram:schedule  Pap: 3/2013 WNL - does not need again.  Tdap:3/2013  Vitamin D: takes supplement  pneumovax done.  Consider zoster vaccine.       Degenerative arthritis of knee 10/21/2010     Priority: Medium     Overview:   IMO Update 10/11       Cervicalgia 05/11/2010     Priority: Medium     Overview:   IMO Update 10/11       DDD (degenerative disc disease), lumbar 01/15/2010     Priority: Medium     Primary localized osteoarthrosis, lower leg 01/31/2008     Priority: Medium     Hyperlipidemia 07/22/2004     Priority: Medium     Overview:   IMO Update 10/11  Overview:   Overview:   IMO Update 10/11     ,   Past Surgical History:   Procedure Laterality Date     APPENDECTOMY OPEN      No Comments Provided     ARTHROSCOPY KNEE      2007,Joseluis Thomas M.D.     COLONOSCOPY      2003,2011,normal     ESOPHAGOSCOPY, GASTROSCOPY, DUODENOSCOPY (EGD), COMBINED      2/3/2016     LAPAROSCOPIC CHOLECYSTECTOMY      3/11/01,Dr. Salas     LAPAROSCOPIC TUBAL LIGATION      No Comments Provided     lumbar decompression L4-5  02/28/2018    Dr. France     OTHER SURGICAL HISTORY      1996,[id number],CATARACT EXTRACTION W/  INTRAOCULAR LENS  "IMPLANT,Bilateral     OTHER SURGICAL HISTORY      2001,89364.0,[facility] BILIARY OR PANC DUCT STENT EXCHANGE W DIL AND WIRE,stent removed.     OTHER SURGICAL HISTORY      2004,[id number],CARDIAC CATHETERIZATION,mild disease     OTHER SURGICAL HISTORY      1/31/07,[id number],OTHER,Left     OTHER SURGICAL HISTORY      02/27/2008,[id number],TOTAL KNEE ARTHROPLASTY,Right     OTHER SURGICAL HISTORY      2010,[id number],TOTAL KNEE ARTHROPLASTY,Left,Dr. Carson     OTHER SURGICAL HISTORY      06/05/2017,[id number],OTHER,Left,Dr. Joiner, [facility]     RELEASE CARPAL TUNNEL      2006     Revision right total knee arthroplasty   04/18/2019    Dr. Carson.  Due to loose implant tibial component     TONSILLECTOMY      age 12    and   Social History     Tobacco Use     Smoking status: Never Smoker     Smokeless tobacco: Never Used   Substance Use Topics     Alcohol use: No     OBJECTIVE:  /82   Pulse 79   Temp 98.1  F (36.7  C) (Temporal)   Resp 16   Ht 1.575 m (5' 2\")   Wt 80.3 kg (177 lb)   SpO2 98%   Breastfeeding No   BMI 32.37 kg/m     EXAM:  General Appearance: Pleasant, alert, appropriate appearance for age. No acute distress  Head Exam: Normal. Normocephalic, atraumatic.  Eye Exam: PERRLA, EOMI, conjunctivae, sclerae normal.  Ear Exam: Normal TM's bilaterally. Normal auditory canals and external ears. Non-tender.  Nose Exam: Normal external nose, mucus membranes, and septum.  OroPharynx Exam:  Dental hygiene adequate. Normal buccal mucosa. Normal pharynx.  Neck Exam:  Supple, no masses or nodes. No bruits  Thyroid Exam: No nodules or enlargement.  Chest/Respiratory Exam: Normal chest wall and respirations. Clear to auscultation.  Breast Exam: No dimpling, nipple retraction or discharge. No masses or nodes.  Cardiovascular Exam: Regular rate and rhythm. S1, S2, no murmur, click, gallop, or rubs.  Gastrointestinal Exam: Soft, non-tender, no masses or organomegaly.  Lymphatic Exam: Non-palpable nodes in neck,clavicular, axillary, or " inguinal regions.  Musculoskeletal Exam: Back is straight and non-tender, full ROM of upper and lower extremities.  She has some decreased rotational range of motion of her thoracic spine consistent with thoracic facet lock which may be causing some of her mid back discomfort.  We discussed referral to physical therapy for manual therapy or chiropractor and she is not really interested in all.  Foot Exam: Left and right foot: good pedal pulses, no lesions, nail hygiene good.  She has some arthritis especially in her left foot. Normal sensory testing with #10 monofilament.   Skin: no rash or abnormalities  Neurologic Exam:  normal gross motor, tone coordination and no tremor.  Psychiatric Exam: Alert and oriented - appropriate affect.     Results for orders placed or performed in visit on 03/23/21   Hemoglobin A1c     Status: Abnormal   Result Value Ref Range    Hemoglobin A1C 6.5 (H) 4.0 - 6.0 %   Thyrotropin GH     Status: None   Result Value Ref Range    Thyrotropin 3.04 0.34 - 5.60 IU/mL   Lipid Profile     Status: Abnormal   Result Value Ref Range    Cholesterol 214 (H) <200 mg/dL    Triglycerides 270 (H) <150 mg/dL    HDL Cholesterol 46 23 - 92 mg/dL    LDL Cholesterol Calculated 114 (H) <100 mg/dL    Non HDL Cholesterol 168 (H) <130 mg/dL   Comprehensive metabolic panel     Status: Abnormal   Result Value Ref Range    Sodium 139 134 - 144 mmol/L    Potassium 3.8 3.5 - 5.1 mmol/L    Chloride 104 98 - 107 mmol/L    Carbon Dioxide 29 21 - 31 mmol/L    Anion Gap 6 3 - 14 mmol/L    Glucose 166 (H) 70 - 105 mg/dL    Urea Nitrogen 13 7 - 25 mg/dL    Creatinine 0.69 0.60 - 1.20 mg/dL    GFR Estimate 82 >60 mL/min/[1.73_m2]    GFR Estimate If Black >90 >60 mL/min/[1.73_m2]    Calcium 9.3 8.6 - 10.3 mg/dL    Bilirubin Total 0.3 0.3 - 1.0 mg/dL    Albumin 4.3 3.5 - 5.7 g/dL    Protein Total 7.1 6.4 - 8.9 g/dL    Alkaline Phosphatase 57 34 - 104 U/L    ALT 19 7 - 52 U/L    AST 22 13 - 39 U/L   CBC with platelets  differential     Status: Abnormal   Result Value Ref Range    WBC 6.0 4.0 - 11.0 10e9/L    RBC Count 3.91 3.8 - 5.2 10e12/L    Hemoglobin 11.7 11.7 - 15.7 g/dL    Hematocrit 34.0 (L) 35.0 - 47.0 %    MCV 87 78 - 100 fl    MCH 29.9 26.5 - 33.0 pg    MCHC 34.4 31.5 - 36.5 g/dL    RDW 13.1 10.0 - 15.0 %    Platelet Count 215 150 - 450 10e9/L    Diff Method Automated Method     % Neutrophils 39.6 %    % Lymphocytes 49.7 %    % Monocytes 7.7 %    % Eosinophils 2.3 %    % Basophils 0.5 %    % Immature Granulocytes 0.2 %    Absolute Neutrophil 2.4 1.6 - 8.3 10e9/L    Absolute Lymphocytes 3.0 0.8 - 5.3 10e9/L    Absolute Monocytes 0.5 0.0 - 1.3 10e9/L    Absolute Eosinophils 0.1 0.0 - 0.7 10e9/L    Absolute Basophils 0.0 0.0 - 0.2 10e9/L    Abs Immature Granulocytes 0.0 0 - 0.4 10e9/L      ASSESSMENT/Plan :    Maliha was seen today for medicare visit.    Diagnoses and all orders for this visit:    Encounter for Medicare annual wellness exam    Type 2 diabetes mellitus without complication, without long-term current use of insulin (H)  -     CBC with platelets differential; Future  -     Comprehensive metabolic panel; Future  -     Lipid Profile; Future  -     Thyrotropin GH; Future  -     Hemoglobin A1c; Future  -     Albumin Random Urine Quantitative with Creat Ratio; Future  -     Hemoglobin A1c  -     Thyrotropin GH  -     Lipid Profile  -     Comprehensive metabolic panel  -     CBC with platelets differential    Benign essential hypertension  -     Comprehensive metabolic panel; Future  -     Comprehensive metabolic panel    Gastroesophageal reflux disease without esophagitis  -     CBC with platelets differential; Future  -     CBC with platelets differential  -     omeprazole (PRILOSEC) 40 MG DR capsule; Take 1 capsule (40 mg) by mouth daily    Hyperlipidemia, unspecified hyperlipidemia type  -     Comprehensive metabolic panel; Future  -     Lipid Profile; Future  -     Lipid Profile  -     Comprehensive metabolic  panel    Visit for screening mammogram  -     MA Screen Bilateral w/Raghav; Future    Overactive bladder  -     oxybutynin ER (DITROPAN XL) 5 MG 24 hr tablet; Take 1 tablet (5 mg) by mouth daily    Primary osteoarthritis, unspecified site  -     celecoxib (CELEBREX) 200 MG capsule; Take 1 capsule (200 mg) by mouth daily For arthritis.      Will notify of lab results when available. Discussed diet, exercise and healthy lifestyle changes. Continue current medications.  Trial of Celebrex to see if that will help her arthritis.  Renal function is normal today.    Mammogram scheduled.     Encouraged her to move ahead with the COVID-19 vaccine.    She wrote the wrong time on her clock but all the rest of my interactions with her did not indicate any significant cognitive impairment.    A total of 30 minutes was spent with the patient, reviewing records, tests, ordering medications, tests or procedures and documenting clinical information in the EHR in addition to Medicare Wellness.     Nicanor Mike MD

## 2021-03-23 NOTE — PROGRESS NOTES
"  SUBJECTIVE:   Maliha Rivera is a 80 year old female who presents for Preventive Visit.      Patient has been advised of split billing requirements and indicates understanding: Yes  Are you in the first 12 months of your Medicare Part B coverage?  No    Physical Health:    In general, how would you rate your overall physical health? good    Outside of work, how many days during the week do you exercise? none states is busy all the time but does not exercise    Outside of work, approximately how many minutes a day do you exercise?not applicable    If you drink alcohol do you typically have >3 drinks per day or >7 drinks per week? No    Do you usually eat at least 4 servings of fruit and vegetables a day, include whole grains & fiber and avoid regularly eating high fat or \"junk\" foods? Yes    Do you have any problems taking medications regularly?  No    Do you have any side effects from medications? none    Needs assistance for the following daily activities: no assistance needed    Which of the following safety concerns are present in your home?  none identified     Hearing impairment: No    In the past 6 months, have you been bothered by leaking of urine? yes    Mental Health:    In general, how would you rate your overall mental or emotional health? good  PHQ-2 Score: 0    Do you feel safe in your environment? Yes    Have you ever done Advance Care Planning? (For example, a Health Directive, POLST, or a discussion with a medical provider or your loved ones about your wishes): No, advance care planning information given to patient to review.  Patient plans to discuss their wishes with loved ones or provider.      Additional concerns to address?  No    Fall risk:  Fallen 2 or more times in the past year?: No  Any fall with injury in the past year?: No    Cognitive Screenin) Repeat 3 items (Leader, Season, Table)    2) Clock draw: ABNORMAL The hands of the clock were in the wrong place  3) 3 item recall: " Recalls 2 objects   Results: ABNORMAL clock, 1-2 items recalled: PROBABLE COGNITIVE IMPAIRMENT, **INFORM PROVIDER**    Mini-CogTM Copyright SANDRA Castillo. Licensed by the author for use in [facility]; reprinted with permission (leticia@[facility]). All rights reserved.      Do you have sleep apnea, excessive snoring or daytime drowsiness?: no            Reviewed and updated as needed this visit by clinical staff  Tobacco  Allergies    Med Hx  Surg Hx  Fam Hx  Soc Hx        Reviewed and updated as needed this visit by Provider                Social History     Tobacco Use     Smoking status: Never Smoker     Smokeless tobacco: Never Used   Substance Use Topics     Alcohol use: No                           Current providers sharing in care for this patient include:   Patient Care Team:  Nicanor Mike MD as PCP - General (Family Practice)  Nicanor Mike MD as Assigned PCP    The following health maintenance items are reviewed in Epic and correct as of today:  Health Maintenance   Topic Date Due     DEXA  Never done     COVID-19 Vaccine (1) Never done     ZOSTER IMMUNIZATION (1 of 2) Never done     MICROALBUMIN  12/05/2019     DIABETIC FOOT EXAM  12/05/2019     FALL RISK ASSESSMENT  03/03/2021     EYE EXAM  07/20/2021     A1C  09/23/2021     MEDICARE ANNUAL WELLNESS VISIT  03/23/2022     BMP  03/23/2022     LIPID  03/23/2022     DTAP/TDAP/TD IMMUNIZATION (3 - Td) 03/26/2023     ADVANCE CARE PLANNING  03/23/2026     PHQ-2  Completed     INFLUENZA VACCINE  Completed     Pneumococcal Vaccine: Pediatrics (0 to 5 Years) and At-Risk Patients (6 to 64 Years)  Completed     Pneumococcal Vaccine: 65+ Years  Completed     IPV IMMUNIZATION  Aged Out     MENINGITIS IMMUNIZATION  Aged Out     Labs reviewed in [facility]  Mammogram Screening: Mammogram Screening: Recommended mammography every 1-2 years with patient discussion and risk factor consideration    ROS:  See below    OBJECTIVE:   /82   Pulse 79   Temp 98.1  " F (36.7  C) (Temporal)   Resp 16   Ht 1.575 m (5' 2\")   Wt 80.3 kg (177 lb)   SpO2 98%   Breastfeeding No   BMI 32.37 kg/m   Estimated body mass index is 32.37 kg/m  as calculated from the following:    Height as of this encounter: 1.575 m (5' 2\").    Weight as of this encounter: 80.3 kg (177 lb).  EXAM:   See below.    Diagnostic Test Results:  Labs reviewed in Epic    ASSESSMENT / PLAN:   Maliha was seen today for medicare visit.    Diagnoses and all orders for this visit:    Encounter for Medicare annual wellness exam    Type 2 diabetes mellitus without complication, without long-term current use of insulin (H)  -     CBC with platelets differential; Future  -     Comprehensive metabolic panel; Future  -     Lipid Profile; Future  -     Thyrotropin GH; Future  -     Hemoglobin A1c; Future  -     Albumin Random Urine Quantitative with Creat Ratio; Future  -     Hemoglobin A1c  -     Thyrotropin GH  -     Lipid Profile  -     Comprehensive metabolic panel  -     CBC with platelets differential    Benign essential hypertension  -     Comprehensive metabolic panel; Future  -     Comprehensive metabolic panel    Gastroesophageal reflux disease without esophagitis  -     CBC with platelets differential; Future  -     CBC with platelets differential  -     omeprazole (PRILOSEC) 40 MG DR capsule; Take 1 capsule (40 mg) by mouth daily    Hyperlipidemia, unspecified hyperlipidemia type  -     Comprehensive metabolic panel; Future  -     Lipid Profile; Future  -     Lipid Profile  -     Comprehensive metabolic panel    Visit for screening mammogram  -     MA Screen Bilateral w/Raghav; Future    Overactive bladder  -     oxybutynin ER (DITROPAN XL) 5 MG 24 hr tablet; Take 1 tablet (5 mg) by mouth daily    Primary osteoarthritis, unspecified site  -     celecoxib (CELEBREX) 200 MG capsule; Take 1 capsule (200 mg) by mouth daily For arthritis.        Patient has been advised of split billing requirements and indicates " "understanding: Yes    COUNSELING:  Reviewed preventive health counseling, as reflected in patient instructions       Regular exercise       Healthy diet/nutrition       Bladder control       Immunizations    Discussed Covid 19 vaccine and recommended it.         Estimated body mass index is 32.37 kg/m  as calculated from the following:    Height as of this encounter: 1.575 m (5' 2\").    Weight as of this encounter: 80.3 kg (177 lb).    Weight management plan: Discussed healthy diet and exercise guidelines    She reports that she has never smoked. She has never used smokeless tobacco.    Appropriate preventive services were discussed with this patient, including applicable screening as appropriate for cardiovascular disease, diabetes, osteopenia/osteoporosis, and glaucoma.  As appropriate for age/gender, discussed screening for colorectal cancer, prostate cancer, breast cancer, and cervical cancer. Checklist reviewing preventive services available has been given to the patient.    Reviewed patients plan of care and provided an AVS. The Basic Care Plan (routine screening as documented in Health Maintenance) for Maliha meets the Care Plan requirement. This Care Plan has been established and reviewed with the Patient.    Counseling Resources:  ATP IV Guidelines  Pooled Cohorts Equation Calculator  Breast Cancer Risk Calculator  BRCA-Related Cancer Risk Assessment: FHS-7 Tool  FRAX Risk Assessment  ICSI Preventive Guidelines  Dietary Guidelines for Americans, 2010  USDA's MyPlate  ASA Prophylaxis  Lung CA Screening    Nicanor Mike MD  [facility]  "

## 2021-03-31 ENCOUNTER — HOSPITAL ENCOUNTER (OUTPATIENT)
Dept: MAMMOGRAPHY | Facility: OTHER | Age: 81
Discharge: HOME | End: 2021-03-31
Attending: FAMILY MEDICINE | Admitting: FAMILY MEDICINE
Payer: MEDICARE

## 2021-03-31 DIAGNOSIS — Z12.31 VISIT FOR SCREENING MAMMOGRAM: ICD-10-CM

## 2021-03-31 PROCEDURE — 77063 BREAST TOMOSYNTHESIS BI: CPT

## 2022-01-06 ENCOUNTER — OFFICE VISIT (OUTPATIENT)
Dept: FAMILY MEDICINE | Facility: OTHER | Age: 82
End: 2022-01-06
Attending: PHYSICIAN ASSISTANT
Payer: MEDICARE

## 2022-01-06 VITALS
WEIGHT: 175.8 LBS | RESPIRATION RATE: 18 BRPM | HEART RATE: 86 BPM | OXYGEN SATURATION: 98 % | BODY MASS INDEX: 32.15 KG/M2 | SYSTOLIC BLOOD PRESSURE: 132 MMHG | TEMPERATURE: 100.3 F | DIASTOLIC BLOOD PRESSURE: 74 MMHG

## 2022-01-06 DIAGNOSIS — R05.9 COUGH: Primary | ICD-10-CM

## 2022-01-06 PROCEDURE — C9803 HOPD COVID-19 SPEC COLLECT: HCPCS

## 2022-01-06 PROCEDURE — G0463 HOSPITAL OUTPT CLINIC VISIT: HCPCS

## 2022-01-06 PROCEDURE — 99213 OFFICE O/P EST LOW 20 MIN: CPT | Performed by: PHYSICIAN ASSISTANT

## 2022-01-06 PROCEDURE — U0005 INFEC AGEN DETEC AMPLI PROBE: HCPCS | Mod: ZL | Performed by: PHYSICIAN ASSISTANT

## 2022-01-06 ASSESSMENT — PAIN SCALES - GENERAL: PAINLEVEL: NO PAIN (0)

## 2022-01-06 NOTE — NURSING NOTE
"Chief Complaint   Patient presents with     Cough     She got a cough yesterday. Fever new when she got here.     Initial There were no vitals taken for this visit. Estimated body mass index is 32.37 kg/m  as calculated from the following:    Height as of 3/23/21: 1.575 m (5' 2\").    Weight as of 3/23/21: 80.3 kg (177 lb).       Medication Reconciliation: Complete      FOOD SECURITY SCREENING QUESTIONS:    The next two questions are to help us understand your food security.  If you are feeling you need any assistance in this area, we have resources available to support you today.    Hunger Vital Signs:  Have you worried about running out of food and not having money to buy more?Never        Kwasi Osorio LPN   "

## 2022-01-06 NOTE — PROGRESS NOTES
ASSESSMENT/PLAN:    I have reviewed the nursing notes.  I have reviewed the findings, diagnosis, plan and need for follow up with the patient.    1. Cough  - Symptomatic; Yes; 1/5/2022 COVID-19 Virus (Coronavirus) by PCR Nose  - Vital signs stable. PE consistent with URI. COVID test was performed, recommend that patient remain in quarantine until results return, which typically takes 24-72 hours. If COVID testing is negative, symptoms are improving (no need for antipyretics, etc.), that patient can return to normal ADLs, if COVID test returns positive, recommend quarantine for 10-14 days from symptom onset. Recommend: increased fluids, rest, use of humidifier, antitussives (cough medication for adults), alternating tylenol and ibuprofen every 4-6 hours as needed (if able to take these medications), salt water gargles for sore throats or throat lozenges, honey, netti pots/saline rinses for sinus/congestion, as well as other home remedies.  If worsening fevers, chills, uncontrollable nausea/vomiting, dehydration,etc., or other worsening signs occur, patient is agreeable to follow up for reevaluation.     Patient is in agreement and understanding of the above treatment plan. All questions and concerns were addressed and answered to patient's satisfaction. AVS reviewed with patient.     Discussed warning signs/symptoms indicative of need to f/u    Follow up if symptoms persist or worsen or concerns    I explained my diagnostic considerations and recommendations to the patient, who voiced understanding and agreement with the treatment plan. All questions were answered. We discussed potential side effects of any prescribed or recommended therapies, as well as expectations for response to treatments.    Tracy Williamson PA-C  1/6/2022  2:21 PM    HPI:    Maliha Rivera is a 81 year old female  who presents to [facility] today for concerns of URI, x yesterday    Symptoms:  Yes fevers or chills. Fever, highest reported  temperature: 100.3 F No sore throat/pharyngitis/tonsillitis.   No allergy/URI Symptoms  No Muffled Sounds/Change in Hearing  No Sensation of Fullness in Ear(s)  No Ringing in Ears/Tinnitus  No Balance Changes  No Dizziness  Yes Congestion (head/nasal/chest)  Yes Cough/Productive Cough- dry   Yes Post Nasal Drip - color: clear  No Headache  No Sinus Pain/Pressure  No Myalgias  No Otalgia  Activity Level Changes: No  Appetite/Liquid Intake Changes: No  Changes to Bowel Habits: No  Changes to Bladder Habits: No  Additional Symptoms to Report: No  History of similar symptoms: No  Prior workup: No    Treatments tried: None tried    Site of exposure: not known.  Type of exposure: not known    Allergies: Cefazolin, Statins    PCP: MD Rashid    Past Medical History:   Diagnosis Date     Encounter for screening for cardiovascular disorders     4/8/15,Lexiscan cardiolite [facility]     Past Surgical History:   Procedure Laterality Date     APPENDECTOMY OPEN      No Comments Provided     ARTHROSCOPY KNEE      2007,Joseluis Thomas M.D.     COLONOSCOPY      2003,2011,normal     ESOPHAGOSCOPY, GASTROSCOPY, DUODENOSCOPY (EGD), COMBINED      2/3/2016     LAPAROSCOPIC CHOLECYSTECTOMY      3/11/01,Dr. Salas     LAPAROSCOPIC TUBAL LIGATION      No Comments Provided     lumbar decompression L4-5  02/28/2018    Dr. France     OTHER SURGICAL HISTORY      1996,[id number],CATARACT EXTRACTION W/  INTRAOCULAR LENS IMPLANT,Bilateral     OTHER SURGICAL HISTORY      2001,66570.0,UT ERCP BILIARY OR PANC DUCT STENT EXCHANGE W DIL AND WIRE,stent removed.     OTHER SURGICAL HISTORY      2004,[id number],CARDIAC CATHETERIZATION,mild disease     OTHER SURGICAL HISTORY      1/31/07,[id number],OTHER,Left     OTHER SURGICAL HISTORY      02/27/2008,[id number],TOTAL KNEE ARTHROPLASTY,Right     OTHER SURGICAL HISTORY      2010,[id number],TOTAL KNEE ARTHROPLASTY,Left,Dr. Carson     OTHER SURGICAL HISTORY      06/05/2017,[id number],OTHER,Left,Dr. Joiner, [facility]  CARPAL TUNNEL      2006     Revision right total knee arthroplasty   04/18/2019    Dr. Carson.  Due to loose implant tibial component     TONSILLECTOMY      age 12     Social History     Tobacco Use     Smoking status: Never Smoker     Smokeless tobacco: Never Used   Substance Use Topics     Alcohol use: No     Current Outpatient Medications   Medication Sig Dispense Refill     acetaminophen (TYLENOL) 325 MG tablet Take 650 mg by mouth       aspirin (ASA) 81 MG chewable tablet        blood glucose monitoring (FREESTYLE LITE) test strip Dispense item covered by pt ins. E11.9 NIDDM type II - Test 1 time/day       blood glucose monitoring (FREESTYLE) lancets As directed. Dispense item covered by pt ins. E11.9 NIDDM type II - Test 1 time/day       Blood Glucose Monitoring Suppl (FIFTY50 GLUCOSE METER 2.0) W/DEVICE KIT Dispense meter, test strips, lancets covered by pt ins. E11.9 NIDDM type II - Test 1 time/day       Blood Pressure Monitoring (ADULT BLOOD PRESSURE CUFF LG) KIT Use for home blood pressure monitoring as directed. 1 kit 0     celecoxib (CELEBREX) 200 MG capsule Take 1 capsule (200 mg) by mouth daily For arthritis. 30 capsule 11     COMPRESSION STOCKINGS For personal use. Length: calf Strength: 16-20 mmHg Please measure.       lisinopril (PRINIVIL/ZESTRIL) 10 MG tablet TAKE 1 TABLET BY MOUTH ONCE DAILY 90 tablet 11     melatonin 5 MG tablet Take 5 mg by mouth       omeprazole (PRILOSEC) 40 MG DR capsule Take 1 capsule (40 mg) by mouth daily 90 capsule 11     order for DME Equipment being ordered: [facility]uate [facility] for a knee brace 1 Units 0     [facility] and/or [facility]oles 1 Units prn     oxybutynin ER (DITROPAN XL) 5 MG 24 hr tablet Take 1 tablet (5 mg) by mouth daily 90 tablet 3     pravastatin (PRAVACHOL) 20 MG tablet TAKE 1 TABLET BY MOUTH ONCE DAILY 90 tablet 11     PSYLLIUM PO Take 3 tsp. by mouth       Allergies    Allergen Reactions     Atorvastatin Muscle Pain (Myalgia)     Cefazolin Nausea and Vomiting     Past medical history, past surgical history, current medications and allergies reviewed and accurate to the best of my knowledge.      ROS:  Refer to HPI    /74   Pulse 86   Temp 100.3  F (37.9  C) (Tympanic)   Resp 18   Wt 79.7 kg (175 lb 12.8 oz)   SpO2 98%   BMI 32.15 kg/m      EXAM:  General Appearance: Well appearing 81 year old female, appropriate appearance for age. No acute distress   Ears: Left TM intact, translucent with bony landmarks appreciated, no erythema, no effusion, no bulging, no purulence.  Right TM intact, translucent with bony landmarks appreciated, no erythema, no effusion, no bulging, no purulence.  Left auditory canal clear.  Right auditory canal clear.  Normal external ears, non tender.  Eyes: conjunctivae normal without erythema or irritation, corneas clear, no drainage or crusting, no eyelid swelling, pupils equal   Orophayrnx: moist mucous membranes, posterior pharynx without erythema, tonsils symmetric, no erythema, no exudates or petechiae, no post nasal drip seen, no trismus, voice clear.    Sinuses:  No sinus tenderness upon palpation of the frontal or maxillary sinuses  Nose:  Bilateral nares: no erythema, no edema, no drainage or congestion   Neck: supple without adenopathy  Respiratory: normal chest wall and respirations.  Normal effort.  Clear to auscultation bilaterally, no wheezing, crackles or rhonchi.  No increased work of breathing.  Mild dry cough.   Cardiac: RRR with no murmurs  Dermatological: no rashes noted of exposed skin  Psychological: normal affect, alert, oriented, and pleasant.     Labs:  COVID in process    Xray:  None

## 2022-01-08 ENCOUNTER — TELEPHONE (OUTPATIENT)
Dept: NURSING | Facility: CLINIC | Age: 82
End: 2022-01-08
Payer: COMMERCIAL

## 2022-01-08 LAB — SARS-COV-2 RNA RESP QL NAA+PROBE: POSITIVE

## 2022-01-08 NOTE — TELEPHONE ENCOUNTER
Attempted to call patient re: covid positive results. No answer on only number listed and no voicemail set up so no message left.  Leticia Mason RN  [facility]

## 2022-01-10 ENCOUNTER — TELEPHONE (OUTPATIENT)
Dept: FAMILY MEDICINE | Facility: OTHER | Age: 82
End: 2022-01-10
Payer: COMMERCIAL

## 2022-01-10 NOTE — TELEPHONE ENCOUNTER
After verification of name and date of birth, patient notified of positive covid results. Read covid 19 batch letter in chart to patient. Patient verbalizes understanding and has no further questions or concerns. Patient reports not having any fevers at this time. Coni Alston RN  ....................  1/10/2022   2:24 PM

## 2022-01-19 DIAGNOSIS — I10 BENIGN ESSENTIAL HYPERTENSION: ICD-10-CM

## 2022-01-20 RX ORDER — LISINOPRIL 10 MG/1
TABLET ORAL
Qty: 90 TABLET | Refills: 3 | Status: SHIPPED | OUTPATIENT
Start: 2022-01-20 | End: 2023-01-31

## 2022-01-20 NOTE — TELEPHONE ENCOUNTER
" Disp Refills Start End DAXA   lisinopril (PRINIVIL/ZESTRIL) 10 MG tablet 90 tablet 11 1/28/2020  No   Sig: TAKE 1 TABLET BY MOUTH ONCE DAILY       LOV: 3/23/2021  Future Office visit:    Next 5 appointments (look out 90 days)    Mar 24, 2022 10:40 AM  PHYSICAL with Nicanor DON MD  [facility] ([facility] ) [address]  [place] [address] [phone]          Requested Prescriptions   Pending Prescriptions Disp Refills     lisinopril (ZESTRIL) 10 MG tablet [Pharmacy Med Name: Lisinopril 10 MG Oral Tablet] 90 tablet 0     Sig: Take 1 tablet by mouth once daily       ACE Inhibitors (Including Combos) Protocol Passed - 1/19/2022 10:33 AM        Passed - Blood pressure under 140/90 in past 12 months     BP Readings from Last 3 Encounters:   01/06/22 132/74   03/23/21 132/82   03/03/20 128/72                 Passed - Recent (12 mo) or future (30 days) visit within the authorizing provider's specialty     Patient has had an office visit with the authorizing provider or a provider within the authorizing providers department within the previous 12 mos or has a future within next 30 days. See \"Patient Info\" tab in inbasket, or \"Choose Columns\" in Meds & Orders section of the refill encounter.              Passed - Medication is active on med list        Passed - Patient is age 18 or older        Passed - No active pregnancy on record        Passed - Normal serum creatinine on file in past 12 months     Recent Labs   Lab Test 03/23/21  1457   CR 0.69       Ok to refill medication if creatinine is low          Passed - Normal serum potassium on file in past 12 months     Recent Labs   Lab Test 03/23/21  1457   POTASSIUM 3.8             Passed - No positive pregnancy test within past 12 months         Coni Alston RN  ....................  1/20/2022   10:01 AM      "

## 2022-02-24 ENCOUNTER — OFFICE VISIT (OUTPATIENT)
Dept: FAMILY MEDICINE | Facility: OTHER | Age: 82
End: 2022-02-24
Attending: NURSE PRACTITIONER
Payer: MEDICARE

## 2022-02-24 ENCOUNTER — HOSPITAL ENCOUNTER (OUTPATIENT)
Dept: GENERAL RADIOLOGY | Facility: OTHER | Age: 82
End: 2022-02-24
Attending: NURSE PRACTITIONER
Payer: MEDICARE

## 2022-02-24 VITALS
DIASTOLIC BLOOD PRESSURE: 76 MMHG | RESPIRATION RATE: 16 BRPM | BODY MASS INDEX: 32.56 KG/M2 | SYSTOLIC BLOOD PRESSURE: 138 MMHG | TEMPERATURE: 99.1 F | OXYGEN SATURATION: 100 % | HEART RATE: 79 BPM | WEIGHT: 178 LBS

## 2022-02-24 DIAGNOSIS — R10.11 RUQ ABDOMINAL PAIN: ICD-10-CM

## 2022-02-24 DIAGNOSIS — R07.81 RIB PAIN ON RIGHT SIDE: ICD-10-CM

## 2022-02-24 DIAGNOSIS — R07.81 RIB PAIN ON RIGHT SIDE: Primary | ICD-10-CM

## 2022-02-24 PROBLEM — D62 POSTOPERATIVE ANEMIA DUE TO ACUTE BLOOD LOSS: Status: ACTIVE | Noted: 2019-04-19

## 2022-02-24 LAB
ALBUMIN SERPL-MCNC: 4.4 G/DL (ref 3.5–5.7)
ALP SERPL-CCNC: 50 U/L (ref 34–104)
ALT SERPL W P-5'-P-CCNC: 15 U/L (ref 7–52)
AMYLASE SERPL-CCNC: 47 U/L (ref 29–103)
ANION GAP SERPL CALCULATED.3IONS-SCNC: 9 MMOL/L (ref 3–14)
AST SERPL W P-5'-P-CCNC: 19 U/L (ref 13–39)
BASOPHILS # BLD AUTO: 0 10E3/UL (ref 0–0.2)
BASOPHILS NFR BLD AUTO: 1 %
BILIRUB SERPL-MCNC: 0.4 MG/DL (ref 0.3–1)
BUN SERPL-MCNC: 17 MG/DL (ref 7–25)
CALCIUM SERPL-MCNC: 9.4 MG/DL (ref 8.6–10.3)
CHLORIDE BLD-SCNC: 104 MMOL/L (ref 98–107)
CO2 SERPL-SCNC: 24 MMOL/L (ref 21–31)
CREAT SERPL-MCNC: 0.76 MG/DL (ref 0.6–1.2)
EOSINOPHIL # BLD AUTO: 0.2 10E3/UL (ref 0–0.7)
EOSINOPHIL NFR BLD AUTO: 4 %
ERYTHROCYTE [DISTWIDTH] IN BLOOD BY AUTOMATED COUNT: 12.4 % (ref 10–15)
GFR SERPL CREATININE-BSD FRML MDRD: 78 ML/MIN/1.73M2
GLUCOSE BLD-MCNC: 114 MG/DL (ref 70–105)
HCT VFR BLD AUTO: 34.6 % (ref 35–47)
HGB BLD-MCNC: 12 G/DL (ref 11.7–15.7)
IMM GRANULOCYTES # BLD: 0 10E3/UL
IMM GRANULOCYTES NFR BLD: 0 %
LIPASE SERPL-CCNC: 35 U/L (ref 11–82)
LYMPHOCYTES # BLD AUTO: 2.3 10E3/UL (ref 0.8–5.3)
LYMPHOCYTES NFR BLD AUTO: 39 %
MCH RBC QN AUTO: 30.1 PG (ref 26.5–33)
MCHC RBC AUTO-ENTMCNC: 34.7 G/DL (ref 31.5–36.5)
MCV RBC AUTO: 87 FL (ref 78–100)
MONOCYTES # BLD AUTO: 0.4 10E3/UL (ref 0–1.3)
MONOCYTES NFR BLD AUTO: 7 %
NEUTROPHILS # BLD AUTO: 2.9 10E3/UL (ref 1.6–8.3)
NEUTROPHILS NFR BLD AUTO: 49 %
NRBC # BLD AUTO: 0 10E3/UL
NRBC BLD AUTO-RTO: 0 /100
PLATELET # BLD AUTO: 199 10E3/UL (ref 150–450)
POTASSIUM BLD-SCNC: 4.1 MMOL/L (ref 3.5–5.1)
PROT SERPL-MCNC: 7.2 G/DL (ref 6.4–8.9)
RBC # BLD AUTO: 3.99 10E6/UL (ref 3.8–5.2)
SODIUM SERPL-SCNC: 137 MMOL/L (ref 134–144)
WBC # BLD AUTO: 5.8 10E3/UL (ref 4–11)

## 2022-02-24 PROCEDURE — 36415 COLL VENOUS BLD VENIPUNCTURE: CPT | Mod: ZL | Performed by: NURSE PRACTITIONER

## 2022-02-24 PROCEDURE — 82150 ASSAY OF AMYLASE: CPT | Mod: ZL | Performed by: NURSE PRACTITIONER

## 2022-02-24 PROCEDURE — 99215 OFFICE O/P EST HI 40 MIN: CPT | Performed by: NURSE PRACTITIONER

## 2022-02-24 PROCEDURE — G0463 HOSPITAL OUTPT CLINIC VISIT: HCPCS | Mod: 25

## 2022-02-24 PROCEDURE — G0463 HOSPITAL OUTPT CLINIC VISIT: HCPCS

## 2022-02-24 PROCEDURE — 71046 X-RAY EXAM CHEST 2 VIEWS: CPT

## 2022-02-24 PROCEDURE — 80053 COMPREHEN METABOLIC PANEL: CPT | Mod: ZL | Performed by: NURSE PRACTITIONER

## 2022-02-24 PROCEDURE — 83690 ASSAY OF LIPASE: CPT | Mod: ZL | Performed by: NURSE PRACTITIONER

## 2022-02-24 PROCEDURE — 85025 COMPLETE CBC W/AUTO DIFF WBC: CPT | Mod: ZL | Performed by: NURSE PRACTITIONER

## 2022-02-24 ASSESSMENT — PAIN SCALES - GENERAL: PAINLEVEL: MODERATE PAIN (4)

## 2022-02-24 NOTE — PROGRESS NOTES
"  Assessment & Plan   Problem List Items Addressed This Visit     None      Visit Diagnoses     Rib pain on right side    -  Primary    Relevant Orders    XR Chest 2 Views (Completed)    CT Chest w/o Contrast    RUQ abdominal pain        Relevant Orders    Comprehensive Metabolic Panel (Completed)    Lipase (Completed)    Amylase (Completed)    CBC and Differential (Completed)    CT Abdomen w/o & w Contrast      Chest x-ray is normal, CMP, amylase, lipase, CBC are within normal limits.  Given her pain that has been chronic and worsening we will move forward with CT scan with and without contrast of abdomen as well as CT of chest.  Did discuss with her primary care provider as well.  Plan to follow the test results when available.    Review of the result(s) of each unique test - CBC, amylase, lipase, CMP  Ordering of each unique test  46 minutes spent on the date of the encounter doing chart review, history and exam, documentation and further activities per the note       BMI:   Estimated body mass index is 32.56 kg/m  as calculated from the following:    Height as of 3/23/21: 1.575 m (5' 2\").    Weight as of this encounter: 80.7 kg (178 lb).     No follow-ups on file.    FRANKO [facility]   Maliha is a 81 year old who presents for the following health issues     Pain    History of Present Illness     Reason for visit:  Pain in late side  Symptom onset:  More than a month    She eats 4 or more servings of fruits and vegetables daily.She consumes 2 sweetened beverage(s) daily.She exercises with enough effort to increase her heart rate 60 or more minutes per day.  She exercises with enough effort to increase her heart rate 5 days per week.   She is taking medications regularly.       Concern - pain under right arm and abdomen  Onset: ongoing  Description: moderate pain under the arm and abdomen  Intensity: moderate  Progression of Symptoms:  worsening  Accompanying " Signs & Symptoms: worse after eating  Previous history of similar problem: none  Precipitating factors:        Worsened by: after eating   Alleviating factors:        Improved by: over time  Therapies tried and outcome:  none     She states that she has had pain to her right upper abdomen for the past 4 to 5 years.  She was in the day of her hospital proximally 4 years ago and had evaluation.  She states they did imaging which showed a blockage.  She does not have a gallbladder.  Pain is present all the time.  Denies any heartburn, denies nausea vomiting.  Pain does worsen with eating and drinking at times.  She does have chronic constipation and uses MiraLAX as needed.  She has noticed over the past 2 to 3 months some pain to the right side of her ribs under her right arm.  This is been painful when she wears a bra so she stopped wearing it.  She also has pain when she moves her arm.  She is been using Tylenol for symptomatic management.    Review of Systems   See above      Objective    /76   Pulse 79   Temp 99.1  F (37.3  C)   Resp 16   Wt 80.7 kg (178 lb)   SpO2 100%   BMI 32.56 kg/m    Body mass index is 32.56 kg/m .  Physical Exam   GENERAL: healthy, alert and no distress  RESP: lungs clear to auscultation - no rales, rhonchi or wheezes  CV: regular rate and rhythm, normal S1 S2, no S3 or S4, no murmur, click or rub, no peripheral edema and peripheral pulses strong  ABDOMEN: soft, right upper quadrant tenderness with palpation, no hepatosplenomegaly, no masses and bowel sounds normal  MS: Tender over right rib cage with palpation  SKIN: no suspicious lesions or rashes  NEURO: Normal strength and tone, mentation intact and speech normal  PSYCH: mentation appears normal, affect normal/bright    Results for orders placed or performed in visit on 02/24/22 (from the past 24 hour(s))   CBC and Differential    Narrative    The following orders were created for panel order CBC and Differential.  Procedure                                Abnormality         Status                     ---------                               -----------         ------                     CBC with platelets and d...[[id number]]  Abnormal            Final result                 Please view results for these tests on the individual orders.   Amylase   Result Value Ref Range    Amylase 47 29 - 103 U/L   Lipase   Result Value Ref Range    Lipase 35 11 - 82 U/L   Comprehensive Metabolic Panel   Result Value Ref Range    Sodium 137 134 - 144 mmol/L    Potassium 4.1 3.5 - 5.1 mmol/L    Chloride 104 98 - 107 mmol/L    Carbon Dioxide (CO2) 24 21 - 31 mmol/L    Anion Gap 9 3 - 14 mmol/L    Urea Nitrogen 17 7 - 25 mg/dL    Creatinine 0.76 0.60 - 1.20 mg/dL    Calcium 9.4 8.6 - 10.3 mg/dL    Glucose 114 (H) 70 - 105 mg/dL    Alkaline Phosphatase 50 34 - 104 U/L    AST 19 13 - 39 U/L    ALT 15 7 - 52 U/L    Protein Total 7.2 6.4 - 8.9 g/dL    Albumin 4.4 3.5 - 5.7 g/dL    Bilirubin Total 0.4 0.3 - 1.0 mg/dL    GFR Estimate 78 >60 mL/min/1.73m2   CBC with platelets and differential   Result Value Ref Range    WBC Count 5.8 4.0 - 11.0 10e3/uL    RBC Count 3.99 3.80 - 5.20 10e6/uL    Hemoglobin 12.0 11.7 - 15.7 g/dL    Hematocrit 34.6 (L) 35.0 - 47.0 %    MCV 87 78 - 100 fL    MCH 30.1 26.5 - 33.0 pg    MCHC 34.7 31.5 - 36.5 g/dL    RDW 12.4 10.0 - 15.0 %    Platelet Count 199 150 - 450 10e3/uL    % Neutrophils 49 %    % Lymphocytes 39 %    % Monocytes 7 %    % Eosinophils 4 %    % Basophils 1 %    % Immature Granulocytes 0 %    NRBCs per 100 WBC 0 <1 /100    Absolute Neutrophils 2.9 1.6 - 8.3 10e3/uL    Absolute Lymphocytes 2.3 0.8 - 5.3 10e3/uL    Absolute Monocytes 0.4 0.0 - 1.3 10e3/uL    Absolute Eosinophils 0.2 0.0 - 0.7 10e3/uL    Absolute Basophils 0.0 0.0 - 0.2 10e3/uL    Absolute Immature Granulocytes 0.0 <=0.4 10e3/uL    Absolute NRBCs 0.0 10e3/uL

## 2022-03-02 ENCOUNTER — HOSPITAL ENCOUNTER (OUTPATIENT)
Dept: CT IMAGING | Facility: OTHER | Age: 82
End: 2022-03-02
Attending: NURSE PRACTITIONER
Payer: MEDICARE

## 2022-03-02 DIAGNOSIS — R07.81 RIB PAIN ON RIGHT SIDE: ICD-10-CM

## 2022-03-02 DIAGNOSIS — R10.11 RUQ ABDOMINAL PAIN: ICD-10-CM

## 2022-03-02 PROCEDURE — 250N000011 HC RX IP 250 OP 636: Performed by: NURSE PRACTITIONER

## 2022-03-02 PROCEDURE — 71260 CT THORAX DX C+: CPT

## 2022-03-02 PROCEDURE — 74177 CT ABD & PELVIS W/CONTRAST: CPT

## 2022-03-02 RX ORDER — IOPAMIDOL 755 MG/ML
100 INJECTION, SOLUTION INTRAVASCULAR ONCE
Status: COMPLETED | OUTPATIENT
Start: 2022-03-02 | End: 2022-03-02

## 2022-03-02 RX ADMIN — IOPAMIDOL 100 ML: 755 INJECTION, SOLUTION INTRAVENOUS at 11:58

## 2022-03-08 ENCOUNTER — TELEPHONE (OUTPATIENT)
Dept: FAMILY MEDICINE | Facility: OTHER | Age: 82
End: 2022-03-08
Payer: COMMERCIAL

## 2022-03-08 NOTE — TELEPHONE ENCOUNTER
Chest CT was clear with no concerns noted.  Abdomen/pelvic CT showed no concerns or issues either.  Essentially, these scans were normal. FRANKO Adams CNP on 3/8/2022 at 2:42 PM

## 2022-03-08 NOTE — TELEPHONE ENCOUNTER
CT of chest and abdomen was ordered by Justine Novoa. Please review results.     Marlene Tsang CMA on 3/8/2022 at 1:40 PM

## 2022-03-19 ENCOUNTER — HEALTH MAINTENANCE LETTER (OUTPATIENT)
Age: 82
End: 2022-03-19

## 2022-03-24 ENCOUNTER — OFFICE VISIT (OUTPATIENT)
Dept: FAMILY MEDICINE | Facility: OTHER | Age: 82
End: 2022-03-24
Attending: FAMILY MEDICINE
Payer: COMMERCIAL

## 2022-03-24 VITALS
RESPIRATION RATE: 18 BRPM | DIASTOLIC BLOOD PRESSURE: 78 MMHG | TEMPERATURE: 98 F | BODY MASS INDEX: 32.68 KG/M2 | SYSTOLIC BLOOD PRESSURE: 138 MMHG | OXYGEN SATURATION: 98 % | WEIGHT: 177.6 LBS | HEIGHT: 62 IN | HEART RATE: 67 BPM

## 2022-03-24 DIAGNOSIS — E11.9 TYPE 2 DIABETES MELLITUS WITHOUT COMPLICATION, WITHOUT LONG-TERM CURRENT USE OF INSULIN (H): ICD-10-CM

## 2022-03-24 DIAGNOSIS — Z00.00 ENCOUNTER FOR MEDICARE ANNUAL WELLNESS EXAM: Primary | ICD-10-CM

## 2022-03-24 DIAGNOSIS — M47.894 THORACIC FACET JOINT SYNDROME: ICD-10-CM

## 2022-03-24 DIAGNOSIS — Z12.31 VISIT FOR SCREENING MAMMOGRAM: ICD-10-CM

## 2022-03-24 DIAGNOSIS — M19.91 PRIMARY OSTEOARTHRITIS, UNSPECIFIED SITE: ICD-10-CM

## 2022-03-24 DIAGNOSIS — D21.9 FIBROMA: ICD-10-CM

## 2022-03-24 DIAGNOSIS — I10 BENIGN ESSENTIAL HYPERTENSION: ICD-10-CM

## 2022-03-24 DIAGNOSIS — N32.81 OVERACTIVE BLADDER: ICD-10-CM

## 2022-03-24 DIAGNOSIS — E78.5 HYPERLIPIDEMIA, UNSPECIFIED HYPERLIPIDEMIA TYPE: ICD-10-CM

## 2022-03-24 DIAGNOSIS — K21.9 GASTROESOPHAGEAL REFLUX DISEASE WITHOUT ESOPHAGITIS: ICD-10-CM

## 2022-03-24 LAB
CHOLEST SERPL-MCNC: 215 MG/DL
CREAT UR-MCNC: 52 MG/DL
FASTING STATUS PATIENT QL REPORTED: YES
HBA1C MFR BLD: 6.8 % (ref 4–6.2)
HDLC SERPL-MCNC: 47 MG/DL (ref 23–92)
LDLC SERPL CALC-MCNC: 134 MG/DL
MICROALBUMIN UR-MCNC: 7 MG/L
MICROALBUMIN/CREAT UR: 13.46 MG/G CR (ref 0–25)
NONHDLC SERPL-MCNC: 168 MG/DL
TRIGL SERPL-MCNC: 168 MG/DL
TSH SERPL DL<=0.005 MIU/L-ACNC: 2.83 MU/L (ref 0.4–4)

## 2022-03-24 PROCEDURE — 80061 LIPID PANEL: CPT | Mod: ZL | Performed by: FAMILY MEDICINE

## 2022-03-24 PROCEDURE — 36415 COLL VENOUS BLD VENIPUNCTURE: CPT | Mod: ZL | Performed by: FAMILY MEDICINE

## 2022-03-24 PROCEDURE — 82043 UR ALBUMIN QUANTITATIVE: CPT | Mod: ZL | Performed by: FAMILY MEDICINE

## 2022-03-24 PROCEDURE — 99215 OFFICE O/P EST HI 40 MIN: CPT | Mod: 25 | Performed by: FAMILY MEDICINE

## 2022-03-24 PROCEDURE — G0463 HOSPITAL OUTPT CLINIC VISIT: HCPCS

## 2022-03-24 PROCEDURE — 84443 ASSAY THYROID STIM HORMONE: CPT | Mod: ZL | Performed by: FAMILY MEDICINE

## 2022-03-24 PROCEDURE — 83036 HEMOGLOBIN GLYCOSYLATED A1C: CPT | Mod: ZL | Performed by: FAMILY MEDICINE

## 2022-03-24 PROCEDURE — G0439 PPPS, SUBSEQ VISIT: HCPCS | Performed by: FAMILY MEDICINE

## 2022-03-24 RX ORDER — PRAVASTATIN SODIUM 20 MG
20 TABLET ORAL DAILY
Qty: 90 TABLET | Refills: 11 | Status: SHIPPED | OUTPATIENT
Start: 2022-03-24 | End: 2023-01-31

## 2022-03-24 RX ORDER — OMEPRAZOLE 40 MG/1
40 CAPSULE, DELAYED RELEASE ORAL DAILY
Qty: 90 CAPSULE | Refills: 11 | Status: SHIPPED | OUTPATIENT
Start: 2022-03-24 | End: 2023-01-31

## 2022-03-24 RX ORDER — CELECOXIB 200 MG/1
200 CAPSULE ORAL DAILY
Qty: 30 CAPSULE | Refills: 11 | Status: SHIPPED | OUTPATIENT
Start: 2022-03-24 | End: 2023-01-31 | Stop reason: SINTOL

## 2022-03-24 RX ORDER — OXYBUTYNIN CHLORIDE 5 MG/1
5 TABLET, EXTENDED RELEASE ORAL DAILY
Qty: 90 TABLET | Refills: 3 | Status: SHIPPED | OUTPATIENT
Start: 2022-03-24 | End: 2023-01-31

## 2022-03-24 ASSESSMENT — PAIN SCALES - GENERAL: PAINLEVEL: NO PAIN (0)

## 2022-03-24 ASSESSMENT — ACTIVITIES OF DAILY LIVING (ADL): CURRENT_FUNCTION: NO ASSISTANCE NEEDED

## 2022-03-24 ASSESSMENT — PATIENT HEALTH QUESTIONNAIRE - PHQ9
10. IF YOU CHECKED OFF ANY PROBLEMS, HOW DIFFICULT HAVE THESE PROBLEMS MADE IT FOR YOU TO DO YOUR WORK, TAKE CARE OF THINGS AT HOME, OR GET ALONG WITH OTHER PEOPLE: NOT DIFFICULT AT ALL
SUM OF ALL RESPONSES TO PHQ QUESTIONS 1-9: 10
SUM OF ALL RESPONSES TO PHQ QUESTIONS 1-9: 10

## 2022-03-24 NOTE — PROGRESS NOTES
"SUBJECTIVE:   Maliha Rivera is a 81 year old female who presents for Preventive Visit.      Patient has been advised of split billing requirements and indicates understanding: Yes  Are you in the first 12 months of your Medicare coverage?  No    Healthy Habits:     In general, how would you rate your overall health?  Good    Frequency of exercise:  2-3 days/week    Duration of exercise:  Less than 15 minutes    Do you usually eat at least 4 servings of fruit and vegetables a day, include whole grains    & fiber and avoid regularly eating high fat or \"junk\" foods?  Yes    Taking medications regularly:  Yes    Medication side effects:  Not applicable    Ability to successfully perform activities of daily living:  No assistance needed    Home Safety:  No safety concerns identified    Hearing Impairment:  No hearing concerns    In the past 6 months, have you been bothered by leaking of urine?  No    In general, how would you rate your overall mental or emotional health?  Excellent      PHQ-2 Total Score: 5    Additional concerns today:  Yes    Do you feel safe in your environment? Yes    Have you ever done Advance Care Planning? (For example, a Health Directive, POLST, or a discussion with a medical provider or your loved ones about your wishes): No, advance care planning information given to patient to review.  Patient declined advance care planning discussion at this time.       Fall risk  Fallen 2 or more times in the past year?: No  Any fall with injury in the past year?: No    Cognitive Screening   1) Repeat 3 items (Leader, Season, Table)    2) Clock draw: NORMAL  3) 3 item recall: Recalls 2 objects   Results: NORMAL clock, 1-2 items recalled: COGNITIVE IMPAIRMENT LESS LIKELY    Mini-CogTM Copyright SANDRA Castillo. Licensed by the author for use in [facility]; reprinted with permission (leticia@.[facility]). All rights reserved.      Do you have sleep apnea, excessive snoring or daytime drowsiness?: " "no    Reviewed and updated as needed this visit by clinical staff   Tobacco  Allergies  Meds   Med Hx  Surg Hx  Fam Hx  Soc Hx        Reviewed and updated as needed this visit by Provider                 Social History     Tobacco Use     Smoking status: Never Smoker     Smokeless tobacco: Never Used   Substance Use Topics     Alcohol use: No     If you drink alcohol do you typically have >3 drinks per day or >7 drinks per week? No    Alcohol Use 3/24/2022   Prescreen: >3 drinks/day or >7 drinks/week? No         Current providers sharing in care for this patient include:   Patient Care Team:  Nicanor Mike MD as PCP - General (Family Practice)  Nicanor Mike MD as Assigned PCP    The following health maintenance items are reviewed in Epic and correct as of today:  Health Maintenance Due   Topic Date Due     DEXA  Never done     ZOSTER IMMUNIZATION (1 of 2) Never done     MICROALBUMIN  12/05/2019     DIABETIC FOOT EXAM  12/05/2019     EYE EXAM  07/20/2021     A1C  09/23/2021     COVID-19 Vaccine (3 - Booster for Pfizer series) 03/15/2022     LIPID  03/23/2022     MEDICARE ANNUAL WELLNESS VISIT  03/23/2022     Labs reviewed in EPIC    Pertinent mammograms are reviewed under the imaging tab.    Review of Systems  See below    OBJECTIVE:   BP (!) 154/82   Pulse 67   Temp 98  F (36.7  C) (Temporal)   Resp 18   Ht 1.575 m (5' 2\")   Wt 80.6 kg (177 lb 9.6 oz)   SpO2 98%   Breastfeeding No   BMI 32.48 kg/m   Estimated body mass index is 32.48 kg/m  as calculated from the following:    Height as of this encounter: 1.575 m (5' 2\").    Weight as of this encounter: 80.6 kg (177 lb 9.6 oz).  Physical Exam  See below    Diagnostic Test Results:  Labs reviewed in Epic    ASSESSMENT / PLAN:   Maliha was seen today for medicare visit.    Diagnoses and all orders for this visit:    Encounter for Medicare annual wellness exam    Benign essential hypertension    Type 2 diabetes mellitus without complication, " "without long-term current use of insulin (H)  -     Hemoglobin A1c; Future  -     Albumin Random Urine Quantitative with Creat Ratio; Future  -     TSH Reflex GH; Future  -     Albumin Random Urine Quantitative with Creat Ratio  -     Hemoglobin A1c  -     Cancel: Albumin Random Urine Quantitative with Creat Ratio  -     TSH Reflex GH    Hyperlipidemia, unspecified hyperlipidemia type  -     Lipid Profile; Future  -     pravastatin (PRAVACHOL) 20 MG tablet; Take 1 tablet (20 mg) by mouth daily  -     Lipid Profile    Gastroesophageal reflux disease without esophagitis  -     omeprazole (PRILOSEC) 40 MG DR capsule; Take 1 capsule (40 mg) by mouth daily    Visit for screening mammogram  -     MA Screen Bilateral w/Raghav; Future    Primary osteoarthritis, unspecified site  -     celecoxib (CELEBREX) 200 MG capsule; Take 1 capsule (200 mg) by mouth daily For arthritis.    Overactive bladder  -     oxybutynin ER (DITROPAN XL) 5 MG 24 hr tablet; Take 1 tablet (5 mg) by mouth daily    Thoracic facet joint syndrome    Fibroma scalp        Patient has been advised of split billing requirements and indicates understanding: Yes    COUNSELING:  Reviewed preventive health counseling, as reflected in patient instructions       Regular exercise       Healthy diet/nutrition       Bladder control       Colon cancer screening    Estimated body mass index is 32.48 kg/m  as calculated from the following:    Height as of this encounter: 1.575 m (5' 2\").    Weight as of this encounter: 80.6 kg (177 lb 9.6 oz).    Weight management plan: Discussed healthy diet and exercise guidelines    She reports that she has never smoked. She has never used smokeless tobacco.      Appropriate preventive services were discussed with this patient, including applicable screening as appropriate for cardiovascular disease, diabetes, osteopenia/osteoporosis, and glaucoma.  As appropriate for age/gender, discussed screening for colorectal cancer, prostate " cancer, breast cancer, and cervical cancer. Checklist reviewing preventive services available has been given to the patient.    Reviewed patients plan of care and provided an AVS. The Basic Care Plan (routine screening as documented in Health Maintenance) for Maliha meets the Care Plan requirement. This Care Plan has been established and reviewed with the Patient.    Counseling Resources:  ATP IV Guidelines  Pooled Cohorts Equation Calculator  Breast Cancer Risk Calculator  Breast Cancer: Medication to Reduce Risk  FRAX Risk Assessment  ICSI Preventive Guidelines  Dietary Guidelines for Americans, 2010  [employer]'s MyPlate  ASA Prophylaxis  Lung CA Screening    Nicanor Mike MD  [facility]    Identified Health Risks:  Answers for HPI/ROS submitted by the patient on 3/24/2022  If you checked off any problems, how difficult have these problems made it for you to do your work, take care of things at home, or get along with other people?: Not difficult at all  PHQ9 TOTAL SCORE: 10

## 2022-03-24 NOTE — PROGRESS NOTES
"Nursing Notes:   Olivia Jean-Baptiste LPN  3/24/2022 11:14 AM  Signed  Chief Complaint   Patient presents with     Medicare Visit     annual       Initial BP (!) 154/82   Pulse 67   Temp 98  F (36.7  C) (Temporal)   Resp 18   Ht 1.575 m (5' 2\")   Wt 80.6 kg (177 lb 9.6 oz)   SpO2 98%   Breastfeeding No   BMI 32.48 kg/m   Estimated body mass index is 32.48 kg/m  as calculated from the following:    Height as of this encounter: 1.575 m (5' 2\").    Weight as of this encounter: 80.6 kg (177 lb 9.6 oz).  Medication Reconciliation: complete    FOOD SECURITY SCREENING QUESTIONS  Hunger Vital Signs:  Within the past 12 months we worried whether our food would run out before we got money to buy more. Never  Within the past 12 months the food we bought just didn't last and we didn't have money to get more. Never        Advance care directive on file? no  Advance care directive provided to patient? declined     Olivia Jean-Baptiste LPN      SUBJECTIVE:  Maliha Rivera  is a 81 year old female who comes in today for Medicare wellness and follow-up of her other medical problems.  She has hypertension for which she takes lisinopril 10 mg daily.  She is on aspirin and pravastatin and has diet-controlled type 2 diabetes.  She uses Prilosec for reflux and is on Celebrex for arthritis.  I last saw her about a year ago.  She has had COVID vaccine and is currently due for her booster.  She has had her flu shot and Pneumovax and tetanus and has not had Shingrix.     She was seen about a month ago for rib pain.  Chest x-ray, CMP, amylase, lipase, and CBC were normal.  She had CT of the chest abdomen and pelvis which were normal. It still bothers a bit. She still has pain after she eats. She has pain in her abdomen. She has been constipated. She takes Metamucil.  She has not tried a laxative.  Last colonoscopy was 10 years ago.  She wondered about that and we discussed the fact we typically would not screen her since she was over " 80 but that if her symptoms persist that that might be reasonable next step to do a diagnostic colonoscopy.    Blood pressure at home has been good.     She has some pain from time to time around her right shoulder.  Seems to radiate from the middle of her back.    She had a tick bite on the back of her neck on the right last fall.  It left her with a scaly bump that bothers her from time to time.    Past Medical, Family, and Social History reviewed and updated as noted below.   ROS is negative except as noted above       Allergies   Allergen Reactions     Atorvastatin Muscle Pain (Myalgia)     Cefazolin Nausea and Vomiting   ,   Family History   Problem Relation Age of Onset     Heart Disease Mother         Heart Disease     Heart Disease Father         Heart Disease     Heart Disease Sister         Heart Disease     Cancer Other         Cancer,female cancer   ,   Current Outpatient Medications   Medication     acetaminophen (TYLENOL) 325 MG tablet     aspirin (ASA) 81 MG chewable tablet     blood glucose monitoring (FREESTYLE LITE) test strip     blood glucose monitoring (FREESTYLE) lancets     Blood Glucose Monitoring Suppl (FIFTY50 GLUCOSE METER 2.0) W/DEVICE KIT     Blood Pressure Monitoring (ADULT BLOOD PRESSURE CUFF LG) KIT     celecoxib (CELEBREX) 200 MG capsule     COMPRESSION STOCKINGS     lisinopril (ZESTRIL) 10 MG tablet     melatonin 5 MG tablet     omeprazole (PRILOSEC) 40 MG DR capsule     order for DME     order for DME     oxybutynin ER (DITROPAN XL) 5 MG 24 hr tablet     pravastatin (PRAVACHOL) 20 MG tablet     PSYLLIUM PO     No current facility-administered medications for this visit.   ,   Past Medical History:   Diagnosis Date     Encounter for screening for cardiovascular disorders     4/8/15,Lexiscan cardiolite Phill Ruiz   ,   Patient Active Problem List    Diagnosis Date Noted     Postoperative anemia due to acute blood loss 04/19/2019     Priority: Medium     Benign essential  hypertension 04/18/2018     Priority: Medium     Lumbar stenosis 03/01/2018     Priority: Medium     Lumbar radiculopathy 01/23/2018     Priority: Medium     Status post total bilateral knee replacement 03/31/2017     Priority: Medium     Primary osteoarthritis of left knee 03/23/2017     Priority: Medium     DM w/o complication type II (H) 09/22/2015     Priority: Medium     Overview:   3/2014:  A1c fine off metformin - will discontinue as patient does not want to take it.  She is taking fish oil + other ingredients she states helps with DM  On ASA  Patient does not want to take lisinopril  Does not tolerate statins and has discontinued pravastatin; try niacin  No retinopathy on eye exam 4/2013 and 12/2014  Overview:   Overview:   3/2014:  A1c fine off metformin - will discontinue as patient does not want to take it.  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  Lipids:; declines statin - will try niacin and is on fish oil.  Mammogram:schedule  Pap: 3/2013 WNL - does not need again.  Tdap:3/2013  Vitamin D: takes supplement  pneumovax done.  Consider zoster vaccine.       Degenerative arthritis of knee 10/21/2010     Priority: Medium     Overview:   IMO Update 10/11       Cervicalgia 05/11/2010     Priority: Medium     Overview:   IMO Update 10/11       DDD (degenerative disc disease), lumbar 01/15/2010     Priority: Medium     Primary localized  "osteoarthrosis, lower leg 01/31/2008     Priority: Medium     Hyperlipidemia 07/22/2004     Priority: Medium     Overview:   IMO Update 10/11  Overview:   Overview:   IMO Update 10/11     ,   Past Surgical History:   Procedure Laterality Date     APPENDECTOMY OPEN      No Comments Provided     ARTHROSCOPY KNEE      2007,Joseluis Thomas M.D.     COLONOSCOPY      2003,2011,normal     ESOPHAGOSCOPY, GASTROSCOPY, DUODENOSCOPY (EGD), COMBINED      2/3/2016     LAPAROSCOPIC CHOLECYSTECTOMY      3/11/01,Dr. Salas     LAPAROSCOPIC TUBAL LIGATION      No Comments Provided     lumbar decompression L4-5  02/28/2018    Dr. France     OTHER SURGICAL HISTORY      1996,[id number],CATARACT EXTRACTION W/  INTRAOCULAR LENS IMPLANT,Bilateral     OTHER SURGICAL HISTORY      2001,14167.0,AK ERCP BILIARY OR PANC DUCT STENT EXCHANGE W DIL AND WIRE,stent removed.     OTHER SURGICAL HISTORY      2004,[id number],CARDIAC CATHETERIZATION,mild disease     OTHER SURGICAL HISTORY      1/31/07,[id number],OTHER,Left     OTHER SURGICAL HISTORY      02/27/2008,[id number],TOTAL KNEE ARTHROPLASTY,Right     OTHER SURGICAL HISTORY      2010,[id number],TOTAL KNEE ARTHROPLASTY,Left,Dr. Carson     OTHER SURGICAL HISTORY      06/05/2017,[id number],OTHER,Left,Dr. Joiner, [facility]     RELEASE CARPAL TUNNEL      2006     Revision right total knee arthroplasty   04/18/2019    Dr. Carson.  Due to loose implant tibial component     TONSILLECTOMY      age 12    and   Social History     Tobacco Use     Smoking status: Never Smoker     Smokeless tobacco: Never Used   Substance Use Topics     Alcohol use: No     OBJECTIVE:  /78   Pulse 67   Temp 98  F (36.7  C) (Temporal)   Resp 18   Ht 1.575 m (5' 2\")   Wt 80.6 kg (177 lb 9.6 oz)   SpO2 98%   Breastfeeding No   BMI 32.48 kg/m     EXAM:  General Appearance: Pleasant, alert, appropriate appearance for age. No acute distress  Head Exam: Normal. Normocephalic, atraumatic.  Eye Exam: PERRLA, EOMI, conjunctivae, sclerae normal.  Ear " Exam: Normal TM's bilaterally. Normal auditory canals and external ears. Non-tender.  Nose Exam: Normal external nose, mucus membranes, and septum.  OroPharynx Exam:  Dental hygiene adequate. Normal buccal mucosa. Normal pharynx.  Neck Exam:  Supple, no masses or nodes. No bruits  Thyroid Exam: No nodules or enlargement.  Chest/Respiratory Exam: Normal chest wall and respirations. Clear to auscultation.  Breast Exam: No dimpling, nipple retraction or discharge. No masses or nodes.  Cardiovascular Exam: Regular rate and rhythm. S1, S2, no murmur, click, gallop, or rubs.  Gastrointestinal Exam: Soft, non-tender, no masses or organomegaly.  Lymphatic Exam: Non-palpable nodes in neck,clavicular, axillary, or inguinal regions.  Musculoskeletal Exam: Back is straight and non-tender, full ROM of upper and lower extremities.  She has decreased rotational range of motion of the thoracic spine and some tenderness to palpation in the mid thoracic region on the right consistent with thoracic facet lock.  Foot Exam: Left and right foot: good pedal pulses, no lesions, nail hygiene good. Normal sensory testing with #10 monofilament.   Skin: no rash or abnormalities.  She does have a raised area of scar tissue that is a bit scaly on the occipital region on the right in her scalp.  Neurologic Exam:  normal gross motor, tone coordination and no tremor.  Psychiatric Exam: Alert and oriented - appropriate affect.     Results for orders placed or performed in visit on 03/24/22   Hemoglobin A1c     Status: Abnormal   Result Value Ref Range    Hemoglobin A1C 6.8 (H) 4.0 - 6.2 %   Lipid Profile     Status: Abnormal   Result Value Ref Range    Cholesterol 215 (H) <200 mg/dL    Triglycerides 168 (H) <150 mg/dL    Direct Measure HDL 47 23 - 92 mg/dL    LDL Cholesterol Calculated 134 (H) <=100 mg/dL    Non HDL Cholesterol 168 (H) <130 mg/dL    Patient Fasting > 8hrs? Yes     Narrative    Cholesterol  Desirable:  <200  mg/dL    Triglycerides  Normal:  Less than 150 mg/dL  Borderline High:  150-199 mg/dL  High:  200-499 mg/dL  Very High:  Greater than or equal to 500 mg/dL    Direct Measure HDL  Female:  Greater than or equal to 50 mg/dL   Male:  Greater than or equal to 40 mg/dL    LDL Cholesterol  Desirable:  <100mg/dL  Above Desirable:  100-129 mg/dL   Borderline High:  130-159 mg/dL   High:  160-189 mg/dL   Very High:  >= 190 mg/dL    Non HDL Cholesterol  Desirable:  130 mg/dL  Above Desirable:  130-159 mg/dL  Borderline High:  160-189 mg/dL  High:  190-219 mg/dL  Very High:  Greater than or equal to 220 mg/dL   TSH Reflex GH     Status: Normal   Result Value Ref Range    TSH 2.83 0.40 - 4.00 mU/L      ASSESSMENT/Plan :    Maliha was seen today for medicare visit.    Diagnoses and all orders for this visit:    Encounter for Medicare annual wellness exam    Benign essential hypertension    Type 2 diabetes mellitus without complication, without long-term current use of insulin (H)  -     Hemoglobin A1c; Future  -     Albumin Random Urine Quantitative with Creat Ratio; Future  -     TSH Reflex GH; Future  -     Albumin Random Urine Quantitative with Creat Ratio  -     Hemoglobin A1c  -     Cancel: Albumin Random Urine Quantitative with Creat Ratio  -     TSH Reflex GH    Hyperlipidemia, unspecified hyperlipidemia type  -     Lipid Profile; Future  -     pravastatin (PRAVACHOL) 20 MG tablet; Take 1 tablet (20 mg) by mouth daily  -     Lipid Profile    Gastroesophageal reflux disease without esophagitis  -     omeprazole (PRILOSEC) 40 MG DR capsule; Take 1 capsule (40 mg) by mouth daily    Visit for screening mammogram  -     MA Screen Bilateral w/Raghav; Future    Primary osteoarthritis, unspecified site  -     celecoxib (CELEBREX) 200 MG capsule; Take 1 capsule (200 mg) by mouth daily For arthritis.    Overactive bladder  -     oxybutynin ER (DITROPAN XL) 5 MG 24 hr tablet; Take 1 tablet (5 mg) by mouth daily    Thoracic facet  "joint syndrome    Fibroma scalp      Will notify of lab results when available. Discussed diet, exercise and healthy lifestyle changes. Continue current medications. Mammogram scheduled.  Discussed Senokot 2 tabs twice daily until results.  I think her symptoms are due to constipation.  If she is not better, then would consider diagnostic colonoscopy as a next step.    She may have some neuritic pain left over from the shingles in the right flank but the majority of her symptoms are about her shoulder are due to thoracic facet being locked.  She has seen Dr. Denver in the past and will make an appointment with him.    Offered removal of her scalp fibroma since its bothersome.  I could do it or we could refer her to general surgery.  She will let us know.    A total of 45 minutes was spent with the patient, reviewing records, tests, ordering medications, tests or procedures and documenting clinical information in the EHR in addition to Medicare Wellness.     Nicanor Mike MD            Answers for HPI/ROS submitted by the patient on 3/24/2022  If you checked off any problems, how difficult have these problems made it for you to do your work, take care of things at home, or get along with other people?: Not difficult at all  PHQ9 TOTAL SCORE: 10  In general, how would you rate your overall physical health?: good  Frequency of exercise:: 2-3 days/week  Do you usually eat at least 4 servings of fruit and vegetables a day, include whole grains & fiber, and avoid regularly eating high fat or \"junk\" foods? : Yes  Taking medications regularly:: Yes  Medication side effects:: Not applicable  Activities of Daily Living: no assistance needed  Home safety: no safety concerns identified  Hearing Impairment:: no hearing concerns  In the past 6 months, have you been bothered by leaking of urine?: No  In general, how would you rate your overall mental or emotional health?: excellent  Additional concerns today:: Yes  Duration of " exercise:: Less than 15 minutes

## 2022-03-24 NOTE — PATIENT INSTRUCTIONS
Senokot 2 tab twice daily until results then stop.  Continue the metamucil and drinking plenty of water.

## 2022-03-24 NOTE — NURSING NOTE
"Chief Complaint   Patient presents with     Medicare Visit     annual       Initial BP (!) 154/82   Pulse 67   Temp 98  F (36.7  C) (Temporal)   Resp 18   Ht 1.575 m (5' 2\")   Wt 80.6 kg (177 lb 9.6 oz)   SpO2 98%   Breastfeeding No   BMI 32.48 kg/m   Estimated body mass index is 32.48 kg/m  as calculated from the following:    Height as of this encounter: 1.575 m (5' 2\").    Weight as of this encounter: 80.6 kg (177 lb 9.6 oz).  Medication Reconciliation: complete    FOOD SECURITY SCREENING QUESTIONS  Hunger Vital Signs:  Within the past 12 months we worried whether our food would run out before we got money to buy more. Never  Within the past 12 months the food we bought just didn't last and we didn't have money to get more. Never        Advance care directive on file? no  Advance care directive provided to patient? declined     Olivia Jean-Baptiste LPN  "

## 2022-04-01 ENCOUNTER — HOSPITAL ENCOUNTER (OUTPATIENT)
Dept: MAMMOGRAPHY | Facility: OTHER | Age: 82
Discharge: HOME | End: 2022-04-01
Attending: FAMILY MEDICINE | Admitting: FAMILY MEDICINE
Payer: MEDICARE

## 2022-04-01 DIAGNOSIS — Z12.31 VISIT FOR SCREENING MAMMOGRAM: ICD-10-CM

## 2022-04-01 PROCEDURE — 77067 SCR MAMMO BI INCL CAD: CPT

## 2022-04-04 ENCOUNTER — TRANSFERRED RECORDS (OUTPATIENT)
Dept: HEALTH INFORMATION MANAGEMENT | Facility: OTHER | Age: 82
End: 2022-04-04
Payer: COMMERCIAL

## 2022-04-04 LAB — RETINOPATHY: NEGATIVE

## 2022-06-08 DIAGNOSIS — K21.9 GASTROESOPHAGEAL REFLUX DISEASE WITHOUT ESOPHAGITIS: ICD-10-CM

## 2022-06-09 RX ORDER — OMEPRAZOLE 40 MG/1
CAPSULE, DELAYED RELEASE ORAL
Qty: 90 CAPSULE | Refills: 0 | OUTPATIENT
Start: 2022-06-09

## 2022-06-09 NOTE — TELEPHONE ENCOUNTER
[facility] sent Rx request for the following:      Requested Prescriptions   Pending Prescriptions Disp Refills     omeprazole (PRILOSEC) 40 MG DR capsule [Pharmacy Med Name: Omeprazole 40 MG Oral Capsule Delayed Release] 90 capsule 0     Sig: Take 1 capsule by mouth once daily          Last Prescription Date:   3/24/2022  Last Fill Qty/Refills:         90, R-11    Last Office Visit:              3/24/2022   Future Office visit:           NONE    Unable to complete prescription refill per RN Medication Refill Policy. Redundant refill request refused: Too soon:      Britta Brooks RN on 6/9/2022 at 7:37 AM

## 2022-09-04 ENCOUNTER — HEALTH MAINTENANCE LETTER (OUTPATIENT)
Age: 82
End: 2022-09-04

## 2022-10-20 ENCOUNTER — TELEPHONE (OUTPATIENT)
Dept: FAMILY MEDICINE | Facility: OTHER | Age: 82
End: 2022-10-20

## 2022-10-20 DIAGNOSIS — M19.90 ARTHRITIS PAIN: Primary | ICD-10-CM

## 2022-10-20 NOTE — TELEPHONE ENCOUNTER
JVC - Reason for call: Medication or medication request    Name of medication requested: Naproxen 500 mg    Are you out of the medication? Yes    What pharmacy do you use? [facility]    Preferred method for responding to this message: Telephone Call    Phone number patient can be reached at: Home number on file [phone] (home)    If we cannot reach you directly, may we leave a detailed response at the number you provided? Yes    Patient states her friend gave her this medication and patient states it really helped her arthritis pain.  She is hoping PCP will prescribe it for her.

## 2022-10-21 RX ORDER — NAPROXEN 500 MG/1
500 TABLET ORAL 2 TIMES DAILY WITH MEALS
Qty: 180 TABLET | Refills: 0 | Status: SHIPPED | OUTPATIENT
Start: 2022-10-21 | End: 2023-01-31 | Stop reason: SINTOL

## 2022-10-21 NOTE — TELEPHONE ENCOUNTER
Pending Prescriptions:                       Disp   Refills    naproxen (NAPROSYN) 500 MG tablet         180 ta*3            Sig: Take 1 tablet (500 mg) by mouth 2 times daily           (with meals)    Medication has not been prescribed. Patient's friend gave her some of hers for arthritis pain and patient states it helped and would like a script sent for it.     Marlene Tsang CMA on 10/21/2022 at 9:58 AM

## 2022-10-21 NOTE — TELEPHONE ENCOUNTER
After the patient's name and date of birth was verified, the patient was told the below information.  Olivia Jean-Baptiste LPN..................10/21/2022   12:58 PM

## 2022-10-21 NOTE — TELEPHONE ENCOUNTER
I sent naproxen to the pharmacy.  Do not take with over-the-counter ibuprofen or Aleve.  Juliet Chavez PA-C.......... 10/21/2022 12:36 PM

## 2023-01-11 ENCOUNTER — TRANSFERRED RECORDS (OUTPATIENT)
Dept: MULTI SPECIALTY CLINIC | Facility: CLINIC | Age: 83
End: 2023-01-11

## 2023-01-11 LAB — RETINOPATHY: NORMAL

## 2023-01-14 ENCOUNTER — HOSPITAL ENCOUNTER (OUTPATIENT)
Dept: GENERAL RADIOLOGY | Facility: OTHER | Age: 83
Discharge: HOME | End: 2023-01-14
Attending: NURSE PRACTITIONER
Payer: MEDICARE

## 2023-01-14 ENCOUNTER — OFFICE VISIT (OUTPATIENT)
Dept: FAMILY MEDICINE | Facility: OTHER | Age: 83
End: 2023-01-14
Attending: NURSE PRACTITIONER
Payer: MEDICARE

## 2023-01-14 VITALS
HEART RATE: 81 BPM | TEMPERATURE: 99.6 F | OXYGEN SATURATION: 97 % | WEIGHT: 169.1 LBS | BODY MASS INDEX: 31.12 KG/M2 | DIASTOLIC BLOOD PRESSURE: 50 MMHG | HEIGHT: 62 IN | RESPIRATION RATE: 16 BRPM | SYSTOLIC BLOOD PRESSURE: 150 MMHG

## 2023-01-14 DIAGNOSIS — E11.9 TYPE 2 DIABETES MELLITUS WITHOUT COMPLICATION, WITHOUT LONG-TERM CURRENT USE OF INSULIN (H): ICD-10-CM

## 2023-01-14 DIAGNOSIS — J06.9 VIRAL URI WITH COUGH: Primary | ICD-10-CM

## 2023-01-14 DIAGNOSIS — R05.9 COUGH IN ADULT: ICD-10-CM

## 2023-01-14 PROCEDURE — G0463 HOSPITAL OUTPT CLINIC VISIT: HCPCS | Mod: 25

## 2023-01-14 PROCEDURE — 99213 OFFICE O/P EST LOW 20 MIN: CPT | Performed by: NURSE PRACTITIONER

## 2023-01-14 PROCEDURE — 71046 X-RAY EXAM CHEST 2 VIEWS: CPT

## 2023-01-14 PROCEDURE — G0463 HOSPITAL OUTPT CLINIC VISIT: HCPCS

## 2023-01-14 RX ORDER — BENZONATATE 100 MG/1
100 CAPSULE ORAL 2 TIMES DAILY PRN
Qty: 20 CAPSULE | Refills: 0 | Status: SHIPPED | OUTPATIENT
Start: 2023-01-14 | End: 2023-01-31

## 2023-01-14 ASSESSMENT — PAIN SCALES - GENERAL: PAINLEVEL: NO PAIN (0)

## 2023-01-14 NOTE — NURSING NOTE
Pt here for a cough and low grade temp for a week.  Danyell Mccormack CMA ([facility])......................1/14/2023  1:56 PM       Medication Reconciliation: complete    Danyell Mccormack CMA  1/14/2023 1:56 PM

## 2023-01-14 NOTE — PROGRESS NOTES
ASSESSMENT/PLAN:     I have reviewed the nursing notes.  I have reviewed the findings, diagnosis, plan and need for follow up with the patient.      1. Cough in adult    - XR Chest 2 Views  - benzonatate (TESSALON) 100 MG capsule; Take 1 capsule (100 mg) by mouth 2 times daily as needed for cough  Dispense: 20 capsule; Refill: 0    2. Viral URI with cough    CXR completed and reviewed, no infiltrate appreciated, radiologist over read:  No acute cardiopulmonary disease.      Discussed xray results with patient and reassurance provided.   Discussed with patient that symptoms and exam are consistent with viral illness.    No clinical indications for antibiotic treatment at this time.    Symptoms for the past week so no clinical indication for influenza or covid testing at this time.    Recommend using Zyrtec 1 tab daily PRN for runny nose and sneezing    Symptomatic treatment - Encouraged fluids, salt water gargles, honey, elevation, humidifier, saline nasal spray, sinus rinse/netti pot, lozenges, tea, soup, smoothies, popsicles, topical vapor rub, rest, etc     Discussed warning signs/symptoms indicative of need to f/u  Follow up if symptoms persist or worsen or concerns    3. Type 2 diabetes mellitus without complication, without long-term current use of insulin (H)    - blood glucose monitoring (NO BRAND SPECIFIED) meter device kit; Use to test blood sugar 2 times daily or as directed.  Dispense: 1 kit; Refill: 0    Patient's blood glucose monitoring meter broke and patient needs a new one so replacement ordered.         I explained my diagnostic considerations and recommendations to the patient, who voiced understanding and agreement with the treatment plan. All questions were answered. We discussed potential side effects of any prescribed or recommended therapies, as well as expectations for response to treatments.    Dara Irving NP  [facility] [place]      SUBJECTIVE:   Maliha Rivera is a  82 year old female who presents to clinic today for the following health issues:  Cough    HPI  Cough, runny nose, sneezing, chills, sweats, and fatigue for the past week.  Cough is non productive.  Intermittent hard time catching her breath with coughing.  No chest heaviness.  No shortness of breath with activity.  Low grade fevers for the past week, around 99+  Sore throat initially, resolved.  Headache once, resolved.  Appetite has been minimal.  Drinks fluids poorly.   Exposure:  Her daughter was sick with same symptoms   Using Mucinex and topical vicks       Past Medical History:   Diagnosis Date     Encounter for screening for cardiovascular disorders     4/8/15,Lexiscan cardiolite [facility]     Past Surgical History:   Procedure Laterality Date     APPENDECTOMY OPEN      No Comments Provided     ARTHROSCOPY KNEE      2007,Joseluis Thomas M.D.     COLONOSCOPY      2003,2011,normal     ESOPHAGOSCOPY, GASTROSCOPY, DUODENOSCOPY (EGD), COMBINED      2/3/2016     LAPAROSCOPIC CHOLECYSTECTOMY      3/11/01,Dr. Salas     LAPAROSCOPIC TUBAL LIGATION      No Comments Provided     lumbar decompression L4-5  02/28/2018    Dr. France     OTHER SURGICAL HISTORY      1996,[id number],CATARACT EXTRACTION W/  INTRAOCULAR LENS IMPLANT,Bilateral     OTHER SURGICAL HISTORY      2001,47085.0,ND ERCP BILIARY OR PANC DUCT STENT EXCHANGE W DIL AND WIRE,stent removed.     OTHER SURGICAL HISTORY      2004,[id number],CARDIAC CATHETERIZATION,mild disease     OTHER SURGICAL HISTORY      1/31/07,[id number],OTHER,Left     OTHER SURGICAL HISTORY      02/27/2008,[id number],TOTAL KNEE ARTHROPLASTY,Right     OTHER SURGICAL HISTORY      2010,[id number],TOTAL KNEE ARTHROPLASTY,Left,Dr. Carson     OTHER SURGICAL HISTORY      06/05/2017,[id number],OTHER,[facility]     RELEASE CARPAL TUNNEL      2006     Revision right total knee arthroplasty   04/18/2019    Dr. Carson.  Due to loose implant tibial component     TONSILLECTOMY      age 12     Social  History     Tobacco Use     Smoking status: Never     Passive exposure: Never     Smokeless tobacco: Never   Substance Use Topics     Alcohol use: No     Current Outpatient Medications   Medication Sig Dispense Refill     acetaminophen (TYLENOL) 325 MG tablet Take 650 mg by mouth       aspirin (ASA) 81 MG chewable tablet        blood glucose (NO BRAND SPECIFIED) test strip Dispense item covered by pt ins. E11.9 NIDDM type II - Test 1 time/day       blood glucose monitoring (FREESTYLE) lancets As directed. Dispense item covered by pt ins. E11.9 NIDDM type II - Test 1 time/day       Blood Glucose Monitoring Suppl (FIFTY50 GLUCOSE METER 2.0) W/DEVICE KIT Dispense meter, test strips, lancets covered by pt ins. E11.9 NIDDM type II - Test 1 time/day       Blood Pressure Monitoring (ADULT BLOOD PRESSURE CUFF LG) KIT Use for home blood pressure monitoring as directed. 1 kit 0     celecoxib (CELEBREX) 200 MG capsule Take 1 capsule (200 mg) by mouth daily For arthritis. 30 capsule 11     COMPRESSION STOCKINGS For personal use. Length: calf Strength: 16-20 mmHg Please measure.       lisinopril (ZESTRIL) 10 MG tablet Take 1 tablet by mouth once daily 90 tablet 3     melatonin 5 MG tablet Take 5 mg by mouth       naproxen (NAPROSYN) 500 MG tablet Take 1 tablet (500 mg) by mouth 2 times daily (with meals) 180 tablet 0     omeprazole (PRILOSEC) 40 MG DR capsule Take 1 capsule (40 mg) by mouth daily 90 capsule 11     pravastatin (PRAVACHOL) 20 MG tablet Take 1 tablet (20 mg) by mouth daily 90 tablet 11     order for DME Equipment being ordered: Evaluate at [facility] for a knee brace 1 Units 0     [facility] and/or [facility]oles 1 Units prn     oxybutynin ER (DITROPAN XL) 5 MG 24 hr tablet Take 1 tablet (5 mg) by mouth daily (Patient not taking: Reported on 1/14/2023) 90 tablet 3     PSYLLIUM PO Take 3 tsp. by mouth       Allergies   Allergen Reactions  "    Atorvastatin Muscle Pain (Myalgia)     Cefazolin Nausea and Vomiting         Past medical history, past surgical history, current medications and allergies reviewed and accurate to the best of my knowledge.        OBJECTIVE:     BP (!) 150/50 (BP Location: Right arm, Patient Position: Sitting, Cuff Size: Adult Large)   Pulse 81   Temp 99.6  F (37.6  C) (Tympanic)   Resp 16   Ht 1.575 m (5' 2\")   Wt 76.7 kg (169 lb 1.6 oz)   SpO2 97%   BMI 30.93 kg/m    Body mass index is 30.93 kg/m .     Physical Exam  General Appearance: Well appearing elderly female, appropriate appearance for age. No acute distress  Orophayrnx: voice clear.    Nose:  No noted drainage or congestion   Neck: supple without adenopathy  Respiratory: normal chest wall and respirations.  Normal effort.  Clear to auscultation bilaterally, no wheezing, crackles or rhonchi.  No increased work of breathing.  Frequent dry cough appreciated.  Cardiac: RRR with no murmurs  Musculoskeletal:  Equal movement of bilateral upper extremities.  Equal movement of bilateral lower extremities.  Normal gait.    Psychological: normal affect, alert, oriented, and pleasant.       Imaging:  Results for orders placed or performed in visit on 01/14/23   XR Chest 2 Views     Status: None    Narrative    Exam:  XR CHEST 2 VIEWS    HISTORY: Cough in adult.    COMPARISON:  3/2/2022, 2/24/2020    FINDINGS:     The cardiomediastinal contours are normal.      No focal consolidation, effusion, or pneumothorax.      No acute osseous abnormality.       Impression    IMPRESSION:      No acute cardiopulmonary process.      SARAH JEFFERSON MD         SYSTEM ID:  [id number]       "

## 2023-01-15 ENCOUNTER — HEALTH MAINTENANCE LETTER (OUTPATIENT)
Age: 83
End: 2023-01-15

## 2023-01-18 DIAGNOSIS — E11.9 TYPE 2 DIABETES MELLITUS WITHOUT COMPLICATION, WITHOUT LONG-TERM CURRENT USE OF INSULIN (H): Primary | ICD-10-CM

## 2023-01-18 NOTE — TELEPHONE ENCOUNTER
Note from pharmacy regarding the following:    blood glucose monitoring (NO BRAND SPECIFIED) meter device kit 1 kit 0 1/14/2023  --   Sig: Use to test blood sugar 2 times daily or as directed.     Need an Rx for test strips, lancets, meter- each need their own Rx. Insulin dependent= 3x day testing, Non-insulin= 1x day testing.    Samira Montano RN .............. 1/18/2023  11:12 AM

## 2023-01-31 ENCOUNTER — HOSPITAL ENCOUNTER (OUTPATIENT)
Dept: GENERAL RADIOLOGY | Facility: OTHER | Age: 83
Discharge: HOME | End: 2023-01-31
Attending: FAMILY MEDICINE
Payer: MEDICARE

## 2023-01-31 ENCOUNTER — OFFICE VISIT (OUTPATIENT)
Dept: FAMILY MEDICINE | Facility: OTHER | Age: 83
End: 2023-01-31
Attending: FAMILY MEDICINE
Payer: MEDICARE

## 2023-01-31 VITALS
OXYGEN SATURATION: 99 % | BODY MASS INDEX: 31.83 KG/M2 | SYSTOLIC BLOOD PRESSURE: 152 MMHG | DIASTOLIC BLOOD PRESSURE: 76 MMHG | TEMPERATURE: 98.6 F | RESPIRATION RATE: 16 BRPM | WEIGHT: 174 LBS | HEART RATE: 68 BPM

## 2023-01-31 DIAGNOSIS — S80.01XA CONTUSION OF RIGHT KNEE, INITIAL ENCOUNTER: Primary | ICD-10-CM

## 2023-01-31 DIAGNOSIS — M75.42 IMPINGEMENT SYNDROME, SHOULDER, LEFT: ICD-10-CM

## 2023-01-31 DIAGNOSIS — K21.9 GASTROESOPHAGEAL REFLUX DISEASE WITHOUT ESOPHAGITIS: ICD-10-CM

## 2023-01-31 DIAGNOSIS — E78.5 HYPERLIPIDEMIA, UNSPECIFIED HYPERLIPIDEMIA TYPE: ICD-10-CM

## 2023-01-31 DIAGNOSIS — M70.62 TROCHANTERIC BURSITIS OF LEFT HIP: ICD-10-CM

## 2023-01-31 DIAGNOSIS — E11.9 TYPE 2 DIABETES MELLITUS WITHOUT COMPLICATION, WITHOUT LONG-TERM CURRENT USE OF INSULIN (H): ICD-10-CM

## 2023-01-31 DIAGNOSIS — N32.81 OVERACTIVE BLADDER: ICD-10-CM

## 2023-01-31 DIAGNOSIS — S80.01XA CONTUSION OF RIGHT KNEE, INITIAL ENCOUNTER: ICD-10-CM

## 2023-01-31 DIAGNOSIS — M19.90 ARTHRITIS PAIN: ICD-10-CM

## 2023-01-31 DIAGNOSIS — I10 BENIGN ESSENTIAL HYPERTENSION: ICD-10-CM

## 2023-01-31 DIAGNOSIS — M19.91 PRIMARY OSTEOARTHRITIS, UNSPECIFIED SITE: ICD-10-CM

## 2023-01-31 DIAGNOSIS — K29.00 ACUTE GASTRITIS WITHOUT HEMORRHAGE, UNSPECIFIED GASTRITIS TYPE: ICD-10-CM

## 2023-01-31 DIAGNOSIS — M47.894 THORACIC FACET SYNDROME: ICD-10-CM

## 2023-01-31 LAB
ALBUMIN SERPL BCG-MCNC: 4.4 G/DL (ref 3.5–5.2)
ALP SERPL-CCNC: 70 U/L (ref 35–104)
ALT SERPL W P-5'-P-CCNC: 16 U/L (ref 10–35)
ANION GAP SERPL CALCULATED.3IONS-SCNC: 9 MMOL/L (ref 7–15)
AST SERPL W P-5'-P-CCNC: 20 U/L (ref 10–35)
BASOPHILS # BLD AUTO: 0 10E3/UL (ref 0–0.2)
BASOPHILS NFR BLD AUTO: 1 %
BILIRUB SERPL-MCNC: 0.3 MG/DL
BUN SERPL-MCNC: 11.6 MG/DL (ref 8–23)
CALCIUM SERPL-MCNC: 9.3 MG/DL (ref 8.8–10.2)
CHLORIDE SERPL-SCNC: 107 MMOL/L (ref 98–107)
CHOLEST SERPL-MCNC: 204 MG/DL
CREAT SERPL-MCNC: 0.58 MG/DL (ref 0.51–0.95)
DEPRECATED HCO3 PLAS-SCNC: 26 MMOL/L (ref 22–29)
EOSINOPHIL # BLD AUTO: 0.2 10E3/UL (ref 0–0.7)
EOSINOPHIL NFR BLD AUTO: 4 %
ERYTHROCYTE [DISTWIDTH] IN BLOOD BY AUTOMATED COUNT: 12.6 % (ref 10–15)
GFR SERPL CREATININE-BSD FRML MDRD: 90 ML/MIN/1.73M2
GLUCOSE SERPL-MCNC: 108 MG/DL (ref 70–99)
HBA1C MFR BLD: 6.6 % (ref 4–6.2)
HCT VFR BLD AUTO: 34.3 % (ref 35–47)
HDLC SERPL-MCNC: 59 MG/DL
HGB BLD-MCNC: 11.9 G/DL (ref 11.7–15.7)
IMM GRANULOCYTES # BLD: 0 10E3/UL
IMM GRANULOCYTES NFR BLD: 0 %
LDLC SERPL CALC-MCNC: 120 MG/DL
LYMPHOCYTES # BLD AUTO: 2 10E3/UL (ref 0.8–5.3)
LYMPHOCYTES NFR BLD AUTO: 38 %
MCH RBC QN AUTO: 29.8 PG (ref 26.5–33)
MCHC RBC AUTO-ENTMCNC: 34.7 G/DL (ref 31.5–36.5)
MCV RBC AUTO: 86 FL (ref 78–100)
MONOCYTES # BLD AUTO: 0.4 10E3/UL (ref 0–1.3)
MONOCYTES NFR BLD AUTO: 7 %
NEUTROPHILS # BLD AUTO: 2.7 10E3/UL (ref 1.6–8.3)
NEUTROPHILS NFR BLD AUTO: 50 %
NONHDLC SERPL-MCNC: 145 MG/DL
NRBC # BLD AUTO: 0 10E3/UL
NRBC BLD AUTO-RTO: 0 /100
PLATELET # BLD AUTO: 203 10E3/UL (ref 150–450)
POTASSIUM SERPL-SCNC: 4.2 MMOL/L (ref 3.4–5.3)
PROT SERPL-MCNC: 7 G/DL (ref 6.4–8.3)
RBC # BLD AUTO: 3.99 10E6/UL (ref 3.8–5.2)
SODIUM SERPL-SCNC: 142 MMOL/L (ref 136–145)
TRIGL SERPL-MCNC: 126 MG/DL
TSH SERPL DL<=0.005 MIU/L-ACNC: 2.78 UIU/ML (ref 0.3–4.2)
WBC # BLD AUTO: 5.3 10E3/UL (ref 4–11)

## 2023-01-31 PROCEDURE — 36415 COLL VENOUS BLD VENIPUNCTURE: CPT | Mod: ZL | Performed by: FAMILY MEDICINE

## 2023-01-31 PROCEDURE — 250N000009 HC RX 250: Performed by: FAMILY MEDICINE

## 2023-01-31 PROCEDURE — 20610 DRAIN/INJ JOINT/BURSA W/O US: CPT | Performed by: FAMILY MEDICINE

## 2023-01-31 PROCEDURE — 84443 ASSAY THYROID STIM HORMONE: CPT | Mod: ZL | Performed by: FAMILY MEDICINE

## 2023-01-31 PROCEDURE — 80061 LIPID PANEL: CPT | Mod: ZL | Performed by: FAMILY MEDICINE

## 2023-01-31 PROCEDURE — 85025 COMPLETE CBC W/AUTO DIFF WBC: CPT | Mod: ZL | Performed by: FAMILY MEDICINE

## 2023-01-31 PROCEDURE — 250N000011 HC RX IP 250 OP 636: Performed by: FAMILY MEDICINE

## 2023-01-31 PROCEDURE — G0463 HOSPITAL OUTPT CLINIC VISIT: HCPCS | Mod: 25

## 2023-01-31 PROCEDURE — 73502 X-RAY EXAM HIP UNI 2-3 VIEWS: CPT

## 2023-01-31 PROCEDURE — 83036 HEMOGLOBIN GLYCOSYLATED A1C: CPT | Mod: ZL | Performed by: FAMILY MEDICINE

## 2023-01-31 PROCEDURE — 73562 X-RAY EXAM OF KNEE 3: CPT | Mod: RT

## 2023-01-31 PROCEDURE — 73030 X-RAY EXAM OF SHOULDER: CPT | Mod: LT

## 2023-01-31 PROCEDURE — 80053 COMPREHEN METABOLIC PANEL: CPT | Mod: ZL | Performed by: FAMILY MEDICINE

## 2023-01-31 PROCEDURE — 99214 OFFICE O/P EST MOD 30 MIN: CPT | Mod: 25 | Performed by: FAMILY MEDICINE

## 2023-01-31 RX ORDER — NAPROXEN 500 MG/1
500 TABLET ORAL 2 TIMES DAILY WITH MEALS
Qty: 180 TABLET | Refills: 1 | Status: CANCELLED | OUTPATIENT
Start: 2023-01-31

## 2023-01-31 RX ORDER — BUPIVACAINE HYDROCHLORIDE 5 MG/ML
2 INJECTION, SOLUTION EPIDURAL; INTRACAUDAL ONCE
Status: COMPLETED | OUTPATIENT
Start: 2023-01-31 | End: 2023-01-31

## 2023-01-31 RX ORDER — LIDOCAINE HYDROCHLORIDE 10 MG/ML
2 INJECTION, SOLUTION EPIDURAL; INFILTRATION; INTRACAUDAL; PERINEURAL ONCE
Status: COMPLETED | OUTPATIENT
Start: 2023-01-31 | End: 2023-01-31

## 2023-01-31 RX ORDER — TRIAMCINOLONE ACETONIDE 40 MG/ML
80 INJECTION, SUSPENSION INTRA-ARTICULAR; INTRAMUSCULAR ONCE
Status: COMPLETED | OUTPATIENT
Start: 2023-01-31 | End: 2023-01-31

## 2023-01-31 RX ORDER — OMEPRAZOLE 40 MG/1
40 CAPSULE, DELAYED RELEASE ORAL DAILY
Qty: 90 CAPSULE | Refills: 11 | Status: ON HOLD | OUTPATIENT
Start: 2023-01-31 | End: 2023-07-19

## 2023-01-31 RX ORDER — LISINOPRIL 10 MG/1
10 TABLET ORAL DAILY
Qty: 90 TABLET | Refills: 3 | Status: ON HOLD | OUTPATIENT
Start: 2023-01-31 | End: 2023-07-19

## 2023-01-31 RX ORDER — NEOMYCIN SULFATE, POLYMYXIN B SULFATE, AND DEXAMETHASONE 3.5; 10000; 1 MG/G; [USP'U]/G; MG/G
OINTMENT OPHTHALMIC
COMMUNITY
Start: 2022-11-18 | End: 2023-03-29

## 2023-01-31 RX ORDER — OXYBUTYNIN CHLORIDE 5 MG/1
5 TABLET, EXTENDED RELEASE ORAL DAILY
Qty: 90 TABLET | Refills: 3 | Status: ON HOLD | OUTPATIENT
Start: 2023-01-31 | End: 2023-03-28

## 2023-01-31 RX ORDER — SUCRALFATE 1 G/1
1 TABLET ORAL 3 TIMES DAILY
Qty: 30 TABLET | Refills: 1 | Status: SHIPPED | OUTPATIENT
Start: 2023-01-31 | End: 2023-03-29

## 2023-01-31 RX ORDER — PRAVASTATIN SODIUM 20 MG
20 TABLET ORAL DAILY
Qty: 90 TABLET | Refills: 11 | Status: ON HOLD | OUTPATIENT
Start: 2023-01-31 | End: 2023-07-19

## 2023-01-31 RX ORDER — CELECOXIB 200 MG/1
200 CAPSULE ORAL DAILY
Qty: 30 CAPSULE | Refills: 11 | Status: CANCELLED | OUTPATIENT
Start: 2023-01-31

## 2023-01-31 RX ADMIN — LIDOCAINE HYDROCHLORIDE 2 ML: 10 INJECTION, SOLUTION INFILTRATION; PERINEURAL at 12:11

## 2023-01-31 RX ADMIN — BUPIVACAINE HYDROCHLORIDE 10 MG: 5 INJECTION, SOLUTION EPIDURAL; INTRACAUDAL; PERINEURAL at 12:11

## 2023-01-31 RX ADMIN — TRIAMCINOLONE ACETONIDE 80 MG: 40 INJECTION, SUSPENSION INTRA-ARTICULAR; INTRAMUSCULAR at 12:12

## 2023-01-31 ASSESSMENT — PAIN SCALES - GENERAL: PAINLEVEL: MODERATE PAIN (5)

## 2023-01-31 NOTE — NURSING NOTE
"Chief Complaint   Patient presents with     Fall     Fell 3 weeks ago and hurt left shoulder and hip   She fell 3 weeks ago and her left shoulder and left hip are still hurting her. She also stated every time she eats she gets a burning feeling in her stomach.  Olivia Jean-Baptiste LPN..................1/31/2023   9:51 AM      Initial BP (!) 152/76   Pulse 68   Temp 98.6  F (37  C) (Temporal)   Resp 16   Wt 78.9 kg (174 lb)   LMP  (LMP Unknown)   SpO2 99%   Breastfeeding No   BMI 31.83 kg/m   Estimated body mass index is 31.83 kg/m  as calculated from the following:    Height as of 1/14/23: 1.575 m (5' 2\").    Weight as of this encounter: 78.9 kg (174 lb).  Medication Reconciliation: complete    FOOD SECURITY SCREENING QUESTIONS  Hunger Vital Signs:  Within the past 12 months we worried whether our food would run out before we got money to buy more. Never  Within the past 12 months the food we bought just didn't last and we didn't have money to get more. Never        Advance care directive on file? no  Advance care directive provided to patient? declined     Olivia Jean-Baptiste LPN  "

## 2023-01-31 NOTE — PROGRESS NOTES
Maliha was seen today for fall.    Diagnoses and all orders for this visit:    Contusion of right knee, initial encounter  -     XR Knee Right 3 Views; Future    Type 2 diabetes mellitus without complication, without long-term current use of insulin (H)  -     Hemoglobin A1c; Future  -     blood glucose monitoring (NO BRAND SPECIFIED) meter device kit; Use to test blood sugar 2 times daily  -     CBC with Platelets & Differential; Future  -     Comprehensive metabolic panel; Future  -     Lipid Profile; Future  -     TSH with free T4 reflex; Future  -     TSH with free T4 reflex  -     Lipid Profile  -     Comprehensive metabolic panel  -     Hemoglobin A1c  -     CBC with Platelets & Differential  -     blood glucose monitoring (NO BRAND SPECIFIED) meter device kit; Use to test blood sugar once daily. Dispense as covered by insurance. Dx. Code: E11.9.    Primary osteoarthritis, unspecified site  -     CBC with Platelets & Differential; Future  -     CBC with Platelets & Differential    Benign essential hypertension  -     lisinopril (ZESTRIL) 10 MG tablet; Take 1 tablet (10 mg) by mouth daily  -     CBC with Platelets & Differential; Future  -     Comprehensive metabolic panel; Future  -     Comprehensive metabolic panel  -     CBC with Platelets & Differential    Gastroesophageal reflux disease without esophagitis  -     omeprazole (PRILOSEC) 40 MG DR capsule; Take 1 capsule (40 mg) by mouth daily  -     CBC with Platelets & Differential; Future  -     CBC with Platelets & Differential    Overactive bladder  -     oxybutynin ER (DITROPAN XL) 5 MG 24 hr tablet; Take 1 tablet (5 mg) by mouth daily    Hyperlipidemia, unspecified hyperlipidemia type  -     pravastatin (PRAVACHOL) 20 MG tablet; Take 1 tablet (20 mg) by mouth daily  -     Comprehensive metabolic panel; Future  -     Lipid Profile; Future  -     Lipid Profile  -     Comprehensive metabolic panel    Arthritis pain    Trochanteric bursitis of left  hip  -     Cancel: XR Pelvis and Hip Left 1 View; Future    Impingement syndrome, shoulder, left  -     XR Shoulder Left G/E 3 Views; Future  -     triamcinolone (KENALOG-40) injection 80 mg  -     lidocaine (PF) (XYLOCAINE) 1 % injection 2 mL  -     OMNICELL bupivacaine (MARCAINE) preservative free injection 0.5%  -     Large Joint/Bursa injection and/or drainage (Shoulder, Knee)    Acute gastritis without hemorrhage, unspecified gastritis type  -     sucralfate (CARAFATE) 1 GM tablet; Take 1 tablet (1 g) by mouth 3 times daily    Thoracic facet syndrome       She will stop her NSAIDs and aspirin and continue omeprazole 40 mg daily.  I do not think she has an acute upper GI bleed but may have an acute gastritis.  We will add Carafate 1 g 3 times daily for 2 weeks and she will let us know if she is not improving.  Might consider upper endoscopy if persistent symptoms.    Discussed her trochanteric bursitis and recommended continued icing along with icing of her contused right knee.    For her left shoulder impingement and rotator cuff tendinitis I think she would benefit from subacromial injection and she like to proceed.    LEFT SHOULDER INJECTION:  Risks, benefits, alternatives discussed, consent obtained Pt wishes to proceed.  Chloraprep used for sterile prep x2 prior to injection. Timeout is performed. Ethyl Chloride used as skin refrigerant for topical anesthesia. A posterior approach is used.  The subacromial space is injected with a mixture of 2ml each of 1% lidocaine without epinephrine, 0.5% Marcaine, and Kenalog 40mg/ml. After injection, bandaid placed. Patient noted improvement in symptoms.  Ice 20 min tid and prn.  RTC if not improved over next 7 days.     If she is having persistent pain in her left hip, might consider trochanteric bursal injection after couple weeks.    A total of 37 minutes was spent with the patient, reviewing records, tests, ordering medications, tests or procedures and documenting  "clinical information in the EHR in addition to injection.     Nicanor Mike MD     Nursing Notes:   Olivia Jean-Baptiste LPN  1/31/2023 10:47 AM  Signed  Chief Complaint   Patient presents with     Fall     Fell 3 weeks ago and hurt left shoulder and hip   She fell 3 weeks ago and her left shoulder and left hip are still hurting her. She also stated every time she eats she gets a burning feeling in her stomach.  Olivia Jean-Baptiste LPN..................1/31/2023   9:51 AM      Initial BP (!) 152/76   Pulse 68   Temp 98.6  F (37  C) (Temporal)   Resp 16   Wt 78.9 kg (174 lb)   LMP  (LMP Unknown)   SpO2 99%   Breastfeeding No   BMI 31.83 kg/m   Estimated body mass index is 31.83 kg/m  as calculated from the following:    Height as of 1/14/23: 1.575 m (5' 2\").    Weight as of this encounter: 78.9 kg (174 lb).  Medication Reconciliation: complete    FOOD SECURITY SCREENING QUESTIONS  Hunger Vital Signs:  Within the past 12 months we worried whether our food would run out before we got money to buy more. Never  Within the past 12 months the food we bought just didn't last and we didn't have money to get more. Never        Advance care directive on file? no  Advance care directive provided to patient? declined     Olivia Jean-Baptiste LPN      Ila Jett is a 82 year old, presenting for the following health issues:  Fall (Fell 3 weeks ago and hurt left shoulder and hip)     She fell 3 weeks ago and hurt her left shoulder and hip.  They are still pretty sore. She landed on her left side when she fell on the ice.  Her right knee is sore. She didn't hurt as much right away, but a few days later, she was worse.  It hurts to lay on her left side. It doesn't bother when she walks her mile per day. She has some discomfort with her left arm above her head.  Her right knee has been swollen and aches.  She has right TKA. She has been icing. Her knee is not bothering when she walks. It will click which is new.     She " has noted that every time she eats she gets burning in her stomach. She will have some gas pains before she has a BM.   She has been taking Celebrex for arthritis.  She takes low-dose aspirin.  She is on omeprazole 40 mg daily. She takes a stool softener daily.     She takes Ditropan for her bladder and pravastatin for her cholesterol.  She is on lisinopril for hypertension.    She has diet-controlled type 2 diabetes.    She is vaccinated but not boosted for COVID-19.  She has had her flu shot this year.  She is due for Boostrix this year.  She has not had Shingrix.    History of Present Illness       Reason for visit:  Im not good  Symptoms include:  Im not fell good  Symptom intensity:  Moderate  What makes it worse:  When i eat  l fell sick  What makes it better:  Notfor hour    She eats 2-3 servings of fruits and vegetables daily.She consumes 1 sweetened beverage(s) daily.She exercises with enough effort to increase her heart rate 60 or more minutes per day.  She exercises with enough effort to increase her heart rate 5 days per week.   She is taking medications regularly.         Review of Systems   ROS is negative except as noted above           Objective    BP (!) 152/76   Pulse 68   Temp 98.6  F (37  C) (Temporal)   Resp 16   Wt 78.9 kg (174 lb)   LMP  (LMP Unknown)   SpO2 99%   Breastfeeding No   BMI 31.83 kg/m    Body mass index is 31.83 kg/m .  Physical Exam   Alert cooperative, no distress.  Gets up on the exam table without assistance.  She has decreased rotational range of motion of her thoracic spine to the left.  Some asymmetry is noted.  Discussed chiropractic and she wishes to hold off on this.  I suspect she probably has a locked thoracic facet.  Examination of her left knee is normal and right knee shows some modest effusion but no real tenderness.  X-ray is unrevealing.  Left hip is tender over the trochanteric bursa but less so on external rotation and flexion some discomfort on  internal rotation but logrolling is not painful.  Examination of her left shoulder reveals decreased range of motion at shoulder flexion at about 150 degrees and on AB duction at about 110 degrees.  She has positive impingement testing but no weakness on isolation of the supraspinatus.    She has no scleral icterus and mucous membranes are moist.  Neck supple without adenopathy.  Lungs are clear, no rales rhonchi or wheezes are heard.  Cardiac RRR without murmur.  Abdomen is soft with some mild discomfort over the epigastrium but no rebound guarding or peritoneal signs.    Results for orders placed or performed during the hospital encounter of 01/31/23   XR Shoulder Left G/E 3 Views     Status: None    Narrative    PROCEDURE:  XR SHOULDER LEFT G/E 3 VIEWS    HISTORY: Impingement syndrome, shoulder, left.    COMPARISON:  None.    TECHNIQUE:  3 views left shoulder.    FINDINGS:  No acute fracture. Moderate to advanced left glenohumeral  osteoarthritis. The humeral head may be high riding on a single view.  Some undersurface regularity the acromion is questioned. Partial  visualization of cervical degenerative changes.      Impression    IMPRESSION: Left shoulder osteoarthritis. Possible rotator cuff tear.       SHIRA PARADA MD         SYSTEM ID:  [id number]   Results for orders placed or performed during the hospital encounter of 01/31/23   XR Pelvis w Hip Left G/E 2 Views     Status: None    Narrative    XR PELVIS AND HIP LEFT 2 VIEWS    HISTORY: Trochanteric bursitis of left hip .    COMPARISON: CT 3/2/2022.    TECHNIQUE: AP pelvis and 2 views left hip.    FINDINGS:    No acute fracture. Osteopenia. Lumbar facet hypertrophy. SI joint  hypertrophy. Preserved hip joint spaces. Irregularity of the greater  trochanters can predispose to trochanteric bursitis.        Impression    IMPRESSION:     Degenerative changes as above.    SHIRA PARADA MD         SYSTEM ID:  [id number]   Results for orders placed or  performed during the hospital encounter of 01/31/23   XR Knee Right 3 Views     Status: None    Narrative    PROCEDURE:  XR KNEE RIGHT 3 VIEWS    HISTORY: Contusion of right knee, initial encounter.    COMPARISON:  2/3/2020    TECHNIQUE:  4 views right knee.    FINDINGS:  Postoperative changes of a right knee hemiarthroplasty are  seen with relatively long intramedullary components. Linear lucency is  seen at the tip of the tibial component, only partially visualized on  the frontal view. Recommend right tibia and fibula views for further  assessment.     SHIRA PARADA MD         SYSTEM ID:  [id number]   Results for orders placed or performed in visit on 01/31/23   TSH with free T4 reflex     Status: Normal   Result Value Ref Range    TSH 2.78 0.30 - 4.20 uIU/mL   Lipid Profile     Status: Abnormal   Result Value Ref Range    Cholesterol 204 (H) <200 mg/dL    Triglycerides 126 <150 mg/dL    Direct Measure HDL 59 >=50 mg/dL    LDL Cholesterol Calculated 120 (H) <=100 mg/dL    Non HDL Cholesterol 145 (H) <130 mg/dL    Narrative    Cholesterol  Desirable:  <200 mg/dL    Triglycerides  Normal:  Less than 150 mg/dL  Borderline High:  150-199 mg/dL  High:  200-499 mg/dL  Very High:  Greater than or equal to 500 mg/dL    Direct Measure HDL  Female:  Greater than or equal to 50 mg/dL   Male:  Greater than or equal to 40 mg/dL    LDL Cholesterol  Desirable:  <100mg/dL  Above Desirable:  100-129 mg/dL   Borderline High:  130-159 mg/dL   High:  160-189 mg/dL   Very High:  >= 190 mg/dL    Non HDL Cholesterol  Desirable:  130 mg/dL  Above Desirable:  130-159 mg/dL  Borderline High:  160-189 mg/dL  High:  190-219 mg/dL  Very High:  Greater than or equal to 220 mg/dL   Comprehensive metabolic panel     Status: Abnormal   Result Value Ref Range    Sodium 142 136 - 145 mmol/L    Potassium 4.2 3.4 - 5.3 mmol/L    Chloride 107 98 - 107 mmol/L    Carbon Dioxide (CO2) 26 22 - 29 mmol/L    Anion Gap 9 7 - 15 mmol/L    Urea Nitrogen  11.6 8.0 - 23.0 mg/dL    Creatinine 0.58 0.51 - 0.95 mg/dL    Calcium 9.3 8.8 - 10.2 mg/dL    Glucose 108 (H) 70 - 99 mg/dL    Alkaline Phosphatase 70 35 - 104 U/L    AST 20 10 - 35 U/L    ALT 16 10 - 35 U/L    Protein Total 7.0 6.4 - 8.3 g/dL    Albumin 4.4 3.5 - 5.2 g/dL    Bilirubin Total 0.3 <=1.2 mg/dL    GFR Estimate 90 >60 mL/min/1.73m2   Hemoglobin A1c     Status: Abnormal   Result Value Ref Range    Hemoglobin A1C 6.6 (H) 4.0 - 6.2 %   CBC with platelets and differential     Status: Abnormal   Result Value Ref Range    WBC Count 5.3 4.0 - 11.0 10e3/uL    RBC Count 3.99 3.80 - 5.20 10e6/uL    Hemoglobin 11.9 11.7 - 15.7 g/dL    Hematocrit 34.3 (L) 35.0 - 47.0 %    MCV 86 78 - 100 fL    MCH 29.8 26.5 - 33.0 pg    MCHC 34.7 31.5 - 36.5 g/dL    RDW 12.6 10.0 - 15.0 %    Platelet Count 203 150 - 450 10e3/uL    % Neutrophils 50 %    % Lymphocytes 38 %    % Monocytes 7 %    % Eosinophils 4 %    % Basophils 1 %    % Immature Granulocytes 0 %    NRBCs per 100 WBC 0 <1 /100    Absolute Neutrophils 2.7 1.6 - 8.3 10e3/uL    Absolute Lymphocytes 2.0 0.8 - 5.3 10e3/uL    Absolute Monocytes 0.4 0.0 - 1.3 10e3/uL    Absolute Eosinophils 0.2 0.0 - 0.7 10e3/uL    Absolute Basophils 0.0 0.0 - 0.2 10e3/uL    Absolute Immature Granulocytes 0.0 <=0.4 10e3/uL    Absolute NRBCs 0.0 10e3/uL   CBC with Platelets & Differential     Status: Abnormal    Narrative    The following orders were created for panel order CBC with Platelets & Differential.  Procedure                               Abnormality         Status                     ---------                               -----------         ------                     CBC with platelets and d...[[id number]]  Abnormal            Final result                 Please view results for these tests on the individual orders.

## 2023-01-31 NOTE — PATIENT INSTRUCTIONS
Stop aspirin. Stop naproxen. Stop Celebrex.  Continue the omeprazole.  Add sucralfate 3 times daily but don't take it at the same time as other medications or the medications won't be absorbed.    Stay on this for 2 weeks. If your stomach isn't better, let me know and we will do a scope into the stomach.     Ice your left hip and your right knee. We might inject your left hip if not better in 2 weeks.     Ice your shoulder.

## 2023-02-24 ENCOUNTER — TELEPHONE (OUTPATIENT)
Dept: FAMILY MEDICINE | Facility: OTHER | Age: 83
End: 2023-02-24
Payer: COMMERCIAL

## 2023-02-24 DIAGNOSIS — K29.00 ACUTE GASTRITIS WITHOUT HEMORRHAGE, UNSPECIFIED GASTRITIS TYPE: Primary | ICD-10-CM

## 2023-02-24 NOTE — TELEPHONE ENCOUNTER
Patient called to talk to [facility]. She states when she last saw Dr. Mike he put her on a medication and told her if it does not work they will have to do a scope. It has not worked. She would like the scope ordered.    Elisabet Honeycutt on 2/24/2023 at 2:08 PM

## 2023-02-27 NOTE — TELEPHONE ENCOUNTER
After the patient's name and date of birth was verified, the patient was told the below information.  Olivia Jean-Baptiste LPN..................2/27/2023   4:42 PM

## 2023-03-01 ENCOUNTER — TELEPHONE (OUTPATIENT)
Dept: SURGERY | Facility: OTHER | Age: 83
End: 2023-03-01
Payer: COMMERCIAL

## 2023-03-01 NOTE — TELEPHONE ENCOUNTER
[facility] Diagnostic Referral    Patient has a referral for a EGD with a diagnosis of Acute gastritis without hemorrhage, unspecified gastritis type.    Please advise.    Thank you,  Anisa Barr on 3/1/2023 at 8:32 AM

## 2023-03-03 ENCOUNTER — TELEPHONE (OUTPATIENT)
Dept: SURGERY | Facility: OTHER | Age: 83
End: 2023-03-03

## 2023-03-03 NOTE — TELEPHONE ENCOUNTER
Screening Questions for the Scheduling of Screening Colonoscopies   (If Colonoscopy is diagnostic, Provider should review the chart before scheduling.)  Are you younger than 50 or older than 80?  YES  Do you take aspirin or fish oil?  NO (if yes, tell patient to stop 1 week prior to Colonoscopy)  Do you take warfarin (Coumadin), clopidogrel (Plavix), apixaban (Eliquis), dabigatram (Pradaxa), rivaroxaban (Xarelto) or any blood thinner? NO  Do you use oxygen at home?  NO  Do you have kidney disease? NO  Are you on dialysis? NO  Have you had a stroke or heart attack in the last year? NO  Have you had a stent in your heart or any blood vessel in the last year? NO  Have you had a transplant of any organ? NO  Have you had a colonoscopy or upper endoscopy (EGD) before? YES, C-SCOPE         When?  2013  Date of scheduled EGD. 03/28/2023  Provider [facility]  Pharmacy [facility]

## 2023-03-28 ENCOUNTER — HOSPITAL ENCOUNTER (OUTPATIENT)
Facility: OTHER | Age: 83
Discharge: HOME | End: 2023-03-28
Attending: SURGERY | Admitting: SURGERY
Payer: MEDICARE

## 2023-03-28 ENCOUNTER — ANESTHESIA EVENT (OUTPATIENT)
Dept: SURGERY | Facility: OTHER | Age: 83
End: 2023-03-28
Payer: MEDICARE

## 2023-03-28 ENCOUNTER — ANESTHESIA (OUTPATIENT)
Dept: SURGERY | Facility: OTHER | Age: 83
End: 2023-03-28
Payer: MEDICARE

## 2023-03-28 VITALS
OXYGEN SATURATION: 98 % | BODY MASS INDEX: 29.23 KG/M2 | TEMPERATURE: 98.2 F | WEIGHT: 165 LBS | DIASTOLIC BLOOD PRESSURE: 84 MMHG | RESPIRATION RATE: 16 BRPM | HEIGHT: 63 IN | SYSTOLIC BLOOD PRESSURE: 166 MMHG | HEART RATE: 84 BPM

## 2023-03-28 LAB — GLUCOSE BLDC GLUCOMTR-MCNC: 119 MG/DL (ref 70–99)

## 2023-03-28 PROCEDURE — 99100 ANES PT EXTEME AGE<1 YR&>70: CPT

## 2023-03-28 PROCEDURE — 43239 EGD BIOPSY SINGLE/MULTIPLE: CPT | Performed by: SURGERY

## 2023-03-28 PROCEDURE — 88305 TISSUE EXAM BY PATHOLOGIST: CPT

## 2023-03-28 PROCEDURE — 258N000003 HC RX IP 258 OP 636: Performed by: SURGERY

## 2023-03-28 PROCEDURE — 250N000009 HC RX 250

## 2023-03-28 PROCEDURE — 250N000011 HC RX IP 250 OP 636

## 2023-03-28 PROCEDURE — 999N000010 HC STATISTIC ANES STAT CODE-CRNA PER MINUTE: Performed by: SURGERY

## 2023-03-28 PROCEDURE — 82962 GLUCOSE BLOOD TEST: CPT

## 2023-03-28 PROCEDURE — 43239 EGD BIOPSY SINGLE/MULTIPLE: CPT

## 2023-03-28 RX ORDER — LIDOCAINE 40 MG/G
CREAM TOPICAL
Status: DISCONTINUED | OUTPATIENT
Start: 2023-03-28 | End: 2023-03-28 | Stop reason: HOSPADM

## 2023-03-28 RX ORDER — PROPOFOL 10 MG/ML
INJECTION, EMULSION INTRAVENOUS CONTINUOUS PRN
Status: DISCONTINUED | OUTPATIENT
Start: 2023-03-28 | End: 2023-03-28

## 2023-03-28 RX ORDER — SODIUM CHLORIDE, SODIUM LACTATE, POTASSIUM CHLORIDE, CALCIUM CHLORIDE 600; 310; 30; 20 MG/100ML; MG/100ML; MG/100ML; MG/100ML
INJECTION, SOLUTION INTRAVENOUS CONTINUOUS
Status: DISCONTINUED | OUTPATIENT
Start: 2023-03-28 | End: 2023-03-28 | Stop reason: HOSPADM

## 2023-03-28 RX ORDER — NALOXONE HYDROCHLORIDE 0.4 MG/ML
0.2 INJECTION, SOLUTION INTRAMUSCULAR; INTRAVENOUS; SUBCUTANEOUS
Status: DISCONTINUED | OUTPATIENT
Start: 2023-03-28 | End: 2023-03-28 | Stop reason: HOSPADM

## 2023-03-28 RX ORDER — NALOXONE HYDROCHLORIDE 0.4 MG/ML
0.4 INJECTION, SOLUTION INTRAMUSCULAR; INTRAVENOUS; SUBCUTANEOUS
Status: DISCONTINUED | OUTPATIENT
Start: 2023-03-28 | End: 2023-03-28 | Stop reason: HOSPADM

## 2023-03-28 RX ORDER — LIDOCAINE HYDROCHLORIDE 20 MG/ML
INJECTION, SOLUTION INFILTRATION; PERINEURAL PRN
Status: DISCONTINUED | OUTPATIENT
Start: 2023-03-28 | End: 2023-03-28

## 2023-03-28 RX ORDER — FLUMAZENIL 0.1 MG/ML
0.2 INJECTION, SOLUTION INTRAVENOUS
Status: DISCONTINUED | OUTPATIENT
Start: 2023-03-28 | End: 2023-03-28 | Stop reason: HOSPADM

## 2023-03-28 RX ORDER — GLYCOPYRROLATE 0.2 MG/ML
INJECTION, SOLUTION INTRAMUSCULAR; INTRAVENOUS PRN
Status: DISCONTINUED | OUTPATIENT
Start: 2023-03-28 | End: 2023-03-28

## 2023-03-28 RX ORDER — PROPOFOL 10 MG/ML
INJECTION, EMULSION INTRAVENOUS PRN
Status: DISCONTINUED | OUTPATIENT
Start: 2023-03-28 | End: 2023-03-28

## 2023-03-28 RX ADMIN — PROPOFOL 40 MG: 10 INJECTION, EMULSION INTRAVENOUS at 12:30

## 2023-03-28 RX ADMIN — SODIUM CHLORIDE, POTASSIUM CHLORIDE, SODIUM LACTATE AND CALCIUM CHLORIDE: 600; 310; 30; 20 INJECTION, SOLUTION INTRAVENOUS at 10:43

## 2023-03-28 RX ADMIN — GLYCOPYRROLATE 0.2 MG: 0.2 INJECTION, SOLUTION INTRAMUSCULAR; INTRAVENOUS at 12:29

## 2023-03-28 RX ADMIN — PROPOFOL 140 MCG/KG/MIN: 10 INJECTION, EMULSION INTRAVENOUS at 12:30

## 2023-03-28 RX ADMIN — LIDOCAINE HYDROCHLORIDE 40 MG: 20 INJECTION, SOLUTION INFILTRATION; PERINEURAL at 12:30

## 2023-03-28 ASSESSMENT — ACTIVITIES OF DAILY LIVING (ADL)
ADLS_ACUITY_SCORE: 35
ADLS_ACUITY_SCORE: 35

## 2023-03-28 NOTE — ANESTHESIA CARE TRANSFER NOTE
Patient: Maliha Rivera    Procedure: Procedure(s):  Esophagoscopy, gastroscopy, duodenoscopy (EGD), combined       Diagnosis: Acute gastritis without hemorrhage, unspecified gastritis type [K29.00]  Diagnosis Additional Information: No value filed.    Anesthesia Type:   MAC     Note:    Oropharynx: oropharynx clear of all foreign objects and spontaneously breathing  Level of Consciousness: awake  Oxygen Supplementation: room air    Independent Airway: airway patency satisfactory and stable  Dentition: dentition unchanged  Vital Signs Stable: post-procedure vital signs reviewed and stable  Report to RN Given: handoff report given  Patient transferred to: Phase II    Handoff Report: Identifed the Patient, Identified the Reponsible Provider, Reviewed the pertinent medical history, Discussed the surgical course, Reviewed Intra-OP anesthesia mangement and issues during anesthesia, Set expectations for post-procedure period and Allowed opportunity for questions and acknowledgement of understanding      Vitals:  Vitals Value Taken Time   /90 03/28/23 1245   Temp     Pulse 89 03/28/23 1245   Resp     SpO2 97 % 03/28/23 1246   Vitals shown include unvalidated device data.    Electronically Signed By: FRANKO Obrien CRNA  March 28, 2023  12:47 PM

## 2023-03-28 NOTE — ANESTHESIA POSTPROCEDURE EVALUATION
Patient: Maliha Rivera    Procedure: Procedure(s):  Esophagoscopy, gastroscopy, duodenoscopy (EGD), combined       Anesthesia Type:  MAC    Note:  Disposition: Outpatient   Postop Pain Control: Uneventful            Sign Out: Well controlled pain   PONV: No   Neuro/Psych: Uneventful            Sign Out: Acceptable/Baseline neuro status   Airway/Respiratory: Uneventful            Sign Out: Acceptable/Baseline resp. status   CV/Hemodynamics: Uneventful            Sign Out: Acceptable CV status; No obvious hypovolemia; No obvious fluid overload   Other NRE: NONE   DID A NON-ROUTINE EVENT OCCUR?            Last vitals:  Vitals Value Taken Time   /72 03/28/23 1300   Temp     Pulse 79 03/28/23 1300   Resp     SpO2 98 % 03/28/23 1313   Vitals shown include unvalidated device data.    Electronically Signed By: FRANKO CURRIE CRNA  March 28, 2023  1:14 PM

## 2023-03-28 NOTE — DISCHARGE INSTRUCTIONS
[facility]  Adult Discharge Orders & Instructions      For 24 hours after surgery:  Get plenty of rest.  A responsible adult must stay with you for at least 24 hours after you leave the hospital.   You may feel lightheaded.  IF so, sit for a few minutes before standing.  Have someone help you get up.   You may have a slight fever. Call the doctor if your fever is over 101 F (38.3 C) (taken under the tongue) or lasts longer than 24 hours.  You may have a dry mouth, a sore throat, muscle aches or trouble sleeping.  These should go away after 24 hours.  Do not make important or legal decisions.  6.   Do not drive or use heavy equipment.  If you have weakness or tingling, don't drive or use heavy equipment until this feeling goes away.                                                                                                                                                                         To contact a doctor, call    [phone]______________       UPPER ENDOSCOPY    AFTER THE PROCEDURE  You will return to Same Day Surgery to rest for about an hour before you go home.  The doctor will talk with you and your family.  A family member/friend may visit with you.  You may burp up any air remaining in your stomach.  You may feel dizzy or light-headed from the medicine.  Your nurse will go over the discharge instructions with you and your caregiver and answer any of your questions.    You will be contacted the next day to check on how you are doing.  If biopsies were taken, you will be contacted with the results usually within 1 week.  BACK AT HOME  Rest for an hour or two after you get home.  When your throat is no longer numb and you have a gag reflex, take a few sips of cool water.  If you can swallow comfortably, you may start eating again.  You may have a mild sore throat for the rest of the day.  You will be contacted with results by the surgeons office within a week. If not contacted in one  week, call the surgeons office.  WHAT TO WATCH FOR:  Problems rarely occur after the exam, but it is important to be aware of the early signs of a complication.  Call your doctor immediately if you have:  Difficulty swallowing or breathing  Unusual pain in your stomach or chest  Vomiting blood or dark material that looks like coffee grounds  Black or tarry stools  Temperature over 101.5 degrees    MORE QUESTIONS?  Please ask your doctor or nurse before the exam begins  or call your doctor at the clinic.

## 2023-03-28 NOTE — ANESTHESIA PREPROCEDURE EVALUATION
Anesthesia Pre-Procedure Evaluation    Patient: Maliha Rivera   MRN: [id number] : 1940        Procedure : Procedure(s):  Esophagoscopy, gastroscopy, duodenoscopy (EGD), combined          Past Medical History:   Diagnosis Date     Encounter for screening for cardiovascular disorders     4/8/15,Lexiscan cardiolite [facility]      Past Surgical History:   Procedure Laterality Date     APPENDECTOMY OPEN      No Comments Provided     ARTHROSCOPY KNEE      ,Joseluis Thomas M.D.     COLONOSCOPY      ,,normal     ESOPHAGOSCOPY, GASTROSCOPY, DUODENOSCOPY (EGD), COMBINED      2/3/2016     LAPAROSCOPIC CHOLECYSTECTOMY      3/11/01,Dr. Salas     LAPAROSCOPIC TUBAL LIGATION      No Comments Provided     lumbar decompression L4-5  2018    Dr. France     OTHER SURGICAL HISTORY      ,[id number],CATARACT EXTRACTION W/  INTRAOCULAR LENS IMPLANT,Bilateral     OTHER SURGICAL HISTORY      ,41694.0,[place] ERCP BILIARY OR PANC DUCT STENT EXCHANGE W DIL AND WIRE,stent removed.     OTHER SURGICAL HISTORY      ,[id number],CARDIAC CATHETERIZATION,mild disease     OTHER SURGICAL HISTORY      07,[id number],OTHER,Left     OTHER SURGICAL HISTORY      2008,[id number],TOTAL KNEE ARTHROPLASTY,Right     OTHER SURGICAL HISTORY      2010,IGE446,TOTAL KNEE ARTHROPLASTY,Left,Dr. Carson     OTHER SURGICAL HISTORY      2017,[id number],OTHER,Left,Dr. Joiner, [facility]     RELEASE CARPAL TUNNEL      2006     Revision right total knee arthroplasty   2019    Dr. Carson.  Due to loose implant tibial component     TONSILLECTOMY      age 12      Allergies   Allergen Reactions     Atorvastatin Muscle Pain (Myalgia)     Cefazolin Nausea and Vomiting      Social History     Tobacco Use     Smoking status: Never     Passive exposure: Never     Smokeless tobacco: Never   Substance Use Topics     Alcohol use: No      Wt Readings from Last 1 Encounters:   23 74.8 kg (165 lb)        Anesthesia Evaluation   Pt has had  "prior anesthetic.         ROS/MED HX  ENT/Pulmonary:  - neg pulmonary ROS     Neurologic:  - neg neurologic ROS     Cardiovascular: Comment: Prolonged QT    (+) hypertension-----    METS/Exercise Tolerance: >4 METS    Hematologic:  - neg hematologic  ROS     Musculoskeletal: Comment: Lumbar stenosis  DDD  (+) arthritis,     GI/Hepatic: Comment: Abdominal pain  \"I used to have heartburn, I don't anymore\"    (+) GERD,     Renal/Genitourinary:  - neg Renal ROS     Endo:     (+) type II DM (6.6),     Psychiatric/Substance Use:  - neg psychiatric ROS     Infectious Disease:  - neg infectious disease ROS     Malignancy:  - neg malignancy ROS     Other:  - neg other ROS          Physical Exam    Airway        Mallampati: III   TM distance: < 3 FB   Neck ROM: full   Mouth opening: < 3 cm    Respiratory Devices and Support         Dental     Comment: Upper dentures        Cardiovascular             Pulmonary   pulmonary exam normal                OUTSIDE LABS:  CBC:   Lab Results   Component Value Date    WBC 5.3 01/31/2023    WBC 5.8 02/24/2022    HGB 11.9 01/31/2023    HGB 12.0 02/24/2022    HCT 34.3 (L) 01/31/2023    HCT 34.6 (L) 02/24/2022     01/31/2023     02/24/2022     BMP:   Lab Results   Component Value Date     01/31/2023     02/24/2022    POTASSIUM 4.2 01/31/2023    POTASSIUM 4.1 02/24/2022    CHLORIDE 107 01/31/2023    CHLORIDE 104 02/24/2022    CO2 26 01/31/2023    CO2 24 02/24/2022    BUN 11.6 01/31/2023    BUN 17 02/24/2022    CR 0.58 01/31/2023    CR 0.76 02/24/2022     (H) 03/28/2023     (H) 01/31/2023     COAGS:   Lab Results   Component Value Date    PTT 31 02/22/2018    INR 1.05 02/22/2018     POC: No results found for: BGM, HCG, HCGS  HEPATIC:   Lab Results   Component Value Date    ALBUMIN 4.4 01/31/2023    PROTTOTAL 7.0 01/31/2023    ALT 16 01/31/2023    AST 20 01/31/2023    ALKPHOS 70 01/31/2023    BILITOTAL 0.3 01/31/2023     OTHER:   Lab Results   Component " Value Date    A1C 6.6 (H) 01/31/2023    SHON 9.3 01/31/2023    LIPASE 35 02/24/2022    AMYLASE 47 02/24/2022    TSH 2.78 01/31/2023    CRP 2.0 (H) 06/05/2019    SED 22 09/22/2015       Anesthesia Plan    ASA Status:  3   NPO Status:  NPO Appropriate    Anesthesia Type: MAC.     - Reason for MAC: straight local not clinically adequate              Consents    Anesthesia Plan(s) and associated risks, benefits, and realistic alternatives discussed. Questions answered and patient/representative(s) expressed understanding.     - Discussed: Risks, Benefits and Alternatives for BOTH SEDATION and the PROCEDURE were discussed     - Discussed with:  Patient      - Extended Intubation/Ventilatory Support Discussed: No.      - Patient is DNR/DNI Status: No    Use of blood products discussed: Yes.     - Discussed with: Patient.     - Consented: consented to blood products            Reason for refusal: other.     Postoperative Care            Comments:                FRANKO CURRIE CRNA
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
## 2023-03-28 NOTE — OR NURSING
Patient and responsible adult given discharge instructions with no questions regarding instructions. Chrissie score 20. Pain level 0/10.  Discharged from unit via walking . Patient discharged to home .

## 2023-03-28 NOTE — OP NOTE
PROCEDURE NOTE    DATE OF SERVICE: 3/28/2023    SURGEON: JIMBO Martin MD     PRE-OP DIAGNOSIS:  Epigastric pain    POST-OP DIAGNOSIS:  Sliding hiatal hernia     PROCEDURE:   EGD with biopsy    ASSISTANT:  Circulator: Kristina Bobo RN  Scrub Person: Shirley Farooq    ANESTHESIA:  MAC                            MACCRNA Independent: Jasen Dobbins APRN CRNA    INDICATION FOR THE PROCEDURE: Maliha Rivera is a 82 year old female. The patient presents with chronic epigastric pain and sensation of food sticking.     PROCEDURE:The patient was taken to the endoscopy suite. Appropriate monitors were attached. The patient was placed in the left lateral decubitus position. Bite block was positioned.Timeout was performed confirming the patient's identity and procedure to be performed. After appropriate sedation was confirmed, the flexible endoscope was advanced into the oropharynx. The posterior oropharynx appeared grossly normal. The scope was advanced into the proximal esophagus. The esophagus was insufflated with air. The scope was advanced under direct visualization. No acute abnormalities of the esophagus were noted. The scope was advanced into the stomach. The scope was advanced through the pylorus into the duodenal bulb. The bulb and distal duodenum appeared grossly normal. Biopsies were taken from the duodenum with cold forceps. The scope was withdrawn back into the stomach. Antral biopsy was obtained and sent to pathology. The scope was retroflexed and the GE junction inspected. No abnormalities were noted.  The scope was returned to a neutral position and the stomach was decompressed. The scope was withdrawn to the GE junction and sliding hernia was noted, 2-3 cm. The mucosa of the esophagus was inspected while withdrawing thescope. No abnormalities were noted. The scope was withdrawn from the patient. The bite block was removed. The patient tolerated the procedure with no immediately apparent  complication. The patient was taken to recovery instable condition.     ESTIMATED BLOOD LOSS: none    COMPLICATIONS:  None    TISSUE REMOVED:  Yes    RECOMMEND:    Follow up biopsies   Continue PPI     JIMBO Martin MD

## 2023-03-29 ENCOUNTER — OFFICE VISIT (OUTPATIENT)
Dept: FAMILY MEDICINE | Facility: OTHER | Age: 83
End: 2023-03-29
Attending: FAMILY MEDICINE
Payer: COMMERCIAL

## 2023-03-29 VITALS
DIASTOLIC BLOOD PRESSURE: 62 MMHG | SYSTOLIC BLOOD PRESSURE: 148 MMHG | TEMPERATURE: 97.3 F | HEIGHT: 62 IN | RESPIRATION RATE: 18 BRPM | OXYGEN SATURATION: 99 % | BODY MASS INDEX: 31.1 KG/M2 | HEART RATE: 102 BPM | WEIGHT: 169 LBS

## 2023-03-29 DIAGNOSIS — L60.3 NAIL DYSTROPHY: ICD-10-CM

## 2023-03-29 DIAGNOSIS — E11.9 TYPE 2 DIABETES MELLITUS WITHOUT COMPLICATION, WITHOUT LONG-TERM CURRENT USE OF INSULIN (H): ICD-10-CM

## 2023-03-29 DIAGNOSIS — Z00.00 ENCOUNTER FOR MEDICARE ANNUAL WELLNESS EXAM: Primary | ICD-10-CM

## 2023-03-29 DIAGNOSIS — Z78.0 MENOPAUSE: ICD-10-CM

## 2023-03-29 DIAGNOSIS — K44.9 SLIDING HIATAL HERNIA: ICD-10-CM

## 2023-03-29 DIAGNOSIS — I10 BENIGN ESSENTIAL HYPERTENSION: ICD-10-CM

## 2023-03-29 DIAGNOSIS — K21.9 GASTROESOPHAGEAL REFLUX DISEASE WITHOUT ESOPHAGITIS: ICD-10-CM

## 2023-03-29 DIAGNOSIS — Z12.31 VISIT FOR SCREENING MAMMOGRAM: ICD-10-CM

## 2023-03-29 DIAGNOSIS — E78.5 HYPERLIPIDEMIA, UNSPECIFIED HYPERLIPIDEMIA TYPE: ICD-10-CM

## 2023-03-29 PROCEDURE — 99214 OFFICE O/P EST MOD 30 MIN: CPT | Mod: 25 | Performed by: FAMILY MEDICINE

## 2023-03-29 PROCEDURE — G0463 HOSPITAL OUTPT CLINIC VISIT: HCPCS

## 2023-03-29 PROCEDURE — G0439 PPPS, SUBSEQ VISIT: HCPCS | Performed by: FAMILY MEDICINE

## 2023-03-29 ASSESSMENT — PAIN SCALES - GENERAL: PAINLEVEL: NO PAIN (0)

## 2023-03-29 ASSESSMENT — ENCOUNTER SYMPTOMS
SHORTNESS OF BREATH: 0
DIZZINESS: 0
MYALGIAS: 0
PARESTHESIAS: 0
HEARTBURN: 0
SORE THROAT: 0
CONSTIPATION: 0
ARTHRALGIAS: 0
CHILLS: 0
NAUSEA: 0
HEMATOCHEZIA: 0
DIARRHEA: 0
FEVER: 0
HEMATURIA: 0
DYSURIA: 0
HEADACHES: 0
JOINT SWELLING: 0
ABDOMINAL PAIN: 0
COUGH: 0
PALPITATIONS: 0
WEAKNESS: 0
FREQUENCY: 0
EYE PAIN: 0
BREAST MASS: 0
NERVOUS/ANXIOUS: 0

## 2023-03-29 ASSESSMENT — ACTIVITIES OF DAILY LIVING (ADL): CURRENT_FUNCTION: NO ASSISTANCE NEEDED

## 2023-03-29 NOTE — NURSING NOTE
"Chief Complaint   Patient presents with     Medicare Visit     annual       Initial BP (!) 148/62   Pulse 102   Temp 97.3  F (36.3  C) (Temporal)   Resp 18   Ht 1.575 m (5' 2\")   Wt 76.7 kg (169 lb)   LMP  (LMP Unknown)   SpO2 99%   Breastfeeding No   BMI 30.91 kg/m   Estimated body mass index is 30.91 kg/m  as calculated from the following:    Height as of this encounter: 1.575 m (5' 2\").    Weight as of this encounter: 76.7 kg (169 lb).  Medication Reconciliation: complete    FOOD SECURITY SCREENING QUESTIONS  Hunger Vital Signs:  Within the past 12 months we worried whether our food would run out before we got money to buy more. Never  Within the past 12 months the food we bought just didn't last and we didn't have money to get more. Never        Advance care directive on file? no  Advance care directive provided to patient? declined     Olivia Jean-Baptiste LPN  "

## 2023-03-29 NOTE — PROGRESS NOTES
SUBJECTIVE:   Maliha is a 82 year old who presents for Preventive Visit.  No flowsheet data found.  Patient has been advised of split billing requirements and indicates understanding: Yes  Are you in the first 12 months of your Medicare coverage?  No    HPI    Have you ever done Advance Care Planning? (For example, a Health Directive, POLST, or a discussion with a medical provider or your loved ones about your wishes): No, advance care planning information given to patient to review.  Patient declined advance care planning discussion at this time.       Fall risk  Fallen 2 or more times in the past year?: Yes  Any fall with injury in the past year?: No    Cognitive Screening   1) Repeat 3 items (Leader, Season, Table)    2) Clock draw: NORMAL  3) 3 item recall: Recalls 2 objects   Results: NORMAL clock, 1-2 items recalled: COGNITIVE IMPAIRMENT LESS LIKELY    Mini-CogTM Copyright S Anna. Licensed by the author for use in [facility]; reprinted with permission (leticia@[facility]). All rights reserved.      Do you have sleep apnea, excessive snoring or daytime drowsiness?: no    Reviewed and updated as needed this visit by clinical staff   Tobacco  Allergies  Meds   Med Hx  Surg Hx  Fam Hx          Reviewed and updated as needed this visit by Provider                 Social History     Tobacco Use     Smoking status: Never     Passive exposure: Never     Smokeless tobacco: Never   Substance Use Topics     Alcohol use: No         Alcohol Use 3/29/2023   Prescreen: >3 drinks/day or >7 drinks/week? No     Do you have a current opioid prescription? No  Do you use any other controlled substances or medications that are not prescribed by a provider? None              Current providers sharing in care for this patient include:   Patient Care Team:  Nicanor Mike MD as PCP - General (Family Practice)  Nicanor Mike MD as Assigned PCP    The following health maintenance items are reviewed in Epic and  "correct as of today:  Health Maintenance   Topic Date Due     DEXA  Never done     ZOSTER IMMUNIZATION (1 of 2) Never done     COVID-19 Vaccine (3 - Booster for Pfizer series) 12/10/2021     MICROALBUMIN  03/24/2023     MEDICARE ANNUAL WELLNESS VISIT  03/24/2023     DTAP/TDAP/TD IMMUNIZATION (2 - Td or Tdap) 03/26/2023     EYE EXAM  04/04/2023     A1C  07/31/2023     BMP  01/31/2024     LIPID  01/31/2024     DIABETIC FOOT EXAM  03/29/2024     FALL RISK ASSESSMENT  03/29/2024     ADVANCE CARE PLANNING  03/24/2027     PHQ-2 (once per calendar year)  Completed     INFLUENZA VACCINE  Completed     Pneumococcal Vaccine: 65+ Years  Completed     IPV IMMUNIZATION  Aged Out     MENINGITIS IMMUNIZATION  Aged Out     Labs reviewed in EPIC      Pertinent mammograms are reviewed under the imaging tab.    Review of Systems      OBJECTIVE:   BP (!) 148/62   Pulse 102   Temp 97.3  F (36.3  C) (Temporal)   Resp 18   Ht 1.575 m (5' 2\")   Wt 76.7 kg (169 lb)   LMP  (LMP Unknown)   SpO2 99%   Breastfeeding No   BMI 30.91 kg/m   Estimated body mass index is 30.91 kg/m  as calculated from the following:    Height as of this encounter: 1.575 m (5' 2\").    Weight as of this encounter: 76.7 kg (169 lb).  Physical Exam          ASSESSMENT / PLAN:   Maliha was seen today for medicare visit.    Diagnoses and all orders for this visit:    Encounter for Medicare annual wellness exam    Type 2 diabetes mellitus without complication, without long-term current use of insulin (H)  -     FOOT EXAM    Benign essential hypertension    Gastroesophageal reflux disease without esophagitis    Hyperlipidemia, unspecified hyperlipidemia type    Menopause  -     DX Hip/Pelvis/Spine; Future    Nail dystrophy  -     Orthopedic  Referral; Future    Visit for screening mammogram  -     MA Screening Bilateral w/ Raghav; Future    Sliding hiatal hernia        Patient has been advised of split billing requirements and indicates understanding: " "Yes      COUNSELING:  Reviewed preventive health counseling, as reflected in patient instructions       Regular exercise       Healthy diet/nutrition       Dental care       Fall risk prevention       Immunizations    She will check with the drugstore about Boostrix and Shingrix.           Osteoporosis prevention/bone health      BMI:   Estimated body mass index is 30.91 kg/m  as calculated from the following:    Height as of this encounter: 1.575 m (5' 2\").    Weight as of this encounter: 76.7 kg (169 lb).   Weight management plan: Discussed healthy diet and exercise guidelines      She reports that she has never smoked. She has never been exposed to tobacco smoke. She has never used smokeless tobacco.      Appropriate preventive services were discussed with this patient, including applicable screening as appropriate for cardiovascular disease, diabetes, osteopenia/osteoporosis, and glaucoma.  As appropriate for age/gender, discussed screening for colorectal cancer, prostate cancer, breast cancer, and cervical cancer. Checklist reviewing preventive services available has been given to the patient.    Reviewed patients plan of care and provided an AVS. The Basic Care Plan (routine screening as documented in Health Maintenance) for Maliha meets the Care Plan requirement. This Care Plan has been established and reviewed with the Patient.          Nicanor Mike MD  [facility] AND \[facility]\""    Identified Health Risks:    I have reviewed Opioid Use Disorder and Substance Use Disorder risk factors and made any needed referrals.     "

## 2023-03-29 NOTE — PATIENT INSTRUCTIONS
You are due for your tetanus booster (TDAP) and this is given at the pharmacy.     Also ask the pharmacist about the Shingrix shot and what your cost would be.     Call [facility] (Dr. Tapia) about your toenail.  I sent the referral for the insurance.

## 2023-03-29 NOTE — PROGRESS NOTES
"Nursing Notes:   Olivia Jean-Baptiste, LPN  3/29/2023  3:52 PM  Signed  Chief Complaint   Patient presents with     Medicare Visit     annual       Initial BP (!) 148/62   Pulse 102   Temp 97.3  F (36.3  C) (Temporal)   Resp 18   Ht 1.575 m (5' 2\")   Wt 76.7 kg (169 lb)   LMP  (LMP Unknown)   SpO2 99%   Breastfeeding No   BMI 30.91 kg/m   Estimated body mass index is 30.91 kg/m  as calculated from the following:    Height as of this encounter: 1.575 m (5' 2\").    Weight as of this encounter: 76.7 kg (169 lb).  Medication Reconciliation: complete    FOOD SECURITY SCREENING QUESTIONS  Hunger Vital Signs:  Within the past 12 months we worried whether our food would run out before we got money to buy more. Never  Within the past 12 months the food we bought just didn't last and we didn't have money to get more. Never        Advance care directive on file? no  Advance care directive provided to patient? declined     Olivia Jean-Baptiste LPN    SUBJECTIVE:  Maliha Rivera  is a 82 year old female who comes in for Medicare wellness and follow-up of her other medical problems.    She had EGD yesterday because of chronic epigastric pain and the sensation of food sticking.  She had a sliding hiatal hernia.  Biopsies were taken and did not show anything worrisome.  A reactive gastropathy was noted which could be due to NSAIDs or alcohol or bile reflux.  We had previously tried sucralfate and omeprazole but she had persistent symptoms.  She still taking the omeprazole but has stopped the sucralfate.  Some soreness in her throat after her scope yesterday.    We did labs in January that were all good including her hemoglobin A1c which was 6.6%.    She is vaccinated but not boosted for COVID-19.  She had her flu shot and is up-to-date on Pneumovax.  She is due for Boostrix has not had Shingrix.    She has not had DEXA scan.    Past Medical, Family, and Social History reviewed and updated as noted below.   ROS is negative " except as noted above       Allergies   Allergen Reactions     Atorvastatin Muscle Pain (Myalgia)     Cefazolin Nausea and Vomiting   ,   Family History   Problem Relation Age of Onset     Heart Disease Mother         Heart Disease     Heart Disease Father         Heart Disease     Heart Disease Sister         Heart Disease     Cancer Other         Cancer,female cancer   ,   Current Outpatient Medications   Medication     acetaminophen (TYLENOL) 325 MG tablet     blood glucose (NO BRAND SPECIFIED) lancets standard     blood glucose (NO BRAND SPECIFIED) test strip     blood glucose (NO BRAND SPECIFIED) test strip     blood glucose monitoring (FREESTYLE) lancets     blood glucose monitoring (NO BRAND SPECIFIED) meter device kit     blood glucose monitoring (NO BRAND SPECIFIED) meter device kit     Blood Glucose Monitoring Suppl (FIFTY50 GLUCOSE METER 2.0) W/DEVICE KIT     Blood Pressure Monitoring (ADULT BLOOD PRESSURE CUFF LG) KIT     COMPRESSION STOCKINGS     lisinopril (ZESTRIL) 10 MG tablet     melatonin 5 MG tablet     omeprazole (PRILOSEC) 40 MG DR capsule     order for DME     order for DME     pravastatin (PRAVACHOL) 20 MG tablet     No current facility-administered medications for this visit.   ,   Past Medical History:   Diagnosis Date     Encounter for screening for cardiovascular disorders     4/8/15,Lexiscan cardiolite [facility]   ,   [facility] Active Problem List    Diagnosis Date Noted     Sliding hiatal hernia 03/29/2023     Priority: Medium     Postoperative anemia due to acute blood loss 04/19/2019     Priority: Medium     Benign essential hypertension 04/18/2018     Priority: Medium     Lumbar stenosis 03/01/2018     Priority: Medium     Lumbar radiculopathy 01/23/2018     Priority: Medium     Status post total bilateral knee replacement 03/31/2017     Priority: Medium     Primary osteoarthritis of left knee 03/23/2017     Priority: Medium     DM w/o complication type II (H) 09/22/2015      Priority: Medium     Overview:   3/2014:  A1c fine off metformin - will discontinue as patient does not want to take it.  She is taking fish oil + other ingredients she states helps with DM  On ASA  Patient does not want to take lisinopril  Does not tolerate statins and has discontinued pravastatin; try niacin  No retinopathy on eye exam 4/2013 and 12/2014  Overview:   Overview:   3/2014:  A1c fine off metformin - will discontinue as patient does not want to take it.  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  Lipids:; declines statin - will try niacin and is on fish oil.  Mammogram:schedule  Pap: 3/2013 WNL - does not need again.  Tdap:3/2013  Vitamin D: takes supplement  pneumovax done.  Consider zoster vaccine.       Degenerative arthritis of knee 10/21/2010     Priority: Medium     Overview:   IMO Update 10/11       Cervicalgia 05/11/2010     Priority: Medium     Overview:   IMO Update 10/11       DDD (degenerative disc disease), lumbar 01/15/2010     Priority: Medium     Primary localized osteoarthrosis, lower leg 01/31/2008     Priority: Medium     Hyperlipidemia 07/22/2004     Priority: Medium     Overview:   IMO Update 10/11  Overview:   Overview:   IMO Update 10/11     ,   Past Surgical History:   Procedure Laterality Date     APPENDECTOMY OPEN      No Comments Provided     ARTHROSCOPY KNEE      2007,Joseluis Thomas M.D.     COLONOSCOPY       "2003,2011,normal     ESOPHAGOSCOPY, GASTROSCOPY, DUODENOSCOPY (EGD), COMBINED      2/3/2016     ESOPHAGOSCOPY, GASTROSCOPY, DUODENOSCOPY (EGD), COMBINED N/A 3/28/2023    Procedure: Esophagoscopy, gastroscopy, duodenoscopy (EGD), combined;  Surgeon: Apolinar Martin MD;  Location: [facility] OR     LAPAROSCOPIC CHOLECYSTECTOMY      3/11/01,Dr. Salas     LAPAROSCOPIC TUBAL LIGATION      No Comments Provided     lumbar decompression L4-5  02/28/2018    Dr. France     OTHER SURGICAL HISTORY      1996,[id number],CATARACT EXTRACTION W/  INTRAOCULAR LENS IMPLANT,Bilateral     OTHER SURGICAL HISTORY      2001,11994.0,[place] ERCP BILIARY OR PANC DUCT STENT EXCHANGE W DIL AND WIRE,stent removed.     OTHER SURGICAL HISTORY      2004,[id number],CARDIAC CATHETERIZATION,mild disease     OTHER SURGICAL HISTORY      1/31/07,[id number],OTHER,Left     OTHER SURGICAL HISTORY      02/27/2008,[id number],TOTAL KNEE ARTHROPLASTY,Right     OTHER SURGICAL HISTORY      2010,[id number],TOTAL KNEE ARTHROPLASTY,Left,Dr. Carson     OTHER SURGICAL HISTORY      06/05/2017,[id number],OTHER,Left,Dr. Joiner, [facility]     RELEASE CARPAL TUNNEL      2006     Revision right total knee arthroplasty   04/18/2019    Dr. Carson.  Due to loose implant tibial component     TONSILLECTOMY      age 12    and   Social History     Tobacco Use     Smoking status: Never     Passive exposure: Never     Smokeless tobacco: Never   Substance Use Topics     Alcohol use: No     OBJECTIVE:  BP (!) 148/62   Pulse 102   Temp 97.3  F (36.3  C) (Temporal)   Resp 18   Ht 1.575 m (5' 2\")   Wt 76.7 kg (169 lb)   LMP  (LMP Unknown)   SpO2 99%   Breastfeeding No   BMI 30.91 kg/m     EXAM:  General Appearance: Pleasant, alert, appropriate appearance for age. No acute distress  Head Exam: Normal. Normocephalic, atraumatic.  Eye Exam: PERRLA, EOMI, conjunctivae, sclerae normal.  Ear Exam: Normal TM's bilaterally. Normal auditory canals and external ears. Non-tender.  Nose Exam: Normal external nose, " mucus membranes, and septum.  OroPharynx Exam:  Dental hygiene adequate. Normal buccal mucosa. Normal pharynx.  Neck Exam:  Supple, no masses or nodes. No bruits  Thyroid Exam: No nodules or enlargement.  Chest/Respiratory Exam: Normal chest wall and respirations. Clear to auscultation.  Breast Exam: No dimpling, nipple retraction or discharge. No masses or nodes.  Cardiovascular Exam: Regular rate and rhythm. S1, S2, no murmur, click, gallop, or rubs.  Gastrointestinal Exam: Soft, non-tender, no masses or organomegaly.  Lymphatic Exam: Non-palpable nodes in neck,clavicular, axillary, or inguinal regions.  Musculoskeletal Exam: Back is straight and non-tender, full ROM of upper and lower extremities.  Foot Exam: Left and right foot: good pedal pulses, no lesions, nail hygiene good. Normal sensory testing with #10 monofilament.   Skin: no rash or abnormalities  Neurologic Exam:  normal gross motor, tone coordination and no tremor.  Psychiatric Exam: Alert and oriented - appropriate affect.      ASSESSMENT/Plan :    Maliha was seen today for medicare visit.    Diagnoses and all orders for this visit:    Encounter for Medicare annual wellness exam    Type 2 diabetes mellitus without complication, without long-term current use of insulin (H)  -     FOOT EXAM    Benign essential hypertension    Gastroesophageal reflux disease without esophagitis    Hyperlipidemia, unspecified hyperlipidemia type    Menopause  -     DX Hip/Pelvis/Spine; Future    Nail dystrophy  -     Orthopedic  Referral; Future    Visit for screening mammogram  -     MA Screening Bilateral w/ Raghav; Future    Sliding hiatal hernia      Previous labs were reviewed.  Did not feel we need to repeat anything today.  Mammogram is ordered.  DEXA scan is ordered.    Lengthy discussion with regard to her sliding hiatal hernia.  We will see how things go now that she has had her scope and knows that things are not anything worrisome.  If she is having  "ongoing difficulties, would then consider general surgery referral for consideration of repair.    A total of 35 minutes was spent with the patient, reviewing records, tests, ordering medications, tests or procedures and documenting clinical information in the EHR in addition to Medicare Wellness.       Nicanor Mike MD    Answers for HPI/ROS submitted by the patient on 3/29/2023  In general, how would you rate your overall physical health?: good  Frequency of exercise:: 6-7 days/week  Do you usually eat at least 4 servings of fruit and vegetables a day, include whole grains & fiber, and avoid regularly eating high fat or \"junk\" foods? : Yes  Taking medications regularly:: Yes  Medication side effects:: Not applicable  Activities of Daily Living: no assistance needed  Home safety: no safety concerns identified  Hearing Impairment:: no hearing concerns  In the past 6 months, have you been bothered by leaking of urine?: No  abdominal pain: No  Blood in stool: No  Blood in urine: No  chest pain: No  chills: No  congestion: No  constipation: No  cough: No  diarrhea: No  dizziness: No  ear pain: No  eye pain: No  nervous/anxious: No  fever: No  frequency: No  genital sores: No  headaches: No  hearing loss: No  heartburn: No  arthralgias: No  joint swelling: No  peripheral edema: No  mood changes: No  myalgias: No  nausea: No  dysuria: No  palpitations: No  Skin sensation changes: No  sore throat: No  urgency: No  rash: No  shortness of breath: No  visual disturbance: No  weakness: No  pelvic pain: No  vaginal bleeding: No  vaginal discharge: No  breast mass: No  breast discharge: No  In general, how would you rate your overall mental or emotional health?: excellent  Additional concerns today:: No  Duration of exercise:: Other          "

## 2023-04-05 LAB
PATH REPORT.COMMENTS IMP SPEC: NORMAL
PATH REPORT.FINAL DX SPEC: NORMAL
PATH REPORT.RELEVANT HX SPEC: NORMAL
PHOTO IMAGE: NORMAL

## 2023-04-08 ENCOUNTER — TRANSFERRED RECORDS (OUTPATIENT)
Dept: HEALTH INFORMATION MANAGEMENT | Facility: OTHER | Age: 83
End: 2023-04-08

## 2023-04-20 ENCOUNTER — OFFICE VISIT (OUTPATIENT)
Dept: FAMILY MEDICINE | Facility: OTHER | Age: 83
End: 2023-04-20
Attending: FAMILY MEDICINE
Payer: COMMERCIAL

## 2023-04-20 VITALS
DIASTOLIC BLOOD PRESSURE: 80 MMHG | RESPIRATION RATE: 18 BRPM | TEMPERATURE: 97.3 F | HEART RATE: 57 BPM | BODY MASS INDEX: 31.28 KG/M2 | WEIGHT: 171 LBS | OXYGEN SATURATION: 96 % | SYSTOLIC BLOOD PRESSURE: 132 MMHG

## 2023-04-20 DIAGNOSIS — K29.60 BILE REFLUX GASTRITIS: ICD-10-CM

## 2023-04-20 DIAGNOSIS — K21.9 GASTROESOPHAGEAL REFLUX DISEASE WITHOUT ESOPHAGITIS: ICD-10-CM

## 2023-04-20 DIAGNOSIS — K44.9 SLIDING HIATAL HERNIA: Primary | ICD-10-CM

## 2023-04-20 PROCEDURE — G0463 HOSPITAL OUTPT CLINIC VISIT: HCPCS

## 2023-04-20 PROCEDURE — 99213 OFFICE O/P EST LOW 20 MIN: CPT | Performed by: FAMILY MEDICINE

## 2023-04-20 RX ORDER — URSODIOL 300 MG/1
300 CAPSULE ORAL 2 TIMES DAILY
Qty: 60 CAPSULE | Refills: 0 | Status: ON HOLD | OUTPATIENT
Start: 2023-04-20 | End: 2023-07-19

## 2023-04-20 ASSESSMENT — PAIN SCALES - GENERAL: PAINLEVEL: NO PAIN (0)

## 2023-04-20 NOTE — NURSING NOTE
"Chief Complaint   Patient presents with     RECHECK     After having an EGD       Initial /80   Pulse 57   Temp 97.3  F (36.3  C) (Temporal)   Resp 18   Wt 77.6 kg (171 lb)   LMP  (LMP Unknown)   SpO2 96%   Breastfeeding No   BMI 31.28 kg/m   Estimated body mass index is 31.28 kg/m  as calculated from the following:    Height as of 3/29/23: 1.575 m (5' 2\").    Weight as of this encounter: 77.6 kg (171 lb).  Medication Reconciliation: complete    FOOD SECURITY SCREENING QUESTIONS  Hunger Vital Signs:  Within the past 12 months we worried whether our food would run out before we got money to buy more. Never  Within the past 12 months the food we bought just didn't last and we didn't have money to get more. Never        Advance care directive on file? no  Advance care directive provided to patient? declined     Olivia Jean-Baptiste, JASON  "

## 2023-04-20 NOTE — PROGRESS NOTES
"Nursing Notes:   Olivia Jean-Baptiste, LPN  4/20/2023  3:41 PM  Signed  Chief Complaint   Patient presents with     RECHECK     After having an EGD       Initial /80   Pulse 57   Temp 97.3  F (36.3  C) (Temporal)   Resp 18   Wt 77.6 kg (171 lb)   LMP  (LMP Unknown)   SpO2 96%   Breastfeeding No   BMI 31.28 kg/m   Estimated body mass index is 31.28 kg/m  as calculated from the following:    Height as of 3/29/23: 1.575 m (5' 2\").    Weight as of this encounter: 77.6 kg (171 lb).  Medication Reconciliation: complete    FOOD SECURITY SCREENING QUESTIONS  Hunger Vital Signs:  Within the past 12 months we worried whether our food would run out before we got money to buy more. Never  Within the past 12 months the food we bought just didn't last and we didn't have money to get more. Never        Advance care directive on file? no  Advance care directive provided to patient? declined     Olivia Jean-Baptiste LPN    SUBJECTIVE:  Maliha Rivera  is a 82 year old female who comes in for follow-up after EGD.  I saw her a month ago for Medicare wellness.  The day before that she had EGD because of sensation of food sticking.  Biopsies were fine and a reactive gastropathy was noted which was felt to be due to NSAIDs alcohol or bile reflux.  She previously had tried sucralfate and omeprazole with persistent symptoms.  She was also noted to have a sliding hiatal hernia.  We elected to give it a bit of time to see how she did and if she was not improving, would then consider referral to general surgery for possible repair of her hiatal hernia.      She is bothered when she eats.  About 15 minutes after eating, it will burn for several hours. She is still taking omeprazole without help. She is no longer taking the sucralfate.  No difference with certain foods.  She has a lot of belching. It is always is worse after any meal.  No water brash.     She had a complicated cholecystectomy with leakage and drain in place followed " by ERCP.    Past Medical, Family, and Social History reviewed and updated as noted below.   ROS is negative except as noted above       Allergies   Allergen Reactions     Atorvastatin Muscle Pain (Myalgia)     Cefazolin Nausea and Vomiting   ,   Family History   Problem Relation Age of Onset     Heart Disease Mother         Heart Disease     Heart Disease Father         Heart Disease     Heart Disease Sister         Heart Disease     Cancer Other         Cancer,female cancer   ,   Current Outpatient Medications   Medication     acetaminophen (TYLENOL) 325 MG tablet     blood glucose (NO BRAND SPECIFIED) lancets standard     blood glucose (NO BRAND SPECIFIED) test strip     blood glucose (NO BRAND SPECIFIED) test strip     blood glucose monitoring (FREESTYLE) lancets     blood glucose monitoring (NO BRAND SPECIFIED) meter device kit     blood glucose monitoring (NO BRAND SPECIFIED) meter device kit     Blood Glucose Monitoring Suppl (FIFTY50 GLUCOSE METER 2.0) W/DEVICE KIT     Blood Pressure Monitoring (ADULT BLOOD PRESSURE CUFF LG) KIT     COMPRESSION STOCKINGS     lisinopril (ZESTRIL) 10 MG tablet     melatonin 5 MG tablet     omeprazole (PRILOSEC) 40 MG DR capsule     order for DME     order for DME     pravastatin (PRAVACHOL) 20 MG tablet     ursodiol (ACTIGALL) 300 MG capsule     No current facility-administered medications for this visit.   ,   Past Medical History:   Diagnosis Date     Encounter for screening for cardiovascular disorders     4/8/15,Lexiscan cardiolite [facility]   ,   Patient Active Problem List    Diagnosis Date Noted     Sliding hiatal hernia 03/29/2023     Priority: Medium     Postoperative anemia due to acute blood loss 04/19/2019     Priority: Medium     Benign essential hypertension 04/18/2018     Priority: Medium     Lumbar stenosis 03/01/2018     Priority: Medium     Lumbar radiculopathy 01/23/2018     Priority: Medium     Status post total bilateral knee replacement 03/31/2017      Priority: Medium     Primary osteoarthritis of left knee 03/23/2017     Priority: Medium     DM w/o complication type II (H) 09/22/2015     Priority: Medium     Overview:   3/2014:  A1c fine off metformin - will discontinue as patient does not want to take it.  She is taking fish oil + other ingredients she states helps with DM  On ASA  Patient does not want to take lisinopril  Does not tolerate statins and has discontinued pravastatin; try niacin  No retinopathy on eye exam 4/2013 and 12/2014  Overview:   Overview:   3/2014:  A1c fine off metformin - will discontinue as patient does not want to take it.  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  Lipids:; declines statin - will try niacin and is on fish oil.  Mammogram:schedule  Pap: 3/2013 WNL - does not need again.  Tdap:3/2013  Vitamin D: takes supplement  pneumovax done.  Consider zoster vaccine.       Degenerative arthritis of knee 10/21/2010     Priority: Medium     Overview:   IMO Update 10/11       Cervicalgia 05/11/2010     Priority: Medium     Overview:   IMO Update 10/11       DDD (degenerative disc disease), lumbar 01/15/2010     Priority: Medium     Primary localized osteoarthrosis, lower leg 01/31/2008     Priority: Medium     Hyperlipidemia 07/22/2004     Priority: Medium     Overview:   IMO Update 10/11  Overview:   Overview:   IMO Update 10/11     ,   Past Surgical History:    Procedure Laterality Date     APPENDECTOMY OPEN      No Comments Provided     ARTHROSCOPY KNEE      2007,Joseluis Thomas M.D.     COLONOSCOPY      2003,2011,normal     ESOPHAGOSCOPY, GASTROSCOPY, DUODENOSCOPY (EGD), COMBINED      2/3/2016     ESOPHAGOSCOPY, GASTROSCOPY, DUODENOSCOPY (EGD), COMBINED N/A 3/28/2023    Procedure: Esophagoscopy, gastroscopy, duodenoscopy (EGD), combined;  Surgeon: Apolinar Martin MD;  Location: [facility] OR     LAPAROSCOPIC CHOLECYSTECTOMY      3/11/01,Dr. Salas     LAPAROSCOPIC TUBAL LIGATION      No Comments Provided     lumbar decompression L4-5  02/28/2018    Dr. France     OTHER SURGICAL HISTORY      1996,[id number],CATARACT EXTRACTION W/  INTRAOCULAR LENS IMPLANT,Bilateral     OTHER SURGICAL HISTORY      2001,46742.0,[facility] BILIARY OR PANC DUCT STENT EXCHANGE W DIL AND WIRE,stent removed.     OTHER SURGICAL HISTORY      2004,[id number],CARDIAC CATHETERIZATION,mild disease     OTHER SURGICAL HISTORY      1/31/07,[id number],OTHER,Left     OTHER SURGICAL HISTORY      02/27/2008,[id number],TOTAL KNEE ARTHROPLASTY,Right     OTHER SURGICAL HISTORY      2010,[id number],TOTAL KNEE ARTHROPLASTY,Left,Dr. Carson     OTHER SURGICAL HISTORY      06/05/2017,[id number],OTHER,Left,Dr. Joiner, [facility]     RELEASE CARPAL TUNNEL      2006     Revision right total knee arthroplasty   04/18/2019    Dr. Carson.  Due to loose implant tibial component     TONSILLECTOMY      age 12    and   Social History     Tobacco Use     Smoking status: Never     Passive exposure: Never     Smokeless tobacco: Never   Vaping Use     Vaping status: Never Used     Passive vaping exposure: Yes   Substance Use Topics     Alcohol use: No     OBJECTIVE:  /80   Pulse 57   Temp 97.3  F (36.3  C) (Temporal)   Resp 18   Wt 77.6 kg (171 lb)   LMP  (LMP Unknown)   SpO2 96%   Breastfeeding No   BMI 31.28 kg/m     EXAM:  Alert cooperative, no distress.  Abdomen is soft and nontender.  Reviewed previous testing, biopsy results and scope  results.  ASSESSMENT/Plan :    Maliha was seen today for recheck.    Diagnoses and all orders for this visit:    Sliding hiatal hernia  -     Adult General Surg Referral; Future    Gastroesophageal reflux disease without esophagitis  -     Adult General Surg Referral; Future    Bile reflux gastritis  -     ursodiol (ACTIGALL) 300 MG capsule; Take 1 capsule (300 mg) by mouth 2 times daily  -     Adult General Surg Referral; Future      Since she still having issues, we will do a trial of ursodiol for bile reflux gastritis to see if that settles things down.  I also think it would be prudent to see general surgery for consideration of further evaluation.  Referral sent for same.  She will keep in touch with her progress.    Nicanor Mike MD          Answers for HPI/ROS submitted by the patient on 4/20/2023  What is the reason for your visit today? : to see my test  How many servings of fruits and vegetables do you eat daily?: 4 or more  On average, how many sweetened beverages do you drink each day (Examples: soda, juice, sweet tea, etc.  Do NOT count diet or artificially sweetened beverages)?: 1  How many minutes a day do you exercise enough to make your heart beat faster?: 60 or more  How many days a week do you exercise enough to make your heart beat faster?: 7

## 2023-04-20 NOTE — PROGRESS NOTES
Ila Jett is a 82 year old, presenting for the following health issues:  RECHECK (After having an EGD)         View : No data to display.              History of Present Illness       Reason for visit:  To see my test    She eats 4 or more servings of fruits and vegetables daily.She consumes 1 sweetened beverage(s) daily.She exercises with enough effort to increase her heart rate 60 or more minutes per day.  She exercises with enough effort to increase her heart rate 7 days per week.                Review of Systems         Objective    /80   Pulse 57   Temp 97.3  F (36.3  C) (Temporal)   Resp 18   Wt 77.6 kg (171 lb)   LMP  (LMP Unknown)   SpO2 96%   Breastfeeding No   BMI 31.28 kg/m    Body mass index is 31.28 kg/m .  Physical Exam

## 2023-04-24 ENCOUNTER — OFFICE VISIT (OUTPATIENT)
Dept: SURGERY | Facility: OTHER | Age: 83
End: 2023-04-24
Attending: SURGERY
Payer: COMMERCIAL

## 2023-04-24 VITALS
OXYGEN SATURATION: 98 % | RESPIRATION RATE: 18 BRPM | BODY MASS INDEX: 31.13 KG/M2 | TEMPERATURE: 99.5 F | WEIGHT: 170.2 LBS | DIASTOLIC BLOOD PRESSURE: 58 MMHG | SYSTOLIC BLOOD PRESSURE: 118 MMHG | HEART RATE: 69 BPM

## 2023-04-24 DIAGNOSIS — K44.9 SLIDING HIATAL HERNIA: ICD-10-CM

## 2023-04-24 DIAGNOSIS — K29.60 BILE REFLUX GASTRITIS: ICD-10-CM

## 2023-04-24 DIAGNOSIS — K21.9 GASTROESOPHAGEAL REFLUX DISEASE WITHOUT ESOPHAGITIS: ICD-10-CM

## 2023-04-24 PROCEDURE — 99214 OFFICE O/P EST MOD 30 MIN: CPT | Performed by: SURGERY

## 2023-04-24 PROCEDURE — G0463 HOSPITAL OUTPT CLINIC VISIT: HCPCS

## 2023-04-24 RX ORDER — LIDOCAINE 40 MG/G
CREAM TOPICAL
Status: CANCELLED | OUTPATIENT
Start: 2023-04-24

## 2023-04-24 RX ORDER — SODIUM CHLORIDE, SODIUM LACTATE, POTASSIUM CHLORIDE, CALCIUM CHLORIDE 600; 310; 30; 20 MG/100ML; MG/100ML; MG/100ML; MG/100ML
INJECTION, SOLUTION INTRAVENOUS CONTINUOUS
Status: CANCELLED | OUTPATIENT
Start: 2023-04-24

## 2023-04-24 ASSESSMENT — PAIN SCALES - GENERAL: PAINLEVEL: MILD PAIN (3)

## 2023-04-24 NOTE — NURSING NOTE
"Chief Complaint   Patient presents with     Clinic Care Coordination - Follow-up     Follow up post EGD.       Initial /58 (BP Location: Right arm, Patient Position: Sitting, Cuff Size: Adult Large)   Pulse 69   Temp 99.5  F (37.5  C) (Tympanic)   Resp 18   Wt 77.2 kg (170 lb 3.2 oz)   LMP  (LMP Unknown)   SpO2 98%   Breastfeeding No   BMI 31.13 kg/m   Estimated body mass index is 31.13 kg/m  as calculated from the following:    Height as of 3/29/23: 1.575 m (5' 2\").    Weight as of this encounter: 77.2 kg (170 lb 3.2 oz).  Medication Reconciliation: complete    Kika Carcamo LPN  "

## 2023-04-24 NOTE — H&P (VIEW-ONLY)
Maliha Rivera presents to clinic for follow-up of hiatal hernia.  Patient presents today with symptoms of bloating, increased belching, substernal burning chest pain after eating.  Patient had upper endoscopy approximately 1 month ago and did not describe any of the symptoms to me prior to this procedure.  Patient described chronic epigastric pain with radiation to the right upper quadrant.  Patient has a history of complicated cholecystectomy approximately 20 years ago.     Patient was given ursodiol by her primary care doctor which does not seem to have helped her symptoms.  Patient takes omeprazole daily.      /58 (BP Location: Right arm, Patient Position: Sitting, Cuff Size: Adult Large)   Pulse 69   Temp 99.5  F (37.5  C) (Tympanic)   Resp 18   Wt 77.2 kg (170 lb 3.2 oz)   LMP  (LMP Unknown)   SpO2 98%   Breastfeeding No   BMI 31.13 kg/m      Patient is sitting up in chair, appears comfortable  No increased work of breathing, lungs are clear to auscultation bilaterally  Heart is regular rate and rhythm, no murmur   abdomen is soft, nondistended, nontender  Patient is alert and oriented, normal speech and mentation upper endoscopy biopsy pathology report shows normal duodenal mucosa and      Reactive gastropathy, consistent with NSAID injury or bile reflux gastritis.      Maliha Rivera is an 82-year-old female status post EGD with new, possibly ongoing GERD symptoms and right upper quadrant pain.  We will plan for EGD with Bravo pH probe placement to quantify the patient's reflux.  This will help us decide how much of her problem is due to reflux and if she may be helped with a surgical procedure for this.  The technical details of EGD with Bravo pH probe placement were discussed with the patient all the risk and benefits include bleeding, perforation, aspiration, foreign body sensation.  The patient demonstrates understanding is willing to proceed.    Schedule for EGD with Bravo pH probe  placement at the patient's convenience      Apolinar Martin MD

## 2023-04-27 ENCOUNTER — TELEPHONE (OUTPATIENT)
Dept: SURGERY | Facility: OTHER | Age: 83
End: 2023-04-27

## 2023-04-27 NOTE — TELEPHONE ENCOUNTER
Screening Questions for the Scheduling of Screening Colonoscopies     (If Colonoscopy is diagnostic, Provider should review the chart before scheduling.)    Are you younger than 50 or older than 80?  YES    Do you take aspirin or fish oil?  NO (if yes, tell patient to stop 1 week prior to Colonoscopy)  Do you take warfarin (Coumadin), clopidogrel (Plavix), apixaban (Eliquis), dabigatram (Pradaxa), rivaroxaban (Xarelto) or any blood thinner? NO    Do you use oxygen at home?  NO    Do you have kidney disease? NO  Are you on dialysis? NO    Have you had a stroke or heart attack in the last year? NO    Have you had a stent in your heart or any blood vessel in the last year? NO    Have you had a transplant of any organ? NO  Have you had a colonoscopy or upper endoscopy (EGD) before? YES, C-SCOPE and EGD           When?  C-Scope 2013, EGD 03/2023    Date of scheduled EGD. 05/02/2023    [facility]    Pharmacy [facility]

## 2023-05-02 ENCOUNTER — ANESTHESIA (OUTPATIENT)
Dept: SURGERY | Facility: OTHER | Age: 83
End: 2023-05-02
Payer: MEDICARE

## 2023-05-02 ENCOUNTER — ANESTHESIA EVENT (OUTPATIENT)
Dept: SURGERY | Facility: OTHER | Age: 83
End: 2023-05-02
Payer: MEDICARE

## 2023-05-02 ENCOUNTER — HOSPITAL ENCOUNTER (OUTPATIENT)
Facility: OTHER | Age: 83
Discharge: HOME | End: 2023-05-02
Attending: SURGERY | Admitting: SURGERY
Payer: MEDICARE

## 2023-05-02 VITALS
OXYGEN SATURATION: 99 % | SYSTOLIC BLOOD PRESSURE: 168 MMHG | RESPIRATION RATE: 18 BRPM | WEIGHT: 170 LBS | DIASTOLIC BLOOD PRESSURE: 82 MMHG | BODY MASS INDEX: 31.09 KG/M2 | TEMPERATURE: 98.1 F | HEART RATE: 69 BPM

## 2023-05-02 DIAGNOSIS — K21.9 GASTROESOPHAGEAL REFLUX DISEASE WITHOUT ESOPHAGITIS: ICD-10-CM

## 2023-05-02 DIAGNOSIS — K44.9 SLIDING HIATAL HERNIA: ICD-10-CM

## 2023-05-02 PROCEDURE — 250N000009 HC RX 250: Performed by: NURSE ANESTHETIST, CERTIFIED REGISTERED

## 2023-05-02 PROCEDURE — 43239 EGD BIOPSY SINGLE/MULTIPLE: CPT | Performed by: NURSE ANESTHETIST, CERTIFIED REGISTERED

## 2023-05-02 PROCEDURE — 43239 EGD BIOPSY SINGLE/MULTIPLE: CPT | Performed by: SURGERY

## 2023-05-02 PROCEDURE — 91035 G-ESOPH REFLX TST W/ELECTROD: CPT | Performed by: SURGERY

## 2023-05-02 PROCEDURE — 43239 EGD BIOPSY SINGLE/MULTIPLE: CPT

## 2023-05-02 PROCEDURE — 272N000050: Performed by: SURGERY

## 2023-05-02 PROCEDURE — 99100 ANES PT EXTEME AGE<1 YR&>70: CPT | Performed by: NURSE ANESTHETIST, CERTIFIED REGISTERED

## 2023-05-02 PROCEDURE — 88305 TISSUE EXAM BY PATHOLOGIST: CPT

## 2023-05-02 PROCEDURE — 250N000011 HC RX IP 250 OP 636: Performed by: NURSE ANESTHETIST, CERTIFIED REGISTERED

## 2023-05-02 PROCEDURE — 258N000003 HC RX IP 258 OP 636: Performed by: SURGERY

## 2023-05-02 PROCEDURE — 999N000010 HC STATISTIC ANES STAT CODE-CRNA PER MINUTE: Performed by: SURGERY

## 2023-05-02 RX ORDER — PROPOFOL 10 MG/ML
INJECTION, EMULSION INTRAVENOUS PRN
Status: DISCONTINUED | OUTPATIENT
Start: 2023-05-02 | End: 2023-05-02

## 2023-05-02 RX ORDER — LIDOCAINE 40 MG/G
CREAM TOPICAL
Status: DISCONTINUED | OUTPATIENT
Start: 2023-05-02 | End: 2023-05-02 | Stop reason: HOSPADM

## 2023-05-02 RX ORDER — NALOXONE HYDROCHLORIDE 0.4 MG/ML
0.4 INJECTION, SOLUTION INTRAMUSCULAR; INTRAVENOUS; SUBCUTANEOUS
Status: DISCONTINUED | OUTPATIENT
Start: 2023-05-02 | End: 2023-05-02 | Stop reason: HOSPADM

## 2023-05-02 RX ORDER — LIDOCAINE HYDROCHLORIDE 20 MG/ML
INJECTION, SOLUTION INFILTRATION; PERINEURAL PRN
Status: DISCONTINUED | OUTPATIENT
Start: 2023-05-02 | End: 2023-05-02

## 2023-05-02 RX ORDER — SODIUM CHLORIDE, SODIUM LACTATE, POTASSIUM CHLORIDE, CALCIUM CHLORIDE 600; 310; 30; 20 MG/100ML; MG/100ML; MG/100ML; MG/100ML
INJECTION, SOLUTION INTRAVENOUS CONTINUOUS
Status: DISCONTINUED | OUTPATIENT
Start: 2023-05-02 | End: 2023-05-02 | Stop reason: HOSPADM

## 2023-05-02 RX ORDER — PROPOFOL 10 MG/ML
INJECTION, EMULSION INTRAVENOUS CONTINUOUS PRN
Status: DISCONTINUED | OUTPATIENT
Start: 2023-05-02 | End: 2023-05-02

## 2023-05-02 RX ORDER — FLUMAZENIL 0.1 MG/ML
0.2 INJECTION, SOLUTION INTRAVENOUS
Status: DISCONTINUED | OUTPATIENT
Start: 2023-05-02 | End: 2023-05-02 | Stop reason: HOSPADM

## 2023-05-02 RX ORDER — NALOXONE HYDROCHLORIDE 0.4 MG/ML
0.2 INJECTION, SOLUTION INTRAMUSCULAR; INTRAVENOUS; SUBCUTANEOUS
Status: DISCONTINUED | OUTPATIENT
Start: 2023-05-02 | End: 2023-05-02 | Stop reason: HOSPADM

## 2023-05-02 RX ADMIN — PROPOFOL 50 MG: 10 INJECTION, EMULSION INTRAVENOUS at 13:34

## 2023-05-02 RX ADMIN — LIDOCAINE HYDROCHLORIDE 40 MG: 20 INJECTION, SOLUTION INFILTRATION; PERINEURAL at 13:34

## 2023-05-02 RX ADMIN — SODIUM CHLORIDE, POTASSIUM CHLORIDE, SODIUM LACTATE AND CALCIUM CHLORIDE: 600; 310; 30; 20 INJECTION, SOLUTION INTRAVENOUS at 11:52

## 2023-05-02 RX ADMIN — PROPOFOL 130 MCG/KG/MIN: 10 INJECTION, EMULSION INTRAVENOUS at 13:34

## 2023-05-02 ASSESSMENT — ACTIVITIES OF DAILY LIVING (ADL)
ADLS_ACUITY_SCORE: 35

## 2023-05-02 NOTE — INTERVAL H&P NOTE
I saw and examined Maliha Rivera.  I have reviewed the history and physical and find no changes to the patient's medical status or condition with the exceptions noted below.         Apolinar Martin MD   12:20 PM 5/2/2023

## 2023-05-02 NOTE — ANESTHESIA PREPROCEDURE EVALUATION
Anesthesia Pre-Procedure Evaluation    Patient: Maliha Rivera   MRN: [id number] : 1940        Procedure : Procedure(s):  ESOPHAGOGASTRODUODENOSCOPY, WITH BRAVO PH MONITORING DEVICE INSERTION          Past Medical History:   Diagnosis Date     Encounter for screening for cardiovascular disorders     4/8/15,Lexiscan cardiolite [facility]      Past Surgical History:   Procedure Laterality Date     APPENDECTOMY OPEN      No Comments Provided     ARTHROSCOPY KNEE      ,Joseluis Thomas M.D.     COLONOSCOPY      ,,normal     ESOPHAGOSCOPY, GASTROSCOPY, DUODENOSCOPY (EGD), COMBINED      2/3/2016     ESOPHAGOSCOPY, GASTROSCOPY, DUODENOSCOPY (EGD), COMBINED N/A 3/28/2023    Procedure: Esophagoscopy, gastroscopy, duodenoscopy (EGD), combined;  Surgeon: Apolinar Martin MD;  Location: [facility] OR     LAPAROSCOPIC CHOLECYSTECTOMY      3/11/01,Dr. Salas     LAPAROSCOPIC TUBAL LIGATION      No Comments Provided     lumbar decompression L4-5  2018    Dr. France     OTHER SURGICAL HISTORY      ,[id number],CATARACT EXTRACTION W/  INTRAOCULAR LENS IMPLANT,Bilateral     OTHER SURGICAL HISTORY      ,50014.0,[facility] BILIARY OR PANC DUCT STENT EXCHANGE W DIL AND WIRE,stent removed.     OTHER SURGICAL HISTORY      ,[id number],CARDIAC CATHETERIZATION,mild disease     OTHER SURGICAL HISTORY      07,[id number],OTHER,Left     OTHER SURGICAL HISTORY      2008,[id number],TOTAL KNEE ARTHROPLASTY,Right     OTHER SURGICAL HISTORY      2010,[id number],TOTAL KNEE ARTHROPLASTY,Left,Dr. Carson     OTHER SURGICAL HISTORY      2017,[id number],OTHER,Left,Dr. Joiner, [facility]     RELEASE CARPAL TUNNEL      2006     Revision right total knee arthroplasty   2019    Dr. Carson.  Due to loose implant tibial component     TONSILLECTOMY      age 12      Allergies   Allergen Reactions     Atorvastatin Muscle Pain (Myalgia)     Cefazolin Nausea and Vomiting      Social History     Tobacco Use     Smoking status:  Never     Passive exposure: Never     Smokeless tobacco: Never   Vaping Use     Vaping status: Never Used     Passive vaping exposure: Yes   Substance Use Topics     Alcohol use: No      Wt Readings from Last 1 Encounters:   05/02/23 77.1 kg (170 lb)        Anesthesia Evaluation   Pt has had prior anesthetic. Type: General and MAC.    No history of anesthetic complications       ROS/MED HX  ENT/Pulmonary:     (+) GRADY risk factors, hypertension, obese,     Neurologic:  - neg neurologic ROS     Cardiovascular:     (+) hypertension-----    METS/Exercise Tolerance: 3 - Able to walk 1-2 blocks without stopping    Hematologic:  - neg hematologic  ROS     Musculoskeletal: Comment: Lumbar DDD and limited mobility with neck movement. - neg musculoskeletal ROS     GI/Hepatic:     (+) GERD, Asymptomatic on medication,     Renal/Genitourinary:  - neg Renal ROS     Endo:       Psychiatric/Substance Use:  - neg psychiatric ROS     Infectious Disease:  - neg infectious disease ROS     Malignancy:  - neg malignancy ROS     Other:  - neg other ROS   (-) Any chance pregnant       Physical Exam    Airway        Mallampati: III   TM distance: > 3 FB   Neck ROM: full   Mouth opening: < 3 cm    Respiratory Devices and Support         Dental     Comment: Edentulous on top with modest abnormalities on bottom teeth.     (+) Edentulous      Cardiovascular   cardiovascular exam normal       Rhythm and rate: regular and normal     Pulmonary   pulmonary exam normal        breath sounds clear to auscultation           OUTSIDE LABS:  CBC:   Lab Results   Component Value Date    WBC 5.3 01/31/2023    WBC 5.8 02/24/2022    HGB 11.9 01/31/2023    HGB 12.0 02/24/2022    HCT 34.3 (L) 01/31/2023    HCT 34.6 (L) 02/24/2022     01/31/2023     02/24/2022     BMP:   Lab Results   Component Value Date     01/31/2023     02/24/2022    POTASSIUM 4.2 01/31/2023    POTASSIUM 4.1 02/24/2022    CHLORIDE 107 01/31/2023    CHLORIDE 104  02/24/2022    CO2 26 01/31/2023    CO2 24 02/24/2022    BUN 11.6 01/31/2023    BUN 17 02/24/2022    CR 0.58 01/31/2023    CR 0.76 02/24/2022     (H) 03/28/2023     (H) 01/31/2023     COAGS:   Lab Results   Component Value Date    PTT 31 02/22/2018    INR 1.05 02/22/2018     POC: No results found for: BGM, HCG, HCGS  HEPATIC:   Lab Results   Component Value Date    ALBUMIN 4.4 01/31/2023    PROTTOTAL 7.0 01/31/2023    ALT 16 01/31/2023    AST 20 01/31/2023    ALKPHOS 70 01/31/2023    BILITOTAL 0.3 01/31/2023     OTHER:   Lab Results   Component Value Date    A1C 6.6 (H) 01/31/2023    SHON 9.3 01/31/2023    LIPASE 35 02/24/2022    AMYLASE 47 02/24/2022    TSH 2.78 01/31/2023    CRP 2.0 (H) 06/05/2019    SED 22 09/22/2015       Anesthesia Plan    ASA Status:  3   NPO Status:  NPO Appropriate    Anesthesia Type: MAC.     - Reason for MAC: straight local not clinically adequate   Induction: Intravenous.   Maintenance: TIVA.        Consents    Anesthesia Plan(s) and associated risks, benefits, and realistic alternatives discussed. Questions answered and patient/representative(s) expressed understanding.     - Discussed: Risks, Benefits and Alternatives for BOTH SEDATION and the PROCEDURE were discussed     - Discussed with:  Patient      - Extended Intubation/Ventilatory Support Discussed: No.      - Patient is DNR/DNI Status: No    Use of blood products discussed: No .     Postoperative Care            Comments:                FRANKO Obrien CRNA

## 2023-05-02 NOTE — PROGRESS NOTES
Maliha has been discharged to home at 1450 via ambulatory accompanied by ARNAUD Reyes    Written discharge instructions were provided to patient.  Prescriptions were N/A.  Patient states their pain is zero, and denies any nausea or dizziness upon discharge.    Patient and adult caring for them verbalize understanding of discharge instructions including no driving until tomorrow and no longer taking narcotic pain medications - no operating mechanical equipment and no making any important decisions.They understand reason for discharge, and necessary follow-up appointments.      Amy Palm RN on 5/2/2023 at 4:00 PM

## 2023-05-02 NOTE — ANESTHESIA CARE TRANSFER NOTE
Patient: Mlaiha Rivera    Procedure: Procedure(s):  ESOPHAGOGASTRODUODENOSCOPY WITH BIOPSY, WITH BRAVO PH MONITORING DEVICE INSERTION       Diagnosis: Gastroesophageal reflux disease without esophagitis [K21.9]  Sliding hiatal hernia [K44.9]  Diagnosis Additional Information: No value filed.    Anesthesia Type:   MAC     Note:    Oropharynx: oropharynx clear of all foreign objects  Level of Consciousness: awake  Oxygen Supplementation: room air    Independent Airway: airway patency satisfactory and stable  Dentition: dentition unchanged  Vital Signs Stable: post-procedure vital signs reviewed and stable  Report to RN Given: handoff report given  Patient transferred to: Phase II    Handoff Report: Identifed the Patient, Identified the Reponsible Provider, Reviewed the pertinent medical history, Discussed the surgical course, Reviewed Intra-OP anesthesia mangement and issues during anesthesia, Set expectations for post-procedure period and Allowed opportunity for questions and acknowledgement of understanding      Vitals:  Vitals Value Taken Time   BP     Temp     Pulse     Resp     SpO2         Electronically Signed By: FRANKO PENA CRNA  May 2, 2023  1:50 PM

## 2023-05-02 NOTE — DISCHARGE INSTRUCTIONS
[facility]  Adult Discharge Orders & Instructions      For 24 hours after surgery:  Get plenty of rest.  A responsible adult must stay with you for at least 24 hours after you leave the hospital.   You may feel lightheaded.  IF so, sit for a few minutes before standing.  Have someone help you get up.   You may have a slight fever. Call the doctor if your fever is over 101 F (38.3 C) (taken under the tongue) or lasts longer than 24 hours.  You may have a dry mouth, a sore throat, muscle aches or trouble sleeping.  These should go away after 24 hours.  Do not make important or legal decisions.  6.   Do not drive or use heavy equipment.  If you have weakness or tingling, don't drive or use heavy equipment until this feeling goes away.  To contact a doctor, call    [phone]______________     You were given instruction today on the BRAVO recording device and how to enter your Symptoms via the recorder. You were also given a 2 page instruction packet that includes a written diary. You will need to bring back the written diary and the BRAVO recording device to Day Surgery after 48 hours, which would be after *** date and *** time.

## 2023-05-02 NOTE — OP NOTE
PROCEDURE NOTE    SURGEON:Apolinar Martin MD    PRE-OP DIAGNOSIS:   GERD, hiatal hernia       POST-OP DIAGNOSIS: same    PROCEDURE PLANNED:   Diagnostic EGD, flexible, biopsy, bravo pH probe placement         PROCEDURE PERFORMED:  Diagnostic EGD, flexible, biopsy, bravo pH probe placement     SPECIMEN:    ID Type Source Tests Collected by Time Destination   1 :  Biopsy Gastric Esophageal Junction SURGICAL PATHOLOGY EXAM Apolinar Martin MD 5/2/2023  1:37 PM           ANESTHESIA: CRNA Independent: Kellee Mendez APRN CRNA      ESTIMATED BLOOD LOSS: None    COMPLICATIONS:  None    INDICATION FOR THE PROCEDURE:The patient is a 82 year oldfemale. The patient presents with GERD and hiatal hernia     PROCEDURE: The patient was taken to the endoscopy suite. Appropriate monitors were attached. The patient was placed in the left lateral decubitus position. Bite block was positioned. Timeout was performed confirming the patient's identity and procedure to be performed. After appropriate sedation was confirmed, the flexible endoscope was advanced into the oropharynx. The posterior oropharynx appeared grossly normal. The scope was advanced into the proximal esophagus. The esophagus was insufflated with air. The scope was advanced under direct visualization. No acute abnormalities of the esophagus were noted. The scope was advanced into the stomach.  The scope was retroflexed and the GE junction inspected. No abnormalities were noted.  The scope was returned to a neutral position and the stomach was decompressed. The scope was withdrawn to the GE junction and biopsy obtained.  The hiatus measured 3cm.  The Z -line measured 34cm. There was LA Grade C change without inflammatory esophagitis. The mucosa of the esophagus was inspected while withdrawing the scope. No abnormalities were noted. . The scope was withdrawn from the patient.  The bravo catheter was advanced to 28 cm. Suction was held for at least 30  seconds without interruption. The probe was deployed. The scope was re-advanced and the capsule verified.  The bite block was removed.The bite block was removed. The patient tolerated the procedure with no immediately apparent complication. The patient was taken to recovery instable condition.        RECOMMENDATIONS  Follow-up pathology and bravo results.

## 2023-05-02 NOTE — ANESTHESIA POSTPROCEDURE EVALUATION
Patient: Maliha Rivera    Procedure: Procedure(s):  ESOPHAGOGASTRODUODENOSCOPY WITH BIOPSY, WITH BRAVO PH MONITORING DEVICE INSERTION       Anesthesia Type:  MAC    Note:  Disposition: Outpatient   Postop Pain Control: Uneventful            Sign Out: Well controlled pain   PONV: No   Neuro/Psych: Uneventful            Sign Out: Acceptable/Baseline neuro status   Airway/Respiratory: Uneventful            Sign Out: Acceptable/Baseline resp. status   CV/Hemodynamics: Uneventful            Sign Out: Acceptable CV status; No obvious hypovolemia; No obvious fluid overload   Other NRE: NONE   DID A NON-ROUTINE EVENT OCCUR? No           Last vitals:  Vitals Value Taken Time   /82 05/02/23 1430   Temp 98.1  F (36.7  C) 05/02/23 1348   Pulse 69 05/02/23 1430   Resp 18 05/02/23 1400   SpO2 97 % 05/02/23 1435   Vitals shown include unvalidated device data.    Electronically Signed By: FRANKO PENA CRNA  May 2, 2023  2:36 PM

## 2023-05-02 NOTE — OR NURSING
.The patient was unable or refused to remove jewelry prior to their surgical procedure. The patient has been instructed on the risk of injury and possible infection. In the event jewelry or piercings are obstructing the surgical process or impeding circulation, the jewelry or piercings may need to be cut off. The surgeon and anesthesia provider have been notified that an item cannot be removed, or that the patient refuses to remove the jewelry or piercing. The surgeon and anesthesia provider will determine if it is in the patient's best interest to proceed with the procedure with the jewelry or piercings remaining in contact with the patient's body.

## 2023-05-11 ENCOUNTER — TRANSFERRED RECORDS (OUTPATIENT)
Dept: HEALTH INFORMATION MANAGEMENT | Facility: OTHER | Age: 83
End: 2023-05-11
Payer: COMMERCIAL

## 2023-05-22 ENCOUNTER — TRANSFERRED RECORDS (OUTPATIENT)
Dept: HEALTH INFORMATION MANAGEMENT | Facility: OTHER | Age: 83
End: 2023-05-22
Payer: COMMERCIAL

## 2023-05-26 ENCOUNTER — OFFICE VISIT (OUTPATIENT)
Dept: SURGERY | Facility: OTHER | Age: 83
End: 2023-05-26
Attending: SURGERY
Payer: COMMERCIAL

## 2023-05-26 VITALS
RESPIRATION RATE: 18 BRPM | BODY MASS INDEX: 30.62 KG/M2 | HEART RATE: 80 BPM | TEMPERATURE: 99 F | DIASTOLIC BLOOD PRESSURE: 60 MMHG | SYSTOLIC BLOOD PRESSURE: 122 MMHG | WEIGHT: 167.4 LBS | OXYGEN SATURATION: 97 %

## 2023-05-26 DIAGNOSIS — K21.9 GASTROESOPHAGEAL REFLUX DISEASE WITHOUT ESOPHAGITIS: Primary | ICD-10-CM

## 2023-05-26 PROCEDURE — G0463 HOSPITAL OUTPT CLINIC VISIT: HCPCS

## 2023-05-26 PROCEDURE — 99212 OFFICE O/P EST SF 10 MIN: CPT | Performed by: SURGERY

## 2023-05-26 ASSESSMENT — PAIN SCALES - GENERAL: PAINLEVEL: NO PAIN (0)

## 2023-05-26 NOTE — PROGRESS NOTES
Maliha Rivera presents to clinic for follow-up after undergoing EGD with biopsy and Bravo pH probe placement.  Patient states she is doing well.  Symptoms are moderately well controlled with her PPI.  But, she continues to get breakthrough symptoms and also has regurgitation.      /60 (BP Location: Right arm, Patient Position: Sitting, Cuff Size: Adult Large)   Pulse 80   Temp 99  F (37.2  C) (Tympanic)   Resp 18   Wt 75.9 kg (167 lb 6.4 oz)   LMP  (LMP Unknown)   SpO2 97%   Breastfeeding No   BMI 30.62 kg/m      Patient is sitting up in the chair, appears comfortable  No increased work of breathing  Abdomen is soft, nontender  Patient is alert and oriented, normal speech and mentation      Upper endoscopy biopsy pathology report shows inflammation at the GE junction consistent with reflux esophagitis with DeMeester score of 24.5      Maliha Rivera is an 82-year-old female with GERD with regurgitation and hiatal hernia.  Patient is interested in pursuing further treatment for the symptoms.  She states now that her regurgitation is getting quite bothersome and becoming a daily occurrence.  We have demonstrated reflux with biopsies and severely elevated DeMeester score.  The patient is medically a good surgical candidate.  We will assure normal esophageal function with esophagram and plan to schedule for fundoplication after we receive these results.  We will follow-up with the patient by phone and likely have her return to clinic to further discuss the operation in addition to scheduling for surgery.    X-ray esophagram  Follow-up by phone after this test is done      Apolinar Martin MD

## 2023-06-05 DIAGNOSIS — K21.9 GASTROESOPHAGEAL REFLUX DISEASE WITHOUT ESOPHAGITIS: Primary | ICD-10-CM

## 2023-06-06 ENCOUNTER — HOSPITAL ENCOUNTER (OUTPATIENT)
Dept: GENERAL RADIOLOGY | Facility: OTHER | Age: 83
Discharge: HOME | End: 2023-06-06
Attending: SURGERY | Admitting: SURGERY
Payer: MEDICARE

## 2023-06-06 DIAGNOSIS — K21.9 GASTROESOPHAGEAL REFLUX DISEASE WITHOUT ESOPHAGITIS: ICD-10-CM

## 2023-06-06 PROCEDURE — 250N000013 HC RX MED GY IP 250 OP 250 PS 637: Performed by: SURGERY

## 2023-06-06 PROCEDURE — 74246 X-RAY XM UPR GI TRC 2CNTRST: CPT

## 2023-06-06 RX ADMIN — ANTACID/ANTIFLATULENT 4 G: 380; 550; 10; 10 GRANULE, EFFERVESCENT ORAL at 10:42

## 2023-06-08 ENCOUNTER — HOSPITAL ENCOUNTER (OUTPATIENT)
Dept: MAMMOGRAPHY | Facility: OTHER | Age: 83
Discharge: HOME | End: 2023-06-08
Attending: FAMILY MEDICINE | Admitting: FAMILY MEDICINE
Payer: MEDICARE

## 2023-06-08 ENCOUNTER — TELEPHONE (OUTPATIENT)
Dept: SURGERY | Facility: OTHER | Age: 83
End: 2023-06-08
Payer: COMMERCIAL

## 2023-06-08 DIAGNOSIS — Z12.31 VISIT FOR SCREENING MAMMOGRAM: ICD-10-CM

## 2023-06-08 PROCEDURE — 77067 SCR MAMMO BI INCL CAD: CPT

## 2023-06-08 NOTE — TELEPHONE ENCOUNTER
Spoke with patient. There doesn't seem to be any information to pass on at this time. She understands and agrees.    Olivia Clark RN on 6/8/2023 at 4:31 PM

## 2023-06-08 NOTE — TELEPHONE ENCOUNTER
Patient said she received a call she thought it was something to do with results from a test she had, can you call her, and give her the results from her test she had on June 6.     Amparo Ocampo on 6/8/2023 at 3:23 PM

## 2023-06-12 ENCOUNTER — TELEPHONE (OUTPATIENT)
Dept: SURGERY | Facility: OTHER | Age: 83
End: 2023-06-12

## 2023-06-12 ENCOUNTER — TRANSFERRED RECORDS (OUTPATIENT)
Dept: HEALTH INFORMATION MANAGEMENT | Facility: OTHER | Age: 83
End: 2023-06-12

## 2023-06-12 DIAGNOSIS — K21.9 GASTROESOPHAGEAL REFLUX DISEASE WITHOUT ESOPHAGITIS: Primary | ICD-10-CM

## 2023-06-12 NOTE — TELEPHONE ENCOUNTER
Pt saw JJG on 5/26 for GERD.  6/6 she had a XR ESOPHOGRAM W UPPER GI.  Pt wondering what the next steps are.    Please call.    Matilda Martin on 6/12/2023 at 9:43 AM

## 2023-06-13 NOTE — TELEPHONE ENCOUNTER
Phones were down yesterday. Patient was called today with the results and transferred to scheduling to make an appointment with provider. Coni Alston RN  ....................  6/13/2023   9:23 AM       Mixed hyperlipidemia

## 2023-06-23 ENCOUNTER — TELEPHONE (OUTPATIENT)
Dept: SURGERY | Facility: OTHER | Age: 83
End: 2023-06-23

## 2023-06-23 NOTE — TELEPHONE ENCOUNTER
Patient called today she has questions about when her surgery will be scheduled, she was not sure if she needed to see Dr Martin again? Can you please give her a call.     Amparo Ocampo on 6/23/2023 at 8:27 AM

## 2023-07-13 ENCOUNTER — OFFICE VISIT (OUTPATIENT)
Dept: SURGERY | Facility: OTHER | Age: 83
End: 2023-07-13
Attending: SURGERY
Payer: COMMERCIAL

## 2023-07-13 VITALS
HEIGHT: 63 IN | BODY MASS INDEX: 29.77 KG/M2 | HEART RATE: 63 BPM | DIASTOLIC BLOOD PRESSURE: 74 MMHG | RESPIRATION RATE: 16 BRPM | SYSTOLIC BLOOD PRESSURE: 122 MMHG | OXYGEN SATURATION: 98 % | WEIGHT: 168 LBS | TEMPERATURE: 97.9 F

## 2023-07-13 DIAGNOSIS — K21.9 GASTROESOPHAGEAL REFLUX DISEASE WITHOUT ESOPHAGITIS: ICD-10-CM

## 2023-07-13 DIAGNOSIS — Z01.818 PREOPERATIVE TESTING: ICD-10-CM

## 2023-07-13 DIAGNOSIS — K44.9 SLIDING HIATAL HERNIA: Primary | ICD-10-CM

## 2023-07-13 LAB
ATRIAL RATE - MUSE: 57 BPM
DIASTOLIC BLOOD PRESSURE - MUSE: NORMAL MMHG
INTERPRETATION ECG - MUSE: NORMAL
P AXIS - MUSE: 62 DEGREES
PR INTERVAL - MUSE: 192 MS
QRS DURATION - MUSE: 88 MS
QT - MUSE: 442 MS
QTC - MUSE: 430 MS
R AXIS - MUSE: 5 DEGREES
SYSTOLIC BLOOD PRESSURE - MUSE: NORMAL MMHG
T AXIS - MUSE: 25 DEGREES
VENTRICULAR RATE- MUSE: 57 BPM

## 2023-07-13 PROCEDURE — 99214 OFFICE O/P EST MOD 30 MIN: CPT | Performed by: SURGERY

## 2023-07-13 PROCEDURE — G0463 HOSPITAL OUTPT CLINIC VISIT: HCPCS

## 2023-07-13 PROCEDURE — 93010 ELECTROCARDIOGRAM REPORT: CPT | Performed by: INTERNAL MEDICINE

## 2023-07-13 PROCEDURE — 93005 ELECTROCARDIOGRAM TRACING: CPT | Performed by: SURGERY

## 2023-07-13 RX ORDER — ACETAMINOPHEN 325 MG/1
975 TABLET ORAL ONCE
Status: CANCELLED | OUTPATIENT
Start: 2023-07-13 | End: 2023-07-13

## 2023-07-13 RX ORDER — ENOXAPARIN SODIUM 100 MG/ML
40 INJECTION SUBCUTANEOUS
Status: CANCELLED | OUTPATIENT
Start: 2023-07-13

## 2023-07-13 ASSESSMENT — PAIN SCALES - GENERAL: PAINLEVEL: NO PAIN (0)

## 2023-07-13 NOTE — NURSING NOTE
"Chief Complaint   Patient presents with     Clinic Care Coordination - Follow-up     Here today to discuss surgery.       Initial /74 (BP Location: Right arm, Patient Position: Sitting, Cuff Size: Adult Regular)   Pulse 63   Temp 97.9  F (36.6  C) (Tympanic)   Resp 16   Ht 1.6 m (5' 3\")   Wt 76.2 kg (168 lb)   LMP  (LMP Unknown)   SpO2 98%   Breastfeeding No   BMI 29.76 kg/m   Estimated body mass index is 29.76 kg/m  as calculated from the following:    Height as of this encounter: 1.6 m (5' 3\").    Weight as of this encounter: 76.2 kg (168 lb).  Medication Reconciliation: complete    Kika Carcamo LPN  "

## 2023-07-13 NOTE — H&P (VIEW-ONLY)
"Maliha Rivera presents to clinic for follow-up to discuss antireflux surgery after undergoing full work up.   EGD shows small hiatal hernia and GE junction biopsies consistent with reflux esophagitis. Demester score 24.5. esophogram demonstrates reflux, hiatal hernia and moderate / severe esophageal dysmotility.   Patient continues to have reflux and regurgitation.   Maliha Rivera is able to easily achieve 4 METs of activity. She is lifelong non-smoker and non-drinker. Does not take blood thinners. She is a borderline diabetic with HTN. No history of MI or stroke. Denies personal or family history of bleeding problems, anesthesia problems or blood clots.       /74 (BP Location: Right arm, Patient Position: Sitting, Cuff Size: Adult Regular)   Pulse 63   Temp 97.9  F (36.6  C) (Tympanic)   Resp 16   Ht 1.6 m (5' 3\")   Wt 76.2 kg (168 lb)   LMP  (LMP Unknown)   SpO2 98%   Breastfeeding No   BMI 29.76 kg/m      Patient is sitting up in the chair, appears comfortable  No increased work of breathing, lungs area clear to auscultation bilaterally   Heart is regular rate and rhythm, no murmur   Abdomen is soft, nontender  Patient is alert and oriented, normal speech and mentation      Maliha Rivera is an 82-year-old female with GERD with regurgitation and hiatal hernia.  The patient has undergone full work up for antireflux surgery and I feel she is a good candidate. She may benefit more from toupet, partial wrap, than nissen due to finding of possible severe esophageal motility.   The technical details of laparoscopic Nissen / toupet fundoplication were discussed with the patient all the risks and benefits include bleeding, infection, gas bloat, inability to burp or vomit, slipped wrap, wrap breakdown leading to recurrent GERD and possible need for further surgery, stomach or esophageal leak, complete dysphagia and injury to surrounding structures including spleen, liver, pancreas.  The " patient demonstrates understanding is willing to proceed.     Pre op EKG in clinic   Schedule for laparoscopic fundoplication on 7/19/23    Apolinar Martin MD

## 2023-07-14 ENCOUNTER — ANESTHESIA EVENT (OUTPATIENT)
Dept: SURGERY | Facility: OTHER | Age: 83
End: 2023-07-14
Payer: MEDICARE

## 2023-07-17 ENCOUNTER — OFFICE VISIT (OUTPATIENT)
Dept: FAMILY MEDICINE | Facility: OTHER | Age: 83
End: 2023-07-17
Attending: PHYSICIAN ASSISTANT
Payer: COMMERCIAL

## 2023-07-17 ENCOUNTER — TELEPHONE (OUTPATIENT)
Dept: SURGERY | Facility: OTHER | Age: 83
End: 2023-07-17

## 2023-07-17 VITALS
OXYGEN SATURATION: 98 % | DIASTOLIC BLOOD PRESSURE: 84 MMHG | HEART RATE: 68 BPM | WEIGHT: 167 LBS | TEMPERATURE: 97.8 F | SYSTOLIC BLOOD PRESSURE: 138 MMHG | BODY MASS INDEX: 30.73 KG/M2 | HEIGHT: 62 IN | RESPIRATION RATE: 20 BRPM

## 2023-07-17 DIAGNOSIS — E11.9 TYPE 2 DIABETES MELLITUS WITHOUT COMPLICATION, WITHOUT LONG-TERM CURRENT USE OF INSULIN (H): ICD-10-CM

## 2023-07-17 DIAGNOSIS — K29.60 BILE REFLUX GASTRITIS: ICD-10-CM

## 2023-07-17 DIAGNOSIS — Z00.00 ENCOUNTER FOR MEDICARE ANNUAL WELLNESS EXAM: ICD-10-CM

## 2023-07-17 DIAGNOSIS — K21.9 GASTROESOPHAGEAL REFLUX DISEASE WITHOUT ESOPHAGITIS: ICD-10-CM

## 2023-07-17 DIAGNOSIS — Z01.818 PREOP GENERAL PHYSICAL EXAM: Primary | ICD-10-CM

## 2023-07-17 DIAGNOSIS — I10 BENIGN ESSENTIAL HYPERTENSION: ICD-10-CM

## 2023-07-17 DIAGNOSIS — E78.5 HYPERLIPIDEMIA, UNSPECIFIED HYPERLIPIDEMIA TYPE: ICD-10-CM

## 2023-07-17 DIAGNOSIS — K44.9 SLIDING HIATAL HERNIA: ICD-10-CM

## 2023-07-17 LAB
ANION GAP SERPL CALCULATED.3IONS-SCNC: 11 MMOL/L (ref 7–15)
BUN SERPL-MCNC: 10.9 MG/DL (ref 8–23)
CALCIUM SERPL-MCNC: 9.7 MG/DL (ref 8.8–10.2)
CHLORIDE SERPL-SCNC: 103 MMOL/L (ref 98–107)
CHOLEST SERPL-MCNC: 244 MG/DL
CREAT SERPL-MCNC: 0.65 MG/DL (ref 0.51–0.95)
CREAT UR-MCNC: 35.5 MG/DL
DEPRECATED HCO3 PLAS-SCNC: 26 MMOL/L (ref 22–29)
GFR SERPL CREATININE-BSD FRML MDRD: 87 ML/MIN/1.73M2
GLUCOSE SERPL-MCNC: 128 MG/DL (ref 70–99)
HBA1C MFR BLD: 6.5 % (ref 4–6.2)
HDLC SERPL-MCNC: 60 MG/DL
HGB BLD-MCNC: 13.3 G/DL (ref 11.7–15.7)
HOLD SPECIMEN: NORMAL
LDLC SERPL CALC-MCNC: 153 MG/DL
MICROALBUMIN UR-MCNC: <12 MG/L
MICROALBUMIN/CREAT UR: NORMAL MG/G{CREAT}
NONHDLC SERPL-MCNC: 184 MG/DL
POTASSIUM SERPL-SCNC: 4.1 MMOL/L (ref 3.4–5.3)
SODIUM SERPL-SCNC: 140 MMOL/L (ref 136–145)
TRIGL SERPL-MCNC: 154 MG/DL

## 2023-07-17 PROCEDURE — 85018 HEMOGLOBIN: CPT | Mod: ZL | Performed by: PHYSICIAN ASSISTANT

## 2023-07-17 PROCEDURE — 36415 COLL VENOUS BLD VENIPUNCTURE: CPT | Mod: ZL | Performed by: PHYSICIAN ASSISTANT

## 2023-07-17 PROCEDURE — 99214 OFFICE O/P EST MOD 30 MIN: CPT | Performed by: PHYSICIAN ASSISTANT

## 2023-07-17 PROCEDURE — G0463 HOSPITAL OUTPT CLINIC VISIT: HCPCS | Performed by: PHYSICIAN ASSISTANT

## 2023-07-17 PROCEDURE — 83036 HEMOGLOBIN GLYCOSYLATED A1C: CPT | Mod: ZL | Performed by: PHYSICIAN ASSISTANT

## 2023-07-17 PROCEDURE — 93010 ELECTROCARDIOGRAM REPORT: CPT | Performed by: INTERNAL MEDICINE

## 2023-07-17 PROCEDURE — 82570 ASSAY OF URINE CREATININE: CPT | Mod: ZL | Performed by: PHYSICIAN ASSISTANT

## 2023-07-17 PROCEDURE — 80048 BASIC METABOLIC PNL TOTAL CA: CPT | Mod: ZL | Performed by: PHYSICIAN ASSISTANT

## 2023-07-17 PROCEDURE — 93005 ELECTROCARDIOGRAM TRACING: CPT | Performed by: PHYSICIAN ASSISTANT

## 2023-07-17 PROCEDURE — G0463 HOSPITAL OUTPT CLINIC VISIT: HCPCS

## 2023-07-17 PROCEDURE — 80061 LIPID PANEL: CPT | Mod: ZL | Performed by: PHYSICIAN ASSISTANT

## 2023-07-17 ASSESSMENT — ENCOUNTER SYMPTOMS
DIZZINESS: 0
MYALGIAS: 0
WEAKNESS: 0
NERVOUS/ANXIOUS: 0
ARTHRALGIAS: 0
BREAST MASS: 0
EYE PAIN: 0
JOINT SWELLING: 0
HEADACHES: 0
HEARTBURN: 0
CHILLS: 0
PALPITATIONS: 0
FREQUENCY: 0
FEVER: 0
ABDOMINAL PAIN: 0
SHORTNESS OF BREATH: 0
DYSURIA: 0
COUGH: 0
SORE THROAT: 0
HEMATOCHEZIA: 0
NAUSEA: 0
DIARRHEA: 0
CONSTIPATION: 0
PARESTHESIAS: 0
HEMATURIA: 0

## 2023-07-17 ASSESSMENT — ACTIVITIES OF DAILY LIVING (ADL): CURRENT_FUNCTION: NO ASSISTANCE NEEDED

## 2023-07-17 ASSESSMENT — PAIN SCALES - GENERAL: PAINLEVEL: NO PAIN (0)

## 2023-07-17 NOTE — PATIENT INSTRUCTIONS
Patient should take the following medications the morning of surgery with a small sip of water: lisinopril.  Patient was instructed to hold the following medications the morning of surgery: hold all other meds.     Patient was advised to call our office and the surgical services with any change in condition or new symptoms if they were to develop between today and their surgical date.  Especially any cardiopulmonary symptoms or symptoms concerning for an infection.     Discontinue aspirin, aleve, naproxen and ibuprofen 7 days prior to surgery to reduce bleeding risk.  It is fine to take tylenol the week before surgery.  Hold vitamins and herbal remedies for 7 days before surgery.         For informational purposes only. Not to replace the advice of your health care provider. Copyright   2003, 2019 [employer] [facility]. All rights reserved. Clinically reviewed by Angela Locke MD. [employer] [id number] - REV 12/22.  Preparing for Your Surgery  Getting started  A nurse will call you to review your health history and instructions. They will give you an arrival time based on your scheduled surgery time. Please be ready to share:    Your doctor's clinic name and phone number    Your medical, surgical, and anesthesia history    A list of allergies and sensitivities    A list of medicines, including herbal treatments and over-the-counter drugs    Whether the patient has a legal guardian (ask how to send us the papers in advance)  Please tell us if you're pregnant--or if there's any chance you might be pregnant. Some surgeries may injure a fetus (unborn baby), so they require a pregnancy test. Surgeries that are safe for a fetus don't always need a test, and you can choose whether to have one.   If you have a child who's having surgery, please ask for a copy of Preparing for Your Child's Surgery.    Preparing for surgery    Within 10 to 30 days of surgery: Have a pre-op exam (sometimes called an H&P, or History and Physical).  This can be done at a clinic or pre-operative center.  ? If you're having a , you may not need this exam. Talk to your care team.    At your pre-op exam, talk to your care team about all medicines you take. If you need to stop any medicines before surgery, ask when to start taking them again.  ? We do this for your safety. Many medicines can make you bleed too much during surgery. Some change how well surgery (anesthesia) drugs work.    Call your insurance company to let them know you're having surgery. (If you don't have insurance, call [phone].)    Call your clinic if there's any change in your health. This includes signs of a cold or flu (sore throat, runny nose, cough, rash, fever). It also includes a scrape or scratch near the surgery site.    If you have questions on the day of surgery, call your hospital or surgery center.  Eating and drinking guidelines  For your safety: Unless your surgeon tells you otherwise, follow the guidelines below.    Eat and drink as usual until 8 hours before you arrive for surgery. After that, no food or milk.    Drink clear liquids until 2 hours before you arrive. These are liquids you can see through, like water, Gatorade, and Propel Water. They also include plain black coffee and tea (no cream or milk), candy, and breath mints. You can spit out gum when you arrive.    If you drink alcohol: Stop drinking it the night before surgery.    If your care team tells you to take medicine on the morning of surgery, it's okay to take it with a sip of water.  Preventing infection    Shower or bathe the night before and morning of your surgery. Follow the instructions your clinic gave you. (If no instructions, use regular soap.)    Don't shave or clip hair near your surgery site. We'll remove the hair if needed.    Don't smoke or vape the morning of surgery. You may chew nicotine gum up to 2 hours before surgery. A nicotine patch is okay.  ? Note: Some surgeries require you to  completely quit smoking and nicotine. Check with your surgeon.    Your care team will make every effort to keep you safe from infection. We will:  ? Clean our hands often with soap and water (or an alcohol-based hand rub).  ? Clean the skin at your surgery site with a special soap that kills germs.  ? Give you a special gown to keep you warm. (Cold raises the risk of infection.)  ? Wear special hair covers, masks, gowns and gloves during surgery.  ? Give antibiotic medicine, if prescribed. Not all surgeries need antibiotics.  What to bring on the day of surgery    Photo ID and insurance card    Copy of your health care directive, if you have one    Glasses and hearing aids (bring cases)  ? You can't wear contacts during surgery    Inhaler and eye drops, if you use them (tell us about these when you arrive)    CPAP machine or breathing device, if you use them    A few personal items, if spending the night    If you have . . .  ? A pacemaker, ICD (cardiac defibrillator) or other implant: Bring the ID card.  ? An implanted stimulator: Bring the remote control.  ? A legal guardian: Bring a copy of the certified (court-stamped) guardianship papers.  Please remove any jewelry, including body piercings. Leave jewelry and other valuables at home.  If you're going home the day of surgery    You must have a responsible adult drive you home. They should stay with you overnight as well.    If you don't have someone to stay with you, and you aren't safe to go home alone, we may keep you overnight. Insurance often won't pay for this.  After surgery  If it's hard to control your pain or you need more pain medicine, please call your surgeon's office.  Questions?   If you have any questions for your care team, list them here: _________________________________________________________________________________________________________________________________________________________________________ ____________________________________  ____________________________________ ____________________________________    How to Take Your Medication Before Surgery  - Take all of your medications before surgery except as noted below  - HOLD (do not take) Aspirin for 7 days  - STOP taking all vitamins and herbal supplements 14 days before surgery.    Patient Education   Personalized Prevention Plan  You are due for the preventive services outlined below.  Your care team is available to assist you in scheduling these services.  If you have already completed any of these items, please share that information with your care team to update in your medical record.  Health Maintenance Due   Topic Date Due     Osteoporosis Screening  Never done     Zoster (Shingles) Vaccine (1 of 2) Never done     COVID-19 Vaccine (3 - Pfizer series) 12/10/2021     Diptheria Tetanus Pertussis (DTAP/TDAP/TD) Vaccine (2 - Td or Tdap) 03/26/2023       Preventing Falls at Home  A person can fall for many reasons. Older adults may fall because reaction time slows as we age. Your muscles and joints may get stiff, weak, or less flexible because of illness, medicines, or a physical condition.   Other health problems that make falls more likely include:     Arthritis    Dizziness or lightheadedness when you stand up (orthostatic hypotension)    History of a stroke    Dizziness    Anemia    Certain medicines taken for mental illness or to control blood pressure.    Problems with balance or gait    Bladder or urinary problems    History of falling    Changes in vision (vision impairment)    Changes in thinking skills and memory (cognitive impairment)    Muscle weakness    Excessive alcohol use  Falls can cause serious injuries, such as head trauma, broken bones, dislocated joints, internal bleeding, and cuts. Injuries like these can limit your independence.   Prevention tips  To help prevent falls and fall-related injuries, follow the tips below.    Floors  To make floors safer:     Put nonskid  "pads under area rugs.    Remove small rugs.    Replace worn floor coverings.    Tack carpets firmly to each step on carpeted stairs. Put nonskid strips on the edges of uncarpeted stairs.    Keep floors and stairs free of clutter and cords.    Keep floors and stairs clear of animal and children's toys    Arrange furniture so there are clear pathways.    Clean up any spills right away. Don't walk on wet floors.  Bathrooms    To make bathrooms safer:     Install grab bars in the tub or shower.    Install a raised (elevated) toilet or toilet seat.    Apply nonskid strips or put a nonskid rubber mat in the tub or shower.    Sit on a bath chair to bathe.    Use bathmats with nonskid backing.  Lighting  To improve visibility in your home:      Keep a flashlight in each room. Or put a lamp next to the bed within easy reach.    Put nightlights in the bedrooms, hallways, kitchen, and bathrooms.    Make sure all stairways have good lighting. There should be a light switch at the bottom and the top of each stairway.  Other changes to make    Look around to find any safety hazards. Look closely at doorways, walkways, and the driveway. Remove or repair any safety problems that you find.    Wear shoes that fit well. Never go barefoot or wear socks or slippers with smooth soles.    See your eye care provider once a year if you wear glasses. This is to be sure the prescription is still right for you.    Take your time when going up and down stairs; always use handrails. Never carry items in both hands.    Wear an alert necklace or bracelet if you are already prone to falling.    Put handrails on both sides of stairs and in walkways for more support. To prevent injury to your wrist or arm, don t use handrails to pull yourself up.    Use a \"reach stick\" to grab hard-to-reach items.    Install grab bars wherever needed to pull yourself up.    Arrange items that you use often. This will make them easier to find or reach.    Keep track " of where your pets are so you don't trip over them when you are walking.    [employer] last reviewed this educational content on 11/1/2022 2000-2023 [employer]. All rights reserved. This information is not intended as a substitute for professional medical care. Always follow your healthcare professional's instructions.

## 2023-07-17 NOTE — NURSING NOTE
Pt presents to clinic today for a pre op exam.       FOOD SECURITY SCREENING QUESTIONS:    The next two questions are to help us understand your food security.  If you are feeling you need any assistance in this area, we have resources available to support you today.    Hunger Vital Signs:  Within the past 12 months we worried whether our food would run out before we got money to buy more. Never  Within the past 12 months the food we bought just didn't last and we didn't have money to get more. Never        Advance care directive on file? no  Advance care directive provided to patient? no     Medication Reconciliation: complete  Xin Calixto LPN,LPN on 7/17/2023 at 8:34 AM

## 2023-07-17 NOTE — LETTER
August 1, 2023      Maliha Rivera  [id number]  [facility] [address]        Dear ,    We are writing to inform you of your test results.    Urine is negative for protein.  Your hemoglobin, electrolytes, kidney function, and hemoglobin A1c are stable.    Your cholesterol level has increased as compared to previous.  Have you been able to tolerate pravastatin 40 mg daily?  If so I would recommend increasing your dose to 40 mg daily to better control your cholesterol.  Please let me know if you would like me to update the prescription at the pharmacy.  Please work on good diet, exercise, weight loss, and decreasing fat and cholesterol in your diet to help reduce your cholesterol level.    Resulted Orders   Albumin Random Urine Quantitative with Creat Ratio   Result Value Ref Range    Creatinine Urine mg/dL 35.5 mg/dL      Comment:      The reference ranges have not been established in urine creatinine. The results should be integrated into the clinical context for interpretation.    Albumin Urine mg/L <12.0 mg/L      Comment:      The reference ranges have not been established in urine albumin. The results should be integrated into the clinical context for interpretation.    Albumin Urine mg/g Cr        Comment:      Unable to calculate, urine albumin and/or urine creatinine is outside detectable limits.  Microalbuminuria is defined as an albumin:creatinine ratio of 17 to 299 for males and 25 to 299 for females. A ratio of albumin:creatinine of 300 or higher is indicative of overt proteinuria.  Due to biologic variability, positive results should be confirmed by a second, first-morning random or 24-hour timed urine specimen. If there is discrepancy, a third specimen is recommended. When 2 out of 3 results are in the microalbuminuria range, this is evidence for incipient nephropathy and warrants increased efforts at glucose control, blood pressure control, and institution of therapy with an  angiotensin-converting-enzyme (ACE) inhibitor (if the patient can tolerate it).     Hemoglobin   Result Value Ref Range    Hemoglobin 13.3 11.7 - 15.7 g/dL   Hemoglobin A1c   Result Value Ref Range    Hemoglobin A1C 6.5 (H) 4.0 - 6.2 %   Basic Metabolic Panel   Result Value Ref Range    Sodium 140 136 - 145 mmol/L    Potassium 4.1 3.4 - 5.3 mmol/L    Chloride 103 98 - 107 mmol/L    Carbon Dioxide (CO2) 26 22 - 29 mmol/L    Anion Gap 11 7 - 15 mmol/L    Urea Nitrogen 10.9 8.0 - 23.0 mg/dL    Creatinine 0.65 0.51 - 0.95 mg/dL    Calcium 9.7 8.8 - 10.2 mg/dL    Glucose 128 (H) 70 - 99 mg/dL    GFR Estimate 87 >60 mL/min/1.73m2   Lipid Panel   Result Value Ref Range    Cholesterol 244 (H) <200 mg/dL    Triglycerides 154 (H) <150 mg/dL    Direct Measure HDL 60 >=50 mg/dL    LDL Cholesterol Calculated 153 (H) <=100 mg/dL    Non HDL Cholesterol 184 (H) <130 mg/dL    Narrative    Cholesterol  Desirable:  <200 mg/dL    Triglycerides  Normal:  Less than 150 mg/dL  Borderline High:  150-199 mg/dL  High:  200-499 mg/dL  Very High:  Greater than or equal to 500 mg/dL    Direct Measure HDL  Female:  Greater than or equal to 50 mg/dL   Male:  Greater than or equal to 40 mg/dL    LDL Cholesterol  Desirable:  <100mg/dL  Above Desirable:  100-129 mg/dL   Borderline High:  130-159 mg/dL   High:  160-189 mg/dL   Very High:  >= 190 mg/dL    Non HDL Cholesterol  Desirable:  130 mg/dL  Above Desirable:  130-159 mg/dL  Borderline High:  160-189 mg/dL  High:  190-219 mg/dL  Very High:  Greater than or equal to 220 mg/dL       If you have any questions or concerns, please call the clinic at the number listed above.       Sincerely,      Juliet Chavez PA-C

## 2023-07-17 NOTE — TELEPHONE ENCOUNTER
Called patient and updated her that we can have her surgery on July 19 th. And with a follow up appointment July 31st @ 10:40 am.  Coni Alston RN  ....................  7/17/2023   10:53 AM

## 2023-07-17 NOTE — PROGRESS NOTES
[facility]  [address]  [place] [address]  Phone: [phone]  Fax: [phone]  Primary Provider: Nicanor Mike  Pre-op Performing Provider: MISAEL GARNER      PREOPERATIVE EVALUATION:  Today's date: 7/17/2023    Maliha Rivera is a 82 year old female who presents for a preoperative evaluation.      7/17/2023     8:32 AM   Additional Questions   Roomed by christopher   Accompanied by self     Surgical Information:  Surgery/Procedure: Nissen fundoplication  Surgery Location: [facility]  Surgeon:Dr. Martin  Surgery Date: 7/19/23  Time of Surgery: 0630  Where patient plans to recover: At home with family  Fax number for surgical facility: Note does not need to be faxed, will be available electronically in Epic.    Assessment & Plan     The proposed surgical procedure is considered INTERMEDIATE risk.    Problem List Items Addressed This Visit        Respiratory    Sliding hiatal hernia       Digestive    Esophageal reflux       Endocrine    DM w/o complication type II (H)    Relevant Orders    Hemoglobin (Completed)    Hemoglobin A1c (Completed)    Basic Metabolic Panel (Completed)    Lipid Panel (Completed)    Albumin Random Urine Quantitative with Creat Ratio (Completed)    Hyperlipidemia       Circulatory    Benign essential hypertension   Other Visit Diagnoses     Preop general physical exam    -  Primary    Relevant Orders    EKG 12-lead, tracing only (Completed)    Bile reflux gastritis        Encounter for Medicare annual wellness exam            Complete EKG which is unchanged as compared to previous.  No acute concerns at this time.  Completed thorough lab work-up including hemoglobin, hemoglobin A1c, BMP for preoperative clearance.  Labs are stable.  Also completed lipid panel and urine microalbumin with history of type 2 diabetes mellitus and hypertension.  Labs are stable.  No acute concerns at this time.  Patient is approved for surgery.    Risks and  Recommendations:  The patient has the following additional risks and recommendations for perioperative complications:   - No identified additional risk factors other than previously addressed    Antiplatelet or Anticoagulation Medication Instructions:   - Patient is on no antiplatelet or anticoagulation medications.    Additional Medication Instructions:  Patient Instructions     Patient should take the following medications the morning of surgery with a small sip of water: lisinopril.  Patient was instructed to hold the following medications the morning of surgery: hold all other meds.     Patient was advised to call our office and the surgical services with any change in condition or new symptoms if they were to develop between today and their surgical date.  Especially any cardiopulmonary symptoms or symptoms concerning for an infection.     Discontinue aspirin, aleve, naproxen and ibuprofen 7 days prior to surgery to reduce bleeding risk.  It is fine to take tylenol the week before surgery.  Hold vitamins and herbal remedies for 7 days before surgery.         For informational purposes only. Not to replace the advice of your health care provider. Copyright   2003, 2019 [employer] [facility]. All rights reserved. Clinically reviewed by Angela Locke MD. [employer] [id number] - REV 12/22.  Preparing for Your Surgery  Getting started  A nurse will call you to review your health history and instructions. They will give you an arrival time based on your scheduled surgery time. Please be ready to share:    Your doctor's clinic name and phone number    Your medical, surgical, and anesthesia history    A list of allergies and sensitivities    A list of medicines, including herbal treatments and over-the-counter drugs    Whether the patient has a legal guardian (ask how to send us the papers in advance)  Please tell us if you're pregnant--or if there's any chance you might be pregnant. Some surgeries may injure a fetus  (unborn baby), so they require a pregnancy test. Surgeries that are safe for a fetus don't always need a test, and you can choose whether to have one.   If you have a child who's having surgery, please ask for a copy of Preparing for Your Child's Surgery.    Preparing for surgery    Within 10 to 30 days of surgery: Have a pre-op exam (sometimes called an H&P, or History and Physical). This can be done at a clinic or pre-operative center.  ? If you're having a , you may not need this exam. Talk to your care team.    At your pre-op exam, talk to your care team about all medicines you take. If you need to stop any medicines before surgery, ask when to start taking them again.  ? We do this for your safety. Many medicines can make you bleed too much during surgery. Some change how well surgery (anesthesia) drugs work.    Call your insurance company to let them know you're having surgery. (If you don't have insurance, call [phone].)    Call your clinic if there's any change in your health. This includes signs of a cold or flu (sore throat, runny nose, cough, rash, fever). It also includes a scrape or scratch near the surgery site.    If you have questions on the day of surgery, call your hospital or surgery center.  Eating and drinking guidelines  For your safety: Unless your surgeon tells you otherwise, follow the guidelines below.    Eat and drink as usual until 8 hours before you arrive for surgery. After that, no food or milk.    Drink clear liquids until 2 hours before you arrive. These are liquids you can see through, like water, Gatorade, and Propel Water. They also include plain black coffee and tea (no cream or milk), candy, and breath mints. You can spit out gum when you arrive.    If you drink alcohol: Stop drinking it the night before surgery.    If your care team tells you to take medicine on the morning of surgery, it's okay to take it with a sip of water.  Preventing infection    Shower or  bathe the night before and morning of your surgery. Follow the instructions your clinic gave you. (If no instructions, use regular soap.)    Don't shave or clip hair near your surgery site. We'll remove the hair if needed.    Don't smoke or vape the morning of surgery. You may chew nicotine gum up to 2 hours before surgery. A nicotine patch is okay.  ? Note: Some surgeries require you to completely quit smoking and nicotine. Check with your surgeon.    Your care team will make every effort to keep you safe from infection. We will:  ? Clean our hands often with soap and water (or an alcohol-based hand rub).  ? Clean the skin at your surgery site with a special soap that kills germs.  ? Give you a special gown to keep you warm. (Cold raises the risk of infection.)  ? Wear special hair covers, masks, gowns and gloves during surgery.  ? Give antibiotic medicine, if prescribed. Not all surgeries need antibiotics.  What to bring on the day of surgery    Photo ID and insurance card    Copy of your health care directive, if you have one    Glasses and hearing aids (bring cases)  ? You can't wear contacts during surgery    Inhaler and eye drops, if you use them (tell us about these when you arrive)    CPAP machine or breathing device, if you use them    A few personal items, if spending the night    If you have . . .  ? A pacemaker, ICD (cardiac defibrillator) or other implant: Bring the ID card.  ? An implanted stimulator: Bring the remote control.  ? A legal guardian: Bring a copy of the certified (court-stamped) guardianship papers.  Please remove any jewelry, including body piercings. Leave jewelry and other valuables at home.  If you're going home the day of surgery    You must have a responsible adult drive you home. They should stay with you overnight as well.    If you don't have someone to stay with you, and you aren't safe to go home alone, we may keep you overnight. Insurance often won't pay for this.  After  surgery  If it's hard to control your pain or you need more pain medicine, please call your surgeon's office.  Questions?   If you have any questions for your care team, list them here: _________________________________________________________________________________________________________________________________________________________________________ ____________________________________ ____________________________________ ____________________________________    How to Take Your Medication Before Surgery  - Take all of your medications before surgery except as noted below  - HOLD (do not take) Aspirin for 7 days  - STOP taking all vitamins and herbal supplements 14 days before surgery.       RECOMMENDATION:  APPROVAL GIVEN to proceed with proposed procedure, without further diagnostic evaluation.    Ordering of each unique test  30 minutes spent by me on the date of the encounter doing chart review, history and exam, documentation and further activities per the note      Subjective       HPI related to upcoming procedure:     Patient is history of an EGD showing a small hiatal hernia and GE junction biopsies consistent with reflux esophagitis.  Demester score 24.5. esophogram demonstrates reflux, hiatal hernia and moderate / severe esophageal dysmotility.   Patient continues to have reflux and regurgitation.   Patient states that eating anything flares up her stomach.  It burns and hurts.  Epigastric discomfort.  Bad heartburn and belching.  Symptoms have been present over many years however they are getting worse over the last year.  Epigastric pain shoots to the back.          7/17/2023     9:11 AM   Preop Questions   1. Have you ever had a heart attack or stroke? No   2. Have you ever had surgery on your heart or blood vessels, such as a stent placement, a coronary artery bypass, or surgery on an artery in your head, neck, heart, or legs? No   3. Do you have chest pain with activity? No   4. Do you have a  history of  heart failure? No   5. Do you currently have a cold, bronchitis or symptoms of other infection? No   6. Do you have a cough, shortness of breath, or wheezing? No   7. Do you or anyone in your family have previous history of blood clots? No   8. Do you or does anyone in your family have a serious bleeding problem such as prolonged bleeding following surgeries or cuts? No   9. Have you ever had problems with anemia or been told to take iron pills? No   10. Have you had any abnormal blood loss such as black, tarry or bloody stools, or abnormal vaginal bleeding? No   11. Have you ever had a blood transfusion? No   12. Are you willing to have a blood transfusion if it is medically needed before, during, or after your surgery? Yes   13. Have you or any of your relatives ever had problems with anesthesia? No   14. Do you have sleep apnea, excessive snoring or daytime drowsiness? No   15. Do you have any artifical heart valves or other implanted medical devices like a pacemaker, defibrillator, or continuous glucose monitor? No   16. Do you have artificial joints? Bilateral knees   17. Are you allergic to latex? No     Health Care Directive:  Patient does not have a Health Care Directive or Living Will: Discussed advance care planning with patient; however, patient declined at this time.    Preoperative Review of :   reviewed - no record of controlled substances prescribed.      Status of Chronic Conditions:  DIABETES - Patient has a longstanding history of DiabetesType Type II . Patient is being treated with diet, exercise and SMBG and denies significant side effects. Control has been good. Complicating factors include but are not limited to: hypertension and hyperlipidemia.  Blood sugars have been stable.    HYPERLIPIDEMIA - Patient has a long history of significant Hyperlipidemia requiring medication for treatment with recent good control. Patient reports no problems or side effects with the medication.      HYPERTENSION - Patient has longstanding history of HTN , currently denies any symptoms referable to elevated blood pressure. Specifically denies chest pain, palpitations, dyspnea, orthopnea, PND or peripheral edema. Blood pressure readings have been in normal range. Current medication regimen is as listed below. Patient denies any side effects of medication.  No personal or family history of bleeding problems, anesthesia problems, or blood clots.  No history of MI or stroke.  Does not take blood thinners.  Lifelong non-smoker and nondrinker.    Last mammogram was 6/8/2023.  This is not ordered today.   Pap deferred due to age >65.    History of tobacco abuse, need for low dose chest CT - na.       Patient has tolerated surgery well in the past.  Patient has no recent cough or cold symptoms.  No recent fevers, chills, sore throat, ear pain, chest pain, palpitations, problems breathing, stomachaches, nausea, vomiting, diarrhea, constipation, blood in stool or urine, dysuria, frequency, urgency.    Patient can walk up a flight of stairs without becoming short of breath or having chest pain: YES   Patient is able to tolerate greater than 4 METs of activity without any cardiopulmonary symptoms.      Review of Systems   Constitutional: Negative for chills and fever.   HENT: Negative for congestion, ear pain, hearing loss and sore throat.    Eyes: Negative for pain and visual disturbance.   Respiratory: Negative for cough and shortness of breath.    Cardiovascular: Negative for chest pain, palpitations and peripheral edema.   Gastrointestinal: Negative for abdominal pain, constipation, diarrhea, heartburn, hematochezia and nausea.   Breasts:  Negative for tenderness, breast mass and discharge.   Genitourinary: Negative for dysuria, frequency, genital sores, hematuria, pelvic pain, urgency, vaginal bleeding and vaginal discharge.   Musculoskeletal: Negative for arthralgias, joint swelling and myalgias.   Skin: Negative  for rash.   Neurological: Negative for dizziness, weakness, headaches and paresthesias.   Psychiatric/Behavioral: Negative for mood changes. The patient is not nervous/anxious.        Patient Active Problem List    Diagnosis Date Noted     Sliding hiatal hernia 03/29/2023     Priority: Medium     Postoperative anemia due to acute blood loss 04/19/2019     Priority: Medium     Benign essential hypertension 04/18/2018     Priority: Medium     Lumbar stenosis 03/01/2018     Priority: Medium     Lumbar radiculopathy 01/23/2018     Priority: Medium     Status post total bilateral knee replacement 03/31/2017     Priority: Medium     Primary osteoarthritis of left knee 03/23/2017     Priority: Medium     DM w/o complication type II (H) 09/22/2015     Priority: Medium     Overview:   3/2014:  A1c fine off metformin - will discontinue as patient does not want to take it.  She is taking fish oil + other ingredients she states helps with DM  On ASA  Patient does not want to take lisinopril  Does not tolerate statins and has discontinued pravastatin; try niacin  No retinopathy on eye exam 4/2013 and 12/2014  Overview:   Overview:   3/2014:  A1c fine off metformin - will discontinue as patient does not want to take it.  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  Lipids:; declines statin - will try niacin and is on fish oil.  Mammogram:schedule  Pap: 3/2013 WNL -  does not need again.  Tdap:3/2013  Vitamin D: takes supplement  pneumovax done.  Consider zoster vaccine.       Degenerative arthritis of knee 10/21/2010     Priority: Medium     Overview:   IMO Update 10/11       Cervicalgia 05/11/2010     Priority: Medium     Overview:   IMO Update 10/11       DDD (degenerative disc disease), lumbar 01/15/2010     Priority: Medium     Primary localized osteoarthrosis, lower leg 01/31/2008     Priority: Medium     Hyperlipidemia 07/22/2004     Priority: Medium     Overview:   IMO Update 10/11  Overview:   Overview:   IMO Update 10/11        Past Medical History:   Diagnosis Date     Encounter for screening for cardiovascular disorders     4/8/15,Lexiscan cardiolite [facility]     Past Surgical History:   Procedure Laterality Date     APPENDECTOMY OPEN      No Comments Provided     ARTHROSCOPY KNEE      2007,Joseluis Thomas M.D.     COLONOSCOPY      2003,2011,normal     ESOPHAGOSCOPY, GASTROSCOPY, DUODENOSCOPY (EGD), COMBINED      2/3/2016     ESOPHAGOSCOPY, GASTROSCOPY, DUODENOSCOPY (EGD), COMBINED N/A 3/28/2023    Procedure: Esophagoscopy, gastroscopy, duodenoscopy (EGD), combined;  Surgeon: Apolinar Martin MD;  Location: [facility] OR     LAPAROSCOPIC CHOLECYSTECTOMY      3/11/01,Dr. Salas     LAPAROSCOPIC TUBAL LIGATION      No Comments Provided     lumbar decompression L4-5  02/28/2018    Dr. France     OTHER SURGICAL HISTORY      1996,[id number],CATARACT EXTRACTION W/  INTRAOCULAR LENS IMPLANT,Bilateral     OTHER SURGICAL HISTORY      2001,91446.0,IA ERCP BILIARY OR PANC DUCT STENT EXCHANGE W DIL AND WIRE,stent removed.     OTHER SURGICAL HISTORY      2004,[id number],CARDIAC CATHETERIZATION,mild disease     OTHER SURGICAL HISTORY      1/31/07,[id number],OTHER,Left     OTHER SURGICAL HISTORY      02/27/2008,[id number],TOTAL KNEE ARTHROPLASTY,Right     OTHER SURGICAL HISTORY      2010,[id number],TOTAL KNEE ARTHROPLASTY,Left,Dr. Carson     OTHER SURGICAL HISTORY       06/05/2017,[id number],OTHER,Left,Dr. Joiner, [facility]     RELEASE CARPAL TUNNEL      2006     Revision right total knee arthroplasty   04/18/2019    Dr. Carson.  Due to loose implant tibial component     TONSILLECTOMY      age 12     Current Outpatient Medications   Medication Sig Dispense Refill     acetaminophen (TYLENOL) 325 MG tablet Take 650 mg by mouth       blood glucose (NO BRAND SPECIFIED) lancets standard Use to test blood sugar once daily. Dispense as covered by insurance. Dx. Code: E11.9. 100 each 11     blood glucose (NO BRAND SPECIFIED) test strip Use to test blood sugar once daily. Dispense as covered by insurance. Dx. Code: E11.9. 100 strip 11     blood glucose (NO BRAND SPECIFIED) test strip Dispense item covered by pt ins. E11.9 NIDDM type II - Test 1 time/day       blood glucose monitoring (FREESTYLE) lancets As directed. Dispense item covered by pt ins. E11.9 NIDDM type II - Test 1 time/day       blood glucose monitoring (NO BRAND SPECIFIED) meter device kit Use to test blood sugar 2 times daily 1 kit 0     blood glucose monitoring (NO BRAND SPECIFIED) meter device kit Use to test blood sugar once daily. Dispense as covered by insurance. Dx. Code: E11.9. 1 kit 0     Blood Glucose Monitoring Suppl (FIFTY50 GLUCOSE METER 2.0) W/DEVICE KIT Dispense meter, test strips, lancets covered by pt ins. E11.9 NIDDM type II - Test 1 time/day       Blood Pressure Monitoring (ADULT BLOOD PRESSURE CUFF LG) KIT Use for home blood pressure monitoring as directed. 1 kit 0     COMPRESSION STOCKINGS For personal use. Length: calf Strength: 16-20 mmHg Please measure.       lisinopril (ZESTRIL) 10 MG tablet Take 1 tablet (10 mg) by mouth daily 90 tablet 3     melatonin 5 MG tablet Take 5 mg by mouth       omeprazole (PRILOSEC) 40 MG DR capsule Take 1 capsule (40 mg) by mouth daily 90 capsule 11     order for DME Equipment being ordered: Evaluate at [facility] for a knee brace 1 Units 0     order for DME Equipment  "being ordered: [facility] to evaluate for diabetic shoes and/or insoles 1 Units prn     pravastatin (PRAVACHOL) 20 MG tablet Take 1 tablet (20 mg) by mouth daily 90 tablet 11     ursodiol (ACTIGALL) 300 MG capsule Take 1 capsule (300 mg) by mouth 2 times daily 60 capsule 0       Allergies   Allergen Reactions     Atorvastatin Muscle Pain (Myalgia)     Cefazolin Nausea and Vomiting        Social History     Tobacco Use     Smoking status: Never     Passive exposure: Never     Smokeless tobacco: Never   Substance Use Topics     Alcohol use: No     Family History   Problem Relation Age of Onset     Heart Disease Mother         Heart Disease     Heart Disease Father         Heart Disease     Heart Disease Sister         Heart Disease     Cancer Other         Cancer,female cancer     History   Drug Use Unknown         Objective     /84 (BP Location: Left arm, Patient Position: Sitting, Cuff Size: Adult Regular)   Pulse 68   Temp 97.8  F (36.6  C) (Tympanic)   Resp 20   Ht 1.575 m (5' 2\")   Wt 75.8 kg (167 lb)   LMP  (LMP Unknown)   SpO2 98%   BMI 30.54 kg/m      Physical Exam    GENERAL APPEARANCE: healthy, alert and no distress     EYES: EOMI, PERRL     HENT: ear canals and TM's normal and nose and mouth without ulcers or lesions     NECK: no adenopathy, no asymmetry, masses, or scars and thyroid normal to palpation     RESP: lungs clear to auscultation - no rales, rhonchi or wheezes     CV: regular rates and rhythm, normal S1 S2, no S3 or S4 and no murmur, click or rub     ABDOMEN:  soft, nontender, no HSM or masses and bowel sounds normal     MS: extremities normal- no gross deformities noted, no evidence of inflammation in joints, FROM in all extremities.     SKIN: no suspicious lesions or rashes     NEURO: Normal strength and tone, sensory exam grossly normal, mentation intact and speech normal     PSYCH: mentation appears normal. and affect normal/bright     LYMPHATICS: No cervical " adenopathy    Recent Labs   Lab Test 01/31/23  1110 03/24/22  1054 02/24/22  1118   HGB 11.9  --  12.0     --  199     --  137   POTASSIUM 4.2  --  4.1   CR 0.58  --  0.76   A1C 6.6* 6.8*  --         Diagnostics:  Recent Results (from the past 720 hour(s))   EKG 12-lead complete w/read - Clinics    Collection Time: 07/13/23 10:10 AM   Result Value Ref Range    Systolic Blood Pressure  mmHg    Diastolic Blood Pressure  mmHg    Ventricular Rate 57 BPM    Atrial Rate 57 BPM    AZ Interval 192 ms    QRS Duration 88 ms     ms    QTc 430 ms    P Axis 62 degrees    R AXIS 5 degrees    T Axis 25 degrees    Interpretation ECG       Sinus bradycardia  Low voltage QRS  Cannot rule out Anterior infarct , age undetermined  Nonspecific anterior T-wave abnormality  Abnormal ECG  No previous ECGs available  Confirmed by MD GERMÁN, ZENA ([id number]) on 7/13/2023 3:36:03 PM     Hemoglobin A1c    Collection Time: 07/17/23  9:24 AM   Result Value Ref Range    Hemoglobin A1C 6.5 (H) 4.0 - 6.2 %   Basic Metabolic Panel    Collection Time: 07/17/23  9:24 AM   Result Value Ref Range    Sodium 140 136 - 145 mmol/L    Potassium 4.1 3.4 - 5.3 mmol/L    Chloride 103 98 - 107 mmol/L    Carbon Dioxide (CO2) 26 22 - 29 mmol/L    Anion Gap 11 7 - 15 mmol/L    Urea Nitrogen 10.9 8.0 - 23.0 mg/dL    Creatinine 0.65 0.51 - 0.95 mg/dL    Calcium 9.7 8.8 - 10.2 mg/dL    Glucose 128 (H) 70 - 99 mg/dL    GFR Estimate 87 >60 mL/min/1.73m2   Lipid Panel    Collection Time: 07/17/23  9:24 AM   Result Value Ref Range    Cholesterol 244 (H) <200 mg/dL    Triglycerides 154 (H) <150 mg/dL    Direct Measure HDL 60 >=50 mg/dL    LDL Cholesterol Calculated 153 (H) <=100 mg/dL    Non HDL Cholesterol 184 (H) <130 mg/dL   Extra Serum Separator Tube (SST)    Collection Time: 07/17/23  9:26 AM   Result Value Ref Range    Hold Specimen JIC    Extra Red Top Tube    Collection Time: 07/17/23  9:26 AM   Result Value Ref Range    Hold Specimen JIC    Extra  "Green Top (Lithium Heparin) Tube    Collection Time: 07/17/23  9:26 AM   Result Value Ref Range    Hold Specimen JIC    Extra Purple Top Tube    Collection Time: 07/17/23  9:26 AM   Result Value Ref Range    Hold Specimen JIC    Albumin Random Urine Quantitative with Creat Ratio    Collection Time: 07/17/23  9:28 AM   Result Value Ref Range    Creatinine Urine mg/dL 35.5 mg/dL    Albumin Urine mg/L <12.0 mg/L    Albumin Urine mg/g Cr     EKG 12-lead, tracing only    Collection Time: 07/17/23  9:31 AM   Result Value Ref Range    Systolic Blood Pressure  mmHg    Diastolic Blood Pressure  mmHg    Ventricular Rate 58 BPM    Atrial Rate 58 BPM    ID Interval 194 ms    QRS Duration 88 ms     ms    QTc 424 ms    P Axis 65 degrees    R AXIS 6 degrees    T Axis 23 degrees    Interpretation ECG       Sinus bradycardia  Inferior infarct , age undetermined  Abnormal ECG  When compared with ECG of 13-JUL-2023 10:10,  No significant change was found     Hemoglobin    Collection Time: 07/17/23 10:24 AM   Result Value Ref Range    Hemoglobin 13.3 11.7 - 15.7 g/dL      EKG: sinus bradycardia, unchanged from previous tracings    Revised Cardiac Risk Index (RCRI):  The patient has the following serious cardiovascular risks for perioperative complications:   - No serious cardiac risks = 0 points     RCRI Interpretation: 0 points: Class I (very low risk - 0.4% complication rate)           Signed Electronically by: Juliet Chavez PA-C  Copy of this evaluation report is provided to requesting physician.      Answers for HPI/ROS submitted by the patient on 7/17/2023  In general, how would you rate your overall physical health?: good  Frequency of exercise:: 6-7 days/week  Do you usually eat at least 4 servings of fruit and vegetables a day, include whole grains & fiber, and avoid regularly eating high fat or \"junk\" foods? : Yes  Taking medications regularly:: Yes  Medication side effects:: None  Activities of Daily Living: no " assistance needed  Home safety: no safety concerns identified  Hearing Impairment:: no hearing concerns  In the past 6 months, have you been bothered by leaking of urine?: No  In general, how would you rate your overall mental or emotional health?: good  Additional concerns today:: No  Duration of exercise:: Other        She is at risk for falling and has been provided with information to reduce the risk of falling at home.

## 2023-07-18 LAB
ATRIAL RATE - MUSE: 58 BPM
DIASTOLIC BLOOD PRESSURE - MUSE: NORMAL MMHG
INTERPRETATION ECG - MUSE: NORMAL
P AXIS - MUSE: 65 DEGREES
PR INTERVAL - MUSE: 194 MS
QRS DURATION - MUSE: 88 MS
QT - MUSE: 432 MS
QTC - MUSE: 424 MS
R AXIS - MUSE: 6 DEGREES
SYSTOLIC BLOOD PRESSURE - MUSE: NORMAL MMHG
T AXIS - MUSE: 23 DEGREES
VENTRICULAR RATE- MUSE: 58 BPM

## 2023-07-18 NOTE — ANESTHESIA PREPROCEDURE EVALUATION
Anesthesia Pre-Procedure Evaluation    Patient: Maliha Rivera   MRN: [id number] : 1940        Procedure : Procedure(s):  FUNDOPLICATION, NISSEN, LAPAROSCOPIC          Past Medical History:   Diagnosis Date     Encounter for screening for cardiovascular disorders     4/8/15,Lexiscan cardiolite [facility]      Past Surgical History:   Procedure Laterality Date     APPENDECTOMY OPEN      No Comments Provided     ARTHROSCOPY KNEE      ,Joseluis Thomas M.D.     COLONOSCOPY      ,,normal     ESOPHAGOSCOPY, GASTROSCOPY, DUODENOSCOPY (EGD), COMBINED      2/3/2016     ESOPHAGOSCOPY, GASTROSCOPY, DUODENOSCOPY (EGD), COMBINED N/A 3/28/2023    Procedure: Esophagoscopy, gastroscopy, duodenoscopy (EGD), combined;  Surgeon: Apolinar Martin MD;  Location: [facility] OR     LAPAROSCOPIC CHOLECYSTECTOMY      3/11/01,Dr. Salas     LAPAROSCOPIC TUBAL LIGATION      No Comments Provided     lumbar decompression L4-5  2018    Dr. France     OTHER SURGICAL HISTORY      ,[id number],CATARACT EXTRACTION W/  INTRAOCULAR LENS IMPLANT,Bilateral     OTHER SURGICAL HISTORY      ,89378.0,OR ERCP BILIARY OR PANC DUCT STENT EXCHANGE W DIL AND WIRE,stent removed.     OTHER SURGICAL HISTORY      ,[id number],CARDIAC CATHETERIZATION,mild disease     OTHER SURGICAL HISTORY      07,[id number],OTHER,Left     OTHER SURGICAL HISTORY      2008,[id number],TOTAL KNEE ARTHROPLASTY,Right     OTHER SURGICAL HISTORY      2010,[id number],TOTAL KNEE ARTHROPLASTY,Left,Dr. Carson     OTHER SURGICAL HISTORY      2017,[id number],OTHER,Left,Dr. Joiner, [facility]     RELEASE CARPAL TUNNEL      2006     Revision right total knee arthroplasty   2019    Dr. Carson.  Due to loose implant tibial component     TONSILLECTOMY      age 12      Allergies   Allergen Reactions     Atorvastatin Muscle Pain (Myalgia)     Cefazolin Nausea and Vomiting      Social History     Tobacco Use     Smoking status: Never     Passive exposure: Never      Smokeless tobacco: Never   Substance Use Topics     Alcohol use: No      Wt Readings from Last 1 Encounters:   07/17/23 75.8 kg (167 lb)        Anesthesia Evaluation   Pt has had prior anesthetic. Type: General.    No history of anesthetic complications       ROS/MED HX  ENT/Pulmonary:  - neg pulmonary ROS     Neurologic:  - neg neurologic ROS     Cardiovascular:     (+) Dyslipidemia hypertension-----Previous cardiac testing   Echo: Date: Results:    Stress Test: Date: 2015 Results:  CONCLUSIONS:   1. Negative Lexiscan Cardiolite for evidence of ischemia.   2. Hyperdynamic left ventricular function.   3. Please see separate stress report for the full details of the stress portion of this evaluation.     ECG Reviewed: Date: 7/17/23 Results:  Sinus bradycardia   Inferior infarct , age undetermined   Abnormal ECG   When compared with ECG of 13-JUL-2023 10:10,   Nonspecific inferior T-wave abnormality has normalized.   Confirmed by MD DUNLAP KEITH   Cath: Date: Results:      METS/Exercise Tolerance: >4 METS    Hematologic:  - neg hematologic  ROS     Musculoskeletal: Comment: Lumbar radiculopathy  Lumbar stenosis      (+) arthritis,     GI/Hepatic: Comment: Sliding hiatal hernia  dysphagia    (+) GERD, hiatal hernia,     Renal/Genitourinary:  - neg Renal ROS     Endo:     (+) type II DM, Obesity,     Psychiatric/Substance Use:  - neg psychiatric ROS     Infectious Disease:  - neg infectious disease ROS     Malignancy:  - neg malignancy ROS     Other:  - neg other ROS          Physical Exam    Airway        Mallampati: III       Respiratory Devices and Support         Dental     Comment: Upper plate        Cardiovascular   cardiovascular exam normal       Rhythm and rate: regular and normal     Pulmonary   pulmonary exam normal        breath sounds clear to auscultation           OUTSIDE LABS:  CBC:   Lab Results   Component Value Date    WBC 5.3 01/31/2023    WBC 5.8 02/24/2022    HGB 13.3 07/17/2023    HGB 11.9  01/31/2023    HCT 34.3 (L) 01/31/2023    HCT 34.6 (L) 02/24/2022     01/31/2023     02/24/2022     BMP:   Lab Results   Component Value Date     07/17/2023     01/31/2023    POTASSIUM 4.1 07/17/2023    POTASSIUM 4.2 01/31/2023    CHLORIDE 103 07/17/2023    CHLORIDE 107 01/31/2023    CO2 26 07/17/2023    CO2 26 01/31/2023    BUN 10.9 07/17/2023    BUN 11.6 01/31/2023    CR 0.65 07/17/2023    CR 0.58 01/31/2023     (H) 07/17/2023     (H) 03/28/2023     COAGS:   Lab Results   Component Value Date    PTT 31 02/22/2018    INR 1.05 02/22/2018     POC: No results found for: BGM, HCG, HCGS  HEPATIC:   Lab Results   Component Value Date    ALBUMIN 4.4 01/31/2023    PROTTOTAL 7.0 01/31/2023    ALT 16 01/31/2023    AST 20 01/31/2023    ALKPHOS 70 01/31/2023    BILITOTAL 0.3 01/31/2023     OTHER:   Lab Results   Component Value Date    A1C 6.5 (H) 07/17/2023    SHON 9.7 07/17/2023    LIPASE 35 02/24/2022    AMYLASE 47 02/24/2022    TSH 2.78 01/31/2023    CRP 2.0 (H) 06/05/2019    SED 22 09/22/2015       Anesthesia Plan    ASA Status:  3   NPO Status:  NPO Appropriate    Anesthesia Type: General.     - Airway: ETT   Induction: Intravenous.   Maintenance: Inhalation.        Consents    Anesthesia Plan(s) and associated risks, benefits, and realistic alternatives discussed. Questions answered and patient/representative(s) expressed understanding.     - Discussed: Risks, Benefits and Alternatives for BOTH SEDATION and the PROCEDURE were discussed     - Discussed with:  Patient, Spouse      - Extended Intubation/Ventilatory Support Discussed: Yes.      - Patient is DNR/DNI Status: No    Use of blood products discussed: Yes.     - Discussed with: Patient.     - Consented: consented to blood products            Reason for refusal: other.     Postoperative Care    Pain management: IV analgesics.   PONV prophylaxis: Ondansetron (or other 5HT-3)     Comments:                Goldy Escamilla CRNA,  APRN CRNA

## 2023-07-19 ENCOUNTER — ANESTHESIA (OUTPATIENT)
Dept: SURGERY | Facility: OTHER | Age: 83
End: 2023-07-19
Payer: MEDICARE

## 2023-07-19 ENCOUNTER — HOSPITAL ENCOUNTER (OUTPATIENT)
Facility: OTHER | Age: 83
Discharge: HOME | End: 2023-07-21
Attending: SURGERY | Admitting: SURGERY
Payer: MEDICARE

## 2023-07-19 DIAGNOSIS — K21.9 GASTROESOPHAGEAL REFLUX DISEASE WITHOUT ESOPHAGITIS: ICD-10-CM

## 2023-07-19 DIAGNOSIS — Z98.890 POSTOPERATIVE STATE: Primary | ICD-10-CM

## 2023-07-19 LAB
CREAT SERPL-MCNC: 0.59 MG/DL (ref 0.51–0.95)
GFR SERPL CREATININE-BSD FRML MDRD: 89 ML/MIN/1.73M2
GLUCOSE BLDC GLUCOMTR-MCNC: 152 MG/DL (ref 70–99)
GLUCOSE BLDC GLUCOMTR-MCNC: 235 MG/DL (ref 70–99)
HOLD SPECIMEN: NORMAL
LACTATE SERPL-SCNC: 1.3 MMOL/L (ref 0.7–2)

## 2023-07-19 PROCEDURE — 250N000009 HC RX 250: Performed by: SURGERY

## 2023-07-19 PROCEDURE — 258N000003 HC RX IP 258 OP 636: Performed by: NURSE ANESTHETIST, CERTIFIED REGISTERED

## 2023-07-19 PROCEDURE — 370N000017 HC ANESTHESIA TECHNICAL FEE, PER MIN: Performed by: SURGERY

## 2023-07-19 PROCEDURE — 43280 LAPAROSCOPY FUNDOPLASTY: CPT | Performed by: SURGERY

## 2023-07-19 PROCEDURE — 250N000009 HC RX 250: Performed by: NURSE ANESTHETIST, CERTIFIED REGISTERED

## 2023-07-19 PROCEDURE — 999N000141 HC STATISTIC PRE-PROCEDURE NURSING ASSESSMENT: Performed by: SURGERY

## 2023-07-19 PROCEDURE — 36415 COLL VENOUS BLD VENIPUNCTURE: CPT | Performed by: SURGERY

## 2023-07-19 PROCEDURE — 258N000003 HC RX IP 258 OP 636: Performed by: SURGERY

## 2023-07-19 PROCEDURE — 258N000001 HC RX 258: Performed by: SURGERY

## 2023-07-19 PROCEDURE — 82962 GLUCOSE BLOOD TEST: CPT

## 2023-07-19 PROCEDURE — 710N000010 HC RECOVERY PHASE 1, LEVEL 2, PER MIN: Performed by: SURGERY

## 2023-07-19 PROCEDURE — 250N000011 HC RX IP 250 OP 636: Performed by: NURSE ANESTHETIST, CERTIFIED REGISTERED

## 2023-07-19 PROCEDURE — 96374 THER/PROPH/DIAG INJ IV PUSH: CPT | Mod: XU

## 2023-07-19 PROCEDURE — 96375 TX/PRO/DX INJ NEW DRUG ADDON: CPT | Mod: XU

## 2023-07-19 PROCEDURE — 999N000157 HC STATISTIC RCP TIME EA 10 MIN

## 2023-07-19 PROCEDURE — 83605 ASSAY OF LACTIC ACID: CPT | Performed by: SURGERY

## 2023-07-19 PROCEDURE — 360N000077 HC SURGERY LEVEL 4, PER MIN: Performed by: SURGERY

## 2023-07-19 PROCEDURE — 82565 ASSAY OF CREATININE: CPT | Performed by: SURGERY

## 2023-07-19 PROCEDURE — 99100 ANES PT EXTEME AGE<1 YR&>70: CPT | Performed by: NURSE ANESTHETIST, CERTIFIED REGISTERED

## 2023-07-19 PROCEDURE — 43280 LAPAROSCOPY FUNDOPLASTY: CPT | Performed by: NURSE ANESTHETIST, CERTIFIED REGISTERED

## 2023-07-19 PROCEDURE — 250N000025 HC SEVOFLURANE, PER MIN: Performed by: SURGERY

## 2023-07-19 PROCEDURE — 250N000011 HC RX IP 250 OP 636: Mod: JZ | Performed by: SURGERY

## 2023-07-19 PROCEDURE — 272N000001 HC OR GENERAL SUPPLY STERILE: Performed by: SURGERY

## 2023-07-19 PROCEDURE — 250N000013 HC RX MED GY IP 250 OP 250 PS 637: Performed by: SURGERY

## 2023-07-19 RX ORDER — HYDROCODONE BITARTRATE AND ACETAMINOPHEN 7.5; 325 MG/15ML; MG/15ML
10 SOLUTION ORAL EVERY 4 HOURS PRN
Status: DISCONTINUED | OUTPATIENT
Start: 2023-07-19 | End: 2023-07-21 | Stop reason: HOSPADM

## 2023-07-19 RX ORDER — CEFAZOLIN SODIUM/WATER 2 G/20 ML
2 SYRINGE (ML) INTRAVENOUS SEE ADMIN INSTRUCTIONS
Status: DISCONTINUED | OUTPATIENT
Start: 2023-07-19 | End: 2023-07-19 | Stop reason: HOSPADM

## 2023-07-19 RX ORDER — SENNOSIDES 8.6 MG
650 CAPSULE ORAL EVERY 8 HOURS PRN
Status: ON HOLD | COMMUNITY
End: 2023-07-21

## 2023-07-19 RX ORDER — CEFAZOLIN SODIUM/WATER 2 G/20 ML
2 SYRINGE (ML) INTRAVENOUS
Status: COMPLETED | OUTPATIENT
Start: 2023-07-19 | End: 2023-07-19

## 2023-07-19 RX ORDER — LIDOCAINE 40 MG/G
CREAM TOPICAL
Status: DISCONTINUED | OUTPATIENT
Start: 2023-07-19 | End: 2023-07-19 | Stop reason: HOSPADM

## 2023-07-19 RX ORDER — DEXAMETHASONE SODIUM PHOSPHATE 4 MG/ML
INJECTION, SOLUTION INTRA-ARTICULAR; INTRALESIONAL; INTRAMUSCULAR; INTRAVENOUS; SOFT TISSUE PRN
Status: DISCONTINUED | OUTPATIENT
Start: 2023-07-19 | End: 2023-07-19

## 2023-07-19 RX ORDER — HYDROMORPHONE HYDROCHLORIDE 1 MG/ML
0.5 INJECTION, SOLUTION INTRAMUSCULAR; INTRAVENOUS; SUBCUTANEOUS EVERY 5 MIN PRN
Status: DISCONTINUED | OUTPATIENT
Start: 2023-07-19 | End: 2023-07-19 | Stop reason: HOSPADM

## 2023-07-19 RX ORDER — ONDANSETRON 2 MG/ML
4 INJECTION INTRAMUSCULAR; INTRAVENOUS EVERY 6 HOURS
Status: DISCONTINUED | OUTPATIENT
Start: 2023-07-19 | End: 2023-07-21 | Stop reason: HOSPADM

## 2023-07-19 RX ORDER — PROPOFOL 10 MG/ML
INJECTION, EMULSION INTRAVENOUS PRN
Status: DISCONTINUED | OUTPATIENT
Start: 2023-07-19 | End: 2023-07-19

## 2023-07-19 RX ORDER — HYDROMORPHONE HYDROCHLORIDE 1 MG/ML
0.5 INJECTION, SOLUTION INTRAMUSCULAR; INTRAVENOUS; SUBCUTANEOUS
Status: DISCONTINUED | OUTPATIENT
Start: 2023-07-19 | End: 2023-07-21 | Stop reason: HOSPADM

## 2023-07-19 RX ORDER — FENTANYL CITRATE 50 UG/ML
50 INJECTION, SOLUTION INTRAMUSCULAR; INTRAVENOUS EVERY 5 MIN PRN
Status: DISCONTINUED | OUTPATIENT
Start: 2023-07-19 | End: 2023-07-19 | Stop reason: HOSPADM

## 2023-07-19 RX ORDER — BUPIVACAINE HYDROCHLORIDE 5 MG/ML
INJECTION, SOLUTION PERINEURAL PRN
Status: DISCONTINUED | OUTPATIENT
Start: 2023-07-19 | End: 2023-07-19 | Stop reason: HOSPADM

## 2023-07-19 RX ORDER — ACETAMINOPHEN 325 MG/1
975 TABLET ORAL ONCE
Status: COMPLETED | OUTPATIENT
Start: 2023-07-19 | End: 2023-07-19

## 2023-07-19 RX ORDER — KETOROLAC TROMETHAMINE 15 MG/ML
15 INJECTION, SOLUTION INTRAMUSCULAR; INTRAVENOUS EVERY 6 HOURS
Status: COMPLETED | OUTPATIENT
Start: 2023-07-19 | End: 2023-07-20

## 2023-07-19 RX ORDER — OXYCODONE HYDROCHLORIDE 5 MG/1
10 TABLET ORAL
Status: DISCONTINUED | OUTPATIENT
Start: 2023-07-19 | End: 2023-07-19 | Stop reason: HOSPADM

## 2023-07-19 RX ORDER — NALOXONE HYDROCHLORIDE 0.4 MG/ML
0.2 INJECTION, SOLUTION INTRAMUSCULAR; INTRAVENOUS; SUBCUTANEOUS
Status: DISCONTINUED | OUTPATIENT
Start: 2023-07-19 | End: 2023-07-21 | Stop reason: HOSPADM

## 2023-07-19 RX ORDER — NALOXONE HYDROCHLORIDE 0.4 MG/ML
0.2 INJECTION, SOLUTION INTRAMUSCULAR; INTRAVENOUS; SUBCUTANEOUS
Status: DISCONTINUED | OUTPATIENT
Start: 2023-07-19 | End: 2023-07-19

## 2023-07-19 RX ORDER — NALOXONE HYDROCHLORIDE 0.4 MG/ML
0.4 INJECTION, SOLUTION INTRAMUSCULAR; INTRAVENOUS; SUBCUTANEOUS
Status: DISCONTINUED | OUTPATIENT
Start: 2023-07-19 | End: 2023-07-19

## 2023-07-19 RX ORDER — SODIUM CHLORIDE, SODIUM LACTATE, POTASSIUM CHLORIDE, CALCIUM CHLORIDE 600; 310; 30; 20 MG/100ML; MG/100ML; MG/100ML; MG/100ML
INJECTION, SOLUTION INTRAVENOUS CONTINUOUS
Status: DISCONTINUED | OUTPATIENT
Start: 2023-07-19 | End: 2023-07-19 | Stop reason: HOSPADM

## 2023-07-19 RX ORDER — OXYCODONE HYDROCHLORIDE 5 MG/1
5 TABLET ORAL
Status: DISCONTINUED | OUTPATIENT
Start: 2023-07-19 | End: 2023-07-19 | Stop reason: HOSPADM

## 2023-07-19 RX ORDER — ONDANSETRON 2 MG/ML
4 INJECTION INTRAMUSCULAR; INTRAVENOUS EVERY 30 MIN PRN
Status: DISCONTINUED | OUTPATIENT
Start: 2023-07-19 | End: 2023-07-19 | Stop reason: HOSPADM

## 2023-07-19 RX ORDER — FENTANYL CITRATE 50 UG/ML
INJECTION, SOLUTION INTRAMUSCULAR; INTRAVENOUS PRN
Status: DISCONTINUED | OUTPATIENT
Start: 2023-07-19 | End: 2023-07-19

## 2023-07-19 RX ORDER — ONDANSETRON 4 MG/1
4 TABLET, ORALLY DISINTEGRATING ORAL EVERY 30 MIN PRN
Status: DISCONTINUED | OUTPATIENT
Start: 2023-07-19 | End: 2023-07-19 | Stop reason: HOSPADM

## 2023-07-19 RX ORDER — FENTANYL CITRATE 50 UG/ML
25 INJECTION, SOLUTION INTRAMUSCULAR; INTRAVENOUS EVERY 5 MIN PRN
Status: DISCONTINUED | OUTPATIENT
Start: 2023-07-19 | End: 2023-07-19 | Stop reason: HOSPADM

## 2023-07-19 RX ORDER — BUPIVACAINE HYDROCHLORIDE AND EPINEPHRINE 5; 5 MG/ML; UG/ML
INJECTION, SOLUTION PERINEURAL PRN
Status: DISCONTINUED | OUTPATIENT
Start: 2023-07-19 | End: 2023-07-19 | Stop reason: HOSPADM

## 2023-07-19 RX ORDER — ACETAMINOPHEN 325 MG/1
975 TABLET ORAL EVERY 8 HOURS
Status: DISCONTINUED | OUTPATIENT
Start: 2023-07-19 | End: 2023-07-21 | Stop reason: HOSPADM

## 2023-07-19 RX ORDER — ENOXAPARIN SODIUM 100 MG/ML
40 INJECTION SUBCUTANEOUS EVERY 24 HOURS
Status: DISCONTINUED | OUTPATIENT
Start: 2023-07-20 | End: 2023-07-21 | Stop reason: HOSPADM

## 2023-07-19 RX ORDER — LISINOPRIL 10 MG/1
10 TABLET ORAL DAILY
Status: DISCONTINUED | OUTPATIENT
Start: 2023-07-20 | End: 2023-07-21 | Stop reason: HOSPADM

## 2023-07-19 RX ORDER — NALOXONE HYDROCHLORIDE 0.4 MG/ML
0.4 INJECTION, SOLUTION INTRAMUSCULAR; INTRAVENOUS; SUBCUTANEOUS
Status: DISCONTINUED | OUTPATIENT
Start: 2023-07-19 | End: 2023-07-21 | Stop reason: HOSPADM

## 2023-07-19 RX ORDER — KETAMINE HYDROCHLORIDE 10 MG/ML
INJECTION INTRAMUSCULAR; INTRAVENOUS PRN
Status: DISCONTINUED | OUTPATIENT
Start: 2023-07-19 | End: 2023-07-19

## 2023-07-19 RX ORDER — HYDROMORPHONE HCL IN WATER/PF 6 MG/30 ML
0.2 PATIENT CONTROLLED ANALGESIA SYRINGE INTRAVENOUS EVERY 5 MIN PRN
Status: DISCONTINUED | OUTPATIENT
Start: 2023-07-19 | End: 2023-07-19 | Stop reason: HOSPADM

## 2023-07-19 RX ORDER — LIDOCAINE 40 MG/G
CREAM TOPICAL
Status: DISCONTINUED | OUTPATIENT
Start: 2023-07-19 | End: 2023-07-21 | Stop reason: HOSPADM

## 2023-07-19 RX ORDER — DIPHENHYDRAMINE HCL 25 MG
25 CAPSULE ORAL
Status: DISCONTINUED | OUTPATIENT
Start: 2023-07-19 | End: 2023-07-21 | Stop reason: HOSPADM

## 2023-07-19 RX ORDER — HYDROMORPHONE HCL IN WATER/PF 6 MG/30 ML
0.2 PATIENT CONTROLLED ANALGESIA SYRINGE INTRAVENOUS
Status: DISCONTINUED | OUTPATIENT
Start: 2023-07-19 | End: 2023-07-21 | Stop reason: HOSPADM

## 2023-07-19 RX ORDER — ACETAMINOPHEN 325 MG/1
650 TABLET ORAL EVERY 4 HOURS PRN
Status: DISCONTINUED | OUTPATIENT
Start: 2023-07-22 | End: 2023-07-21 | Stop reason: HOSPADM

## 2023-07-19 RX ORDER — PRAVASTATIN SODIUM 20 MG
20 TABLET ORAL DAILY
Status: DISCONTINUED | OUTPATIENT
Start: 2023-07-20 | End: 2023-07-19

## 2023-07-19 RX ORDER — LIDOCAINE HYDROCHLORIDE 20 MG/ML
INJECTION, SOLUTION INFILTRATION; PERINEURAL PRN
Status: DISCONTINUED | OUTPATIENT
Start: 2023-07-19 | End: 2023-07-19

## 2023-07-19 RX ORDER — SODIUM CHLORIDE, SODIUM LACTATE, POTASSIUM CHLORIDE, CALCIUM CHLORIDE 600; 310; 30; 20 MG/100ML; MG/100ML; MG/100ML; MG/100ML
INJECTION, SOLUTION INTRAVENOUS CONTINUOUS
Status: DISCONTINUED | OUTPATIENT
Start: 2023-07-19 | End: 2023-07-20

## 2023-07-19 RX ORDER — ONDANSETRON 2 MG/ML
INJECTION INTRAMUSCULAR; INTRAVENOUS PRN
Status: DISCONTINUED | OUTPATIENT
Start: 2023-07-19 | End: 2023-07-19

## 2023-07-19 RX ORDER — ENOXAPARIN SODIUM 100 MG/ML
40 INJECTION SUBCUTANEOUS
Status: COMPLETED | OUTPATIENT
Start: 2023-07-19 | End: 2023-07-19

## 2023-07-19 RX ADMIN — Medication 2 G: at 07:40

## 2023-07-19 RX ADMIN — LIDOCAINE HYDROCHLORIDE 100 MG: 20 INJECTION, SOLUTION INFILTRATION; PERINEURAL at 07:48

## 2023-07-19 RX ADMIN — ONDANSETRON 4 MG: 2 INJECTION INTRAMUSCULAR; INTRAVENOUS at 12:19

## 2023-07-19 RX ADMIN — Medication 2 G: at 07:42

## 2023-07-19 RX ADMIN — KETOROLAC TROMETHAMINE 15 MG: 15 INJECTION, SOLUTION INTRAMUSCULAR; INTRAVENOUS at 17:49

## 2023-07-19 RX ADMIN — FENTANYL CITRATE 50 MCG: 50 INJECTION, SOLUTION INTRAMUSCULAR; INTRAVENOUS at 07:48

## 2023-07-19 RX ADMIN — LIDOCAINE HYDROCHLORIDE 0.1 ML: 10 INJECTION, SOLUTION EPIDURAL; INFILTRATION; INTRACAUDAL; PERINEURAL at 07:27

## 2023-07-19 RX ADMIN — Medication 25 MG: at 07:55

## 2023-07-19 RX ADMIN — SODIUM CHLORIDE, SODIUM LACTATE, POTASSIUM CHLORIDE, AND CALCIUM CHLORIDE: 600; 310; 30; 20 INJECTION, SOLUTION INTRAVENOUS at 09:55

## 2023-07-19 RX ADMIN — ACETAMINOPHEN 975 MG: 325 TABLET, FILM COATED ORAL at 22:11

## 2023-07-19 RX ADMIN — PHENYLEPHRINE HYDROCHLORIDE 100 MCG: 10 INJECTION INTRAVENOUS at 08:06

## 2023-07-19 RX ADMIN — PHENYLEPHRINE HYDROCHLORIDE 100 MCG: 10 INJECTION INTRAVENOUS at 09:37

## 2023-07-19 RX ADMIN — ACETAMINOPHEN 975 MG: 325 TABLET, FILM COATED ORAL at 14:10

## 2023-07-19 RX ADMIN — ACETAMINOPHEN 975 MG: 325 TABLET, FILM COATED ORAL at 07:04

## 2023-07-19 RX ADMIN — ONDANSETRON HYDROCHLORIDE 4 MG: 2 SOLUTION INTRAMUSCULAR; INTRAVENOUS at 07:48

## 2023-07-19 RX ADMIN — PHENYLEPHRINE HYDROCHLORIDE 100 MCG: 10 INJECTION INTRAVENOUS at 08:36

## 2023-07-19 RX ADMIN — ONDANSETRON 4 MG: 2 INJECTION INTRAMUSCULAR; INTRAVENOUS at 17:48

## 2023-07-19 RX ADMIN — ROCURONIUM BROMIDE 30 MG: 50 INJECTION, SOLUTION INTRAVENOUS at 08:47

## 2023-07-19 RX ADMIN — HYDROMORPHONE HYDROCHLORIDE 0.2 MG: 0.2 INJECTION, SOLUTION INTRAMUSCULAR; INTRAVENOUS; SUBCUTANEOUS at 20:42

## 2023-07-19 RX ADMIN — SUGAMMADEX 200 MG: 100 INJECTION, SOLUTION INTRAVENOUS at 09:52

## 2023-07-19 RX ADMIN — ENOXAPARIN SODIUM 40 MG: 40 INJECTION SUBCUTANEOUS at 08:08

## 2023-07-19 RX ADMIN — SUGAMMADEX 200 MG: 100 INJECTION, SOLUTION INTRAVENOUS at 09:48

## 2023-07-19 RX ADMIN — SODIUM CHLORIDE, POTASSIUM CHLORIDE, SODIUM LACTATE AND CALCIUM CHLORIDE: 600; 310; 30; 20 INJECTION, SOLUTION INTRAVENOUS at 12:06

## 2023-07-19 RX ADMIN — SODIUM CHLORIDE, POTASSIUM CHLORIDE, SODIUM LACTATE AND CALCIUM CHLORIDE: 600; 310; 30; 20 INJECTION, SOLUTION INTRAVENOUS at 18:06

## 2023-07-19 RX ADMIN — FENTANYL CITRATE 50 MCG: 50 INJECTION, SOLUTION INTRAMUSCULAR; INTRAVENOUS at 08:05

## 2023-07-19 RX ADMIN — SODIUM CHLORIDE, SODIUM LACTATE, POTASSIUM CHLORIDE, AND CALCIUM CHLORIDE: 600; 310; 30; 20 INJECTION, SOLUTION INTRAVENOUS at 07:45

## 2023-07-19 RX ADMIN — FENTANYL CITRATE 100 MCG: 50 INJECTION, SOLUTION INTRAMUSCULAR; INTRAVENOUS at 09:11

## 2023-07-19 RX ADMIN — PHENYLEPHRINE HYDROCHLORIDE 100 MCG: 10 INJECTION INTRAVENOUS at 08:44

## 2023-07-19 RX ADMIN — PHENYLEPHRINE HYDROCHLORIDE 100 MCG: 10 INJECTION INTRAVENOUS at 08:59

## 2023-07-19 RX ADMIN — PHENYLEPHRINE HYDROCHLORIDE 100 MCG: 10 INJECTION INTRAVENOUS at 08:30

## 2023-07-19 RX ADMIN — ROCURONIUM BROMIDE 70 MG: 50 INJECTION, SOLUTION INTRAVENOUS at 07:48

## 2023-07-19 RX ADMIN — DEXAMETHASONE SODIUM PHOSPHATE 8 MG: 4 INJECTION, SOLUTION INTRA-ARTICULAR; INTRALESIONAL; INTRAMUSCULAR; INTRAVENOUS; SOFT TISSUE at 07:48

## 2023-07-19 RX ADMIN — MIDAZOLAM HYDROCHLORIDE 2 MG: 1 INJECTION, SOLUTION INTRAMUSCULAR; INTRAVENOUS at 07:45

## 2023-07-19 RX ADMIN — KETOROLAC TROMETHAMINE 15 MG: 15 INJECTION, SOLUTION INTRAMUSCULAR; INTRAVENOUS at 12:19

## 2023-07-19 RX ADMIN — PHENYLEPHRINE HYDROCHLORIDE 100 MCG: 10 INJECTION INTRAVENOUS at 08:48

## 2023-07-19 RX ADMIN — PHENYLEPHRINE HYDROCHLORIDE 100 MCG: 10 INJECTION INTRAVENOUS at 09:33

## 2023-07-19 RX ADMIN — PHENYLEPHRINE HYDROCHLORIDE 100 MCG: 10 INJECTION INTRAVENOUS at 08:17

## 2023-07-19 RX ADMIN — Medication 25 MG: at 08:05

## 2023-07-19 RX ADMIN — PROPOFOL 150 MG: 10 INJECTION, EMULSION INTRAVENOUS at 07:48

## 2023-07-19 ASSESSMENT — ACTIVITIES OF DAILY LIVING (ADL)
ADLS_ACUITY_SCORE: 35
ADLS_ACUITY_SCORE: 31
ADLS_ACUITY_SCORE: 35

## 2023-07-19 NOTE — PROGRESS NOTES
Incentive Spirometry education completed.  Pt goal 1700 mls.  Pt achieved 1250 mls.  Pt instructed to perform 10/hr while awake with at least one deep breath and cough per hour until able to perform baseline activity.  How to use an Incentive Spirometer written instructions provided to patient. RT will follow and re-assess as need.      Annemarie Lomas, RT on 7/19/2023 at 5:25 PM

## 2023-07-19 NOTE — PHARMACY-ADMISSION MEDICATION HISTORY
".Pharmacist Admission Medication History    Admission medication history is complete. The information provided in this note is only as accurate as the sources available at the time of the update.    Medication reconciliation/reorder completed by provider prior to medication history? Yes    Information Source(s): Patient via in-person, fill history    Pertinent Information: patient stopped taking all scheduled medications >2 months ago and states \"everything is going fine\" Only daily medication is tylenol PM at bedtime, all other RX or OTC use is denied    Changes made to PTA medication list:    Added: Tylenol PM QHS    Deleted: All medications: Lisinopril, omeprazole, pravastatin, ursodiol    Changed: Tylenol to 650 8 hours     Medication Affordability:  Not including over the counter (OTC) medications, was there a time in the past 3 months when you did not take your medications as prescribed because of cost?: No    Allergies reviewed with patient and updates made in EHR: yes    Medication History Completed By: Diana Joyner RPH 7/19/2023 3:34 PM    Prior to Admission medications    Medication Sig Last Dose Taking? Auth Provider Long Term End Date   acetaminophen (ACETAMINOPHEN 8 HOUR) 650 MG CR tablet Take 650 mg by mouth every 8 hours as needed for mild pain or fever Past Week at PRN Yes Unknown, Entered By History     diphenhydrAMINE-acetaminophen (TYLENOL PM)  MG tablet Take 1 tablet by mouth At Bedtime 7/18/2023 at PM Yes Unknown, Entered By History     blood glucose (NO BRAND SPECIFIED) lancets standard Use to test blood sugar once daily. Dispense as covered by insurance. Dx. Code: E11.9. Not using  Nicanor Mike MD     blood glucose (NO BRAND SPECIFIED) test strip Use to test blood sugar once daily. Dispense as covered by insurance. Dx. Code: E11.9. Not using  Nicanor Mike MD     blood glucose monitoring (NO BRAND SPECIFIED) meter device kit Use to test blood sugar 2 times daily Not using  " Nicanor Mike MD     Blood Pressure Monitoring (ADULT BLOOD PRESSURE CUFF LG) KIT Use for home blood pressure monitoring as directed.   Nicanor Mike MD     order for DME Equipment being ordered: Evaluate at [facility] for a knee brace   Nicanor Mike MD     order for DME Equipment being ordered: [facility] to evaluate for diabetic shoes and/or insoles   Nicanor Mike MD

## 2023-07-19 NOTE — ANESTHESIA CARE TRANSFER NOTE
Patient: Maliha Rivera    Procedure: Procedure(s):  FUNDOPLICATION, NISSEN, LAPAROSCOPIC WITH LYSIS OF ADHESIONS       Diagnosis: Gastroesophageal reflux disease without esophagitis [K21.9]  Diagnosis Additional Information: No value filed.    Anesthesia Type:   General     Note:    Oropharynx: oral airway in place and spontaneously breathing  Level of Consciousness: drowsy  Oxygen Supplementation: face mask  Level of Supplemental Oxygen (L/min / FiO2): 6  Independent Airway: airway patency satisfactory and stable  Dentition: dentition unchanged  Vital Signs Stable: post-procedure vital signs reviewed and stable  Report to RN Given: handoff report given  Patient transferred to: PACU    Handoff Report: Identifed the Patient, Identified the Reponsible Provider, Reviewed the pertinent medical history, Discussed the surgical course, Reviewed Intra-OP anesthesia mangement and issues during anesthesia, Set expectations for post-procedure period and Allowed opportunity for questions and acknowledgement of understanding      Vitals:  Vitals Value Taken Time   /86 07/19/23 1020   Temp     Pulse 87 07/19/23 1022   Resp 17 07/19/23 1022   SpO2 100 % 07/19/23 1022   Vitals shown include unvalidated device data.    Electronically Signed By: Goldy Escamilla CRNA, APRN CRNA  July 19, 2023  10:23 AM

## 2023-07-19 NOTE — INTERVAL H&P NOTE
I saw and examined Maliha Rivera.  I have reviewed the history and physical and find no changes to the patient's medical status or condition with the exceptions noted below.         Apolinar Martin MD   7:35 AM 7/19/2023

## 2023-07-19 NOTE — OR NURSING
PACU Transfer Note    Maliha Rivera was transferred to [facility] via bed.  Equipment used for transport:  None .  Accompanied by:  Rebecca LARES   Prescriptions were: none    PACU Respiratory Event Documentation     1) Episodes of Apnea greater than or equal to 10 seconds: 0    2) Bradypnea - less than 8 breaths per minute: 0    3) Pain score on 0 to 10 scale: 0/10    4) Pain-sedation mismatch (yes or no): no    5) Repeated 02 desaturation less than 90% (yes or no): no    Anesthesia notified? (yes or no): no    Any of the above events occuring repeatedly in separate 30 minute intervals may be considered recurrent PACU respiratory events.    Patient stable and meets phase 1 discharge criteria for transport from PACU.  Report given to Barb LARES.

## 2023-07-19 NOTE — PLAN OF CARE
Goal Outcome Evaluation:    Patient transferred from surgery at 1105. VSS with slight elevation to BP. Drowsy upon admission and is currently alert and talkative. Pleasant. A&Ox4. Denies nausea or SOB. Pain noted to abdomen, IV toradol given, PO tylenol given per MAR. 5 trochar sites noted. Clean, dry, intact with glue. Open to air. Ice applied to site on and off since admission to unit. LS noted to have coarse crackles to the JOSE and LLL. Education on IS, patient compliant and 1500mL obtained. Patient ambulated with assist of 2 and gait belt to the bathroom. Voided 1150mL. No skin concerns noted. Patient has own dental appliance in place. SCD's on. No edema noted and pedal pulses adequate.      Plan of Care Reviewed With: patient    Overall Patient Progress: no change  Overall Patient Progress: no change

## 2023-07-19 NOTE — ANESTHESIA POSTPROCEDURE EVALUATION
Patient: Maliha Rivera    Procedure: Procedure(s):  FUNDOPLICATION, NISSEN, LAPAROSCOPIC WITH LYSIS OF ADHESIONS       Anesthesia Type:  General    Note:  Disposition: Outpatient   Postop Pain Control: Uneventful            Sign Out: Well controlled pain   PONV: No   Neuro/Psych: Uneventful            Sign Out: Acceptable/Baseline neuro status   Airway/Respiratory: Uneventful            Sign Out: Acceptable/Baseline resp. status   CV/Hemodynamics: Uneventful            Sign Out: Acceptable CV status; No obvious hypovolemia; No obvious fluid overload   Other NRE: NONE   DID A NON-ROUTINE EVENT OCCUR? No           Last vitals:  Vitals Value Taken Time   /76 07/19/23 1055   Temp 97.3  F (36.3  C) 07/19/23 1040   Pulse 74 07/19/23 1055   Resp 22 07/19/23 1055   SpO2 92 % 07/19/23 1055   Vitals shown include unvalidated device data.    Electronically Signed By: Goldy Escamilla CRNA, APRN CRNA  July 19, 2023  11:15 AM

## 2023-07-19 NOTE — PROGRESS NOTES
Admission Note    Data:  Maliha Rivera admitted to 312 from surgery at 1102.      Action:  Dr. Martin has been notified of admission. Pt oriented to unit, call light in reach.     Response:  Patient tolerated transfer .

## 2023-07-19 NOTE — OP NOTE
PREOPERATIVE  DIAGNOSIS: Chronic GERD and regurgitation, hiatal hernia     POSTOPERATIVE DIAGNOSIS: Same    PROCEDURE PERFORMED: Laparoscopic Nissen fundoplication, laparoscopic lysis of adhesions    ESTIMATED BLOOD LOSS: 15 mL    SURGEON:  Apolinar Martin MD     ASSISTANT: Circulator: Ton Kirkland RN  Relief Circulator: Mariana Chamberlain RN  First Assistant: Elvi Novoa RN    ANESTHESIA: GeneralCRNA Independent: Sebastian Awan APRN CRNA; Goldy Escamilla APRN CRNA    FAMILYPHYSICIAN: Nicanor Mike      INDICATION FOR THE PROCEDURE:  The patient is a 82 year old y.o. female with GERD with regurgitation and hiatal hernia.  Work-up included EGD with Bravo pH probe placement.  Probe recorded DeMeester score of 24.5 and GE junction biopsies demonstrated evidence of reflux esophagitis.  X-ray esophagram shows hiatal hernia, active reflux and moderate to severe esophageal dysmotility.  We will consider partial wrap for the potential dysmotility.       PROCEDURE:  Maliha Rivera was brought to the operating room placed in supine position.    General anesthetic was applied and the patient was intubated.  Patient had received preoperative antibiotics and prophylactic dose of Lovenox.  Holt catheter was placed. Patient was prepped and draped in usual sterile fashion.  Timeout was performed and everyone was in agreement to proceed.  The supraumbilical area was anesthetized with local anesthetic.  A 1cm transverse incision was made. The subcutaneous tissues were dissected with hemostat clamp down to the level of the fascia.  The umbilical stalk was grasped and elevated. The Veress needle was placed in the abdomen and drop test confirmed abdominal placement.  Insufflation was applied to the abdomen and the abdomen was insufflated to 15 mmHg.  The 5 mm 30 degree scope was placed in an Optiview trocar and this set up was slowly advanced through the incision into the abdomen under direct vision.  The obturator was removed from the port and the camera was reinserted the abdomen and there did not appear to be any injury to the underlying bowel.   5 mm port was placed in the left upper quadrant under direct vision.  The 5 mm port at the umbilicus was swapped for a 12 mm port.  An additional 5 mm working port was placed on the right lateral abdomen.  There is dense omental adhesions to the abdominal wall in the right upper quadrant from previous open cholecystectomy.  Lysis of adhesions was carried out with the harmonic.  Some adhesions were left behind once enough of the upper abdomen was freed up to allow additional port placement in the right upper quadrant and epigastrium to do the work sufficiently.  Also, adhesions were removed to allow the liver retractor and without any difficulty.  Once the ports were placed the patient was then placed in steep reverse Trendelenburg position and the liver retractor was placed in the far right lateral port and directed underneath the left lobe of the liver.  This was elevated to reveal the esophageal hiatus which had visible hiatal hernia.  The stomach was grasped and retracted inferiorly and the gastrohepatic ligament was opened with harmonic.  Dissection was carried up to the right shalini.  Once the right shalini was identified the medial side of the right shalini was dissected to the apex using the harmonic.  There were fresh tissue planes in this area and the esophagus dissected away from the diaphragm quite nicely.  Dissection was carried around the anterior portion of the esophageal shalini to the left side.  I was actually able to do some dissection onto the left shalini from this approach.  We then turned our attention to the mid stomach and chose a section of the greater curvature that was below the level of the spleen.  Elevated the stomach and the greater omentum began taking down the short gastrics.  The lesser sac was entered and the short gastrics were taken down  sequentially all the way up to the diaphragm.  There were few dense adhesions near the fundus of the stomach and the spleen that were taken down quite carefully.  Also, the splenic artery was visible and prominent in this area and was slow to work around, to take care not to injure this vessel and cause bleeding.  The greater curvature of the stomach including the fundus was mobilized sufficiently and I was able to take down a number of posterior attachments to the stomach which should facilitate bringing around the wrap later on in the procedure.  I then turned my attention back to the right side of the esophagus at the esophageal hiatus.  I lifted up this area and was able to make a window to the left side of the abdomen.  The Penrose drain was passed through this window and the stomach was elevated revealing the posterior side of the esophagus, left shalini and right shalini on the same view.  I was able to take down a number of connective tissue attachments in the mediastinum with this for both posteriorly and on the right side, and even anteriorly to allow greater intra-abdominal esophageal length.  I then flipped the stomach over and completed the dissection on the left shalini and continue mediastinal dissection on the left side of the esophagus and some additional posterior and anterior filmy, areolar attachments.  There is approximately 3 cm of intra-abdominal esophageal length at this point.  There is a prominent anterior fat pad and hernia sac on the anterior portion of the stomach that was resected with the harmonic and removed from the patient, this will not be sent as a specimen.  The OG tube was removed and a 56 [place] bougienage was placed in the esophagus and directed on the greater curvature of the stomach.  The esophagus was retracted anteriorly to expose the right and left shalini.  The hiatal hernia was then repaired with 3 simple interrupted sutures of 0 Surgidac using the Endo Stitch device.  The  esophagus was then dropped and the Penrose drain removed.  I was able to get a grasper behind the esophagus and after identifying the most mobile portion of the fundus bring that through the retroesophageal space to the right side.  A shoeshine technique was utilized to ensure that the wrap was not twisted behind the esophagus.  He did not appear so.  The stomach has excellent mobilization and to complete, 360 degree wrap should be difficulty for the patient, this will also give the best protection against future reflux.  The Nissen fundoplication was performed utilizing 3 simple interrupted sutures of 0 Surgidac and the Endo Stitch device.  The wrap measured approximately 4 cm and transverses the GE junction.  The posterior portion of the wrap was then anchored to the right shalini using additional simple interrupted suture of 0 Surgidac.  The upper abdomen was irrigated and returns are clear.  There is very small ooze coming from one of the needle holes in the hiatal hernia repair that was controlled with pressure and Surgicel.  The bougie was removed and the wrap was found to be nice and floppy.  It was able to accept an instrument easily.  The liver retractor was removed and the left lobe liver allowed to lay over the stomach.  The 12 mm ports were removed under direct vision and then closed under direct vision utilizing the Carlos Gomez needle and 0 Vicryl suture in a simple interrupted fashion.  The 5 mm ports were removed under direct vision and there was no bleeding.  The abdomen was desufflated.     Skin was closed with 4-0 Monocryl suture.  The skin was cleansed and dried and Dermabond was applied to the incision.  At the end of the case all sponge and needle counts were correct.  The patient tolerated procedure well. They were extubated in the OR and was taken to the recovery room in good condition.        JIMBO Martin MD

## 2023-07-20 LAB
ANION GAP SERPL CALCULATED.3IONS-SCNC: 12 MMOL/L (ref 7–15)
BUN SERPL-MCNC: 12.3 MG/DL (ref 8–23)
CALCIUM SERPL-MCNC: 8.5 MG/DL (ref 8.8–10.2)
CHLORIDE SERPL-SCNC: 104 MMOL/L (ref 98–107)
CREAT SERPL-MCNC: 0.73 MG/DL (ref 0.51–0.95)
DEPRECATED HCO3 PLAS-SCNC: 22 MMOL/L (ref 22–29)
ERYTHROCYTE [DISTWIDTH] IN BLOOD BY AUTOMATED COUNT: 12.3 % (ref 10–15)
GFR SERPL CREATININE-BSD FRML MDRD: 82 ML/MIN/1.73M2
GLUCOSE SERPL-MCNC: 117 MG/DL (ref 70–99)
HCT VFR BLD AUTO: 30.7 % (ref 35–47)
HGB BLD-MCNC: 10.6 G/DL (ref 11.7–15.7)
HOLD SPECIMEN: NORMAL
MAGNESIUM SERPL-MCNC: 1.7 MG/DL (ref 1.7–2.3)
MCH RBC QN AUTO: 29.4 PG (ref 26.5–33)
MCHC RBC AUTO-ENTMCNC: 34.5 G/DL (ref 31.5–36.5)
MCV RBC AUTO: 85 FL (ref 78–100)
PHOSPHATE SERPL-MCNC: 3.6 MG/DL (ref 2.5–4.5)
PLATELET # BLD AUTO: 189 10E3/UL (ref 150–450)
POTASSIUM SERPL-SCNC: 3.5 MMOL/L (ref 3.4–5.3)
RBC # BLD AUTO: 3.61 10E6/UL (ref 3.8–5.2)
SODIUM SERPL-SCNC: 138 MMOL/L (ref 136–145)
WBC # BLD AUTO: 7.6 10E3/UL (ref 4–11)

## 2023-07-20 PROCEDURE — 83735 ASSAY OF MAGNESIUM: CPT | Performed by: SURGERY

## 2023-07-20 PROCEDURE — 80048 BASIC METABOLIC PNL TOTAL CA: CPT | Performed by: SURGERY

## 2023-07-20 PROCEDURE — 96372 THER/PROPH/DIAG INJ SC/IM: CPT | Performed by: SURGERY

## 2023-07-20 PROCEDURE — 250N000013 HC RX MED GY IP 250 OP 250 PS 637: Performed by: SURGERY

## 2023-07-20 PROCEDURE — 84100 ASSAY OF PHOSPHORUS: CPT | Performed by: SURGERY

## 2023-07-20 PROCEDURE — 96376 TX/PRO/DX INJ SAME DRUG ADON: CPT

## 2023-07-20 PROCEDURE — 85027 COMPLETE CBC AUTOMATED: CPT | Performed by: SURGERY

## 2023-07-20 PROCEDURE — 258N000003 HC RX IP 258 OP 636: Performed by: SURGERY

## 2023-07-20 PROCEDURE — 36415 COLL VENOUS BLD VENIPUNCTURE: CPT | Performed by: SURGERY

## 2023-07-20 PROCEDURE — 250N000011 HC RX IP 250 OP 636: Mod: JZ | Performed by: SURGERY

## 2023-07-20 RX ADMIN — SODIUM CHLORIDE, POTASSIUM CHLORIDE, SODIUM LACTATE AND CALCIUM CHLORIDE: 600; 310; 30; 20 INJECTION, SOLUTION INTRAVENOUS at 09:21

## 2023-07-20 RX ADMIN — ACETAMINOPHEN 975 MG: 325 TABLET, FILM COATED ORAL at 21:50

## 2023-07-20 RX ADMIN — HYDROCODONE BITARTRATE AND ACETAMINOPHEN 10 ML: 7.5; 325 SOLUTION ORAL at 09:18

## 2023-07-20 RX ADMIN — DIPHENHYDRAMINE HYDROCHLORIDE 25 MG: 25 CAPSULE ORAL at 01:58

## 2023-07-20 RX ADMIN — SODIUM CHLORIDE, POTASSIUM CHLORIDE, SODIUM LACTATE AND CALCIUM CHLORIDE: 600; 310; 30; 20 INJECTION, SOLUTION INTRAVENOUS at 01:56

## 2023-07-20 RX ADMIN — ONDANSETRON 4 MG: 2 INJECTION INTRAMUSCULAR; INTRAVENOUS at 00:46

## 2023-07-20 RX ADMIN — ENOXAPARIN SODIUM 40 MG: 40 INJECTION SUBCUTANEOUS at 07:12

## 2023-07-20 RX ADMIN — LISINOPRIL 10 MG: 10 TABLET ORAL at 09:17

## 2023-07-20 RX ADMIN — KETOROLAC TROMETHAMINE 15 MG: 15 INJECTION, SOLUTION INTRAMUSCULAR; INTRAVENOUS at 06:43

## 2023-07-20 RX ADMIN — ACETAMINOPHEN 975 MG: 325 TABLET, FILM COATED ORAL at 14:21

## 2023-07-20 RX ADMIN — HYDROMORPHONE HYDROCHLORIDE 0.2 MG: 0.2 INJECTION, SOLUTION INTRAMUSCULAR; INTRAVENOUS; SUBCUTANEOUS at 08:17

## 2023-07-20 RX ADMIN — KETOROLAC TROMETHAMINE 15 MG: 15 INJECTION, SOLUTION INTRAMUSCULAR; INTRAVENOUS at 00:46

## 2023-07-20 RX ADMIN — ONDANSETRON 4 MG: 2 INJECTION INTRAMUSCULAR; INTRAVENOUS at 12:35

## 2023-07-20 RX ADMIN — Medication 1 ML: at 10:12

## 2023-07-20 RX ADMIN — DIPHENHYDRAMINE HYDROCHLORIDE 25 MG: 25 CAPSULE ORAL at 21:55

## 2023-07-20 RX ADMIN — ONDANSETRON 4 MG: 2 INJECTION INTRAMUSCULAR; INTRAVENOUS at 18:14

## 2023-07-20 RX ADMIN — ONDANSETRON 4 MG: 2 INJECTION INTRAMUSCULAR; INTRAVENOUS at 05:51

## 2023-07-20 RX ADMIN — ACETAMINOPHEN 975 MG: 325 TABLET, FILM COATED ORAL at 05:52

## 2023-07-20 ASSESSMENT — ACTIVITIES OF DAILY LIVING (ADL)
ADLS_ACUITY_SCORE: 35
ADLS_ACUITY_SCORE: 35
ADLS_ACUITY_SCORE: 31
ADLS_ACUITY_SCORE: 35
ADLS_ACUITY_SCORE: 35
ADLS_ACUITY_SCORE: 37
ADLS_ACUITY_SCORE: 31
ADLS_ACUITY_SCORE: 33
ADLS_ACUITY_SCORE: 31
ADLS_ACUITY_SCORE: 35

## 2023-07-20 NOTE — UTILIZATION REVIEW
Continued stay Outpatient    Concurrent stay review; Secondary Review Determination         Under the authority of the Utilization Management Committee, the utilization review process indicated a secondary review on the above patient.  The review outcome is based on review of the medical records, discussions with staff, and applying clinical experience noted on the date of the review.          (x) Outpatient Status Appropriate - Concurrent stay review    RATIONALE FOR DETERMINATION    82-year-old female was admitted to [facility] after she underwent laparoscopic Nissen fundoplication and laparoscopic lysis of adhesions on 7/19/2023.  Patient appears to be improving, diet has been advanced to a soft diet.  Patient was admitted to the hospital after the procedure. Procedure is not on the CMS inpatient list. No documented complications or unexpected recovery. Patient can be safely  monitored for bleeding and recover in outpatient/extended recovery setting      Patient is clinically improving and there is no clear indication to change patient's status to inpatient. The severity of illness, intensity of service provided, expected LOS and risk for adverse outcome make the care appropriate for outpatient.      This document was produced using voice recognition software       The information on this document is developed by the utilization review team in order for the business office to ensure compliance.  This only denotes the appropriateness of proper admission status and does not reflect the quality of care rendered.         The definitions of Inpatient Status and Observation Status used in making the determination above are those provided in the CMS Coverage Manual, Chapter 1 and Chapter 6, section 70.4.      Sincerely,     Sonny Kendrick MD    Utilization Review  Physician Advisor  [facility].

## 2023-07-20 NOTE — PLAN OF CARE
Goal Outcome Evaluation:      Plan of Care Reviewed With: patient    Overall Patient Progress: improvingOverall Patient Progress: improving    Vitals are stable, Afebrile. Pain is minimal, no complaints of nausea. Tolerating fluids and mech soft diet. No bowel movement today. Walking halls and up to recliner for meals.

## 2023-07-20 NOTE — PROGRESS NOTES
GENERAL SURGERY PROGRESS NOTE  7/20/2023      Interval history:   No acute events overnight.  Pain is moderately well controlled with IV Dilaudid and oral hydrocodone.  Describes pain as across the epigastrium.  Patient denies nausea or vomiting.  Patient is tolerating clear liquids.  Patient is ambulating about the room with assistance.  Voiding per self.  Passing gas but no bowel movement since prior to surgery.  Denies reflux.    Physical Exam:   Vital signs are reviewed and there are no significant abnormalities.     UOP over past 24 hours is 600 mL    General: Laying in bed, appears comfortable  Abdomen: Abdomen is soft, mildly distended.  Mild tenderness to palpation in the upper abdomen, appropriate tenderness about the incisions.  Incisions x5 are clean, dry, intact.  No surrounding erythema, induration or ecchymosis.  MSK: Normal bulk and tone, lower extremity edema  Neuro: Alert and oriented, normal speech and mentation    Labs are reviewed and are significant for sodium 138, potassium 3.5, creatinine 0.73, WBC 7.6, hemoglobin 10.6, platelet 189    No new imaging       Assessment / Plan:   Maliha Rivera is a 82 year old female who is postop day 1 from laparoscopic Nissen fundoplication with repair of hiatal hernia and laparoscopic lysis of adhesions.  Patient is hemodynamically stable and doing well.  I believe the majority of the patient's discomfort is due to gas bloat and possibly residual intraperitoneal gas.  Patient seems to be recovering as expected at this point.    Okay to advance to soft diet  Saline lock IV fluids  Ambulate with assistance  SCDs and Lovenox for DVT prophylaxis  Possibly home later today if feeling well this afternoon, likely stay till tomorrow.          JIMBO Martin MD   7/20/2023

## 2023-07-20 NOTE — PROGRESS NOTES
SAFETY CHECKLIST  ID Bands and Risk clasps correct and in place (DNR, Fall risk, Allergy, Latex, Limb):  Yes  All Lines Reconciled and labeled correctly: Yes  Whiteboard updated:Yes  Environmental interventions: Yes   Verify Tele #: N/A     Deidra Cunningham RN on 7/19/2023 at 7:49 PM

## 2023-07-20 NOTE — PLAN OF CARE
Goal Outcome Evaluation:      Plan of Care Reviewed With: patient    Overall Patient Progress: no change    Pt was resting well this evening. Education provided on splinting her stomach during movement to help with the pain. Pt utilized cold packs and was provided with IV Dilaudid, pt stated that her pain was ok when she was resting and more bothersome with movement. Discussed IS and pt stated she understood the teaching and attempted to complete as many as she could.  Pt had a hard time falling asleep, benadryl was given to help. Deidra Cunningham RN on 7/20/2023 at 4:48 AM

## 2023-07-21 VITALS
TEMPERATURE: 97.9 F | WEIGHT: 166.89 LBS | BODY MASS INDEX: 30.52 KG/M2 | HEART RATE: 66 BPM | DIASTOLIC BLOOD PRESSURE: 62 MMHG | SYSTOLIC BLOOD PRESSURE: 130 MMHG | RESPIRATION RATE: 20 BRPM | OXYGEN SATURATION: 96 %

## 2023-07-21 PROCEDURE — 96376 TX/PRO/DX INJ SAME DRUG ADON: CPT

## 2023-07-21 PROCEDURE — 250N000011 HC RX IP 250 OP 636: Mod: JZ | Performed by: SURGERY

## 2023-07-21 PROCEDURE — 250N000013 HC RX MED GY IP 250 OP 250 PS 637: Performed by: SURGERY

## 2023-07-21 RX ORDER — ONDANSETRON 4 MG/1
4 TABLET, ORALLY DISINTEGRATING ORAL EVERY 8 HOURS PRN
Qty: 20 TABLET | Refills: 0 | Status: SHIPPED | OUTPATIENT
Start: 2023-07-21 | End: 2023-09-27

## 2023-07-21 RX ORDER — HYDROCODONE BITARTRATE AND ACETAMINOPHEN 7.5; 325 MG/15ML; MG/15ML
10 SOLUTION ORAL EVERY 4 HOURS PRN
Qty: 118 ML | Refills: 0 | Status: SHIPPED | OUTPATIENT
Start: 2023-07-21 | End: 2023-09-27

## 2023-07-21 RX ADMIN — ONDANSETRON 4 MG: 2 INJECTION INTRAMUSCULAR; INTRAVENOUS at 00:45

## 2023-07-21 RX ADMIN — ACETAMINOPHEN 975 MG: 325 TABLET, FILM COATED ORAL at 05:44

## 2023-07-21 RX ADMIN — LISINOPRIL 10 MG: 10 TABLET ORAL at 09:29

## 2023-07-21 RX ADMIN — ONDANSETRON 4 MG: 2 INJECTION INTRAMUSCULAR; INTRAVENOUS at 05:44

## 2023-07-21 RX ADMIN — HYDROCODONE BITARTRATE AND ACETAMINOPHEN 10 ML: 7.5; 325 SOLUTION ORAL at 09:55

## 2023-07-21 ASSESSMENT — ACTIVITIES OF DAILY LIVING (ADL)
ADLS_ACUITY_SCORE: 33

## 2023-07-21 NOTE — PROGRESS NOTES
SAFETY CHECKLIST  ID Bands and Risk clasps correct and in place (DNR, Fall risk, Allergy, Latex, Limb):  Yes  All Lines Reconciled and labeled correctly: Yes  Whiteboard updated:Yes  Environmental interventions: Yes  Verify Tele #: VITOR Cunningham RN on 7/20/2023 at 7:26 PM

## 2023-07-21 NOTE — PROGRESS NOTES
"  PRIMARY DIAGNOSIS: \"GENERIC\" NURSING  OUTPATIENT/OBSERVATION GOALS TO BE MET BEFORE DISCHARGE:  1. ADLs back to baseline: Yes    2. Activity and level of assistance: Up with standby assistance.    3. Pain status: Pain free.    4. Return to near baseline physical activity: Yes     Discharge Planner Nurse   Safe discharge environment identified: Yes  Barriers to discharge: No       Entered by: Deidra Cunningham RN 07/21/2023 4:52 AM   Pt doing well and excited to go home today. Stated pain has been well controled today.   Please review provider order for any additional goals.   Nurse to notify provider when observation goals have been met and patient is ready for discharge.  "

## 2023-07-21 NOTE — PHARMACY - DISCHARGE MEDICATION RECONCILIATION AND EDUCATION
Pharmacy:  Discharge Counseling and Medication Reconciliation    Malihalesli Rivera  [id number]  [facility] [address]  [phone] (home)   82 year old female  PCP: Nicanor Mike    Allergies: Atorvastatin and Cefazolin    Discharge Counseling:    Pharmacist met with patient (and/or family) today to review the medication portion of the After Visit Summary (with an emphasis on NEW medications) and to address patient's questions/concerns.    Summary of Education: Met with patient to discuss new medications: Norco liquid, acetaminophen liquid and ondansetron ODT    Materials Provided:  MedCounselor sheets printed from Clinical Pharmacology on: hydrocodone/apap, ondansetron    Discharge Medication Reconciliation:    It has been determined that the patient has an adequate supply of medications available or which can be obtained from the patient's preferred pharmacy, which HE/SHE has confirmed as: [facility]    Thank you for the consult.    Anjelica Mathew RPH........July 21, 2023 10:37 AM

## 2023-07-21 NOTE — PROGRESS NOTES
GENERAL SURGERY PROGRESS NOTE  7/21/2023      Interval history:   No acute events overnight. Tolerating soft diet, denies reflux or regurgitation. Ambulating independently. Pain is well controlled.    Physical Exam:   Vital signs are reviewed and there are no significant abnormalities.     UOP over past 24 hours is 2100 mL  BMx1    General: sitting up in the chair, appears comfortable  Abdomen: Abdomen is soft, mildly distended - improved.  Mild tenderness to palpation in the upper abdomen, appropriate tenderness about the incisions.  Incisions x5 are clean, dry, intact.  No surrounding erythema, induration or ecchymosis.  MSK: Normal bulk and tone, lower extremity edema  Neuro: Alert and oriented, normal speech and mentation    No new labs     No new imaging       Assessment / Plan:   Maliha Rivera is a 82 year old female who is postop day 2 from laparoscopic Nissen fundoplication with repair of hiatal hernia and laparoscopic lysis of adhesions.  Patient is hemodynamically stable and doing well.     Discharge to home   Continue soft diet until follow up         JIMBO Martin MD   7/21/2023

## 2023-07-21 NOTE — PLAN OF CARE
VSS and recorded. Patient denied pain this am, but did request pain medication to use prophylactic for transport. Tenderness present with movement. Incisions are well approximated with surgical glue in place, no drainage. Tolerated soft diet  for breakfast and eager to discharge.

## 2023-07-21 NOTE — PROGRESS NOTES
"PRIMARY DIAGNOSIS: \"GENERIC\" NURSING  OUTPATIENT/OBSERVATION GOALS TO BE MET BEFORE DISCHARGE:  1. ADLs back to baseline: Yes    2. Activity and level of assistance: Up with standby assistance.    3. Pain status: Pain free.    4. Return to near baseline physical activity: Yes     Discharge Planner Nurse   Safe discharge environment identified: Yes  Barriers to discharge: Yes       Entered by: Deidra Cunningham RN 07/21/2023 4:55 AM     Please review provider order for any additional goals.   Nurse to notify provider when observation goals have been met and patient is ready for discharge.  "

## 2023-07-24 ENCOUNTER — NURSE TRIAGE (OUTPATIENT)
Dept: SURGERY | Facility: OTHER | Age: 83
End: 2023-07-24
Payer: COMMERCIAL

## 2023-07-24 ENCOUNTER — PATIENT OUTREACH (OUTPATIENT)
Dept: FAMILY MEDICINE | Facility: OTHER | Age: 83
End: 2023-07-24
Payer: COMMERCIAL

## 2023-07-24 NOTE — TELEPHONE ENCOUNTER
Surgical. Patient discharged with surgical follow-up only. No TCM call required per policy. Mindy Orourke RN on 7/24/2023 at 8:03 AM

## 2023-07-24 NOTE — TELEPHONE ENCOUNTER
Attempting to reach patient to follow up. Unable to leave a message. Patient called back and was able to triage the patient.     S-(situation): Incision site    B-(background): Patient had Fundoplication on 7/19/2023.    A-(assessment): Patient calm. Questions on what is normal. Reports no redness, no increase in pain, no fevers. Reports discomfort is about the same as it has been with no increase in intensity. Clear/yellow crusty drainage at the incision.     R-(recommendations): Recommended that the patient monitor the site of infection. Education provided for things to watch for and when to come in to be seen. Discussed AVS. Patient will call back or come in to be seen if signs of infection develop.         Reason for Disposition   Mild bruising near incision site   Sutured or stapled surgical incision, questions about    Additional Information   Negative: Sounds like a life-threatening emergency to the triager   Negative: Major abdominal surgical incision and wound gaping open with visible internal organs   Negative: Bleeding from incision and won't stop after 10 minutes of direct pressure   Negative: Bleeding (more than a few drops) from incision and after blood vessel surgery (e.g., carotidendarterectomy, femoral bypass graft, kidney dialysis fistula, tracheostomy)   Negative: Bright red, wide-spread, sunburn-like rash   Negative: SEVERE pain in the incision   Negative: Incision gaping open and < 2 days (48 hours) since wound re-opened   Negative: Incision gaping open and length of opening > 2 inches (5 cm)   Negative: Patient sounds very sick or weak to the triager   Negative: Sounds like a serious complication to the triager   Negative: Fever > 100.4 F (38.0 C)   Negative: Incision looks infected (spreading redness, pain)   Negative: Red streak runs from the incision and longer than 1 inch (2.5 cm)   Negative: Pus or bad-smelling fluid draining from incision   Negative: Dressing soaked with blood or body  fluid (e.g., drainage)   Negative: Raised bruise and size > 2 inches (5 cm) and expanding   Negative: Scant bleeding (e.g., few drops) from incision and after blood vessel surgery (e.g., carotid endarterectomy, femoral bypass graft, kidney dialysis fistula   Negative: Caller has URGENT question and triager unable to answer question   Negative: Incision gaping open and length of opening > 1/4 inch (6 mm) and on the face and over 2 days since wound re-opened   Negative: Incision gaping open and length of opening > 1/2 inch (1 cm) and over 2 days since wound re-opened   Negative: Suture or staple removal is overdue   Negative: Patient wants to be seen   Negative: INCREASING pain in incision and over 2 days (48 hours) since surgery   Negative: Suture or staple came out early and caller wants wound checked   Negative: Caller has NON-URGENT question and triager unable to answer question   Negative: Suture or staple came out early   Negative: Pimple where a stitch comes through the skin   Negative: Suture removal date, questions about   Negative: Skin tape (e.g., Steri-Strips) removal, questions about   Negative: Surgical incision after sutures or staples removed, questions about    Protocols used: Post-Op Incision Symptoms and [id number]    Coni Alston RN  ....................  7/24/2023   10:54 AM

## 2023-07-24 NOTE — TELEPHONE ENCOUNTER
Patient incision is yellow and sore, looks puffy.   Post op is on the 31st.   Please call back     Thank you   Danyell Hamilton on 7/24/2023 at 10:09 AM

## 2023-07-31 ENCOUNTER — OFFICE VISIT (OUTPATIENT)
Dept: SURGERY | Facility: OTHER | Age: 83
End: 2023-07-31
Attending: SURGERY
Payer: MEDICARE

## 2023-07-31 VITALS
BODY MASS INDEX: 29.3 KG/M2 | WEIGHT: 160.2 LBS | DIASTOLIC BLOOD PRESSURE: 84 MMHG | RESPIRATION RATE: 17 BRPM | HEART RATE: 77 BPM | OXYGEN SATURATION: 99 % | SYSTOLIC BLOOD PRESSURE: 128 MMHG | TEMPERATURE: 96.8 F

## 2023-07-31 DIAGNOSIS — Z98.890 POSTOPERATIVE STATE: Primary | ICD-10-CM

## 2023-07-31 PROCEDURE — 99024 POSTOP FOLLOW-UP VISIT: CPT | Performed by: SURGERY

## 2023-07-31 PROCEDURE — G0463 HOSPITAL OUTPT CLINIC VISIT: HCPCS

## 2023-07-31 ASSESSMENT — PAIN SCALES - GENERAL: PAINLEVEL: MODERATE PAIN (4)

## 2023-07-31 NOTE — CONFIDENTIAL NOTE
"Chief Complaint   Patient presents with    Surgical Followup       Initial /84 (BP Location: Right arm, Patient Position: Sitting)   Pulse 77   Temp 96.8  F (36  C) (Tympanic)   Resp 17   Wt 72.7 kg (160 lb 3.2 oz)   LMP  (LMP Unknown)   SpO2 99%   BMI 29.30 kg/m   Estimated body mass index is 29.3 kg/m  as calculated from the following:    Height as of 7/17/23: 1.575 m (5' 2\").    Weight as of this encounter: 72.7 kg (160 lb 3.2 oz).  Medication Reconciliation: complete    Marian Ocampo RN   "

## 2023-07-31 NOTE — PROGRESS NOTES
Maliha Rivera presents to clinic for follow-up after undergoing laparoscopic repair of hiatal hernia and Nissen fundoplication.  Patient states she is doing well.  Denies nausea or vomiting.  Has burped a few times and has had 1 or 2 episodes of painful bloating.  Notes the middle epigastric incision remains sore with activity.  She states she has noticed some increased flatus.  She is tolerating soft foods and has tolerated toast so far.  Denies reflux and she is not taking any reflux medications.      /84 (BP Location: Right arm, Patient Position: Sitting)   Pulse 77   Temp 96.8  F (36  C) (Tympanic)   Resp 17   Wt 72.7 kg (160 lb 3.2 oz)   LMP  (LMP Unknown)   SpO2 99%   BMI 29.30 kg/m        Patient is sitting up in the chair, appears comfortable  Abdomen is soft, nontender, nondistended incisions x5 are clean, dry and intact with no surrounding erythema or induration.  No evidence of drainage.  Glue remains on most of the incisions.      Maliha Rivera is a 82-year-old female status post laparoscopic repair of hiatal hernia and Nissen fundoplication.  Patient is recovering as expected and there are no apparent surgical complications.  Patient is encouraged to now slowly work back into normal diet and continue light duty, no heavy lifting for an additional 2 weeks before working slowly back into normal activities.  No further follow-up will be scheduled that she feels well.  She is counseled to call the clinic with concerns.      Apolinar Martin MD

## 2023-09-21 ENCOUNTER — TELEPHONE (OUTPATIENT)
Dept: SURGERY | Facility: OTHER | Age: 83
End: 2023-09-21
Payer: COMMERCIAL

## 2023-09-21 NOTE — TELEPHONE ENCOUNTER
Incoming call from patient. She had laparoscopic repair of hiatal hernia and Nissen fundoplication done on 7/19/23.    She has been experiencing right sided epigastric pain after eating. She also has a BM 15 minutes after eating- new since surgery.   She also notes her stool can sometimes be dark black-light brown. She states this has been going on since surgery as well.  She does not have any pain elsewhere.   No fevers.   Put in schedule for Monday for a follow up with Dr. Martin.   Tracy Isbell RN on 9/21/2023 at 2:35 PM

## 2023-09-21 NOTE — TELEPHONE ENCOUNTER
Patient called and stated she had stomach surgery at the end of August. Patient stated she has pain after eating for two hours and it comes up to her right breast. Patient stated  food goes right through her and the stools are dark.

## 2023-09-25 ENCOUNTER — TELEPHONE (OUTPATIENT)
Dept: SURGERY | Facility: OTHER | Age: 83
End: 2023-09-25

## 2023-09-25 ENCOUNTER — OFFICE VISIT (OUTPATIENT)
Dept: SURGERY | Facility: OTHER | Age: 83
End: 2023-09-25
Attending: SURGERY
Payer: MEDICARE

## 2023-09-25 VITALS
HEIGHT: 62 IN | RESPIRATION RATE: 17 BRPM | HEART RATE: 65 BPM | SYSTOLIC BLOOD PRESSURE: 130 MMHG | WEIGHT: 156 LBS | DIASTOLIC BLOOD PRESSURE: 72 MMHG | TEMPERATURE: 97.5 F | OXYGEN SATURATION: 100 % | BODY MASS INDEX: 28.71 KG/M2

## 2023-09-25 DIAGNOSIS — G89.18 POSTOPERATIVE PAIN: Primary | ICD-10-CM

## 2023-09-25 DIAGNOSIS — K29.70 GASTRITIS, PRESENCE OF BLEEDING UNSPECIFIED, UNSPECIFIED CHRONICITY, UNSPECIFIED GASTRITIS TYPE: ICD-10-CM

## 2023-09-25 PROCEDURE — 99024 POSTOP FOLLOW-UP VISIT: CPT | Performed by: SURGERY

## 2023-09-25 PROCEDURE — G0463 HOSPITAL OUTPT CLINIC VISIT: HCPCS

## 2023-09-25 RX ORDER — SODIUM CHLORIDE, SODIUM LACTATE, POTASSIUM CHLORIDE, CALCIUM CHLORIDE 600; 310; 30; 20 MG/100ML; MG/100ML; MG/100ML; MG/100ML
INJECTION, SOLUTION INTRAVENOUS CONTINUOUS
Status: CANCELLED | OUTPATIENT
Start: 2023-09-25

## 2023-09-25 RX ORDER — OMEPRAZOLE 40 MG/1
40 CAPSULE, DELAYED RELEASE ORAL DAILY
Qty: 30 CAPSULE | Refills: 3 | Status: SHIPPED | OUTPATIENT
Start: 2023-09-25 | End: 2024-02-07

## 2023-09-25 RX ORDER — LIDOCAINE 40 MG/G
CREAM TOPICAL
Status: CANCELLED | OUTPATIENT
Start: 2023-09-25

## 2023-09-25 ASSESSMENT — PATIENT HEALTH QUESTIONNAIRE - PHQ9
SUM OF ALL RESPONSES TO PHQ QUESTIONS 1-9: 7
SUM OF ALL RESPONSES TO PHQ QUESTIONS 1-9: 7
10. IF YOU CHECKED OFF ANY PROBLEMS, HOW DIFFICULT HAVE THESE PROBLEMS MADE IT FOR YOU TO DO YOUR WORK, TAKE CARE OF THINGS AT HOME, OR GET ALONG WITH OTHER PEOPLE: NOT DIFFICULT AT ALL

## 2023-09-25 ASSESSMENT — PAIN SCALES - GENERAL: PAINLEVEL: MILD PAIN (3)

## 2023-09-25 NOTE — NURSING NOTE
"Chief Complaint   Patient presents with    Surgical Followup     Nissen       Initial /72 (BP Location: Right arm, Patient Position: Sitting, Cuff Size: Adult Large)   Pulse 65   Temp 97.5  F (36.4  C) (Tympanic)   Resp 17   Ht 1.575 m (5' 2\")   Wt 70.8 kg (156 lb)   LMP  (LMP Unknown)   SpO2 100%   BMI 28.53 kg/m   Estimated body mass index is 28.53 kg/m  as calculated from the following:    Height as of this encounter: 1.575 m (5' 2\").    Weight as of this encounter: 70.8 kg (156 lb).  Meds Reconciled: complete      Coni Alston RN      "

## 2023-09-25 NOTE — PROGRESS NOTES
"Anita Rivera presents to clinic for follow-up after undergoing laparoscopic repair of hiatal hernia and Nissen fundoplication on 7/19/2023.  Patient states she was doing well for approximately a month after surgery and she began having some discomfort after eating.  She now notes that with just about everything she eats she gets a sharp epigastric pain that will radiate to her left chest.  She denies bloating, nausea or vomiting.  She states that associated with the pain is the urgency to have a bowel movement shortly after eating.  Bowel movements are essentially normal in consistency but she has noticed some dark, almost black stools.  Patient has had her gallbladder removed.      /72 (BP Location: Right arm, Patient Position: Sitting, Cuff Size: Adult Large)   Pulse 65   Temp 97.5  F (36.4  C) (Tympanic)   Resp 17   Ht 1.575 m (5' 2\")   Wt 70.8 kg (156 lb)   LMP  (LMP Unknown)   SpO2 100%   BMI 28.53 kg/m        Patient is sitting up in the chair, appears comfortable  Abdomen is soft, nontender, nondistended incisions x5 are clean, dry and healed.       Maliha Rivera is a 82-year-old female status post laparoscopic repair of hiatal hernia and Nissen fundoplication.  No abdominal symptoms are concerning and definitely better more work-up.  Concern for recurrence of hiatal hernia, slipped fundoplication and peptic ulcer disease.  We will plan to start work-up with upper endoscopy and upper GI study with oral contrast.  We will also restart the patient on PPI for possible peptic ulcer disease.      Apolinar Martin MD   "

## 2023-09-25 NOTE — TELEPHONE ENCOUNTER
Screening Questions for the Scheduling of Screening Colonoscopies   (If Colonoscopy is diagnostic, Provider should review the chart before scheduling.)  Are you younger than 50 or older than 80?  YES  Do you take aspirin or fish oil?  NO (if yes, tell patient to stop 1 week prior to Colonoscopy)  Do you take warfarin (Coumadin), clopidogrel (Plavix), apixaban (Eliquis), dabigatram (Pradaxa), rivaroxaban (Xarelto) or any blood thinner? NO  Do you use oxygen at home?  NO  Do you have kidney disease? NO  Are you on dialysis? NO  Have you had a stroke or heart attack in the last year? NO  Have you had a stent in your heart or any blood vessel in the last year? NO  Have you had a transplant of any organ? NO  Have you had a colonoscopy or upper endoscopy (EGD) before? YES         When?  UNSURE OF DATE  Date of scheduled EGD. 10/04/2023  Provider [facility]  Pharmacy [facility]

## 2023-10-04 ENCOUNTER — ANESTHESIA (OUTPATIENT)
Dept: SURGERY | Facility: OTHER | Age: 83
End: 2023-10-04
Payer: MEDICARE

## 2023-10-04 ENCOUNTER — ANESTHESIA EVENT (OUTPATIENT)
Dept: SURGERY | Facility: OTHER | Age: 83
End: 2023-10-04
Payer: MEDICARE

## 2023-10-04 ENCOUNTER — HOSPITAL ENCOUNTER (OUTPATIENT)
Facility: OTHER | Age: 83
Discharge: HOME | End: 2023-10-04
Attending: SURGERY | Admitting: SURGERY
Payer: MEDICARE

## 2023-10-04 VITALS
SYSTOLIC BLOOD PRESSURE: 164 MMHG | RESPIRATION RATE: 12 BRPM | HEIGHT: 62 IN | BODY MASS INDEX: 28.71 KG/M2 | HEART RATE: 72 BPM | OXYGEN SATURATION: 99 % | TEMPERATURE: 97.6 F | WEIGHT: 156 LBS | DIASTOLIC BLOOD PRESSURE: 86 MMHG

## 2023-10-04 DIAGNOSIS — G89.18 POSTOPERATIVE PAIN: ICD-10-CM

## 2023-10-04 PROCEDURE — 250N000009 HC RX 250: Performed by: NURSE ANESTHETIST, CERTIFIED REGISTERED

## 2023-10-04 PROCEDURE — 88305 TISSUE EXAM BY PATHOLOGIST: CPT

## 2023-10-04 PROCEDURE — 250N000009 HC RX 250: Performed by: SURGERY

## 2023-10-04 PROCEDURE — 43239 EGD BIOPSY SINGLE/MULTIPLE: CPT | Performed by: SURGERY

## 2023-10-04 PROCEDURE — 250N000011 HC RX IP 250 OP 636: Performed by: NURSE ANESTHETIST, CERTIFIED REGISTERED

## 2023-10-04 PROCEDURE — 99100 ANES PT EXTEME AGE<1 YR&>70: CPT | Performed by: NURSE ANESTHETIST, CERTIFIED REGISTERED

## 2023-10-04 PROCEDURE — 999N000010 HC STATISTIC ANES STAT CODE-CRNA PER MINUTE: Performed by: SURGERY

## 2023-10-04 PROCEDURE — 43239 EGD BIOPSY SINGLE/MULTIPLE: CPT | Performed by: NURSE ANESTHETIST, CERTIFIED REGISTERED

## 2023-10-04 PROCEDURE — 258N000003 HC RX IP 258 OP 636: Performed by: SURGERY

## 2023-10-04 RX ORDER — LISINOPRIL 10 MG/1
10 TABLET ORAL DAILY
COMMUNITY
End: 2024-02-07

## 2023-10-04 RX ORDER — NALOXONE HYDROCHLORIDE 0.4 MG/ML
0.4 INJECTION, SOLUTION INTRAMUSCULAR; INTRAVENOUS; SUBCUTANEOUS
Status: DISCONTINUED | OUTPATIENT
Start: 2023-10-04 | End: 2023-10-04 | Stop reason: HOSPADM

## 2023-10-04 RX ORDER — PROPOFOL 10 MG/ML
INJECTION, EMULSION INTRAVENOUS PRN
Status: DISCONTINUED | OUTPATIENT
Start: 2023-10-04 | End: 2023-10-04

## 2023-10-04 RX ORDER — NALOXONE HYDROCHLORIDE 0.4 MG/ML
0.2 INJECTION, SOLUTION INTRAMUSCULAR; INTRAVENOUS; SUBCUTANEOUS
Status: DISCONTINUED | OUTPATIENT
Start: 2023-10-04 | End: 2023-10-04 | Stop reason: HOSPADM

## 2023-10-04 RX ORDER — LIDOCAINE HYDROCHLORIDE 20 MG/ML
INJECTION, SOLUTION INFILTRATION; PERINEURAL PRN
Status: DISCONTINUED | OUTPATIENT
Start: 2023-10-04 | End: 2023-10-04

## 2023-10-04 RX ORDER — PROPOFOL 10 MG/ML
INJECTION, EMULSION INTRAVENOUS CONTINUOUS PRN
Status: DISCONTINUED | OUTPATIENT
Start: 2023-10-04 | End: 2023-10-04

## 2023-10-04 RX ORDER — SODIUM CHLORIDE, SODIUM LACTATE, POTASSIUM CHLORIDE, CALCIUM CHLORIDE 600; 310; 30; 20 MG/100ML; MG/100ML; MG/100ML; MG/100ML
INJECTION, SOLUTION INTRAVENOUS CONTINUOUS
Status: DISCONTINUED | OUTPATIENT
Start: 2023-10-04 | End: 2023-10-04 | Stop reason: HOSPADM

## 2023-10-04 RX ORDER — LIDOCAINE 40 MG/G
CREAM TOPICAL
Status: DISCONTINUED | OUTPATIENT
Start: 2023-10-04 | End: 2023-10-04 | Stop reason: HOSPADM

## 2023-10-04 RX ORDER — FLUMAZENIL 0.1 MG/ML
0.2 INJECTION, SOLUTION INTRAVENOUS
Status: DISCONTINUED | OUTPATIENT
Start: 2023-10-04 | End: 2023-10-04 | Stop reason: HOSPADM

## 2023-10-04 RX ADMIN — LIDOCAINE HYDROCHLORIDE 0.1 ML: 10 INJECTION, SOLUTION EPIDURAL; INFILTRATION; INTRACAUDAL; PERINEURAL at 07:59

## 2023-10-04 RX ADMIN — LIDOCAINE HYDROCHLORIDE 60 MG: 20 INJECTION, SOLUTION INFILTRATION; PERINEURAL at 08:29

## 2023-10-04 RX ADMIN — PROPOFOL 160 MCG/KG/MIN: 10 INJECTION, EMULSION INTRAVENOUS at 08:29

## 2023-10-04 RX ADMIN — PROPOFOL 60 MG: 10 INJECTION, EMULSION INTRAVENOUS at 08:29

## 2023-10-04 RX ADMIN — SODIUM CHLORIDE, POTASSIUM CHLORIDE, SODIUM LACTATE AND CALCIUM CHLORIDE 30 ML/HR: 600; 310; 30; 20 INJECTION, SOLUTION INTRAVENOUS at 07:59

## 2023-10-04 ASSESSMENT — ACTIVITIES OF DAILY LIVING (ADL): ADLS_ACUITY_SCORE: 35

## 2023-10-04 NOTE — ANESTHESIA CARE TRANSFER NOTE
Patient: Maliha Rivera    Procedure: Procedure(s):  ESOPHAGOGASTRODUODENOSCOPY, WITH BIOPSY       Diagnosis: Postoperative pain [G89.18]  Diagnosis Additional Information: No value filed.    Anesthesia Type:   MAC     Note:    Oropharynx: oropharynx clear of all foreign objects  Level of Consciousness: awake  Oxygen Supplementation: nasal cannula  Level of Supplemental Oxygen (L/min / FiO2): 2  Independent Airway: airway patency satisfactory and stable  Dentition: dentition unchanged  Vital Signs Stable: post-procedure vital signs reviewed and stable  Report to RN Given: handoff report given  Patient transferred to: Phase II    Handoff Report: Identifed the Patient, Identified the Reponsible Provider, Reviewed the pertinent medical history, Discussed the surgical course, Reviewed Intra-OP anesthesia mangement and issues during anesthesia, Set expectations for post-procedure period and Allowed opportunity for questions and acknowledgement of understanding  Vitals:  Vitals Value Taken Time   BP     Temp     Pulse     Resp     SpO2         Electronically Signed By: FRANKO Toro CRNA  October 4, 2023  8:42 AM

## 2023-10-04 NOTE — ANESTHESIA PREPROCEDURE EVALUATION
Anesthesia Pre-Procedure Evaluation    Patient: Maliha Rivera   MRN: [id number] : 1940        Procedure : Procedure(s):  ESOPHAGOGASTRODUODENOSCOPY          Past Medical History:   Diagnosis Date    Encounter for screening for cardiovascular disorders     4/8/15,[facility] cardi[facility]      Past Surgical History:   Procedure Laterality Date    APPENDECTOMY OPEN      No Comments Provided    ARTHROSCOPY KNEE      ,Joseluis Thomas M.D.    COLONOSCOPY      ,,normal    ESOPHAGOSCOPY, GASTROSCOPY, DUODENOSCOPY (EGD), COMBINED      2/3/2016    ESOPHAGOSCOPY, GASTROSCOPY, DUODENOSCOPY (EGD), COMBINED N/A 3/28/2023    Procedure: Esophagoscopy, gastroscopy, duodenoscopy (EGD), combined;  Surgeon: Apolinar Martin MD;  Location:  OR    LAPAROSCOPIC CHOLECYSTECTOMY      3/11/01,Dr. Salas    LAPAROSCOPIC NISSEN FUNDOPLICATION N/A 2023    Procedure: FUNDOPLICATION, NISSEN, LAPAROSCOPIC WITH LYSIS OF ADHESIONS;  Surgeon: Apolinar Martin MD;  Location:  OR    LAPAROSCOPIC TUBAL LIGATION      No Comments Provided    lumbar decompression L4-5  2018    Dr. France    OTHER SURGICAL HISTORY      ,[id number],CATARACT EXTRACTION W/  INTRAOCULAR LENS IMPLANT,Bilateral    OTHER SURGICAL HISTORY      ,53058.0,ME ERCP BILIARY OR PANC DUCT STENT EXCHANGE W DIL AND WIRE,stent removed.    OTHER SURGICAL HISTORY      ,[id number],CARDIAC CATHETERIZATION,mild disease    OTHER SURGICAL HISTORY      [id number],OTHER,Left    OTHER SURGICAL HISTORY      2008,[id number],TOTAL KNEE ARTHROPLASTY,Right    OTHER SURGICAL HISTORY      ,[id number],TOTAL KNEE ARTHROPLASTY,Left,Dr. Carson    OTHER SURGICAL HISTORY      2017,[id number],OTHER,Left,Dr. Joiner, [facility]    RELEASE CARPAL TUNNEL      2006    Revision right total knee arthroplasty   2019    Dr. Carson.  Due to loose implant tibial component    TONSILLECTOMY      age 12      Allergies   Allergen Reactions     "Atorvastatin Muscle Pain (Myalgia)    Cefazolin Nausea and Vomiting     Tolerated 7/19/23 without N/V      Social History     Tobacco Use    Smoking status: Never     Passive exposure: Never    Smokeless tobacco: Never   Substance Use Topics    Alcohol use: No      Wt Readings from Last 1 Encounters:   10/04/23 70.8 kg (156 lb)        Anesthesia Evaluation   Pt has had prior anesthetic. Type: General and MAC.    No history of anesthetic complications       ROS/MED HX  ENT/Pulmonary:     (+)     GRADY risk factors,  hypertension, obese,                               Neurologic:  - neg neurologic ROS     Cardiovascular:     (+) Dyslipidemia hypertension- -   -  - -                                 Previous cardiac testing   Echo: Date: Results:    Stress Test:  Date: Results:    ECG Reviewed:  Date: Results:  58 Sinus bradycardia  Inferior infarct , age undetermined  Abnormal ECG  When compared with ECG of 13-JUL-2023 10:10,  Nonspecific inferior T-wave abnormality has normalized.  Confirmed by MD GERMÁN, ZENA ([id number]) on 7/18/2023 12:23:58 PM      Cath:  Date: Results:      METS/Exercise Tolerance: >4 METS    Hematologic:  - neg hematologic  ROS     Musculoskeletal: Comment: Patient with DDD, lumbar radiculopath, and Lumbar stenosis. Patient with great neck mobility and denies pain with movement.   (+)  arthritis,             GI/Hepatic:     (+) GERD, Asymptomatic on medication,                  Renal/Genitourinary:  - neg Renal ROS     Endo: Comment: Patient reports \"borderline diabetes\".    (+)  type II DM,   Not using insulin, - not using insulin pump.  not previously admitted for DM/DKA.              Psychiatric/Substance Use:  - neg psychiatric ROS     Infectious Disease:  - neg infectious disease ROS     Malignancy:  - neg malignancy ROS     Other:            Physical Exam    Airway        Mallampati: III   TM distance: > 3 FB   Neck ROM: full   Mouth opening: > 3 cm    Respiratory Devices and Support     "     Dental     Comment: Top denture plate in place.     (+) Modest Abnormalities - crowns, retainers, 1 or 2 missing teeth      Cardiovascular   cardiovascular exam normal       Rhythm and rate: regular and normal     Pulmonary   pulmonary exam normal        breath sounds clear to auscultation           OUTSIDE LABS:  CBC:   Lab Results   Component Value Date    WBC 7.6 07/20/2023    WBC 5.3 01/31/2023    HGB 10.6 (L) 07/20/2023    HGB 13.3 07/17/2023    HCT 30.7 (L) 07/20/2023    HCT 34.3 (L) 01/31/2023     07/20/2023     01/31/2023     BMP:   Lab Results   Component Value Date     07/20/2023     07/17/2023    POTASSIUM 3.5 07/20/2023    POTASSIUM 4.1 07/17/2023    CHLORIDE 104 07/20/2023    CHLORIDE 103 07/17/2023    CO2 22 07/20/2023    CO2 26 07/17/2023    BUN 12.3 07/20/2023    BUN 10.9 07/17/2023    CR 0.73 07/20/2023    CR 0.59 07/19/2023     (H) 07/20/2023     (H) 07/19/2023     COAGS:   Lab Results   Component Value Date    PTT 31 02/22/2018    INR 1.05 02/22/2018     POC: No results found for: BGM, HCG, HCGS  HEPATIC:   Lab Results   Component Value Date    ALBUMIN 4.4 01/31/2023    PROTTOTAL 7.0 01/31/2023    ALT 16 01/31/2023    AST 20 01/31/2023    ALKPHOS 70 01/31/2023    BILITOTAL 0.3 01/31/2023     OTHER:   Lab Results   Component Value Date    LACT 1.3 07/19/2023    A1C 6.5 (H) 07/17/2023    SHON 8.5 (L) 07/20/2023    PHOS 3.6 07/20/2023    MAG 1.7 07/20/2023    LIPASE 35 02/24/2022    AMYLASE 47 02/24/2022    TSH 2.78 01/31/2023    CRP 2.0 (H) 06/05/2019    SED 22 09/22/2015       Anesthesia Plan    ASA Status:  3    NPO Status:  NPO Appropriate    Anesthesia Type: MAC.     - Reason for MAC: straight local not clinically adequate   Induction: Intravenous.   Maintenance: TIVA.        Consents    Anesthesia Plan(s) and associated risks, benefits, and realistic alternatives discussed. Questions answered and patient/representative(s) expressed understanding.     -  Discussed: Risks, Benefits and Alternatives for BOTH SEDATION and the PROCEDURE were discussed     - Discussed with:  Patient, Healthcare Power of       - Extended Intubation/Ventilatory Support Discussed: No.      - Patient is DNR/DNI Status: No     Use of blood products discussed: No .     Postoperative Care            Comments:                FRANKO Obrien CRNA

## 2023-10-04 NOTE — OP NOTE
PROCEDURE NOTE    DATE OF SERVICE: 10/4/2023    SURGEON: JIMBO Martin MD     PRE-OP DIAGNOSIS:  epigastric pain, s/p fundoplication    POST-OP DIAGNOSIS:  normal post-fundoplication anatomy    PROCEDURE:   EGD with biopsy    ASSISTANT:  Circulator: Anjelica Woo RN  Relief Scrub: Shirley Farooq  Scrub Person: Ekta Baker    ANESTHESIA:  MAC                            MACCRNA Independent: Kenneth Haywood APRN CRNA    INDICATION FOR THE PROCEDURE: Maliha Rivera is a 83 year old female. The patient presents with postprandial epigastric pain. She is ~2-3 months s/p fundoplication.     PROCEDURE:The patient was taken to the endoscopy suite. Appropriate monitors were attached. The patient was placed in the left lateral decubitus position. Bite block was positioned.Timeout was performed confirming the patient's identity and procedure to be performed. After appropriate sedation was confirmed, the flexible endoscope was advanced into the oropharynx. The posterior oropharynx appeared grossly normal. The scope was advanced into the proximal esophagus. The esophagus was insufflated with air. The scope was advanced under direct visualization. No acute abnormalities of the esophagus were noted. The scope was advanced into the stomach. The scope was advanced through the pylorus into the duodenal bulb. The bulb and distal duodenum appeared grossly normal.  The scope was withdrawn back into the stomach. Antral biopsy was obtained and sent to pathology. Antrum and stomach body appeared normal. The scope was retroflexed and the GE junction inspected. 360 deg wrap is noted and appears intact, no evidence of wrap breakdown or significant stomach slippage above the wrap. The scope was returned to a neutral position and the stomach was decompressed. The scope was withdrawn to the GE junction and biopsy obtained. The mucosa of the esophagus was inspected while withdrawing the scope. No abnormalities were noted. The scope  was withdrawn from the patient. The bite block was removed. The patient tolerated the procedure with no immediately apparent complication. The patient was taken to recovery instable condition.     ESTIMATED BLOOD LOSS: none    COMPLICATIONS:  None    TISSUE REMOVED:  Yes    RECOMMEND:    Continue PPI  Upper GI contrast test       JIMBO Martin MD

## 2023-10-04 NOTE — DISCHARGE INSTRUCTIONS
[facility]  Adult Discharge Orders & Instructions    ________________________________________________________________          For 12 hours after surgery  Get plenty of rest.  A responsible adult must stay with you for at least 12 hours after you leave the hospital.   You may feel lightheaded.  IF so, sit for a few minutes before standing.  Have someone help you get up.   You may have a slight fever. Call the doctor if your fever is over 101 F (38.3 C) (taken under the tongue) or lasts longer than 24 hours.  You may have a dry mouth, a sore throat, muscle aches or trouble sleeping.  These should go away after 24 hours.  Do not make important or legal decisions.  6.   Do not drive or use heavy equipment.  If you have weakness or tingling, don't drive or use heavy equipment until this feeling goes away.    To contact a doctor, call   [phone]_______________________

## 2023-10-04 NOTE — INTERVAL H&P NOTE
I saw and examined Maliha Rivera.  I have reviewed the history and physical and find no changes to the patient's medical status or condition with the exceptions noted below.     No increased work of breathing, lungs are clear to auscultation bilaterally   Heart is regular rate and rhythm, no murmur     The technical details of EGD were discussed with the patient along with the risks and benefits to include bleeding, perforation and incomplete study. Maliha Rivera demonstrated understanding and is willing to proceed.       Apolinar Martin MD   7:34 AM 10/4/2023

## 2023-10-04 NOTE — ANESTHESIA POSTPROCEDURE EVALUATION
Patient: Maliha Rivera    Procedure: Procedure(s):  ESOPHAGOGASTRODUODENOSCOPY, WITH BIOPSY       Anesthesia Type:  MAC    Note:  Disposition: Outpatient   Postop Pain Control: Uneventful            Sign Out: Well controlled pain   PONV: No   Neuro/Psych: Uneventful            Sign Out: Acceptable/Baseline neuro status   Airway/Respiratory: Uneventful            Sign Out: Acceptable/Baseline resp. status   CV/Hemodynamics: Uneventful            Sign Out: Acceptable CV status; No obvious hypovolemia; No obvious fluid overload   Other NRE: NONE   DID A NON-ROUTINE EVENT OCCUR? No           Last vitals:  Vitals Value Taken Time   /86 10/04/23 0915   Temp     Pulse 72 10/04/23 0915   Resp     SpO2 98 % 10/04/23 0901   Vitals shown include unvalidated device data.    Electronically Signed By: FRANKO Obrien CRNA  October 4, 2023  12:25 PM

## 2023-10-06 ENCOUNTER — HOSPITAL ENCOUNTER (OUTPATIENT)
Dept: GENERAL RADIOLOGY | Facility: OTHER | Age: 83
Discharge: HOME | End: 2023-10-06
Attending: SURGERY
Payer: MEDICARE

## 2023-10-06 DIAGNOSIS — G89.18 POSTOPERATIVE PAIN: ICD-10-CM

## 2023-10-06 PROCEDURE — 999N000065 XR UPPER GI WATER SOLUBLE

## 2023-10-06 RX ADMIN — DIATRIZOATE MEGLUMINE AND DIATRIZOATE SODIUM 30 ML: 660; 100 SOLUTION ORAL; RECTAL at 12:06

## 2023-11-30 ENCOUNTER — OFFICE VISIT (OUTPATIENT)
Dept: SURGERY | Facility: OTHER | Age: 83
End: 2023-11-30
Attending: SURGERY
Payer: MEDICARE

## 2023-11-30 VITALS
TEMPERATURE: 98.5 F | DIASTOLIC BLOOD PRESSURE: 60 MMHG | WEIGHT: 158.2 LBS | OXYGEN SATURATION: 94 % | HEART RATE: 60 BPM | RESPIRATION RATE: 18 BRPM | SYSTOLIC BLOOD PRESSURE: 138 MMHG | BODY MASS INDEX: 28.94 KG/M2

## 2023-11-30 DIAGNOSIS — G89.18 POSTOPERATIVE PAIN: Primary | ICD-10-CM

## 2023-11-30 PROCEDURE — G0463 HOSPITAL OUTPT CLINIC VISIT: HCPCS

## 2023-11-30 PROCEDURE — 99024 POSTOP FOLLOW-UP VISIT: CPT | Performed by: SURGERY

## 2023-11-30 ASSESSMENT — PAIN SCALES - GENERAL: PAINLEVEL: NO PAIN (0)

## 2023-11-30 NOTE — PROGRESS NOTES
Anita Rivera presents to clinic for follow-up after undergoing laparoscopic repair of hiatal hernia and Nissen fundoplication on 7/19/2023.  Patient continues to have intermittent epigastric pain and reflux symptoms.  She is taking omeprazole daily but this really is not helping her reflux.  She cannot tell what days regularly bad days but her bad days seem to be getting more frequent and worse.  Denies dysphagia.  She states when she eats she feels like she gets full quickly      /60 (BP Location: Right arm, Patient Position: Sitting, Cuff Size: Adult Regular)   Pulse 60   Temp 98.5  F (36.9  C) (Temporal)   Resp 18   Wt 71.8 kg (158 lb 3.2 oz)   LMP  (LMP Unknown)   SpO2 94%   BMI 28.94 kg/m        Patient is sitting up in the chair, appears comfortable  Abdomen is soft, nontender, nondistended incisions x5 are clean, dry and healed.       Maliha Rivera is a 82-year-old female status post laparoscopic repair of hiatal hernia and Nissen fundoplication.  Recurrent GERD symptoms and addition to epigastric pain is concerning for fundoplication complications such as slippage or recurrence of hiatal hernia.  Will complete workup with repeat upper endoscopy with Bravo and CT scan of the area.      Apolinar Martin MD

## 2023-12-01 ENCOUNTER — TELEPHONE (OUTPATIENT)
Dept: SURGERY | Facility: OTHER | Age: 83
End: 2023-12-01

## 2023-12-01 NOTE — TELEPHONE ENCOUNTER
Screening Questions for the Scheduling of Screening Colonoscopies   (If Colonoscopy is diagnostic, Provider should review the chart before scheduling.)  Are you younger than 45 or older than 80?  YES  Do you take aspirin or fish oil?  NO (if yes, tell patient to stop 1 week prior to Colonoscopy)  Do you take warfarin (Coumadin), clopidogrel (Plavix), apixaban (Eliquis), dabigatram (Pradaxa), rivaroxaban (Xarelto) or any blood thinner? NO  Do you take Ozempic? NO  Do you use oxygen or a CPAP at home?  NO  Do you have kidney disease? NO  Are you on dialysis? NO  Have you had a stroke or heart attack in the last year? NO  Have you had a stent in your heart or any blood vessel in the last year? NO  Have you had a transplant of any organ? NO  Have you had a colonoscopy or upper endoscopy (EGD) before? YES         When?  2023  Date of scheduled EGD with Villanueva. 01/16/2024  Provider [facility]  Pharmacy [facility]

## 2023-12-07 DIAGNOSIS — R73.03 PRE-DIABETES: Primary | ICD-10-CM

## 2023-12-08 ENCOUNTER — HOSPITAL ENCOUNTER (OUTPATIENT)
Dept: CT IMAGING | Facility: OTHER | Age: 83
Discharge: HOME | End: 2023-12-08
Attending: SURGERY | Admitting: SURGERY
Payer: MEDICARE

## 2023-12-08 DIAGNOSIS — G89.18 POSTOPERATIVE PAIN: ICD-10-CM

## 2023-12-08 PROCEDURE — 74177 CT ABD & PELVIS W/CONTRAST: CPT | Mod: MG

## 2023-12-08 PROCEDURE — 250N000011 HC RX IP 250 OP 636: Performed by: SURGERY

## 2023-12-08 PROCEDURE — G1010 CDSM STANSON: HCPCS

## 2023-12-08 RX ORDER — IOPAMIDOL 755 MG/ML
91 INJECTION, SOLUTION INTRAVASCULAR ONCE
Status: COMPLETED | OUTPATIENT
Start: 2023-12-08 | End: 2023-12-08

## 2023-12-08 RX ADMIN — IOPAMIDOL 91 ML: 755 INJECTION, SOLUTION INTRAVENOUS at 12:11

## 2024-01-08 RX ORDER — ACETAMINOPHEN 325 MG/1
325-650 TABLET ORAL EVERY 6 HOURS PRN
COMMUNITY
End: 2024-02-07

## 2024-01-16 ENCOUNTER — HOSPITAL ENCOUNTER (OUTPATIENT)
Facility: OTHER | Age: 84
Discharge: HOME | End: 2024-01-16
Attending: SURGERY | Admitting: SURGERY
Payer: MEDICARE

## 2024-01-16 ENCOUNTER — ANESTHESIA EVENT (OUTPATIENT)
Dept: SURGERY | Facility: OTHER | Age: 84
End: 2024-01-16
Payer: MEDICARE

## 2024-01-16 ENCOUNTER — ANESTHESIA (OUTPATIENT)
Dept: SURGERY | Facility: OTHER | Age: 84
End: 2024-01-16
Payer: MEDICARE

## 2024-01-16 VITALS
BODY MASS INDEX: 29.08 KG/M2 | OXYGEN SATURATION: 99 % | RESPIRATION RATE: 16 BRPM | WEIGHT: 158 LBS | SYSTOLIC BLOOD PRESSURE: 175 MMHG | HEART RATE: 64 BPM | HEIGHT: 62 IN | DIASTOLIC BLOOD PRESSURE: 80 MMHG | TEMPERATURE: 96.8 F

## 2024-01-16 PROCEDURE — 88305 TISSUE EXAM BY PATHOLOGIST: CPT

## 2024-01-16 PROCEDURE — 43239 EGD BIOPSY SINGLE/MULTIPLE: CPT

## 2024-01-16 PROCEDURE — 43239 EGD BIOPSY SINGLE/MULTIPLE: CPT | Performed by: SURGERY

## 2024-01-16 PROCEDURE — 91035 G-ESOPH REFLX TST W/ELECTROD: CPT | Performed by: SURGERY

## 2024-01-16 PROCEDURE — 258N000003 HC RX IP 258 OP 636: Performed by: SURGERY

## 2024-01-16 PROCEDURE — 999N000010 HC STATISTIC ANES STAT CODE-CRNA PER MINUTE: Performed by: SURGERY

## 2024-01-16 PROCEDURE — 250N000011 HC RX IP 250 OP 636: Performed by: NURSE ANESTHETIST, CERTIFIED REGISTERED

## 2024-01-16 PROCEDURE — 250N000009 HC RX 250: Performed by: SURGERY

## 2024-01-16 PROCEDURE — 99100 ANES PT EXTEME AGE<1 YR&>70: CPT | Performed by: NURSE ANESTHETIST, CERTIFIED REGISTERED

## 2024-01-16 PROCEDURE — 43239 EGD BIOPSY SINGLE/MULTIPLE: CPT | Performed by: NURSE ANESTHETIST, CERTIFIED REGISTERED

## 2024-01-16 PROCEDURE — 250N000009 HC RX 250: Performed by: NURSE ANESTHETIST, CERTIFIED REGISTERED

## 2024-01-16 RX ORDER — NALOXONE HYDROCHLORIDE 0.4 MG/ML
0.4 INJECTION, SOLUTION INTRAMUSCULAR; INTRAVENOUS; SUBCUTANEOUS
Status: DISCONTINUED | OUTPATIENT
Start: 2024-01-16 | End: 2024-01-16 | Stop reason: HOSPADM

## 2024-01-16 RX ORDER — PROPOFOL 10 MG/ML
INJECTION, EMULSION INTRAVENOUS PRN
Status: DISCONTINUED | OUTPATIENT
Start: 2024-01-16 | End: 2024-01-16

## 2024-01-16 RX ORDER — NALOXONE HYDROCHLORIDE 0.4 MG/ML
0.2 INJECTION, SOLUTION INTRAMUSCULAR; INTRAVENOUS; SUBCUTANEOUS
Status: DISCONTINUED | OUTPATIENT
Start: 2024-01-16 | End: 2024-01-16 | Stop reason: HOSPADM

## 2024-01-16 RX ORDER — FLUMAZENIL 0.1 MG/ML
0.2 INJECTION, SOLUTION INTRAVENOUS
Status: DISCONTINUED | OUTPATIENT
Start: 2024-01-16 | End: 2024-01-16 | Stop reason: HOSPADM

## 2024-01-16 RX ORDER — LIDOCAINE 40 MG/G
CREAM TOPICAL
Status: DISCONTINUED | OUTPATIENT
Start: 2024-01-16 | End: 2024-01-16 | Stop reason: HOSPADM

## 2024-01-16 RX ORDER — PROPOFOL 10 MG/ML
INJECTION, EMULSION INTRAVENOUS CONTINUOUS PRN
Status: DISCONTINUED | OUTPATIENT
Start: 2024-01-16 | End: 2024-01-16

## 2024-01-16 RX ORDER — LIDOCAINE HYDROCHLORIDE 20 MG/ML
INJECTION, SOLUTION INFILTRATION; PERINEURAL PRN
Status: DISCONTINUED | OUTPATIENT
Start: 2024-01-16 | End: 2024-01-16

## 2024-01-16 RX ORDER — SODIUM CHLORIDE, SODIUM LACTATE, POTASSIUM CHLORIDE, CALCIUM CHLORIDE 600; 310; 30; 20 MG/100ML; MG/100ML; MG/100ML; MG/100ML
INJECTION, SOLUTION INTRAVENOUS CONTINUOUS
Status: DISCONTINUED | OUTPATIENT
Start: 2024-01-16 | End: 2024-01-16 | Stop reason: HOSPADM

## 2024-01-16 RX ADMIN — SODIUM CHLORIDE, POTASSIUM CHLORIDE, SODIUM LACTATE AND CALCIUM CHLORIDE: 600; 310; 30; 20 INJECTION, SOLUTION INTRAVENOUS at 09:28

## 2024-01-16 RX ADMIN — LIDOCAINE HYDROCHLORIDE 40 MG: 20 INJECTION, SOLUTION INFILTRATION; PERINEURAL at 10:17

## 2024-01-16 RX ADMIN — PROPOFOL 100 MG: 10 INJECTION, EMULSION INTRAVENOUS at 10:17

## 2024-01-16 RX ADMIN — PROPOFOL 150 MCG/KG/MIN: 10 INJECTION, EMULSION INTRAVENOUS at 10:17

## 2024-01-16 RX ADMIN — LIDOCAINE HYDROCHLORIDE 0.1 ML: 10 INJECTION, SOLUTION EPIDURAL; INFILTRATION; INTRACAUDAL; PERINEURAL at 09:28

## 2024-01-16 ASSESSMENT — ACTIVITIES OF DAILY LIVING (ADL)
ADLS_ACUITY_SCORE: 18
ADLS_ACUITY_SCORE: 33

## 2024-01-16 NOTE — OR NURSING
Maliha has been discharged to home at 1135 via car accompanied by     Written discharge instructions were provided to patient and .  Prescriptions were n/a.  Patient states their pain is denies, and denies any nausea or dizziness upon discharge.    Patient and adult caring for them verbalize understanding of discharge instructions including no driving until tomorrow and no longer taking narcotic pain medications - no operating mechanical equipment and no making any important decisions.They understand reason for discharge, and necessary follow-up appointments.

## 2024-01-16 NOTE — DISCHARGE INSTRUCTIONS
[facility]  Adult Discharge Orders & Instructions    ________________________________________________________________          For 12 hours after surgery  Get plenty of rest.  A responsible adult must stay with you for at least 12 hours after you leave the hospital.   You may feel lightheaded.  IF so, sit for a few minutes before standing.  Have someone help you get up.   You may have a slight fever. Call the doctor if your fever is over 101 F (38.3 C) (taken under the tongue) or lasts longer than 24 hours.  You may have a dry mouth, a sore throat, muscle aches or trouble sleeping.  These should go away after 24 hours.  Do not make important or legal decisions.  6.   Do not drive or use heavy equipment.  If you have weakness or tingling, don't drive or use heavy equipment until this feeling goes away.    To contact a doctor, call   [phone]_______________________    You were given instruction today on the BRAVO recording device and how to enter your Symptoms via the recorder. You were also given a 2 page instruction packet that includes a written diary. You will need to bring back the written diary and the BRAVO recording device to Day Surgery after 48 hours, which would be after Thursday, 1/18/24 date and 10:00 time.

## 2024-01-16 NOTE — ANESTHESIA POSTPROCEDURE EVALUATION
Patient: Maliha Rivera    Procedure: Procedure(s):  EGD, gastroesophageal reflux test with WITH BIOPSY mucosal PH electrode       Anesthesia Type:  MAC    Note:  Disposition: Outpatient   Postop Pain Control: Uneventful            Sign Out: Well controlled pain   PONV: No   Neuro/Psych: Uneventful            Sign Out: Acceptable/Baseline neuro status   Airway/Respiratory: Uneventful            Sign Out: Acceptable/Baseline resp. status   CV/Hemodynamics: Uneventful            Sign Out: Acceptable CV status; No obvious hypovolemia; No obvious fluid overload   Other NRE: NONE   DID A NON-ROUTINE EVENT OCCUR?            Last vitals:  Vitals Value Taken Time   /87 01/16/24 1049   Temp 96.8  F (36  C) 01/16/24 1035   Pulse 69 01/16/24 1049   Resp 16 01/16/24 1045   SpO2 98 % 01/16/24 1101   Vitals shown include unfiled device data.    Electronically Signed By: FRANKO Littlejohn CRNA  January 16, 2024  11:02 AM

## 2024-01-16 NOTE — ANESTHESIA CARE TRANSFER NOTE
Patient: Maliha Rivera    Procedure: Procedure(s):  EGD, gastroesophageal reflux test with WITH BIOPSY mucosal PH electrode       Diagnosis: Postoperative pain [G89.18]  Diagnosis Additional Information: No value filed.    Anesthesia Type:   MAC     Note:    Oropharynx: oropharynx clear of all foreign objects and spontaneously breathing  Level of Consciousness: drowsy  Oxygen Supplementation: nasal cannula  Level of Supplemental Oxygen (L/min / FiO2): 2  Independent Airway: airway patency satisfactory and stable  Dentition: dentition unchanged  Vital Signs Stable: post-procedure vital signs reviewed and stable  Report to RN Given: handoff report given  Patient transferred to: Phase II    Handoff Report: Identifed the Patient, Identified the Reponsible Provider, Reviewed the pertinent medical history, Discussed the surgical course, Reviewed Intra-OP anesthesia mangement and issues during anesthesia, Set expectations for post-procedure period and Allowed opportunity for questions and acknowledgement of understanding      Vitals:  Vitals Value Taken Time   BP     Temp     Pulse     Resp     SpO2 97 % 01/16/24 1037   Vitals shown include unfiled device data.    Electronically Signed By: FRANKO Littlejohn CRNA  January 16, 2024  10:38 AM
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
## 2024-01-16 NOTE — OP NOTE
PROCEDURE NOTE    DATE OF SERVICE: 1/16/2024    SURGEON: JIMBO Martin MD     PRE-OP DIAGNOSIS:  epigastric pain    POST-OP DIAGNOSIS:  slipped nissen,     PROCEDURE:   EGD with biopsy, Bravo pH probe placement     ASSISTANT:  Circulator: Mariana Chamberlain RN  Scrub Person: Brielle Hansen    ANESTHESIA:  MAC                            MACCRNA Independent: Berny Lynch APRN CRNA    INDICATION FOR THE PROCEDURE: Maliha Rivera is a 83 year old female. The patient presents with epigastric pain after nissen fundoplication.     PROCEDURE:The patient was taken to the endoscopy suite. Appropriate monitors were attached. The patient was placed in the left lateral decubitus position. Bite block was positioned.Timeout was performed confirming the patient's identity and procedure to be performed. After appropriate sedation was confirmed, the flexible endoscope was advanced into the oropharynx. The posterior oropharynx appeared grossly normal. The scope was advanced into the proximal esophagus. The esophagus was insufflated with air. The scope was advanced under direct visualization. No acute abnormalities of the esophagus were noted. The scope was advanced into the stomach. Antrum and stomach body appear normal.  The scope was retroflexed and the GE junction inspected. The wrap appears intact and complete, sulci noted on both sides with the wrap tight around the scope. The scope was returned to a neutral position and the stomach was decompressed. The scope was withdrawn to the GE junction and biopsy obtained. There does appear to be a short segment of stomach above the wrap. The mucosa of the esophagus was inspected while withdrawing the scope. No abnormalities were noted. The scope was withdrawn from the patient.   The Z -line measured 31cm. There was LA Grade B change without inflammatory esophagitis. The mucosa of the esophagus was inspected while withdrawing the scope. No abnormalities were noted. . The  scope was withdrawn from the patient.  The bravo catheter was advanced to 28 cm. Suction was held for at least 30 seconds without interruption. The probe was deployed. The scope was re-advanced and the capsule verified.     The bite block was removed. The patient tolerated the procedure with no immediately apparent complication. The patient was taken to recovery instable condition.     ESTIMATED BLOOD LOSS: none    COMPLICATIONS:  None    TISSUE REMOVED:  Yes    RECOMMEND:    Follow up garza results     JIMBO Martin MD

## 2024-01-16 NOTE — ANESTHESIA PREPROCEDURE EVALUATION
Anesthesia Pre-Procedure Evaluation    Patient: Maliha Rivera   MRN: [id number] : 1940        Procedure : Procedure(s):  EGD, gastroesophageal reflux test with mucosal PH electrode          Past Medical History:   Diagnosis Date    Encounter for screening for cardiovascular disorders     4/8/15,Lexiscan cardiolite [facility]      Past Surgical History:   Procedure Laterality Date    APPENDECTOMY OPEN      No Comments Provided    ARTHROSCOPY KNEE      ,Joseluis Thomas M.D.    COLONOSCOPY      ,,normal    ESOPHAGOSCOPY, GASTROSCOPY, DUODENOSCOPY (EGD), COMBINED      2/3/2016    ESOPHAGOSCOPY, GASTROSCOPY, DUODENOSCOPY (EGD), COMBINED N/A 3/28/2023    Procedure: Esophagoscopy, gastroscopy, duodenoscopy (EGD), combined;  Surgeon: Apolinar Martin MD;  Location:  OR    ESOPHAGOSCOPY, GASTROSCOPY, DUODENOSCOPY (EGD), COMBINED N/A 10/4/2023    Procedure: ESOPHAGOGASTRODUODENOSCOPY, WITH BIOPSY;  Surgeon: Apolinar Martin MD;  Location:  OR    LAPAROSCOPIC CHOLECYSTECTOMY      3/11/01,Dr. Salas    LAPAROSCOPIC NISSEN FUNDOPLICATION N/A 2023    Procedure: FUNDOPLICATION, NISSEN, LAPAROSCOPIC WITH LYSIS OF ADHESIONS;  Surgeon: Apolinar Martin MD;  Location:  OR    LAPAROSCOPIC TUBAL LIGATION      No Comments Provided    lumbar decompression L4-5  2018    Dr. France    OTHER SURGICAL HISTORY      ,[id number],CATARACT EXTRACTION W/  INTRAOCULAR LENS IMPLANT,Bilateral    OTHER SURGICAL HISTORY      ,30193.0,[place] ERCP BILIARY OR PANC DUCT STENT EXCHANGE W DIL AND WIRE,stent removed.    OTHER SURGICAL HISTORY      ,[id number],CARDIAC CATHETERIZATION,mild disease    OTHER SURGICAL HISTORY      07,[id number],OTHER,Left    OTHER SURGICAL HISTORY      2008,IAW576,TOTAL KNEE ARTHROPLASTY,Right    OTHER SURGICAL HISTORY      ,[id number],TOTAL KNEE ARTHROPLASTY,Left,Dr. Carson    OTHER SURGICAL HISTORY      2017,[id number],OTHER,Left,Dr. Joiner, [facility]     "RELEASE CARPAL TUNNEL      2006    Revision right total knee arthroplasty   04/18/2019    Dr. Carson.  Due to loose implant tibial component    TONSILLECTOMY      age 12      Allergies   Allergen Reactions    Atorvastatin Muscle Pain (Myalgia)    Cefazolin Nausea and Vomiting     Tolerated 7/19/23 without N/V      Social History     Tobacco Use    Smoking status: Never     Passive exposure: Never    Smokeless tobacco: Never   Substance Use Topics    Alcohol use: No      Wt Readings from Last 1 Encounters:   01/16/24 71.7 kg (158 lb)        Anesthesia Evaluation   Pt has had prior anesthetic. Type: General and MAC.    No history of anesthetic complications       ROS/MED HX  ENT/Pulmonary:  - neg pulmonary ROS   (+)     GRADY risk factors,  hypertension, obese,                                Neurologic:  - neg neurologic ROS     Cardiovascular:     (+) Dyslipidemia hypertension- -   -  - -                                 Previous cardiac testing   Echo: Date: Results:    Stress Test:  Date: Results:    ECG Reviewed:  Date: Results:  58 Sinus bradycardia  Inferior infarct , age undetermined  Abnormal ECG  When compared with ECG of 13-JUL-2023 10:10,  Nonspecific inferior T-wave abnormality has normalized.  Confirmed by MD GERMÁN, [facility] ([id number]) on 7/18/2023 12:23:58 PM      Cath:  Date: Results:      METS/Exercise Tolerance: >4 METS    Hematologic:  - neg hematologic  ROS     Musculoskeletal: Comment: Patient with DDD, lumbar radiculopath, and Lumbar stenosis. Patient with great neck mobility and denies pain with movement.   (+)  arthritis,             GI/Hepatic:     (+) GERD, Asymptomatic on medication,                  Renal/Genitourinary:  - neg Renal ROS     Endo: Comment: Patient reports \"borderline diabetes\".    (+)  type II DM,   Not using insulin, - not using insulin pump.  not previously admitted for DM/DKA.              Psychiatric/Substance Use:  - neg psychiatric ROS     Infectious Disease:  - neg " "infectious disease ROS     Malignancy:  - neg malignancy ROS     Other:  - neg other ROS          Physical Exam    Airway        Mallampati: III   TM distance: > 3 FB   Neck ROM: full   Mouth opening: > 3 cm    Respiratory Devices and Support         Dental     Comment: Top denture plate in place.     (+) Modest Abnormalities - crowns, retainers, 1 or 2 missing teeth      Cardiovascular   cardiovascular exam normal       Rhythm and rate: regular and normal     Pulmonary   pulmonary exam normal        breath sounds clear to auscultation           OUTSIDE LABS:  CBC:   Lab Results   Component Value Date    WBC 7.6 07/20/2023    WBC 5.3 01/31/2023    HGB 10.6 (L) 07/20/2023    HGB 13.3 07/17/2023    HCT 30.7 (L) 07/20/2023    HCT 34.3 (L) 01/31/2023     07/20/2023     01/31/2023     BMP:   Lab Results   Component Value Date     07/20/2023     07/17/2023    POTASSIUM 3.5 07/20/2023    POTASSIUM 4.1 07/17/2023    CHLORIDE 104 07/20/2023    CHLORIDE 103 07/17/2023    CO2 22 07/20/2023    CO2 26 07/17/2023    BUN 12.3 07/20/2023    BUN 10.9 07/17/2023    CR 0.52 12/08/2023    CR 0.73 07/20/2023     (H) 07/20/2023     (H) 07/19/2023     COAGS:   Lab Results   Component Value Date    PTT 31 02/22/2018    INR 1.05 02/22/2018     POC: No results found for: \"BGM\", \"HCG\", \"HCGS\"  HEPATIC:   Lab Results   Component Value Date    ALBUMIN 4.4 01/31/2023    PROTTOTAL 7.0 01/31/2023    ALT 16 01/31/2023    AST 20 01/31/2023    ALKPHOS 70 01/31/2023    BILITOTAL 0.3 01/31/2023     OTHER:   Lab Results   Component Value Date    LACT 1.3 07/19/2023    A1C 6.5 (H) 07/17/2023    SHON 8.5 (L) 07/20/2023    PHOS 3.6 07/20/2023    MAG 1.7 07/20/2023    LIPASE 35 02/24/2022    AMYLASE 47 02/24/2022    TSH 2.78 01/31/2023    CRP 2.0 (H) 06/05/2019    SED 22 09/22/2015       Anesthesia Plan    ASA Status:  3    NPO Status:  NPO Appropriate    Anesthesia Type: MAC.     - Reason for MAC: straight local not " clinically adequate   Induction: Intravenous.   Maintenance: TIVA.        Consents    Anesthesia Plan(s) and associated risks, benefits, and realistic alternatives discussed. Questions answered and patient/representative(s) expressed understanding.     - Discussed: Risks, Benefits and Alternatives for BOTH SEDATION and the PROCEDURE were discussed     - Discussed with:  Patient, Healthcare Power of       - Extended Intubation/Ventilatory Support Discussed: No.      - Patient is DNR/DNI Status: No     Use of blood products discussed: No .     Postoperative Care            Comments:                FRANKO PENA CRNA

## 2024-01-22 DIAGNOSIS — K21.9 GASTROESOPHAGEAL REFLUX DISEASE WITHOUT ESOPHAGITIS: Primary | ICD-10-CM

## 2024-02-02 ENCOUNTER — OFFICE VISIT (OUTPATIENT)
Dept: SURGERY | Facility: OTHER | Age: 84
End: 2024-02-02
Attending: SURGERY
Payer: COMMERCIAL

## 2024-02-02 VITALS
WEIGHT: 163 LBS | DIASTOLIC BLOOD PRESSURE: 79 MMHG | HEART RATE: 64 BPM | OXYGEN SATURATION: 98 % | BODY MASS INDEX: 30 KG/M2 | HEIGHT: 62 IN | RESPIRATION RATE: 16 BRPM | SYSTOLIC BLOOD PRESSURE: 135 MMHG | TEMPERATURE: 98.2 F

## 2024-02-02 DIAGNOSIS — K21.9 GASTROESOPHAGEAL REFLUX DISEASE WITHOUT ESOPHAGITIS: Primary | ICD-10-CM

## 2024-02-02 PROCEDURE — 99214 OFFICE O/P EST MOD 30 MIN: CPT | Performed by: SURGERY

## 2024-02-02 PROCEDURE — G0463 HOSPITAL OUTPT CLINIC VISIT: HCPCS

## 2024-02-02 ASSESSMENT — PATIENT HEALTH QUESTIONNAIRE - PHQ9
10. IF YOU CHECKED OFF ANY PROBLEMS, HOW DIFFICULT HAVE THESE PROBLEMS MADE IT FOR YOU TO DO YOUR WORK, TAKE CARE OF THINGS AT HOME, OR GET ALONG WITH OTHER PEOPLE: NOT DIFFICULT AT ALL
SUM OF ALL RESPONSES TO PHQ QUESTIONS 1-9: 7
SUM OF ALL RESPONSES TO PHQ QUESTIONS 1-9: 7

## 2024-02-02 ASSESSMENT — PAIN SCALES - GENERAL: PAINLEVEL: NO PAIN (0)

## 2024-02-02 NOTE — PROGRESS NOTES
"Maliha Rivera presents to clinic for follow-up of recurrent reflux following Nissen fundoplication.  Patient states she continues to have epigastric pain when eating.  Patient states she occasionally has regurgitation.  She does take PPI.  Denies classic reflux symptoms including substernal burning chest pain waterbrash.      /79 (BP Location: Right arm, Patient Position: Sitting, Cuff Size: Adult Large)   Pulse 64   Temp 98.2  F (36.8  C) (Tympanic)   Resp 16   Ht 1.575 m (5' 2\")   Wt 73.9 kg (163 lb)   LMP  (LMP Unknown)   SpO2 98%   BMI 29.81 kg/m      Abdomen is soft, nontender, nondistended.    No increased work of breathing, lungs are clear to auscultation bilaterally   heart is regular rate and rhythm, no murmur    Surgical pathology report shows GE junction biopsy significant for reflux esophagitis.  Bravo pH probe shows DeMeester score of 8.  Upper GI contrast test shows wrap is in place under the diaphragm and appears to be in appropriate position.  No evidence of contrast extravasation and no evidence of reflux.  EGD shows intact wrap that is complete and snug around the scope on retroflexion in the stomach.  It appears as though the Z-line is above the wrap, indicating the wrap was placed to low on the stomach or the stomach slipped up through the wrap a little bit.  No evidence of recurrent hiatal hernia on imaging.      Maliha Rivera is an 83-year-old female status post laparoscopic Nissen fundoplication approximately 6 months ago for reflux with recurrent epigastric pain.  I believe her symptoms are due to the wrap being too low on the stomach.  This may have been caused by the stomach slipping up through the wrap or the wrap being placed to low on the stomach to begin with.  In any case she will need revision to place the wrap in the appropriate position.  The technical details of laparoscopic Nissen fundoplication revision were discussed with the patient all the risk and " benefits include bleeding, infection, injury to surrounding structures including esophagus, stomach, diaphragm, liver, spleen, pancreas.  The patient demonstrates understanding and willing to proceed.      Scheduled for laparoscopic revision of Nissen fundoplication on 2/21/2024  Pre op evaluation with primary care provider      Apolinar Martin MD

## 2024-02-02 NOTE — NURSING NOTE
"Chief Complaint   Patient presents with    RECHECK     Follow up after EGD. Discuss results     Issues with swallowing food. Poor appetite due to the discomfort with eating.    Initial /79 (BP Location: Right arm, Patient Position: Sitting, Cuff Size: Adult Large)   Pulse 64   Temp 98.2  F (36.8  C) (Tympanic)   Resp 16   Ht 1.575 m (5' 2\")   Wt 73.9 kg (163 lb)   LMP  (LMP Unknown)   SpO2 98%   BMI 29.81 kg/m   Estimated body mass index is 29.81 kg/m  as calculated from the following:    Height as of this encounter: 1.575 m (5' 2\").    Weight as of this encounter: 73.9 kg (163 lb).  Meds Reconciled: complete      Coni Alston RN     "

## 2024-02-07 ENCOUNTER — OFFICE VISIT (OUTPATIENT)
Dept: FAMILY MEDICINE | Facility: OTHER | Age: 84
End: 2024-02-07
Attending: FAMILY MEDICINE
Payer: COMMERCIAL

## 2024-02-07 VITALS
BODY MASS INDEX: 29.81 KG/M2 | DIASTOLIC BLOOD PRESSURE: 70 MMHG | SYSTOLIC BLOOD PRESSURE: 138 MMHG | HEART RATE: 68 BPM | WEIGHT: 162 LBS | TEMPERATURE: 97.4 F | RESPIRATION RATE: 16 BRPM | OXYGEN SATURATION: 98 % | HEIGHT: 62 IN

## 2024-02-07 DIAGNOSIS — I10 BENIGN ESSENTIAL HYPERTENSION: ICD-10-CM

## 2024-02-07 DIAGNOSIS — K44.9 SLIDING HIATAL HERNIA: ICD-10-CM

## 2024-02-07 DIAGNOSIS — E78.5 HYPERLIPIDEMIA, UNSPECIFIED HYPERLIPIDEMIA TYPE: ICD-10-CM

## 2024-02-07 DIAGNOSIS — Z01.818 PREOP GENERAL PHYSICAL EXAM: Primary | ICD-10-CM

## 2024-02-07 DIAGNOSIS — K29.70 GASTRITIS, PRESENCE OF BLEEDING UNSPECIFIED, UNSPECIFIED CHRONICITY, UNSPECIFIED GASTRITIS TYPE: ICD-10-CM

## 2024-02-07 DIAGNOSIS — K21.9 GASTROESOPHAGEAL REFLUX DISEASE WITHOUT ESOPHAGITIS: ICD-10-CM

## 2024-02-07 DIAGNOSIS — E11.9 TYPE 2 DIABETES MELLITUS WITHOUT COMPLICATION, WITHOUT LONG-TERM CURRENT USE OF INSULIN (H): ICD-10-CM

## 2024-02-07 LAB
ALBUMIN SERPL BCG-MCNC: 4.5 G/DL (ref 3.5–5.2)
ALP SERPL-CCNC: 76 U/L (ref 40–150)
ALT SERPL W P-5'-P-CCNC: 15 U/L (ref 0–50)
ANION GAP SERPL CALCULATED.3IONS-SCNC: 10 MMOL/L (ref 7–15)
AST SERPL W P-5'-P-CCNC: 20 U/L (ref 0–45)
BASOPHILS # BLD AUTO: 0 10E3/UL (ref 0–0.2)
BASOPHILS NFR BLD AUTO: 1 %
BILIRUB SERPL-MCNC: 0.2 MG/DL
BUN SERPL-MCNC: 18 MG/DL (ref 8–23)
CALCIUM SERPL-MCNC: 9.4 MG/DL (ref 8.8–10.2)
CHLORIDE SERPL-SCNC: 102 MMOL/L (ref 98–107)
CREAT SERPL-MCNC: 0.58 MG/DL (ref 0.51–0.95)
DEPRECATED HCO3 PLAS-SCNC: 27 MMOL/L (ref 22–29)
EGFRCR SERPLBLD CKD-EPI 2021: 89 ML/MIN/1.73M2
EOSINOPHIL # BLD AUTO: 0.2 10E3/UL (ref 0–0.7)
EOSINOPHIL NFR BLD AUTO: 3 %
ERYTHROCYTE [DISTWIDTH] IN BLOOD BY AUTOMATED COUNT: 12.6 % (ref 10–15)
GLUCOSE SERPL-MCNC: 182 MG/DL (ref 70–99)
HBA1C MFR BLD: 6.2 % (ref 4–6.2)
HCT VFR BLD AUTO: 38 % (ref 35–47)
HGB BLD-MCNC: 12.5 G/DL (ref 11.7–15.7)
IMM GRANULOCYTES # BLD: 0 10E3/UL
IMM GRANULOCYTES NFR BLD: 0 %
LYMPHOCYTES # BLD AUTO: 2 10E3/UL (ref 0.8–5.3)
LYMPHOCYTES NFR BLD AUTO: 37 %
MCH RBC QN AUTO: 28.8 PG (ref 26.5–33)
MCHC RBC AUTO-ENTMCNC: 32.9 G/DL (ref 31.5–36.5)
MCV RBC AUTO: 88 FL (ref 78–100)
MONOCYTES # BLD AUTO: 0.4 10E3/UL (ref 0–1.3)
MONOCYTES NFR BLD AUTO: 7 %
NEUTROPHILS # BLD AUTO: 2.9 10E3/UL (ref 1.6–8.3)
NEUTROPHILS NFR BLD AUTO: 52 %
NRBC # BLD AUTO: 0 10E3/UL
NRBC BLD AUTO-RTO: 0 /100
PLATELET # BLD AUTO: 236 10E3/UL (ref 150–450)
POTASSIUM SERPL-SCNC: 3.8 MMOL/L (ref 3.4–5.3)
PROT SERPL-MCNC: 7.5 G/DL (ref 6.4–8.3)
RBC # BLD AUTO: 4.34 10E6/UL (ref 3.8–5.2)
SODIUM SERPL-SCNC: 139 MMOL/L (ref 135–145)
WBC # BLD AUTO: 5.5 10E3/UL (ref 4–11)

## 2024-02-07 PROCEDURE — 93010 ELECTROCARDIOGRAM REPORT: CPT | Performed by: INTERNAL MEDICINE

## 2024-02-07 PROCEDURE — 93005 ELECTROCARDIOGRAM TRACING: CPT | Performed by: FAMILY MEDICINE

## 2024-02-07 PROCEDURE — 99214 OFFICE O/P EST MOD 30 MIN: CPT | Performed by: FAMILY MEDICINE

## 2024-02-07 PROCEDURE — 80053 COMPREHEN METABOLIC PANEL: CPT | Mod: ZL | Performed by: FAMILY MEDICINE

## 2024-02-07 PROCEDURE — 36415 COLL VENOUS BLD VENIPUNCTURE: CPT | Mod: ZL | Performed by: FAMILY MEDICINE

## 2024-02-07 PROCEDURE — G0463 HOSPITAL OUTPT CLINIC VISIT: HCPCS

## 2024-02-07 PROCEDURE — 83036 HEMOGLOBIN GLYCOSYLATED A1C: CPT | Mod: ZL | Performed by: FAMILY MEDICINE

## 2024-02-07 PROCEDURE — 85025 COMPLETE CBC W/AUTO DIFF WBC: CPT | Mod: ZL | Performed by: FAMILY MEDICINE

## 2024-02-07 RX ORDER — URSODIOL 300 MG/1
300 CAPSULE ORAL DAILY
COMMUNITY
End: 2024-02-07

## 2024-02-07 RX ORDER — PRAVASTATIN SODIUM 20 MG
20 TABLET ORAL DAILY
COMMUNITY
End: 2024-02-07

## 2024-02-07 RX ORDER — PRAVASTATIN SODIUM 20 MG
20 TABLET ORAL DAILY
Qty: 90 TABLET | Refills: 11 | Status: ON HOLD | OUTPATIENT
Start: 2024-02-07 | End: 2024-02-21

## 2024-02-07 RX ORDER — OMEPRAZOLE 40 MG/1
40 CAPSULE, DELAYED RELEASE ORAL DAILY
Qty: 30 CAPSULE | Refills: 3 | Status: ON HOLD | OUTPATIENT
Start: 2024-02-07 | End: 2024-02-24

## 2024-02-07 ASSESSMENT — PAIN SCALES - GENERAL: PAINLEVEL: NO PAIN (0)

## 2024-02-07 NOTE — PROGRESS NOTES
Preoperative Evaluation  [facility]  [address]  [place] [address]  Phone: [phone]  Fax: [phone]  Primary Provider: Yoko Mike  Pre-op Performing Provider: YOKO MIKE  Feb 7, 2024       Maliha is a 83 year old, presenting for the following:  Pre-Op Exam (Surgery date 02/21/2024)        2/7/2024     2:37 PM   Additional Questions   Roomed by Olivia Jean-Baptiste LPN     Surgical Information  Surgery/Procedure: Laparoscopic revision of Nissen Fundoplication  Surgery Location: [facility]  Surgeon: Dr Martin  Surgery Date: 02/21/2024  Time of Surgery: TBD  Where patient plans to recover: At home with family  Fax number for surgical facility: Note does not need to be faxed, will be available electronically in Epic.    Diagnoses and associated orders for this visit:  Preop general physical exam  -     CBC with Platelets & Differential; Future  -     Comprehensive metabolic panel; Future  -     Hemoglobin A1c; Future  -     EKG 12-lead, tracing only (Same Day)  -     CBC with Platelets & Differential  -     Comprehensive metabolic panel  -     Hemoglobin A1c    Sliding hiatal hernia    Gastroesophageal reflux disease without esophagitis  -     CBC with Platelets & Differential; Future  -     CBC with Platelets & Differential    Type 2 diabetes mellitus without complication, without long-term current use of insulin (H)  -     Hemoglobin A1c; Future  -     Hemoglobin A1c    Benign essential hypertension  -     Comprehensive metabolic panel; Future  -     EKG 12-lead, tracing only (Same Day)  -     Comprehensive metabolic panel    Gastritis, presence of bleeding unspecified, unspecified chronicity, unspecified gastritis type  -     omeprazole (PRILOSEC) 40 MG DR capsule; Take 1 capsule (40 mg) by mouth daily    Hyperlipidemia, unspecified hyperlipidemia type  -     pravastatin (PRAVACHOL) 20 MG tablet; Take 1 tablet (20 mg) by mouth daily      Okay to go ahead with  planned procedure.  Hold aspirin and NSAIDs for a week prior..      Subjective       HPI related to upcoming procedure: This patient with severe reflux had laparoscopic Nissen fundoplication done last summer by Dr. Martin.  She was having persistent symptoms and further evaluation was undertaken and it appears that either her wrap slipped or the position was not exactly where it needed to be.  She had recent upper endoscopy.  Dr. Martin has suggested that she should have this revised to an is now scheduled for laparoscopic revision of Nissen Fundoplication on 2/21/2024.  She did fine with her last surgery.  She can easily do 4 METS of activity.    She is in overall good health.  Had been on lisinopril for hypertension but has been off of it now for a while with good control.  She is on some pravastatin for her lipids and previously had been on ursodiol for bile reflux and omeprazole for acid reflux but really has not been taking any of her medicines at all of late.    She has diet-controlled diabetes.        2/7/2024     2:36 PM   Preop Questions   1. Have you ever had a heart attack or stroke? No   2. Have you ever had surgery on your heart or blood vessels, such as a stent placement, a coronary artery bypass, or surgery on an artery in your head, neck, heart, or legs? UNKNOWN -    3. Do you have chest pain with activity? No   4. Do you have a history of  heart failure? No   5. Do you currently have a cold, bronchitis or symptoms of other infection? No   6. Do you have a cough, shortness of breath, or wheezing? No   7. Do you or anyone in your family have previous history of blood clots? No   8. Do you or does anyone in your family have a serious bleeding problem such as prolonged bleeding following surgeries or cuts? No   9. Have you ever had problems with anemia or been told to take iron pills? No   10. Have you had any abnormal blood loss such as black, tarry or bloody stools, or abnormal vaginal  bleeding? No   11. Have you ever had a blood transfusion? No   12. Are you willing to have a blood transfusion if it is medically needed before, during, or after your surgery? Yes   13. Have you or any of your relatives ever had problems with anesthesia? No   14. Do you have sleep apnea, excessive snoring or daytime drowsiness? No   15. Do you have any artifical heart valves or other implanted medical devices like a pacemaker, defibrillator, or continuous glucose monitor? No   16. Do you have artificial joints? YES - both knees   17. Are you allergic to latex? No       Health Care Directive  Patient does not have a Health Care Directive or Living Will: Discussed advance care planning with patient; however, patient declined at this time.    Preoperative Review of    reviewed - controlled substances reflected in medication list.  PDMP Review         Value Time User    State PDMP site checked  Yes 2/7/2024  5:23 PM Nicanor Mike MD                 Patient Active Problem List    Diagnosis Date Noted    Gastroesophageal reflux disease without esophagitis 07/19/2023     Priority: Medium    Sliding hiatal hernia 03/29/2023     Priority: Medium    Postoperative anemia due to acute blood loss 04/19/2019     Priority: Medium    Benign essential hypertension 04/18/2018     Priority: Medium    Lumbar stenosis 03/01/2018     Priority: Medium    Lumbar radiculopathy 01/23/2018     Priority: Medium    Status post total bilateral knee replacement 03/31/2017     Priority: Medium    Primary osteoarthritis of left knee 03/23/2017     Priority: Medium    DM w/o complication type II (H) 09/22/2015     Priority: Medium     Overview:   3/2014:  A1c fine off metformin - will discontinue as patient does not want to take it.  She is taking fish oil + other ingredients she states helps with DM  On ASA  Patient does not want to take lisinopril  Does not tolerate statins and has discontinued pravastatin; try niacin  No retinopathy on  eye exam 4/2013 and 12/2014  Overview:   Overview:   3/2014:  A1c fine off metformin - will discontinue as patient does not want to take it.  She is taking fish oil + other ingredients she states helps with DM  On ASA  Patient does not want to take lisinopril  Does not tolerate statins and has discontinued pravastatin; try niacin  No retinopathy on eye exam 4/2013 and 12/2014      Neurofibroma of thigh 09/22/2015     Priority: Medium    Esophageal reflux 12/13/2013     Priority: Medium    Dysphagia 12/13/2013     Priority: Medium    Healthcare maintenance 03/26/2013     Priority: Medium     Overview:   Calcium: takes supplement  Colonoscopy: WNL 2011  DEXA: schedule - if has osteoporosis will have to look at risk of fosamax on her reflux.  Lipids:; declines statin - will try niacin and is on fish oil.  Mammogram:schedule  Pap: 3/2013 WNL - does not need again.  Tdap:3/2013  Vitamin D: takes supplement  pneumovax done.  Consider zoster vaccine.      Degenerative arthritis of knee 10/21/2010     Priority: Medium     Overview:   IMO Update 10/11      Cervicalgia 05/11/2010     Priority: Medium     Overview:   IMO Update 10/11      DDD (degenerative disc disease), lumbar 01/15/2010     Priority: Medium    Primary localized osteoarthrosis, lower leg 01/31/2008     Priority: Medium    Hyperlipidemia 07/22/2004     Priority: Medium     Overview:   IMO Update 10/11  Overview:   Overview:   IMO Update 10/11        Past Medical History:   Diagnosis Date    Encounter for screening for cardiovascular disorders     4/8/15,Lexiscan cardiolite [facility]     Past Surgical History:   Procedure Laterality Date    APPENDECTOMY OPEN      No Comments Provided    ARTHROSCOPY KNEE      2007,Joseluis Thomas M.D.    COLONOSCOPY      2003,2011,normal    ESOPHAGOSCOPY, GASTROSCOPY, DUODENOSCOPY (EGD), COMBINED      2/3/2016    ESOPHAGOSCOPY, GASTROSCOPY, DUODENOSCOPY (EGD), COMBINED N/A 3/28/2023    Procedure: Esophagoscopy,  gastroscopy, duodenoscopy (EGD), combined;  Surgeon: Apolinar Martin MD;  Location:  OR    ESOPHAGOSCOPY, GASTROSCOPY, DUODENOSCOPY (EGD), COMBINED N/A 10/4/2023    Procedure: ESOPHAGOGASTRODUODENOSCOPY, WITH BIOPSY;  Surgeon: Apolinar Martin MD;  Location:  OR    LAPAROSCOPIC CHOLECYSTECTOMY      3/11/01,Dr. Salas    LAPAROSCOPIC NISSEN FUNDOPLICATION N/A 7/19/2023    Procedure: FUNDOPLICATION, NISSEN, LAPAROSCOPIC WITH LYSIS OF ADHESIONS;  Surgeon: Apolinar Martin MD;  Location: [facility] OR    LAPAROSCOPIC TUBAL LIGATION      No Comments Provided    lumbar decompression L4-5  02/28/2018    Dr. France    OTHER SURGICAL HISTORY      1996,[id number],CATARACT EXTRACTION W/  INTRAOCULAR LENS IMPLANT,Bilateral    OTHER SURGICAL HISTORY      2001,15242.0,[place] [facility] BILIARY OR PANC DUCT STENT EXCHANGE W DIL AND WIRE,stent removed.    OTHER SURGICAL HISTORY      2004,[id number],CARDIAC CATHETERIZATION,mild disease    OTHER SURGICAL HISTORY      1/31/07,[id number],OTHER,Left    OTHER SURGICAL HISTORY      02/27/2008,[id number],TOTAL KNEE ARTHROPLASTY,Right    OTHER SURGICAL HISTORY      2010,[id number],TOTAL KNEE ARTHROPLASTY,Left,Dr. Carson    OTHER SURGICAL HISTORY      06/05/2017,[id number],OTHER,Left,Dr. Joiner, [facility]    RELEASE CARPAL TUNNEL      2006    Revision right total knee arthroplasty   04/18/2019    Dr. Carson.  Due to loose implant tibial component    TONSILLECTOMY      age 12     Current Outpatient Medications   Medication Sig Dispense Refill    blood glucose (NO BRAND SPECIFIED) lancets standard Use to test blood sugar once daily. Dispense as covered by insurance. Dx. Code: E11.9. 100 each 11    blood glucose (NO BRAND SPECIFIED) test strip Use to test blood sugar once daily. Dispense as covered by insurance. Dx. Code: E11.9. 100 strip 11    blood glucose monitoring (NO BRAND SPECIFIED) meter device kit Use to test blood sugar 2 times daily 1 kit 0    Blood Pressure Monitoring (ADULT BLOOD PRESSURE CUFF LG)  "KIT Use for home blood pressure monitoring as directed. 1 kit 0    order for DME Equipment being ordered: Evaluate at [facility] for a knee brace 1 Units 0    order for DME Equipment being ordered: [facility] to evaluate for diabetic shoes and/or insoles 1 Units prn    acetaminophen (TYLENOL) 325 MG tablet Take 325-650 mg by mouth every 6 hours as needed for mild pain (Patient not taking: Reported on 2/7/2024)      lisinopril (ZESTRIL) 10 MG tablet Take 10 mg by mouth daily (Patient not taking: Reported on 2/7/2024)      melatonin 3 MG CAPS Take 5 mg by mouth At Bedtime (Patient not taking: Reported on 2/7/2024)      omeprazole (PRILOSEC) 40 MG DR capsule Take 1 capsule (40 mg) by mouth daily (Patient not taking: Reported on 2/7/2024) 30 capsule 3    pravastatin (PRAVACHOL) 20 MG tablet Take 20 mg by mouth daily (Patient not taking: Reported on 2/7/2024)      ursodiol (ACTIGALL) 300 MG capsule Take 300 mg by mouth daily (Patient not taking: Reported on 2/7/2024)         Allergies   Allergen Reactions    Atorvastatin Muscle Pain (Myalgia)    Cefazolin Nausea and Vomiting     Tolerated 7/19/23 without N/V        Social History     Tobacco Use    Smoking status: Never     Passive exposure: Never    Smokeless tobacco: Never   Substance Use Topics    Alcohol use: No     Family History   Problem Relation Age of Onset    Heart Disease Mother         Heart Disease    Heart Disease Father         Heart Disease    Heart Disease Sister         Heart Disease    Cancer Other         Cancer,female cancer     History   Drug Use Unknown         Review of Systems    Review of Systems  Constitutional, HEENT, cardiovascular, pulmonary, GI, , musculoskeletal, neuro, skin, endocrine and psych systems are negative, except as otherwise noted.  Objective    /70   Pulse 68   Temp 97.4  F (36.3  C) (Temporal)   Resp 16   Ht 1.575 m (5' 2\")   Wt 73.5 kg (162 lb)   LMP  (LMP Unknown)   SpO2 98%   Breastfeeding " "No   BMI 29.63 kg/m     Estimated body mass index is 29.63 kg/m  as calculated from the following:    Height as of this encounter: 1.575 m (5' 2\").    Weight as of this encounter: 73.5 kg (162 lb).  Physical Exam  General Appearance: Pleasant, alert, appropriate appearance for age. No acute distress  Head Exam: Normal. Normocephalic, atraumatic.  Eye Exam: PERRLA, EOMI, conjunctivae, sclerae normal.  Ear Exam: Normal TM's bilaterally. Normal auditory canals and external ears. Non-tender.  Nose Exam: Normal external nose, mucus membranes, and septum.  OroPharynx Exam:  Dental hygiene adequate. Normal buccal mucosa. Normal pharynx.  Neck Exam:  Supple, no masses or nodes. No bruits  Thyroid Exam: No nodules or enlargement.  Chest/Respiratory Exam: Normal chest wall and respirations. Clear to auscultation.  Cardiovascular Exam: Regular rate and rhythm. S1, S2, no murmur, click, gallop, or rubs.  Gastrointestinal Exam: Soft, mildly tender in the epigastrium, no masses or organomegaly.  Lymphatic Exam: Non-palpable nodes in neck,clavicular, axillary, or inguinal regions.  Musculoskeletal Exam: Back is straight and non-tender, full ROM of upper and lower extremities.  Foot Exam: Left and right foot: good pedal pulses  Skin: no rash or abnormalities  Neurologic Exam:  normal gross motor, tone coordination and no tremor.  Psychiatric Exam: Alert and oriented - appropriate affect.     Recent Labs   Lab Test 12/08/23  1017 07/20/23  0521 07/19/23  1132 07/17/23  1024 07/17/23  0924 01/31/23  1110   HGB  --  10.6*  --  13.3  --  11.9   PLT  --  189  --   --   --  203   NA  --  138  --   --  140 142   POTASSIUM  --  3.5  --   --  4.1 4.2   CR 0.52 0.73   < >  --  0.65 0.58   A1C  --   --   --   --  6.5* 6.6*    < > = values in this interval not displayed.        Diagnostics  Recent Results (from the past 24 hour(s))   Comprehensive metabolic panel    Collection Time: 02/07/24  2:47 PM   Result Value Ref Range    Sodium 139 135 " - 145 mmol/L    Potassium 3.8 3.4 - 5.3 mmol/L    Carbon Dioxide (CO2) 27 22 - 29 mmol/L    Anion Gap 10 7 - 15 mmol/L    Urea Nitrogen 18.0 8.0 - 23.0 mg/dL    Creatinine 0.58 0.51 - 0.95 mg/dL    GFR Estimate 89 >60 mL/min/1.73m2    Calcium 9.4 8.8 - 10.2 mg/dL    Chloride 102 98 - 107 mmol/L    Glucose 182 (H) 70 - 99 mg/dL    Alkaline Phosphatase 76 40 - 150 U/L    AST 20 0 - 45 U/L    ALT 15 0 - 50 U/L    Protein Total 7.5 6.4 - 8.3 g/dL    Albumin 4.5 3.5 - 5.2 g/dL    Bilirubin Total 0.2 <=1.2 mg/dL   Hemoglobin A1c    Collection Time: 02/07/24  2:47 PM   Result Value Ref Range    Hemoglobin A1C 6.2 4.0 - 6.2 %   CBC with platelets and differential    Collection Time: 02/07/24  2:47 PM   Result Value Ref Range    WBC Count 5.5 4.0 - 11.0 10e3/uL    RBC Count 4.34 3.80 - 5.20 10e6/uL    Hemoglobin 12.5 11.7 - 15.7 g/dL    Hematocrit 38.0 35.0 - 47.0 %    MCV 88 78 - 100 fL    MCH 28.8 26.5 - 33.0 pg    MCHC 32.9 31.5 - 36.5 g/dL    RDW 12.6 10.0 - 15.0 %    Platelet Count 236 150 - 450 10e3/uL    % Neutrophils 52 %    % Lymphocytes 37 %    % Monocytes 7 %    % Eosinophils 3 %    % Basophils 1 %    % Immature Granulocytes 0 %    NRBCs per 100 WBC 0 <1 /100    Absolute Neutrophils 2.9 1.6 - 8.3 10e3/uL    Absolute Lymphocytes 2.0 0.8 - 5.3 10e3/uL    Absolute Monocytes 0.4 0.0 - 1.3 10e3/uL    Absolute Eosinophils 0.2 0.0 - 0.7 10e3/uL    Absolute Basophils 0.0 0.0 - 0.2 10e3/uL    Absolute Immature Granulocytes 0.0 <=0.4 10e3/uL    Absolute NRBCs 0.0 10e3/uL   EKG 12-lead, tracing only (Same Day)    Collection Time: 02/07/24  3:20 PM   Result Value Ref Range    Systolic Blood Pressure  mmHg    Diastolic Blood Pressure  mmHg    Ventricular Rate 64 BPM    Atrial Rate 64 BPM    IL Interval 178 ms    QRS Duration 82 ms     ms    QTc 416 ms    P Axis 53 degrees    R AXIS 17 degrees    T Axis 13 degrees    Interpretation ECG       Sinus rhythm  Inferior infarct (cited on or before 13-JUL-2023)  Abnormal  ECG  When compared with ECG of 17-JUL-2023 09:31,  No significant change was found        EKG: Normal Sinus Rhythm, normal axis, normal intervals, no acute ST/T changes c/w ischemia, no LVH by voltage criteria, unchanged from previous tracings    Revised Cardiac Risk Index (RCRI)  The patient has the following serious cardiovascular risks for perioperative complications:   - No serious cardiac risks = 0 points     RCRI Interpretation: 0 points: Class I (very low risk - 0.4% complication rate)         Signed Electronically by: Nicanor Mike MD  Copy of this evaluation report is provided to requesting physician.

## 2024-02-07 NOTE — NURSING NOTE
"Chief Complaint   Patient presents with    Pre-Op Exam     Surgery date 02/21/2024       Initial /70   Pulse 68   Temp 97.4  F (36.3  C) (Temporal)   Resp 16   Ht 1.575 m (5' 2\")   Wt 73.5 kg (162 lb)   LMP  (LMP Unknown)   SpO2 98%   Breastfeeding No   BMI 29.63 kg/m   Estimated body mass index is 29.63 kg/m  as calculated from the following:    Height as of this encounter: 1.575 m (5' 2\").    Weight as of this encounter: 73.5 kg (162 lb).  Medication Review: complete    The next two questions are to help us understand your food security.  If you are feeling you need any assistance in this area, we have resources available to support you today.          2/7/2024   SDOH- Food Insecurity   Within the past 12 months, did you worry that your food would run out before you got money to buy more? N   Within the past 12 months, did the food you bought just not last and you didn t have money to get more? N         Health Care Directive:  Patient does not have a Health Care Directive or Living Will: Discussed advance care planning with patient; however, patient declined at this time.    Olivia Jean-Baptiste LPN      "

## 2024-02-08 RX ORDER — ENOXAPARIN SODIUM 100 MG/ML
40 INJECTION SUBCUTANEOUS
Status: CANCELLED | OUTPATIENT
Start: 2024-02-08

## 2024-02-08 RX ORDER — ACETAMINOPHEN 325 MG/1
975 TABLET ORAL ONCE
Status: CANCELLED | OUTPATIENT
Start: 2024-02-08 | End: 2024-02-08

## 2024-02-09 LAB
ATRIAL RATE - MUSE: 64 BPM
DIASTOLIC BLOOD PRESSURE - MUSE: NORMAL MMHG
INTERPRETATION ECG - MUSE: NORMAL
P AXIS - MUSE: 53 DEGREES
PR INTERVAL - MUSE: 178 MS
QRS DURATION - MUSE: 82 MS
QT - MUSE: 404 MS
QTC - MUSE: 416 MS
R AXIS - MUSE: 17 DEGREES
SYSTOLIC BLOOD PRESSURE - MUSE: NORMAL MMHG
T AXIS - MUSE: 13 DEGREES
VENTRICULAR RATE- MUSE: 64 BPM

## 2024-02-20 ENCOUNTER — ANESTHESIA EVENT (OUTPATIENT)
Dept: SURGERY | Facility: OTHER | Age: 84
DRG: 328 | End: 2024-02-20
Payer: MEDICARE

## 2024-02-21 ENCOUNTER — ANESTHESIA (OUTPATIENT)
Dept: SURGERY | Facility: OTHER | Age: 84
DRG: 328 | End: 2024-02-21
Payer: MEDICARE

## 2024-02-21 ENCOUNTER — HOSPITAL ENCOUNTER (INPATIENT)
Facility: OTHER | Age: 84
LOS: 3 days | Discharge: HOME | DRG: 328 | End: 2024-02-24
Attending: SURGERY | Admitting: SURGERY
Payer: MEDICARE

## 2024-02-21 DIAGNOSIS — E78.5 HYPERLIPIDEMIA, UNSPECIFIED HYPERLIPIDEMIA TYPE: Primary | ICD-10-CM

## 2024-02-21 DIAGNOSIS — K21.9 GASTROESOPHAGEAL REFLUX DISEASE WITHOUT ESOPHAGITIS: ICD-10-CM

## 2024-02-21 LAB
CREAT SERPL-MCNC: 0.53 MG/DL (ref 0.51–0.95)
EGFRCR SERPLBLD CKD-EPI 2021: >90 ML/MIN/1.73M2
GLUCOSE BLDC GLUCOMTR-MCNC: 123 MG/DL (ref 70–99)
GLUCOSE BLDC GLUCOMTR-MCNC: 180 MG/DL (ref 70–99)
PLATELET # BLD AUTO: 186 10E3/UL (ref 150–450)

## 2024-02-21 PROCEDURE — 250N000013 HC RX MED GY IP 250 OP 250 PS 637: Performed by: SURGERY

## 2024-02-21 PROCEDURE — 250N000009 HC RX 250: Performed by: NURSE ANESTHETIST, CERTIFIED REGISTERED

## 2024-02-21 PROCEDURE — 250N000011 HC RX IP 250 OP 636: Performed by: NURSE ANESTHETIST, CERTIFIED REGISTERED

## 2024-02-21 PROCEDURE — 0DV44ZZ RESTRICTION OF ESOPHAGOGASTRIC JUNCTION, PERCUTANEOUS ENDOSCOPIC APPROACH: ICD-10-PCS | Performed by: SURGERY

## 2024-02-21 PROCEDURE — 250N000025 HC SEVOFLURANE, PER MIN: Performed by: SURGERY

## 2024-02-21 PROCEDURE — 710N000010 HC RECOVERY PHASE 1, LEVEL 2, PER MIN: Performed by: SURGERY

## 2024-02-21 PROCEDURE — 99100 ANES PT EXTEME AGE<1 YR&>70: CPT | Performed by: NURSE ANESTHETIST, CERTIFIED REGISTERED

## 2024-02-21 PROCEDURE — 120N000001 HC R&B MED SURG/OB

## 2024-02-21 PROCEDURE — 272N000001 HC OR GENERAL SUPPLY STERILE: Performed by: SURGERY

## 2024-02-21 PROCEDURE — 43280 LAPAROSCOPY FUNDOPLASTY: CPT | Performed by: SURGERY

## 2024-02-21 PROCEDURE — 258N000001 HC RX 258: Performed by: SURGERY

## 2024-02-21 PROCEDURE — 370N000017 HC ANESTHESIA TECHNICAL FEE, PER MIN: Performed by: SURGERY

## 2024-02-21 PROCEDURE — 85049 AUTOMATED PLATELET COUNT: CPT | Performed by: SURGERY

## 2024-02-21 PROCEDURE — 43280 LAPAROSCOPY FUNDOPLASTY: CPT | Performed by: NURSE ANESTHETIST, CERTIFIED REGISTERED

## 2024-02-21 PROCEDURE — 258N000003 HC RX IP 258 OP 636: Performed by: NURSE ANESTHETIST, CERTIFIED REGISTERED

## 2024-02-21 PROCEDURE — 36415 COLL VENOUS BLD VENIPUNCTURE: CPT | Performed by: SURGERY

## 2024-02-21 PROCEDURE — 999N000141 HC STATISTIC PRE-PROCEDURE NURSING ASSESSMENT: Performed by: SURGERY

## 2024-02-21 PROCEDURE — 250N000011 HC RX IP 250 OP 636: Performed by: SURGERY

## 2024-02-21 PROCEDURE — 360N000077 HC SURGERY LEVEL 4, PER MIN: Performed by: SURGERY

## 2024-02-21 PROCEDURE — 82565 ASSAY OF CREATININE: CPT | Performed by: SURGERY

## 2024-02-21 PROCEDURE — 82962 GLUCOSE BLOOD TEST: CPT

## 2024-02-21 PROCEDURE — 258N000003 HC RX IP 258 OP 636: Performed by: SURGERY

## 2024-02-21 RX ORDER — OXYCODONE HYDROCHLORIDE 5 MG/1
10 TABLET ORAL
Status: DISCONTINUED | OUTPATIENT
Start: 2024-02-21 | End: 2024-02-22

## 2024-02-21 RX ORDER — ONDANSETRON 2 MG/ML
4 INJECTION INTRAMUSCULAR; INTRAVENOUS EVERY 30 MIN PRN
Status: DISCONTINUED | OUTPATIENT
Start: 2024-02-21 | End: 2024-02-21 | Stop reason: HOSPADM

## 2024-02-21 RX ORDER — ONDANSETRON 2 MG/ML
4 INJECTION INTRAMUSCULAR; INTRAVENOUS EVERY 6 HOURS
Status: DISCONTINUED | OUTPATIENT
Start: 2024-02-21 | End: 2024-02-24 | Stop reason: HOSPADM

## 2024-02-21 RX ORDER — PRAVASTATIN SODIUM 10 MG
20 TABLET ORAL DAILY
Status: DISCONTINUED | OUTPATIENT
Start: 2024-02-21 | End: 2024-02-21

## 2024-02-21 RX ORDER — HYDROMORPHONE HCL IN WATER/PF 6 MG/30 ML
0.4 PATIENT CONTROLLED ANALGESIA SYRINGE INTRAVENOUS EVERY 5 MIN PRN
Status: DISCONTINUED | OUTPATIENT
Start: 2024-02-21 | End: 2024-02-21 | Stop reason: HOSPADM

## 2024-02-21 RX ORDER — SODIUM CHLORIDE, SODIUM LACTATE, POTASSIUM CHLORIDE, CALCIUM CHLORIDE 600; 310; 30; 20 MG/100ML; MG/100ML; MG/100ML; MG/100ML
INJECTION, SOLUTION INTRAVENOUS CONTINUOUS
Status: DISCONTINUED | OUTPATIENT
Start: 2024-02-21 | End: 2024-02-22

## 2024-02-21 RX ORDER — ONDANSETRON 4 MG/1
4 TABLET, ORALLY DISINTEGRATING ORAL EVERY 30 MIN PRN
Status: DISCONTINUED | OUTPATIENT
Start: 2024-02-21 | End: 2024-02-21 | Stop reason: HOSPADM

## 2024-02-21 RX ORDER — NALOXONE HYDROCHLORIDE 0.4 MG/ML
0.2 INJECTION, SOLUTION INTRAMUSCULAR; INTRAVENOUS; SUBCUTANEOUS
Status: DISCONTINUED | OUTPATIENT
Start: 2024-02-21 | End: 2024-02-24 | Stop reason: HOSPADM

## 2024-02-21 RX ORDER — ACETAMINOPHEN 325 MG/1
975 TABLET ORAL ONCE
Status: COMPLETED | OUTPATIENT
Start: 2024-02-21 | End: 2024-02-21

## 2024-02-21 RX ORDER — BUPIVACAINE HYDROCHLORIDE 5 MG/ML
INJECTION, SOLUTION PERINEURAL PRN
Status: DISCONTINUED | OUTPATIENT
Start: 2024-02-21 | End: 2024-02-21 | Stop reason: HOSPADM

## 2024-02-21 RX ORDER — HYDROMORPHONE HCL IN WATER/PF 6 MG/30 ML
0.4 PATIENT CONTROLLED ANALGESIA SYRINGE INTRAVENOUS
Status: DISCONTINUED | OUTPATIENT
Start: 2024-02-21 | End: 2024-02-24 | Stop reason: HOSPADM

## 2024-02-21 RX ORDER — POLYETHYLENE GLYCOL 3350 17 G/17G
17 POWDER, FOR SOLUTION ORAL DAILY
Status: DISCONTINUED | OUTPATIENT
Start: 2024-02-22 | End: 2024-02-24 | Stop reason: HOSPADM

## 2024-02-21 RX ORDER — OXYCODONE HYDROCHLORIDE 5 MG/1
5 TABLET ORAL
Status: DISCONTINUED | OUTPATIENT
Start: 2024-02-21 | End: 2024-02-22

## 2024-02-21 RX ORDER — NALOXONE HYDROCHLORIDE 0.4 MG/ML
0.4 INJECTION, SOLUTION INTRAMUSCULAR; INTRAVENOUS; SUBCUTANEOUS
Status: DISCONTINUED | OUTPATIENT
Start: 2024-02-21 | End: 2024-02-24 | Stop reason: HOSPADM

## 2024-02-21 RX ORDER — CEFAZOLIN SODIUM/WATER 2 G/20 ML
2 SYRINGE (ML) INTRAVENOUS
Status: COMPLETED | OUTPATIENT
Start: 2024-02-21 | End: 2024-02-21

## 2024-02-21 RX ORDER — LIDOCAINE 40 MG/G
CREAM TOPICAL
Status: DISCONTINUED | OUTPATIENT
Start: 2024-02-21 | End: 2024-02-24 | Stop reason: HOSPADM

## 2024-02-21 RX ORDER — FENTANYL CITRATE 50 UG/ML
INJECTION, SOLUTION INTRAMUSCULAR; INTRAVENOUS PRN
Status: DISCONTINUED | OUTPATIENT
Start: 2024-02-21 | End: 2024-02-21

## 2024-02-21 RX ORDER — SODIUM CHLORIDE, SODIUM LACTATE, POTASSIUM CHLORIDE, CALCIUM CHLORIDE 600; 310; 30; 20 MG/100ML; MG/100ML; MG/100ML; MG/100ML
INJECTION, SOLUTION INTRAVENOUS CONTINUOUS
Status: DISCONTINUED | OUTPATIENT
Start: 2024-02-21 | End: 2024-02-21 | Stop reason: HOSPADM

## 2024-02-21 RX ORDER — PROPOFOL 10 MG/ML
INJECTION, EMULSION INTRAVENOUS PRN
Status: DISCONTINUED | OUTPATIENT
Start: 2024-02-21 | End: 2024-02-21

## 2024-02-21 RX ORDER — ENOXAPARIN SODIUM 100 MG/ML
40 INJECTION SUBCUTANEOUS EVERY 24 HOURS
Status: DISCONTINUED | OUTPATIENT
Start: 2024-02-22 | End: 2024-02-24 | Stop reason: HOSPADM

## 2024-02-21 RX ORDER — LIDOCAINE 40 MG/G
CREAM TOPICAL
Status: DISCONTINUED | OUTPATIENT
Start: 2024-02-21 | End: 2024-02-21 | Stop reason: HOSPADM

## 2024-02-21 RX ORDER — HYDROMORPHONE HCL IN WATER/PF 6 MG/30 ML
0.2 PATIENT CONTROLLED ANALGESIA SYRINGE INTRAVENOUS
Status: DISCONTINUED | OUTPATIENT
Start: 2024-02-21 | End: 2024-02-24 | Stop reason: HOSPADM

## 2024-02-21 RX ORDER — FENTANYL CITRATE 50 UG/ML
50 INJECTION, SOLUTION INTRAMUSCULAR; INTRAVENOUS
Status: DISCONTINUED | OUTPATIENT
Start: 2024-02-21 | End: 2024-02-22

## 2024-02-21 RX ORDER — HYDROCODONE BITARTRATE AND ACETAMINOPHEN 7.5; 325 MG/15ML; MG/15ML
10 SOLUTION ORAL EVERY 4 HOURS PRN
Status: DISCONTINUED | OUTPATIENT
Start: 2024-02-21 | End: 2024-02-24 | Stop reason: HOSPADM

## 2024-02-21 RX ORDER — ONDANSETRON 2 MG/ML
4 INJECTION INTRAMUSCULAR; INTRAVENOUS EVERY 30 MIN PRN
Status: COMPLETED | OUTPATIENT
Start: 2024-02-21 | End: 2024-02-22

## 2024-02-21 RX ORDER — ENOXAPARIN SODIUM 100 MG/ML
40 INJECTION SUBCUTANEOUS
Status: COMPLETED | OUTPATIENT
Start: 2024-02-21 | End: 2024-02-21

## 2024-02-21 RX ORDER — METOPROLOL TARTRATE 1 MG/ML
5 INJECTION, SOLUTION INTRAVENOUS
Status: DISCONTINUED | OUTPATIENT
Start: 2024-02-21 | End: 2024-02-24 | Stop reason: HOSPADM

## 2024-02-21 RX ORDER — FENTANYL CITRATE 50 UG/ML
50 INJECTION, SOLUTION INTRAMUSCULAR; INTRAVENOUS EVERY 5 MIN PRN
Status: DISCONTINUED | OUTPATIENT
Start: 2024-02-21 | End: 2024-02-21 | Stop reason: HOSPADM

## 2024-02-21 RX ORDER — HYDROMORPHONE HCL IN WATER/PF 6 MG/30 ML
0.2 PATIENT CONTROLLED ANALGESIA SYRINGE INTRAVENOUS EVERY 5 MIN PRN
Status: DISCONTINUED | OUTPATIENT
Start: 2024-02-21 | End: 2024-02-21 | Stop reason: HOSPADM

## 2024-02-21 RX ORDER — CEFAZOLIN SODIUM/WATER 2 G/20 ML
2 SYRINGE (ML) INTRAVENOUS SEE ADMIN INSTRUCTIONS
Status: DISCONTINUED | OUTPATIENT
Start: 2024-02-21 | End: 2024-02-21 | Stop reason: HOSPADM

## 2024-02-21 RX ORDER — FENTANYL CITRATE 50 UG/ML
25 INJECTION, SOLUTION INTRAMUSCULAR; INTRAVENOUS EVERY 5 MIN PRN
Status: DISCONTINUED | OUTPATIENT
Start: 2024-02-21 | End: 2024-02-21 | Stop reason: HOSPADM

## 2024-02-21 RX ORDER — LIDOCAINE HYDROCHLORIDE 20 MG/ML
INJECTION, SOLUTION INFILTRATION; PERINEURAL PRN
Status: DISCONTINUED | OUTPATIENT
Start: 2024-02-21 | End: 2024-02-21

## 2024-02-21 RX ORDER — ONDANSETRON 2 MG/ML
INJECTION INTRAMUSCULAR; INTRAVENOUS PRN
Status: DISCONTINUED | OUTPATIENT
Start: 2024-02-21 | End: 2024-02-21

## 2024-02-21 RX ORDER — ONDANSETRON 4 MG/1
4 TABLET, ORALLY DISINTEGRATING ORAL EVERY 30 MIN PRN
Status: COMPLETED | OUTPATIENT
Start: 2024-02-21 | End: 2024-02-22

## 2024-02-21 RX ADMIN — ROCURONIUM BROMIDE 10 MG: 50 INJECTION, SOLUTION INTRAVENOUS at 12:20

## 2024-02-21 RX ADMIN — ONDANSETRON HYDROCHLORIDE 4 MG: 2 SOLUTION INTRAMUSCULAR; INTRAVENOUS at 14:44

## 2024-02-21 RX ADMIN — FENTANYL CITRATE 50 MCG: 50 INJECTION INTRAMUSCULAR; INTRAVENOUS at 13:17

## 2024-02-21 RX ADMIN — FENTANYL CITRATE 50 MCG: 50 INJECTION INTRAMUSCULAR; INTRAVENOUS at 14:49

## 2024-02-21 RX ADMIN — ONDANSETRON 4 MG: 2 INJECTION INTRAMUSCULAR; INTRAVENOUS at 16:10

## 2024-02-21 RX ADMIN — ROCURONIUM BROMIDE 20 MG: 50 INJECTION, SOLUTION INTRAVENOUS at 11:58

## 2024-02-21 RX ADMIN — ONDANSETRON HYDROCHLORIDE 4 MG: 2 SOLUTION INTRAMUSCULAR; INTRAVENOUS at 10:33

## 2024-02-21 RX ADMIN — PROPOFOL 30 MG: 10 INJECTION, EMULSION INTRAVENOUS at 10:48

## 2024-02-21 RX ADMIN — FENTANYL CITRATE 50 MCG: 50 INJECTION INTRAMUSCULAR; INTRAVENOUS at 10:49

## 2024-02-21 RX ADMIN — FENTANYL CITRATE 25 MCG: 50 INJECTION INTRAMUSCULAR; INTRAVENOUS at 10:35

## 2024-02-21 RX ADMIN — LIDOCAINE HYDROCHLORIDE 80 MG: 20 INJECTION, SOLUTION INFILTRATION; PERINEURAL at 10:38

## 2024-02-21 RX ADMIN — ROCURONIUM BROMIDE 50 MG: 50 INJECTION, SOLUTION INTRAVENOUS at 10:38

## 2024-02-21 RX ADMIN — FENTANYL CITRATE 50 MCG: 50 INJECTION INTRAMUSCULAR; INTRAVENOUS at 14:54

## 2024-02-21 RX ADMIN — SODIUM CHLORIDE, POTASSIUM CHLORIDE, SODIUM LACTATE AND CALCIUM CHLORIDE 100 ML/HR: 600; 310; 30; 20 INJECTION, SOLUTION INTRAVENOUS at 09:05

## 2024-02-21 RX ADMIN — HYDROMORPHONE HYDROCHLORIDE 0.4 MG: 0.2 INJECTION, SOLUTION INTRAMUSCULAR; INTRAVENOUS; SUBCUTANEOUS at 16:10

## 2024-02-21 RX ADMIN — FENTANYL CITRATE 25 MCG: 50 INJECTION INTRAMUSCULAR; INTRAVENOUS at 14:04

## 2024-02-21 RX ADMIN — ACETAMINOPHEN 975 MG: 325 TABLET, FILM COATED ORAL at 08:50

## 2024-02-21 RX ADMIN — Medication 2 G: at 10:28

## 2024-02-21 RX ADMIN — SODIUM CHLORIDE, POTASSIUM CHLORIDE, SODIUM LACTATE AND CALCIUM CHLORIDE: 600; 310; 30; 20 INJECTION, SOLUTION INTRAVENOUS at 17:10

## 2024-02-21 RX ADMIN — ONDANSETRON 4 MG: 2 INJECTION INTRAMUSCULAR; INTRAVENOUS at 18:48

## 2024-02-21 RX ADMIN — ENOXAPARIN SODIUM 40 MG: 40 INJECTION SUBCUTANEOUS at 11:30

## 2024-02-21 RX ADMIN — PROPOFOL 140 MG: 10 INJECTION, EMULSION INTRAVENOUS at 10:38

## 2024-02-21 RX ADMIN — HYDROMORPHONE HYDROCHLORIDE 0.4 MG: 0.2 INJECTION, SOLUTION INTRAMUSCULAR; INTRAVENOUS; SUBCUTANEOUS at 19:25

## 2024-02-21 RX ADMIN — LIDOCAINE HYDROCHLORIDE 0.1 ML: 10 INJECTION, SOLUTION EPIDURAL; INFILTRATION; INTRACAUDAL; PERINEURAL at 09:05

## 2024-02-21 RX ADMIN — ONDANSETRON HYDROCHLORIDE 4 MG: 2 SOLUTION INTRAMUSCULAR; INTRAVENOUS at 19:09

## 2024-02-21 RX ADMIN — FENTANYL CITRATE 25 MCG: 50 INJECTION INTRAMUSCULAR; INTRAVENOUS at 14:07

## 2024-02-21 ASSESSMENT — ACTIVITIES OF DAILY LIVING (ADL)
DRESSING/BATHING_DIFFICULTY: NO
ADLS_ACUITY_SCORE: 31
ADLS_ACUITY_SCORE: 31
DIFFICULTY_COMMUNICATING: NO
ADLS_ACUITY_SCORE: 20
ADLS_ACUITY_SCORE: 22
ADLS_ACUITY_SCORE: 31
DOING_ERRANDS_INDEPENDENTLY_DIFFICULTY: NO
ADLS_ACUITY_SCORE: 22
ADLS_ACUITY_SCORE: 22
WALKING_OR_CLIMBING_STAIRS_DIFFICULTY: NO
WEAR_GLASSES_OR_BLIND: NO
HEARING_DIFFICULTY_OR_DEAF: NO
ADLS_ACUITY_SCORE: 22
DIFFICULTY_EATING/SWALLOWING: NO
ADLS_ACUITY_SCORE: 31
ADLS_ACUITY_SCORE: 31
FALL_HISTORY_WITHIN_LAST_SIX_MONTHS: NO
CHANGE_IN_FUNCTIONAL_STATUS_SINCE_ONSET_OF_CURRENT_ILLNESS/INJURY: NO
TOILETING_ISSUES: NO
CONCENTRATING,_REMEMBERING_OR_MAKING_DECISIONS_DIFFICULTY: NO

## 2024-02-21 NOTE — OR NURSING
The patient was unable or refused to remove jewelry prior to their surgical procedure. The patient has been instructed on the risk of injury and possible infection. In the event jewelry or piercings are obstructing the surgical process or impeding circulation, the jewelry or piercings may need to be cut off. The surgeon and anesthesia provider have been notified that an item cannot be removed, or that the patient refuses to remove the jewelry or piercing. The surgeon and anesthesia provider will determine if it is in the patient's best interest to proceed with the procedure with the jewelry or piercings remaining in contact with the patient's body.      Ring taped left 4th finger.

## 2024-02-21 NOTE — OR NURSING
PACU Transfer Note    Maliha Rivera was transferred to [facility] via bed.  Equipment used for transport:  oxygen.  Accompanied by:  Melody Anthony RN  Prescriptions were: n/a    PACU Respiratory Event Documentation     1) Episodes of Apnea greater than or equal to 10 seconds: 0    2) Bradypnea - less than 8 breaths per minute: 0    3) Pain score on 0 to 10 scale: 3    4) Pain-sedation mismatch (yes or no): no    5) Repeated 02 desaturation less than 90% (yes or no): no    Anesthesia notified? (yes or no): n/a    Any of the above events occuring repeatedly in separate 30 minute intervals may be considered recurrent PACU respiratory events.    Patient stable and meets phase 1 discharge criteria for transport from PACU.

## 2024-02-21 NOTE — ANESTHESIA POSTPROCEDURE EVALUATION
Patient: Maliha Rivera    Procedure: Procedure(s):  FUNDOPLICATION, NISSEN, LAPAROSCOPIC - REVISION       Anesthesia Type:  General    Note:  Disposition: Admission   Postop Pain Control: Uneventful            Sign Out: Well controlled pain   PONV: No   Neuro/Psych: Uneventful            Sign Out: Acceptable/Baseline neuro status   Airway/Respiratory: Uneventful            Sign Out: Acceptable/Baseline resp. status   CV/Hemodynamics: Uneventful            Sign Out: Acceptable CV status; No obvious hypovolemia; No obvious fluid overload   Other NRE: NONE   DID A NON-ROUTINE EVENT OCCUR? No           Last vitals:  Vitals Value Taken Time   /79 02/21/24 1525   Temp 97.5  F (36.4  C) 02/21/24 1505   Pulse 77 02/21/24 1527   Resp 15 02/21/24 1527   SpO2 97 % 02/21/24 1527   Vitals shown include unfiled device data.    Electronically Signed By: FRANKO PENA CRNA  February 21, 2024  3:29 PM

## 2024-02-21 NOTE — PROGRESS NOTES
[place] [facility] [place]    Patient admitted to room 331 at approximately 1600 via bed from surgery. Patient was accompanied by spouse and daughter.     Verbal SBAR report received from Emmanuel prior to patient arrival.     Patient arrived in bed  Patient alert and oriented X patient presently sleeping post surgery. Pain is controlled with current analgesics.  Medication(s) being used: narcotic analgesics including hydromorphone (Dilaudid).  . Admission vital signs: Blood pressure (!) 185/77, pulse 80, temperature 98.2  F (36.8  C), temperature source Tympanic, resp. rate 20, height 1.575 m (5' 2\"), weight 73.5 kg (162 lb), SpO2 95%, not currently breastfeeding. spouse was oriented to plan of care, call light, bed controls, tv, telephone, bathroom, and visiting hours.     Risk Assessment    The following safety risks were identified during admission: fall. Yellow risk band applied: YES.     Skin Initial Assessment    This writer admitted this patient and completed a full skin assessment and Tim score in the Adult PCS flowsheet. Appropriate interventions initiated as needed.          Education    Patient has a [place] to Observation order: No  Observation education completed and documented: No      Lottie Bhagat RN      "

## 2024-02-21 NOTE — ANESTHESIA PROCEDURE NOTES
Airway       Patient location during procedure: OR       Procedure Start/Stop Times: 2/21/2024 10:46 AM  Staff -        CRNA: Cynthia Ramos APRN CRNA       Performed By: CRNA  Consent for Airway        Urgency: elective  Indications and Patient Condition       Indications for airway management: ag-procedural       Induction type:intravenous       Mask difficulty assessment: 2 - vent by mask + OA or adjuvant +/- NMBA    Final Airway Details       Final airway type: endotracheal airway       Successful airway: ETT - single  Endotracheal Airway Details        ETT size (mm): 7.0       Cuffed: yes       Cuff volume (mL): 8       Successful intubation technique: direct laryngoscopy       DL Blade Type: MAC 3       Grade View of Cords: 3       Adjucts: stylet       Position: Right       Measured from: gums/teeth       Secured at (cm): 24       Bite block used: None    Post intubation assessment        Placement verified by: capnometry, equal breath sounds and chest rise        Number of attempts at approach: 1       Number of other approaches attempted: 0       Secured with: tape       Ease of procedure: easy       Dentition: Intact    Medication(s) Administered   Medication Administration Time: 2/21/2024 10:46 AM

## 2024-02-21 NOTE — OP NOTE
PREOPERATIVE  DIAGNOSIS: Recurrent hiatal hernia, slipped Nissen fundoplication     POSTOPERATIVE DIAGNOSIS: Same     PROCEDURE PERFORMED:  laparoscopic revision of Nissen fundoplication and laparoscopic lysis of adhesions      ESTIMATED BLOOD LOSS: 25 mL    SURGEON:  Apolinar Martin MD     ASSISTANT: Circulator: Alyssia Brooks RN  Relief Circulator: Anjelica Woo RN  Relief Scrub: Melany Tillman  Scrub Person: Brielle Hansen S  First Assistant: Shawn Torres RN; Lefty Angel RN    ANESTHESIA: GeneralCRNA Independent: Cynthia Ramos APRN CRNA    FAMILYPHYSICIAN: Nicanor Mike      INDICATION FOR THE PROCEDURE:  The patient is a 83 year old y.o. female with a Recurrent hiatal hernia, slipped Nissen fundoplication.  Patient underwent extensive testing after she began having epigastric pain following Nissen fundoplication.  Testing shows it is likely the stomach slipped up above the wrap somewhat and a hiatal hernia has recurred.       PROCEDURE:  Maliha Rivera was brought to the operating room placed in supine position.    General anesthetic was applied and the patient was intubated.  Patient was prepped and draped in usual sterile fashion.  Timeout was performed and everyone was in agreement to proceed.  The supraumbilical area was anesthetized with local anesthetic.  A 1cm transverse incision was made. The subcutaneous tissues were dissected with hemostat clamp down to the level of the fascia.  The umbilical stalk was grasped and elevated. The Veress needle was placed in the abdomen and drop test confirmed abdominal placement.  Insufflation was applied to the abdomen and the abdomen was insufflated to 15 mmHg.  The 5 mm 30 degree scope was placed in an Optiview trocar and this set up was slowly advanced through the incision into the abdomen under direct vision. The obturator was removed from the port and the camera was reinserted the abdomen and there did not appear to be any injury  to the underlying bowel.   Additional 5 mm working port was placed in the left upper quadrant under direct vision.  A 12 mm port was placed in the epigastrium, just to the left of the falciform ligament under direct vision.  The umbilical port was then swapped for a 12 mm port.  With these 3 ports I began taking down some omental adhesions to the anterior abdominal wall.  I was able to take down enough of the adhesions to accommodate 2 additional 5 mm working ports on the right abdomen.  The antrum of the stomach was adherent to the anterior abdominal wall and this was taken down with harmonic.  There was some light adhesions from the anterior stomach to the liver that were easily taken down with harmonic.  The liver was then elevated and retracted out of the way using a Hever retractor that was fixed in place to the bed.  Patient was placed in steep reverse Trendelenburg position and the stomach was visualized.  There were some very thick adhesions from what appeared to be the wrap to the liver.  These were carefully taken down there was some bleeding controlled with laparoscopic clips.  I was able to identify the right shalini and uses his beginning of my dissection.  Dissection carefully, and slowly progressed from the right shalini to the anterior esophageal hiatus.  I was able to identify parts of the wrap in addition to some suture material on the anterior portion of the stomach.  I then flipped the stomach over to visualize the left side of the stomach.  And it was obvious that part of the wrap had herniated up into the chest cavity.  Care was taken to remove the stomach from the chest and takedown connective tissue adhesions and informed from the stomach to the esophageal hiatus.  The side was mobilized but the esophageal hiatus was now wide and this made things difficult to tell esophageal hiatus from scar tissue from esophagus.  I then turned my attention back to the wrap and was able to define the wrap.  I  found the anterior stitches of the wrap that were cut I was able to unfold the wrap anteriorly and find decent tissue planes to slowly unravel the wrap.  There is dense adhesions on the posterior aspect of the wrap or an additional fixation suture was placed at the time of the initial surgery.  This was carefully taken down I was able to visualize the retroesophageal window.  A Penrose was placed to this window to facilitate with retraction.  Using this I was able to further define the left shalini and gain better mobility and space in the retroesophageal area.  Of note, the right and left crura appear well-approximated and there is little room for an additional stitch.  The esophageal hiatus seems snug around the esophagus.  The 56 Brazilian bougie was put down into the stomach and I cannot identify an additional area to put another suture in the hiatus that would not significantly reduce the size of the esophageal hiatus.  The bougie was removed and I continued to fully unfold the wrap.  Of note, the GE junction was identified and there appeared to be 3 cm of intra-abdominal esophageal length, this is documented in the pictures.  I was able to pull the wrap over to the left side of the stomach and carefully take down more gastrogastric adhesions to fully unfold the stomach.  There was 1 area where it looked like there was a possible small gastrotomy.  This was sewn shut with 0 Surgidac using the Endo Stitch.  I further widen the retrogastric window with plans to recreate the 360 degree wrap.  I feel like this is a reasonable option because the stomach is quite mobilized and the patient did well with a full wrap prior to this.  This will offer the best reflux protection and hopefully buttress the stomach and esophagus in place.   The most mobile portion of the fundus of the stomach was then identified and brought through the retroesophageal window, to the right side of the stomach.  A shoeshine technique was utilized to  ensure that the wrap was not twisted.  A full, 360 degree fundoplication was recreated with 3 interrupted 0 Surgidac stitches taking a bite of the left side of the stomach a bite of the esophagus and then a bite of the wrap, fundus of the stomach.  The last suture was roughly at the level of the gastroesophageal junction.  The upper abdomen was irrigated with normal saline and returns were clear.  An additional fixation suture was placed from the wrap to the right shalini and from the wrap to the left shalini to hold the wrap in place.  The liver retractor was removed and the patient was leveled.  The 12 mm port site fascia openings were closed under direct vision with Carlos-Alina needle and 0 Vicryl.  The 5 mm ports were then removed under direct vision and there was no bleeding.  The abdomen was desufflated.    Skin was closed with a running 4-0 Monocryl suture.  The skin was cleansed and dried and Dermabond was applied to the incision.  At the end of the case all sponge and needle counts were correct.  The patient tolerated procedure well. They were extubated in the OR and was taken to the recovery room in good condition.        JIMBO Martin MD

## 2024-02-21 NOTE — ANESTHESIA CARE TRANSFER NOTE
Patient: Maliha Rivera    Procedure: Procedure(s):  FUNDOPLICATION, NISSEN, LAPAROSCOPIC - REVISION       Diagnosis: Gastroesophageal reflux disease without esophagitis [K21.9]  Diagnosis Additional Information: No value filed.    Anesthesia Type:   General     Note:    Oropharynx: oropharynx clear of all foreign objects and spontaneously breathing  Level of Consciousness: drowsy  Oxygen Supplementation: room air    Independent Airway: airway patency satisfactory and stable  Dentition: dentition unchanged  Vital Signs Stable: post-procedure vital signs reviewed and stable  Report to RN Given: handoff report given  Patient transferred to: PACU    Handoff Report: Identifed the Patient, Identified the Reponsible Provider, Reviewed the pertinent medical history, Discussed the surgical course, Reviewed Intra-OP anesthesia mangement and issues during anesthesia, Set expectations for post-procedure period and Allowed opportunity for questions and acknowledgement of understanding      Vitals:  Vitals Value Taken Time   /71 02/21/24 1419   Temp 98.9  F (37.2  C) 02/21/24 1422   Pulse 88 02/21/24 1422   Resp 29 02/21/24 1422   SpO2 93 % 02/21/24 1422   Vitals shown include unfiled device data.    Electronically Signed By: FRANKO DE CRNA  February 21, 2024  2:23 PM

## 2024-02-21 NOTE — INTERVAL H&P NOTE
I saw and examined Maliha Rivera.  I have reviewed the history and physical and find no changes to the patient's medical status or condition with the exceptions noted below.         Apolinar Martin MD   10:00 AM 2/21/2024

## 2024-02-21 NOTE — PROVIDER NOTIFICATION
02/21/24 1741   Valuables   Patient Belongings remains with patient   Patient Belongings Remaining with Patient clothing;purse/wallet;jewelry;cell phone/electronics;dental appliance   Did you bring any home meds/supplements to the hospital?  No     A               Admission:  I am responsible for any personal items that are not sent to the safe or pharmacy.  [facility] is not responsible for loss, theft or damage of any property in my possession.    Signature:  _________________________________ Date: _______  Time: _____                                              Staff Signature:  ____________________________ Date: ________  Time: _____      2nd Staff person, if patient is unable/unwilling to sign:    Signature: ________________________________ Date: ________  Time: _____     Discharge:  [facility] has returned all of my personal belongings:    Signature: _________________________________ Date: ________  Time: _____                                          Staff Signature:  ____________________________ Date: ________  Time: _____

## 2024-02-21 NOTE — ANESTHESIA PREPROCEDURE EVALUATION
Anesthesia Pre-Procedure Evaluation    Patient: Maliha Rivera   MRN: [id number] : 1940        Procedure : Procedure(s):  FUNDOPLICATION, NISSEN, LAPAROSCOPIC - REVISION          Past Medical History:   Diagnosis Date    Encounter for screening for cardiovascular disorders     4/8/15,Lexiscan cardiolite [facility]      Past Surgical History:   Procedure Laterality Date    APPENDECTOMY OPEN      No Comments Provided    ARTHROSCOPY KNEE      ,Joseluis Thomas M.D.    COLONOSCOPY      ,,normal    ESOPHAGOSCOPY, GASTROSCOPY, DUODENOSCOPY (EGD), COMBINED      2/3/2016    ESOPHAGOSCOPY, GASTROSCOPY, DUODENOSCOPY (EGD), COMBINED N/A 3/28/2023    Procedure: Esophagoscopy, gastroscopy, duodenoscopy (EGD), combined;  Surgeon: Apolinar Martin MD;  Location:  OR    ESOPHAGOSCOPY, GASTROSCOPY, DUODENOSCOPY (EGD), COMBINED N/A 10/4/2023    Procedure: ESOPHAGOGASTRODUODENOSCOPY, WITH BIOPSY;  Surgeon: Apolinar Martin MD;  Location:  OR    LAPAROSCOPIC CHOLECYSTECTOMY      3/11/01,Dr. Salas    LAPAROSCOPIC NISSEN FUNDOPLICATION N/A 2023    Procedure: FUNDOPLICATION, NISSEN, LAPAROSCOPIC WITH LYSIS OF ADHESIONS;  Surgeon: Apolinar Martin MD;  Location:  OR    LAPAROSCOPIC TUBAL LIGATION      No Comments Provided    lumbar decompression L4-5  2018    Dr. France    OTHER SURGICAL HISTORY      ,[id number],CATARACT EXTRACTION W/  INTRAOCULAR LENS IMPLANT,Bilateral    OTHER SURGICAL HISTORY      ,31614.0,[place] [facility] BILIARY OR PANC DUCT STENT EXCHANGE W DIL AND WIRE,stent removed.    OTHER SURGICAL HISTORY      ,[id number],CARDIAC CATHETERIZATION,mild disease    OTHER SURGICAL HISTORY      07,[id number],OTHER,Left    OTHER SURGICAL HISTORY      2008,[id number],TOTAL KNEE ARTHROPLASTY,Right    OTHER SURGICAL HISTORY      ,[id number],TOTAL KNEE ARTHROPLASTY,Left,Dr. Carson    OTHER SURGICAL HISTORY      2017,[id number],OTHER,Left,Dr. Joiner, [facility]  TUNNEL      2006    Revision right total knee arthroplasty   04/18/2019    Dr. Carson.  Due to loose implant tibial component    TONSILLECTOMY      age 12      Allergies   Allergen Reactions    Atorvastatin Muscle Pain (Myalgia)    Cefazolin Nausea and Vomiting     Tolerated 7/19/23 without N/V      Social History     Tobacco Use    Smoking status: Never     Passive exposure: Never    Smokeless tobacco: Never   Substance Use Topics    Alcohol use: No      Wt Readings from Last 1 Encounters:   02/07/24 73.5 kg (162 lb)        Anesthesia Evaluation   Pt has had prior anesthetic.     No history of anesthetic complications       ROS/MED HX  ENT/Pulmonary:  - neg pulmonary ROS     Neurologic:  - neg neurologic ROS     Cardiovascular:     (+) Dyslipidemia hypertension- -   -  - -                                      METS/Exercise Tolerance: >4 METS    Hematologic:  - neg hematologic  ROS     Musculoskeletal:   (+)  arthritis,             GI/Hepatic:     (+) GERD,                   Renal/Genitourinary:  - neg Renal ROS     Endo:     (+)  type II DM,                    Psychiatric/Substance Use:  - neg psychiatric ROS     Infectious Disease:  - neg infectious disease ROS     Malignancy:  - neg malignancy ROS     Other:  - neg other ROS          Physical Exam    Airway        Mallampati: II   TM distance: > 3 FB   Neck ROM: full   Mouth opening: > 3 cm    Respiratory Devices and Support         Dental     Comment: Full upper denture in place    (+) Edentulous      Cardiovascular   cardiovascular exam normal       Rhythm and rate: regular and normal     Pulmonary   pulmonary exam normal        breath sounds clear to auscultation           OUTSIDE LABS:  CBC:   Lab Results   Component Value Date    WBC 5.5 02/07/2024    WBC 7.6 07/20/2023    HGB 12.5 02/07/2024    HGB 10.6 (L) 07/20/2023    HCT 38.0 02/07/2024    HCT 30.7 (L) 07/20/2023     02/07/2024     07/20/2023     BMP:   Lab Results   Component Value Date  "    02/07/2024     07/20/2023    POTASSIUM 3.8 02/07/2024    POTASSIUM 3.5 07/20/2023    CHLORIDE 102 02/07/2024    CHLORIDE 104 07/20/2023    CO2 27 02/07/2024    CO2 22 07/20/2023    BUN 18.0 02/07/2024    BUN 12.3 07/20/2023    CR 0.58 02/07/2024    CR 0.52 12/08/2023     (H) 02/07/2024     (H) 07/20/2023     COAGS:   Lab Results   Component Value Date    PTT 31 02/22/2018    INR 1.05 02/22/2018     POC: No results found for: \"BGM\", \"HCG\", \"HCGS\"  HEPATIC:   Lab Results   Component Value Date    ALBUMIN 4.5 02/07/2024    PROTTOTAL 7.5 02/07/2024    ALT 15 02/07/2024    AST 20 02/07/2024    ALKPHOS 76 02/07/2024    BILITOTAL 0.2 02/07/2024     OTHER:   Lab Results   Component Value Date    LACT 1.3 07/19/2023    A1C 6.2 02/07/2024    SHON 9.4 02/07/2024    PHOS 3.6 07/20/2023    MAG 1.7 07/20/2023    LIPASE 35 02/24/2022    AMYLASE 47 02/24/2022    TSH 2.78 01/31/2023    CRP 2.0 (H) 06/05/2019    SED 22 09/22/2015       Anesthesia Plan    ASA Status:  2    NPO Status:  NPO Appropriate    Anesthesia Type: General.   Induction: Propofol.   Maintenance: Balanced.        Consents    Anesthesia Plan(s) and associated risks, benefits, and realistic alternatives discussed. Questions answered and patient/representative(s) expressed understanding.     - Discussed: Risks, Benefits and Alternatives for BOTH SEDATION and the PROCEDURE were discussed     - Discussed with:  Patient      - Extended Intubation/Ventilatory Support Discussed: No.      - Patient is DNR/DNI Status: No     Use of blood products discussed: Yes.     - Discussed with: Patient.     - Consented: consented to blood products     Postoperative Care    Pain management: IV analgesics, Multi-modal analgesia.   PONV prophylaxis: Ondansetron (or other 5HT-3), Dexamethasone or Solumedrol     Comments:               JORGE SAUNDERS, APRN CRNA    I have reviewed the pertinent notes and labs in the chart from the past 30 days and (re)examined " "the patient.  Any updates or changes from those notes are reflected in this note.              # Overweight: Estimated body mass index is 29.63 kg/m  as calculated from the following:    Height as of 2/7/24: 1.575 m (5' 2\").    Weight as of 2/7/24: 73.5 kg (162 lb).      "

## 2024-02-22 ENCOUNTER — APPOINTMENT (OUTPATIENT)
Dept: GENERAL RADIOLOGY | Facility: OTHER | Age: 84
DRG: 328 | End: 2024-02-22
Attending: SURGERY
Payer: MEDICARE

## 2024-02-22 LAB
ANION GAP SERPL CALCULATED.3IONS-SCNC: 12 MMOL/L (ref 7–15)
BASOPHILS # BLD AUTO: 0 10E3/UL (ref 0–0.2)
BASOPHILS NFR BLD AUTO: 0 %
BUN SERPL-MCNC: 13.1 MG/DL (ref 8–23)
CALCIUM SERPL-MCNC: 8.7 MG/DL (ref 8.8–10.2)
CHLORIDE SERPL-SCNC: 103 MMOL/L (ref 98–107)
CREAT SERPL-MCNC: 0.51 MG/DL (ref 0.51–0.95)
DEPRECATED HCO3 PLAS-SCNC: 25 MMOL/L (ref 22–29)
EGFRCR SERPLBLD CKD-EPI 2021: >90 ML/MIN/1.73M2
EOSINOPHIL # BLD AUTO: 0 10E3/UL (ref 0–0.7)
EOSINOPHIL NFR BLD AUTO: 0 %
ERYTHROCYTE [DISTWIDTH] IN BLOOD BY AUTOMATED COUNT: 12.2 % (ref 10–15)
GLUCOSE BLDC GLUCOMTR-MCNC: 138 MG/DL (ref 70–99)
GLUCOSE SERPL-MCNC: 160 MG/DL (ref 70–99)
HCT VFR BLD AUTO: 30.3 % (ref 35–47)
HGB BLD-MCNC: 10.8 G/DL (ref 11.7–15.7)
HOLD SPECIMEN: NORMAL
HOLD SPECIMEN: NORMAL
IMM GRANULOCYTES # BLD: 0 10E3/UL
IMM GRANULOCYTES NFR BLD: 0 %
LYMPHOCYTES # BLD AUTO: 1.1 10E3/UL (ref 0.8–5.3)
LYMPHOCYTES NFR BLD AUTO: 15 %
MAGNESIUM SERPL-MCNC: 1.9 MG/DL (ref 1.7–2.3)
MCH RBC QN AUTO: 29.8 PG (ref 26.5–33)
MCHC RBC AUTO-ENTMCNC: 35.6 G/DL (ref 31.5–36.5)
MCV RBC AUTO: 84 FL (ref 78–100)
MONOCYTES # BLD AUTO: 0.6 10E3/UL (ref 0–1.3)
MONOCYTES NFR BLD AUTO: 7 %
NEUTROPHILS # BLD AUTO: 5.8 10E3/UL (ref 1.6–8.3)
NEUTROPHILS NFR BLD AUTO: 78 %
NRBC # BLD AUTO: 0 10E3/UL
NRBC BLD AUTO-RTO: 0 /100
PHOSPHATE SERPL-MCNC: 3.6 MG/DL (ref 2.5–4.5)
PLATELET # BLD AUTO: 104 10E3/UL (ref 150–450)
POTASSIUM SERPL-SCNC: 3.8 MMOL/L (ref 3.4–5.3)
RBC # BLD AUTO: 3.62 10E6/UL (ref 3.8–5.2)
SODIUM SERPL-SCNC: 140 MMOL/L (ref 135–145)
WBC # BLD AUTO: 7.5 10E3/UL (ref 4–11)

## 2024-02-22 PROCEDURE — 85025 COMPLETE CBC W/AUTO DIFF WBC: CPT | Performed by: SURGERY

## 2024-02-22 PROCEDURE — 36415 COLL VENOUS BLD VENIPUNCTURE: CPT | Performed by: SURGERY

## 2024-02-22 PROCEDURE — 84100 ASSAY OF PHOSPHORUS: CPT | Performed by: SURGERY

## 2024-02-22 PROCEDURE — 258N000001 HC RX 258: Performed by: SURGERY

## 2024-02-22 PROCEDURE — 80048 BASIC METABOLIC PNL TOTAL CA: CPT | Performed by: SURGERY

## 2024-02-22 PROCEDURE — 83735 ASSAY OF MAGNESIUM: CPT | Performed by: SURGERY

## 2024-02-22 PROCEDURE — 250N000013 HC RX MED GY IP 250 OP 250 PS 637: Performed by: SURGERY

## 2024-02-22 PROCEDURE — 250N000011 HC RX IP 250 OP 636: Performed by: NURSE ANESTHETIST, CERTIFIED REGISTERED

## 2024-02-22 PROCEDURE — 258N000003 HC RX IP 258 OP 636: Performed by: SURGERY

## 2024-02-22 PROCEDURE — 74240 X-RAY XM UPR GI TRC 1CNTRST: CPT

## 2024-02-22 PROCEDURE — 120N000001 HC R&B MED SURG/OB

## 2024-02-22 PROCEDURE — 250N000011 HC RX IP 250 OP 636: Performed by: SURGERY

## 2024-02-22 RX ORDER — GUAIFENESIN/DEXTROMETHORPHAN 100-10MG/5
10 SYRUP ORAL EVERY 4 HOURS PRN
Status: DISCONTINUED | OUTPATIENT
Start: 2024-02-22 | End: 2024-02-24 | Stop reason: HOSPADM

## 2024-02-22 RX ORDER — ACETAMINOPHEN 650 MG/20.3ML
1000 LIQUID ORAL EVERY 6 HOURS
Status: DISCONTINUED | OUTPATIENT
Start: 2024-02-22 | End: 2024-02-23

## 2024-02-22 RX ORDER — DEXTROSE MONOHYDRATE, SODIUM CHLORIDE, AND POTASSIUM CHLORIDE 50; 1.49; 9 G/1000ML; G/1000ML; G/1000ML
INJECTION, SOLUTION INTRAVENOUS CONTINUOUS
Status: DISCONTINUED | OUTPATIENT
Start: 2024-02-22 | End: 2024-02-23

## 2024-02-22 RX ORDER — KETOROLAC TROMETHAMINE 15 MG/ML
15 INJECTION, SOLUTION INTRAMUSCULAR; INTRAVENOUS EVERY 6 HOURS PRN
Status: DISCONTINUED | OUTPATIENT
Start: 2024-02-22 | End: 2024-02-24 | Stop reason: HOSPADM

## 2024-02-22 RX ADMIN — HYDROMORPHONE HYDROCHLORIDE 0.4 MG: 0.2 INJECTION, SOLUTION INTRAMUSCULAR; INTRAVENOUS; SUBCUTANEOUS at 01:45

## 2024-02-22 RX ADMIN — FAMOTIDINE 20 MG: 10 INJECTION INTRAVENOUS at 10:00

## 2024-02-22 RX ADMIN — ENOXAPARIN SODIUM 40 MG: 40 INJECTION SUBCUTANEOUS at 10:00

## 2024-02-22 RX ADMIN — ONDANSETRON 4 MG: 2 INJECTION INTRAMUSCULAR; INTRAVENOUS at 07:35

## 2024-02-22 RX ADMIN — ACETAMINOPHEN 1000 MG: 650 SOLUTION ORAL at 22:02

## 2024-02-22 RX ADMIN — ONDANSETRON 4 MG: 2 INJECTION INTRAMUSCULAR; INTRAVENOUS at 22:03

## 2024-02-22 RX ADMIN — HYDROMORPHONE HYDROCHLORIDE 0.4 MG: 0.2 INJECTION, SOLUTION INTRAMUSCULAR; INTRAVENOUS; SUBCUTANEOUS at 07:35

## 2024-02-22 RX ADMIN — ONDANSETRON 4 MG: 2 INJECTION INTRAMUSCULAR; INTRAVENOUS at 15:18

## 2024-02-22 RX ADMIN — FAMOTIDINE 20 MG: 10 INJECTION INTRAVENOUS at 22:03

## 2024-02-22 RX ADMIN — ACETAMINOPHEN 1000 MG: 650 SOLUTION ORAL at 15:18

## 2024-02-22 RX ADMIN — ONDANSETRON 4 MG: 2 INJECTION INTRAMUSCULAR; INTRAVENOUS at 10:00

## 2024-02-22 RX ADMIN — SODIUM CHLORIDE, POTASSIUM CHLORIDE, SODIUM LACTATE AND CALCIUM CHLORIDE: 600; 310; 30; 20 INJECTION, SOLUTION INTRAVENOUS at 03:07

## 2024-02-22 RX ADMIN — POTASSIUM CHLORIDE, DEXTROSE MONOHYDRATE AND SODIUM CHLORIDE: 150; 5; 900 INJECTION, SOLUTION INTRAVENOUS at 10:01

## 2024-02-22 RX ADMIN — POLYETHYLENE GLYCOL 3350 17 G: 17 POWDER, FOR SOLUTION ORAL at 10:00

## 2024-02-22 RX ADMIN — ACETAMINOPHEN 1000 MG: 650 SOLUTION ORAL at 10:12

## 2024-02-22 ASSESSMENT — ACTIVITIES OF DAILY LIVING (ADL)
ADLS_ACUITY_SCORE: 29
ADLS_ACUITY_SCORE: 24
ADLS_ACUITY_SCORE: 29
ADLS_ACUITY_SCORE: 29
ADLS_ACUITY_SCORE: 30
ADLS_ACUITY_SCORE: 24
ADLS_ACUITY_SCORE: 24
ADLS_ACUITY_SCORE: 29
ADLS_ACUITY_SCORE: 30
ADLS_ACUITY_SCORE: 29
ADLS_ACUITY_SCORE: 24
ADLS_ACUITY_SCORE: 30
ADLS_ACUITY_SCORE: 29
ADLS_ACUITY_SCORE: 24
ADLS_ACUITY_SCORE: 24
ADLS_ACUITY_SCORE: 29
ADLS_ACUITY_SCORE: 24

## 2024-02-22 NOTE — PLAN OF CARE
Goal Outcome Evaluation:       Patient is alert and oriented x4, afebrile, BP was elevated with a last value of 160/73.    At the beginning of shift the patient had been coughing up moderate amounts of clear phlegm. Her oxygen sats did not decrease during the coughing episodes. She was also very lethargic and was very drowsy, but would arouse to voice.     Her LOC returned to baseline at approximately 0100 hrs, the patient was able to stay awake and have conversations with staff. She also was able to use the commode and sit in the recliner for a little while as well. The patient reported increased pain, PRN dilaudid was given, see MAR.      At 0220 hrs, the patient was offered Robitussin DM to help control the phlegm, the patient declined stating that she had since been able to clear the secretions herself. The patient also denied any nausea.

## 2024-02-22 NOTE — PROGRESS NOTES
GENERAL SURGERY PROGRESS NOTE  2/22/2024      Interval history:   No acute events overnight.  Patient had some nausea and she reports throwing up little bit.  Is unclear if she was actually retching.  Nurse notes she denied scheduled Zofran last night.  Patient notes that she is tolerating ice chips but when she tries to take a big drink of water it does not seem to go down and she regurgitates it back up.   Pain is moderately well-controlled.  She has been up to the chair since surgery.  No problems with the incisions.  Denies fevers or chills.    Physical Exam:   Vital signs are reviewed and there are no significant abnormalities.     UOP over past 24 hours is 1305 mL    General: Laying in bed, appears comfortable  Abdomen: Abdomen slightly distended, incisions are clean, dry, intact with skin glue.  No surrounding erythema or induration.  Abdomen is appropriately tender.  MSK: Normal bulk and tone, no lower extremity edema  Neuro: Alert and oriented, normal speech and mentation    Labs are reviewed and are significant for sodium 140, potassium 3.8, creatinine 0.51, calcium 8.7, WBC 7.5, hemoglobin 10.8, platelet 104    No new imaging       Assessment / Plan:   Maliha Rivera is a 83 year old female who is postop day 1 from laparoscopic revision of Nissen fundoplication with lysis of adhesions.  She is hemodynamically stable.  The retching and inability to take large swallows of water is little concerning.  It does seem as though some fluid is getting through the wrap.  But, it might be tight.  This may be technical in nature but no doubt the patient is dealing with a significant mount of swelling from the extensive gastric and esophageal dissection yesterday.    Neuro/pain control: Scheduled Tylenol and Toradol, as needed narcotics available for breakthrough pain.  Scheduled Zofran  Pulmonary: Incentive spirometer, aggressive pulmonary toilet  Cardiac/hemodynamics: Hemodynamically stable, no concerns  Fluids,  electrolytes, renal: Switch to maintenance IV fluids and slow rate.  Kidney function is normal per labs  Gastrointestinal: Status post revision fundoplication, questionable dysphagia symptoms will evaluate with upper GI contrast test today  Endocrine: No concerns  Infectious disease: Afebrile, no leukocytosis, no concerns  Hematologic: Platelets down to 104 -normal seems to be around 200  Psychiatric: No concerns      JIMBO Martin MD   2/22/2024

## 2024-02-22 NOTE — PROGRESS NOTES
Interdisciplinary Discharge Planning Note    Anticipated Discharge Date: 2/23-2/24    Anticipated Discharge Location: Home    Clinical Needs Before Discharge:  stable functional status    Treatment Needs After Discharge:   None identified at this time    Potential Barriers to Discharge: None identified at this time    TASHA Gan  2/22/2024,  1:06 PM

## 2024-02-22 NOTE — PLAN OF CARE
Patient admitted onto unit at 1600.  Upon admission onto the unit she was noted be moaning and had signs of pain. PRN diluadid was administered, she did not have signs of discomfort after medication was administered.  When she awoke at 1620 she did vomit some thick clear phlegm.  She is no longer having c/o of nausea.  She does have family in the room and they are aware to let us know if she begins to dry heave, as we have medication to give her and do not want the pressure in the surgical area.  She is presently denying pain.

## 2024-02-22 NOTE — PLAN OF CARE
"Patient is A&Ox4.  She has used her call light to alert staff to her needs.  She has been encouraged to walk outside of her room to help move some of the air in her abdomen from surgery.  Her pain has been managed with present medications.  She is able to advance her diet to soft when she feels able.  Surgical sites are intact, she does have tenderness around sites.  /54 (BP Location: Right arm, Patient Position: Semi-Martinez's, Cuff Size: Adult Regular)   Pulse 83   Temp 99.6  F (37.6  C) (Tympanic)   Resp 20   Ht 1.575 m (5' 2\")   Wt 75.1 kg (165 lb 8 oz)   LMP  (LMP Unknown)   SpO2 92%   BMI 30.27 kg/m                      "

## 2024-02-22 NOTE — PHARMACY-ADMISSION MEDICATION HISTORY
Pharmacist Admission Medication History    Admission medication history is complete. The information provided in this note is only as accurate as the sources available at the time of the update.    Information Source(s): Family member, Hospital records, and [facility] via in-person    Pertinent Information: Patient was unable to communicate at this time. Medication reconciliation was done primary through note with patients PCP office visit on 02/07. Per note, patient is only taking omeprazole and pravastatin, but reviewing [employer]s patient has not picked up her statin in over a year. Talking to family, patient is only picking up her medication from [facility]. From this, patient has not been taking her statin.     Changes made to PTA medication list:  Added: None  Deleted: Pravastatin   Changed: None    Allergies reviewed with patient and updates made in EHR: yes    Medication History Completed By: Atul Kohli [facility] 2/21/2024 6:40 PM    Prior to Admission medications    Medication Sig Last Dose Taking? Auth Provider Long Term End Date   blood glucose (NO BRAND SPECIFIED) lancets standard Use to test blood sugar once daily. Dispense as covered by insurance. Dx. Code: E11.9. Unknown Yes Nicanor Mike MD     blood glucose (NO BRAND SPECIFIED) test strip Use to test blood sugar once daily. Dispense as covered by insurance. Dx. Code: E11.9. Unknown Yes Nicanor Mike MD     blood glucose monitoring (NO BRAND SPECIFIED) meter device kit Use to test blood sugar 2 times daily Unknown Yes Nicanor Mike MD     Blood Pressure Monitoring (ADULT BLOOD PRESSURE CUFF LG) KIT Use for home blood pressure monitoring as directed. Unknown Yes Nicanor Mike MD     omeprazole (PRILOSEC) 40 MG DR capsule Take 1 capsule (40 mg) by mouth daily 2/20/2024 at 1600 Yes Nicanor Mike MD     order for DME Equipment being ordered: Evaluate at [facility] for a knee brace Unknown Yes Rashid  Nicanor DON MD     order for DME Equipment being ordered: [facility] to evaluate for diabetic shoes and/or insoles Unknown Yes Nicanor Mike MD

## 2024-02-23 ENCOUNTER — APPOINTMENT (OUTPATIENT)
Dept: GENERAL RADIOLOGY | Facility: OTHER | Age: 84
DRG: 328 | End: 2024-02-23
Attending: SURGERY
Payer: MEDICARE

## 2024-02-23 LAB
ANION GAP SERPL CALCULATED.3IONS-SCNC: 11 MMOL/L (ref 7–15)
BUN SERPL-MCNC: 6.8 MG/DL (ref 8–23)
CALCIUM SERPL-MCNC: 8.5 MG/DL (ref 8.8–10.2)
CHLORIDE SERPL-SCNC: 103 MMOL/L (ref 98–107)
CREAT SERPL-MCNC: 0.47 MG/DL (ref 0.51–0.95)
DEPRECATED HCO3 PLAS-SCNC: 23 MMOL/L (ref 22–29)
EGFRCR SERPLBLD CKD-EPI 2021: >90 ML/MIN/1.73M2
ERYTHROCYTE [DISTWIDTH] IN BLOOD BY AUTOMATED COUNT: 12.1 % (ref 10–15)
GLUCOSE BLDC GLUCOMTR-MCNC: 148 MG/DL (ref 70–99)
GLUCOSE SERPL-MCNC: 164 MG/DL (ref 70–99)
HCT VFR BLD AUTO: 28.2 % (ref 35–47)
HGB BLD-MCNC: 9.9 G/DL (ref 11.7–15.7)
MCH RBC QN AUTO: 29.6 PG (ref 26.5–33)
MCHC RBC AUTO-ENTMCNC: 35.1 G/DL (ref 31.5–36.5)
MCV RBC AUTO: 84 FL (ref 78–100)
PLATELET # BLD AUTO: 66 10E3/UL (ref 150–450)
POTASSIUM SERPL-SCNC: 3.3 MMOL/L (ref 3.4–5.3)
RBC # BLD AUTO: 3.35 10E6/UL (ref 3.8–5.2)
SODIUM SERPL-SCNC: 137 MMOL/L (ref 135–145)
WBC # BLD AUTO: 6.1 10E3/UL (ref 4–11)

## 2024-02-23 PROCEDURE — 71045 X-RAY EXAM CHEST 1 VIEW: CPT | Mod: TC

## 2024-02-23 PROCEDURE — 80048 BASIC METABOLIC PNL TOTAL CA: CPT | Performed by: SURGERY

## 2024-02-23 PROCEDURE — 250N000013 HC RX MED GY IP 250 OP 250 PS 637: Performed by: SURGERY

## 2024-02-23 PROCEDURE — 258N000001 HC RX 258: Performed by: SURGERY

## 2024-02-23 PROCEDURE — 36415 COLL VENOUS BLD VENIPUNCTURE: CPT | Performed by: SURGERY

## 2024-02-23 PROCEDURE — 120N000001 HC R&B MED SURG/OB

## 2024-02-23 PROCEDURE — 250N000011 HC RX IP 250 OP 636: Performed by: SURGERY

## 2024-02-23 PROCEDURE — 85027 COMPLETE CBC AUTOMATED: CPT | Performed by: SURGERY

## 2024-02-23 RX ORDER — PRAVASTATIN SODIUM 10 MG
20 TABLET ORAL AT BEDTIME
Status: DISCONTINUED | OUTPATIENT
Start: 2024-02-23 | End: 2024-02-24 | Stop reason: HOSPADM

## 2024-02-23 RX ORDER — ACETAMINOPHEN 325 MG/1
975 TABLET ORAL EVERY 6 HOURS PRN
Status: DISCONTINUED | OUTPATIENT
Start: 2024-02-23 | End: 2024-02-24 | Stop reason: HOSPADM

## 2024-02-23 RX ADMIN — ACETAMINOPHEN 975 MG: 325 TABLET, FILM COATED ORAL at 17:15

## 2024-02-23 RX ADMIN — PRAVASTATIN SODIUM 20 MG: 10 TABLET ORAL at 21:32

## 2024-02-23 RX ADMIN — ACETAMINOPHEN 1000 MG: 650 SOLUTION ORAL at 04:56

## 2024-02-23 RX ADMIN — FAMOTIDINE 20 MG: 10 INJECTION INTRAVENOUS at 10:07

## 2024-02-23 RX ADMIN — POLYETHYLENE GLYCOL 3350 17 G: 17 POWDER, FOR SOLUTION ORAL at 10:07

## 2024-02-23 RX ADMIN — ONDANSETRON 4 MG: 2 INJECTION INTRAMUSCULAR; INTRAVENOUS at 17:07

## 2024-02-23 RX ADMIN — ENOXAPARIN SODIUM 40 MG: 40 INJECTION SUBCUTANEOUS at 10:07

## 2024-02-23 RX ADMIN — ONDANSETRON 4 MG: 2 INJECTION INTRAMUSCULAR; INTRAVENOUS at 21:31

## 2024-02-23 RX ADMIN — KETOROLAC TROMETHAMINE 15 MG: 15 INJECTION, SOLUTION INTRAMUSCULAR; INTRAVENOUS at 22:55

## 2024-02-23 RX ADMIN — FAMOTIDINE 20 MG: 10 INJECTION INTRAVENOUS at 21:37

## 2024-02-23 RX ADMIN — POTASSIUM CHLORIDE, DEXTROSE MONOHYDRATE AND SODIUM CHLORIDE: 150; 5; 900 INJECTION, SOLUTION INTRAVENOUS at 01:16

## 2024-02-23 RX ADMIN — ONDANSETRON 4 MG: 2 INJECTION INTRAMUSCULAR; INTRAVENOUS at 10:07

## 2024-02-23 RX ADMIN — ONDANSETRON 4 MG: 2 INJECTION INTRAMUSCULAR; INTRAVENOUS at 04:56

## 2024-02-23 ASSESSMENT — ACTIVITIES OF DAILY LIVING (ADL)
ADLS_ACUITY_SCORE: 25
ADLS_ACUITY_SCORE: 26
ADLS_ACUITY_SCORE: 25
ADLS_ACUITY_SCORE: 26
ADLS_ACUITY_SCORE: 25
ADLS_ACUITY_SCORE: 25
ADLS_ACUITY_SCORE: 26
ADLS_ACUITY_SCORE: 25
ADLS_ACUITY_SCORE: 26
ADLS_ACUITY_SCORE: 29
ADLS_ACUITY_SCORE: 26
ADLS_ACUITY_SCORE: 25
ADLS_ACUITY_SCORE: 26

## 2024-02-23 NOTE — PLAN OF CARE
Goal Outcome Evaluation:      Plan of Care Reviewed With: patient    Overall Patient Progress: improving    A/O, VSS.  Up with min assist of one. Denies pain this AM, however, states she does have pain with coughing or movement. C/O mild nausea, received scheduled Zofran. Diet advanced to soft, tolerated thus far. Up and ambulated in [place] this AM. Dr Beatty saw this AM and will come back to see pt for discharge later today       Increased c/o nausea this afternoon and general c/o not feeling well.  Continues to receive scheduled Zofran. BS Hyperactive. Up with SBA of one and took a shower this afternoon.  Febrile at 1700, 101.3, received prn Tylenol 975 mg for that and c/o pain. Will continue to be inpatient at this time. Edilma Menjivar RN on 2/23/2024 at 7:31 PM

## 2024-02-23 NOTE — PLAN OF CARE
SAFETY CHECKLIST  ID Bands and Risk clasps correct and in place (DNR, Fall risk, Allergy, Latex, Limb):  Yes  All Lines Reconciled and labeled correctly: Yes  Whiteboard updated:Yes  Environmental interventions: Yes

## 2024-02-23 NOTE — PLAN OF CARE
Goal Outcome Evaluation:      Plan of Care Reviewed With: patient    Overall Patient Progress: improving    Outcome Evaluation: VSS, 5 trocar sites,    Pt is vitally stable, A&O and pleasant. She stated her pain was tolerable and didn't want anything except scheduled tylenol. Trocar sites are clean, dry, and intact. Pt rested comfortably throughout the night. All questions and concerns addressed.      Deidra Cunningham RN on 2/23/2024 at 2:30 AM

## 2024-02-23 NOTE — PLAN OF CARE
"Pt having 0 pain att.  Up to chair at BR with SBA  Problem: Adult Inpatient Plan of Care  Goal: Plan of Care Review  Description: The Plan of Care Review/Shift note should be completed every shift.  The Outcome Evaluation is a brief statement about your assessment that the patient is improving, declining, or no change.  This information will be displayed automatically on your shift  note.  Outcome: Progressing  Goal: Patient-Specific Goal (Individualized)  Description: You can add care plan individualizations to a care plan. Examples of Individualization might be:  \"Parent requests to be called daily at 9am for status\", \"I have a hard time hearing out of my right ear\", or \"Do not touch me to wake me up as it startles  me\".  Outcome: Progressing  Goal: Absence of Hospital-Acquired Illness or Injury  Outcome: Progressing  Goal: Optimal Comfort and Wellbeing  Outcome: Progressing  Goal: Readiness for Transition of Care  Outcome: Progressing   Goal Outcome Evaluation:                        "

## 2024-02-23 NOTE — PROGRESS NOTES
Blood pressure upon AM check was 183/80, pt had just been up to the bathroom, Waited until Pt was settled in bed and rechecked BP. It came down to 178/76. No PRN needed at this time per protocol.     Deidra Cunningham RN on 2/23/2024 at 5:17 AM

## 2024-02-23 NOTE — PROGRESS NOTES
Chart reviewed per MST screen: pt reported wt loss with decreased appetite. Pt admitted with revision of Nissen fundoplication, post op day 2. Tolerating clear liquid diet, able to advance to soft as tolerated. Wt noted to have increased in past month and is consistent with wt x 9 months ago. Will monitor diet tolerance and advancement. Follow up in 1-5 days.   Diet: [facility] soft  Intakes: monitor   Wt Readings from Last 10 Encounters:   02/23/24 74.1 kg (163 lb 4.8 oz)   02/07/24 73.5 kg (162 lb)   02/02/24 73.9 kg (163 lb)   01/16/24 71.7 kg (158 lb)   11/30/23 71.8 kg (158 lb 3.2 oz)   10/04/23 70.8 kg (156 lb)   09/25/23 70.8 kg (156 lb)   07/31/23 72.7 kg (160 lb 3.2 oz)   07/21/23 75.7 kg (166 lb 14.2 oz)   07/17/23 75.8 kg (167 lb)   Olivia Chavez RD on 2/23/2024 at 9:58 AM

## 2024-02-23 NOTE — PROGRESS NOTES
GENERAL SURGERY PROGRESS NOTE  2/23/2024      Interval history:   No acute events overnight.  Pain is well-controlled with oral Tylenol only.  Mild nausea, tolerating clears well.  Denies dysphagia symptoms.  Denies reflux.  Complains of some bloating abdominal pain.  No vomiting.  Voiding Pursell and passing gas, no bowel movement since prior to surgery.  Woke up sweating last night, febrile to 100.4 F.    Physical Exam:   Vital signs are reviewed and patient had 1 febrile episode, but is back to normal this morning.  Mildly hypertensive    UOP over past 24 hours is 400 mL x 1 unrecorded void    General: Sitting up in chair, appears comfortable  Abdomen: Abdomen slightly distended, incisions are clean, dry, intact with skin glue.  No surrounding erythema or induration.  Abdomen is appropriately tender.  MSK: Normal bulk and tone, no lower extremity edema  Neuro: Alert and oriented, normal speech and mentation    No new labs    Upper GI contrast study, 2/22: Postsurgical changes of Nissen fundoplication. No evidence of  extravasation of contrast. No suspicious focal mucosal abnormality of  the distal esophagus. Expected mild postsurgical narrowing at the GE  junction, likely secondary to postsurgical edema. There is prompt  passage of contrast from the distal esophagus into the stomach without  significant delay. Frequent tertiary contractions in the distal  esophagus. Gaseous distention of the stomach. Visualized portions of  the stomach and proximal small bowel are unremarkable.        Assessment / Plan:   Maliha Rivera is a 83 year old female who is postop day 2 from laparoscopic revision of Nissen fundoplication with lysis of adhesions.  She is hemodynamically stable.  Dysphagia symptoms have improved, patient states this may have been more throat irritation than actual dysphagia.    Neuro/pain control: Scheduled Tylenol and Toradol, as needed narcotics available for breakthrough pain.  Scheduled  Zofran  Pulmonary: Incentive spirometer, aggressive pulmonary toilet  Cardiac/hemodynamics: Hemodynamically stable, no concerns.  Home statin  Fluids, electrolytes, renal: Saline lock IV fluids  Gastrointestinal: Status post revision fundoplication, advance to soft diet  Endocrine: No concerns  Infectious disease: Afebrile, no leukocytosis, no concerns  Hematologic: No concerns  Psychiatric: No concerns      JIMBO Martin MD   2/24/2024

## 2024-02-24 VITALS
SYSTOLIC BLOOD PRESSURE: 152 MMHG | BODY MASS INDEX: 29.02 KG/M2 | HEIGHT: 62 IN | RESPIRATION RATE: 18 BRPM | HEART RATE: 66 BPM | DIASTOLIC BLOOD PRESSURE: 55 MMHG | WEIGHT: 157.7 LBS | TEMPERATURE: 99.5 F | OXYGEN SATURATION: 97 %

## 2024-02-24 LAB
ALBUMIN UR-MCNC: NEGATIVE MG/DL
APPEARANCE UR: CLEAR
BILIRUB UR QL STRIP: NEGATIVE
COLOR UR AUTO: NORMAL
GLUCOSE UR STRIP-MCNC: NEGATIVE MG/DL
HGB UR QL STRIP: NEGATIVE
HOLD SPECIMEN: NORMAL
KETONES UR STRIP-MCNC: NEGATIVE MG/DL
LEUKOCYTE ESTERASE UR QL STRIP: NEGATIVE
NITRATE UR QL: NEGATIVE
PH UR STRIP: 6 [PH] (ref 5–9)
PLATELET # BLD AUTO: 75 10E3/UL (ref 150–450)
SP GR UR STRIP: 1.01 (ref 1–1.03)
UROBILINOGEN UR STRIP-MCNC: NORMAL MG/DL

## 2024-02-24 PROCEDURE — 250N000011 HC RX IP 250 OP 636: Performed by: SURGERY

## 2024-02-24 PROCEDURE — 81003 URINALYSIS AUTO W/O SCOPE: CPT | Performed by: SURGERY

## 2024-02-24 PROCEDURE — 85049 AUTOMATED PLATELET COUNT: CPT | Performed by: SURGERY

## 2024-02-24 PROCEDURE — 250N000013 HC RX MED GY IP 250 OP 250 PS 637: Performed by: SURGERY

## 2024-02-24 PROCEDURE — 36415 COLL VENOUS BLD VENIPUNCTURE: CPT | Performed by: SURGERY

## 2024-02-24 RX ORDER — PRAVASTATIN SODIUM 20 MG
20 TABLET ORAL AT BEDTIME
Qty: 60 TABLET | Refills: 3 | Status: SHIPPED | OUTPATIENT
Start: 2024-02-24

## 2024-02-24 RX ADMIN — ONDANSETRON 4 MG: 2 INJECTION INTRAMUSCULAR; INTRAVENOUS at 09:22

## 2024-02-24 RX ADMIN — ACETAMINOPHEN 975 MG: 325 TABLET, FILM COATED ORAL at 00:00

## 2024-02-24 RX ADMIN — ENOXAPARIN SODIUM 40 MG: 40 INJECTION SUBCUTANEOUS at 09:22

## 2024-02-24 RX ADMIN — POLYETHYLENE GLYCOL 3350 17 G: 17 POWDER, FOR SOLUTION ORAL at 09:22

## 2024-02-24 RX ADMIN — FAMOTIDINE 20 MG: 10 INJECTION INTRAVENOUS at 09:22

## 2024-02-24 RX ADMIN — ONDANSETRON 4 MG: 2 INJECTION INTRAMUSCULAR; INTRAVENOUS at 03:47

## 2024-02-24 ASSESSMENT — ACTIVITIES OF DAILY LIVING (ADL)
ADLS_ACUITY_SCORE: 26
ADLS_ACUITY_SCORE: 24
ADLS_ACUITY_SCORE: 26
ADLS_ACUITY_SCORE: 24
ADLS_ACUITY_SCORE: 24
ADLS_ACUITY_SCORE: 26

## 2024-02-24 NOTE — PLAN OF CARE
Discharge Note      Data:  Maliha ROBBIE Miguel discharged to home at 1205 via ambulation. Accompanied by staff.    Action:  Written discharge/follow-up instructions were provided to patient. Prescriptions sent to patients preferred pharmacy. All belongings sent with patient.    Response:  Maliha verbalized understanding of discharge instructions, reason for discharge, and necessary follow-up appointments.

## 2024-02-24 NOTE — PHARMACY
[facility]  [facility] of [facility]  [address] [place] [address]    February 24, 2024    Dear Pharmacist,    Your customer, Maliha Rivera, born on 1940, was recently discharged from [facility].  We have updated her medication list and want to alert you to the following:       Review of your medicines        START taking        Dose / Directions   pravastatin 20 MG tablet  Commonly known as: PRAVACHOL  Used for: Hyperlipidemia, unspecified hyperlipidemia type      Dose: 20 mg  Take 1 tablet (20 mg) by mouth at bedtime  Quantity: 60 tablet  Refills: 3            CONTINUE these medicines which have NOT CHANGED        Dose / Directions   Adult Blood Pressure Cuff Lg Kit  Used for: Benign essential hypertension      Use for home blood pressure monitoring as directed.  Quantity: 1 kit  Refills: 0     blood glucose lancets standard  Commonly known as: NO BRAND SPECIFIED  Used for: Type 2 diabetes mellitus without complication, without long-term current use of insulin (H)      Use to test blood sugar once daily. Dispense as covered by insurance. Dx. Code: E11.9.  Quantity: 100 each  Refills: 11     blood glucose monitoring meter device kit  Commonly known as: NO BRAND SPECIFIED  Used for: Type 2 diabetes mellitus without complication, without long-term current use of insulin (H)      Use to test blood sugar 2 times daily  Quantity: 1 kit  Refills: 0     blood glucose test strip  Commonly known as: NO BRAND SPECIFIED  Used for: Type 2 diabetes mellitus without complication, without long-term current use of insulin (H)      Use to test blood sugar once daily. Dispense as covered by insurance. Dx. Code: E11.9.  Quantity: 100 strip  Refills: 11     order for DME  Used for: Loose total knee arthroplasty, subsequent encounter, right      Equipment being ordered: Evaluate at [facility] for a knee brace  Quantity: 1 Units  Refills: 0     order for  DME  Used for: Type 2 diabetes mellitus without complication, without long-term current use of insulin (H), Peripheral polyneuropathy, Arthritis of foot, left      Equipment being ordered: [employer] to evaluate for diabetic shoes and/or insoles  Quantity: 1 Units  Refills: prn            STOP taking      omeprazole 40 MG DR capsule  Commonly known as: PriLOSEC                  Where to get your medicines        These medications were sent to [facility] [address] [place] [id number]      Phone: [phone]   pravastatin 20 MG tablet         We also reviewed Maliha Rivera's allergy list and updated it as needed:  Allergies: Cefazolin and Atorvastatin    Thank you for continuing to care for Maliha Rivera.  We look forward to working together with you in the future.    Sincerely,  Ari Bishop, [facility] and [facility]

## 2024-02-24 NOTE — PHARMACY - DISCHARGE MEDICATION RECONCILIATION AND EDUCATION
Pharmacy:  Discharge Counseling and Medication Reconciliation    Maliha R Miguel  [address] [address] [address]  [phone] (home)   83 year old female  PCP: Nicanor Mike    Allergies: Cefazolin and Atorvastatin    Discharge Counseling:    Pharmacist met with patient today to review the medication portion of the After Visit Summary (with an emphasis on NEW medications) and to address patient's questions/concerns.    Summary of Education: Counseled patient on new medication of Pravastatin- patient reports she has been on this previously, has no questions regarding initiation of therapy at this time. Also counseled patient that Dr. Martin discontinued Omeprazole.    Materials Provided:  MedCounselor sheets printed from Clinical Pharmacology on: Pravastatin    Discharge Medication Reconciliation:    It has been determined that the patient has an adequate supply of medications available or which can be obtained from the patient's preferred pharmacy, which she has confirmed as: [facility] [An updated medication list will be faxed to the patient's pharmacy.]    Thank you for the consult.    Ari Bishop [facility]........February 24, 2024 11:07 AM

## 2024-02-24 NOTE — PLAN OF CARE
Patient is alert and orientated x4. VSS. BP has been running high. No interventions needed. LS clear. Denies nausea and pain. Surgical incisions x5 on abdomen. Open to air, clean, dry, intact. Mild fever at start of shift, Afebrile at end of shift. SBA.     Goal Outcome Evaluation:      Plan of Care Reviewed With: patient    Overall Patient Progress: improvingOverall Patient Progress: improving

## 2024-02-24 NOTE — PROGRESS NOTES
GENERAL SURGERY PROGRESS NOTE  2/24/2024      Interval history:   No acute events overnight.  Patient was persistently febrile yesterday.  States she felt well and workup for fevers unremarkable.  Temps have been normal overnight. she is tolerating soft foods and bowel function is present.  Pain is well-controlled.    Physical Exam:   Vital signs are reviewed and patient is currently afebrile, last recorded temperature 100.4 F at approximately 2100. mildly hypertensive    UOP over past 24 hours is 400 mL x 1 unrecorded void    General: Sitting up in bed, appears comfortable  Abdomen: Abdomen slightly distended, incisions are clean, dry, intact with skin glue.  No surrounding erythema or induration.  Abdomen is appropriately tender.  MSK: Normal bulk and tone, no lower extremity edema  Neuro: Alert and oriented, normal speech and mentation    Sodium 137, potassium 3.3, creatinine 0.47, WBC 6.1, hemoglobin 9.9, platelets 75, UA negative    CXR: No evidence of effusion or infiltrate, slightly elevated right hemidiaphragm      Assessment / Plan:   Maliha Rivera is a 83 year old female who is postop day 3 from laparoscopic revision of Nissen fundoplication with lysis of adhesions.  She is hemodynamically stable, fevers have abated.     Neuro/pain control: Scheduled Tylenol and Toradol, as needed narcotics available for breakthrough pain.  Scheduled Zofran  Pulmonary: Incentive spirometer, aggressive pulmonary toilet  Cardiac/hemodynamics: Slightly hypertensive home statin  Fluids, electrolytes, renal: Saline lock IV fluids  Gastrointestinal: Status post revision fundoplication, soft diet  Endocrine: No concerns  Infectious disease: Afebrile, no leukocytosis, no concerns  Hematologic: No concerns  Psychiatric: No concerns    Discharge home today    JIMBO Martin MD   2/24/2024     Johana Macomb 
 
 
 [address] [phone] Patient: Tasha Starks MRN: [id number] :1969 Visit Information Date & Time Provider Department Dept. Phone Encounter #  
 2018  2:00 PM Amelia Oliver  [address] [place] [address] [phone] [id number] Upcoming Health Maintenance Date Due  
 EYE EXAM RETINAL OR DILATED Q1 1979 Pneumococcal 19-64 Medium Risk (1 of 1 - PPSV23) 1988 DTaP/Tdap/Td series (1 - Tdap) 1990 PAP AKA CERVICAL CYTOLOGY 1990 MICROALBUMIN Q1 2015 HEMOGLOBIN A1C Q6M 3/19/2018 Influenza Age 5 to Adult 2018 LIPID PANEL Q1 2018 FOOT EXAM Q1 2019 Allergies as of 2018  Review Complete On: 2018 By: Dilcia Alonso LPN Severity Noted Reaction Type Reactions Aspirin  2014    Unknown (comments) Current Immunizations  Never Reviewed No immunizations on file. Not reviewed this visit You Were Diagnosed With   
  
 Codes Comments Diabetic peripheral neuropathy (HCC)     ICD-10-CM: E11.42 
ICD-9-CM: 250.60, 357.2 Vitals BP Pulse Temp Resp Height(growth percentile) Weight(growth percentile) (!) 159/95 (BP 1 Location: Left arm, BP Patient Position: Sitting) 93 98 °F (36.7 °C) (Oral) 18 5' 2\" (1.575 m) 151 lb (68.5 kg) LMP SpO2 BMI OB Status Smoking Status 2018 99% 27.62 kg/m2 Having regular periods Never Smoker Vitals History BMI and BSA Data Body Mass Index Body Surface Area  
 27.62 kg/m 2 1.73 m 2 Preferred Pharmacy Pharmacy Name Phone [address] [address] [address] [phone] Your Updated Medication List  
  
   
This list is accurate as of 18  2:46 PM.  Always use your most recent med list.  
  
  
  
  
 atorvastatin 20 mg tablet Commonly known as:  LIPITOR Take 1 tablet by mouth daily. butalbital-acetaminophen  mg tablet Commonly known as:  Annie Sher Take 2 Tabs by mouth every eight (8) hours as needed. glyBURIDE 5 mg tablet Commonly known as:  Meme Funk TAKE TWO TABLET BY MOUTH TWICE DAILY  
  
 hydroCHLOROthiazide 25 mg tablet Commonly known as:  HYDRODIURIL Take 1 Tab by mouth daily. lisinopril 5 mg tablet Commonly known as:  Merilynn [place] Take 1 Tab by mouth daily. metFORMIN 1,000 mg tablet Commonly known as:  GLUCOPHAGE Take 1,000 mg by mouth two (2) times daily (with meals). metoprolol tartrate 25 mg tablet Commonly known as:  LOPRESSOR Take 1 Tab by mouth two (2) times a day. SITagliptin 100 mg tablet Commonly known as:  Hanna Fake Take 1 Tab by mouth daily. To-Do List   
 07/25/2018 3:00 PM  
  Appointment with Becky Appiah PT at [address] [place] [address] ([phone]) Please remember to arrive at the hospital at least 30 minutes prior to your scheduled appointment time. When you come in for your appointment, please be sure to bring the therapy prescription the doctor gave you, your insurance card, and a list of the medicines you are taking. Also, please remember to wear comfortable, loose- fitting clothes. We look forward to seeing you. 07/27/2018 11:00 AM  
  Appointment with Becky Appiah PT at [address] [place] [address] ([phone]) Please remember to arrive at the hospital at least 30 minutes prior to your scheduled appointment time. When you come in for your appointment, please be sure to bring the therapy prescription the doctor gave you, your insurance card, and a list of the medicines you are taking. Also, please remember to wear comfortable, loose- fitting clothes. We look forward to seeing you. 07/30/2018 1:30 PM  
  Appointment with Becky Appiah PT at [address] [place] [address] ([phone]) Please remember to arrive at the hospital at least 30 minutes prior to your scheduled appointment time. When you come in for your appointment, please be sure to bring the therapy prescription the doctor gave you, your insurance card, and a list of the medicines you are taking. Also, please remember to wear comfortable, loose- fitting clothes. We look forward to seeing you. 08/01/2018 1:30 PM  
  Appointment with Melva Paez PT at [address] ([phone]) Please remember to arrive at the hospital at least 30 minutes prior to your scheduled appointment time. When you come in for your appointment, please be sure to bring the therapy prescription the doctor gave you, your insurance card, and a list of the medicines you are taking. Also, please remember to wear comfortable, loose- fitting clothes. We look forward to seeing you. Introducing [facility] SERVICES! [facility] introduces flux [employer] patient portal. Now you can access parts of your medical record, email your doctor's office, and request medication refills online. 1. In your internet browser, go to [email or web address] 2. Click on the First Time User? Click Here link in the Sign In box. You will see the New Member Sign Up page. 3. Enter your flux - neutrinity Access Code exactly as it appears below. You will not need to use this code after youve completed the sign-up process. If you do not sign up before the expiration date, you must request a new code. · flux - neutrinity Access Code: [facility] Expires: 8/30/2018  4:08 PM 
 
4. Enter the last four digits of your Social Security Number (xxxx) and Date of Birth (mm/dd/yyyy) as indicated and click Submit. You will be taken to the next sign-up page. 5. Create a ePrept ID. This will be your flux - neutrinity login ID and cannot be changed, so think of one that is secure and easy to remember. 6. Create a flux [employer] password. You can change your password at any time. 7. Enter your Password Reset Question and Answer.  This can be used at a later time if you forget your password. 8. Enter your e-mail address. You will receive e-mail notification when new information is available in [address] 9. Click Sign Up. You can now view and download portions of your medical record. 10. Click the Download Summary menu link to download a portable copy of your medical information. If you have questions, please visit the Frequently Asked Questions section of the [employer] website. Remember, [employer] is NOT to be used for urgent needs. For medical emergencies, dial 911. Now available from your iPhone and Android! Please provide this summary of care documentation to your next provider. Your primary care clinician is listed as Kasia Iraheta. If you have any questions after today's visit, please call [phone].

## 2024-02-26 ENCOUNTER — PATIENT OUTREACH (OUTPATIENT)
Dept: FAMILY MEDICINE | Facility: OTHER | Age: 84
End: 2024-02-26
Payer: COMMERCIAL

## 2024-02-26 NOTE — TELEPHONE ENCOUNTER
Surgical. Patient discharged with surgical follow-up only. No TCM call required per policy.   Shirley Freeman RN on 2/26/2024 at 8:02 AM

## 2024-02-28 ENCOUNTER — TELEPHONE (OUTPATIENT)
Dept: FAMILY MEDICINE | Facility: OTHER | Age: 84
End: 2024-02-28
Payer: COMMERCIAL

## 2024-02-28 DIAGNOSIS — I10 BENIGN ESSENTIAL HYPERTENSION: Primary | ICD-10-CM

## 2024-02-28 RX ORDER — LOSARTAN POTASSIUM 50 MG/1
50 TABLET ORAL DAILY
Qty: 30 TABLET | Refills: 11 | Status: SHIPPED | OUTPATIENT
Start: 2024-02-28

## 2024-02-28 NOTE — TELEPHONE ENCOUNTER
I sent a blood pressure medication in to [facility].  Losartan 50 mg, one tab daily.  It may take a few weeks for maximum effect.  Keep following blood pressure at home.  Nicanor Mike MD on 2/28/2024 at 11:49 AM

## 2024-02-28 NOTE — TELEPHONE ENCOUNTER
The patient is calling about her blood pressure. She has been taking it and it has been going up and down but is sometimes really high.  Dr Mike wanted her to call with blood pressure results.

## 2024-02-28 NOTE — TELEPHONE ENCOUNTER
The patient has been checking her blood pressure. Yesterday afternoon it was 140/72 and then 151/78. Last evening it was 171/87 and 200/103 and at bedtime it was 212/101. She checked her temperature and it was 100 and 100.6 before bed. This morning her BP was 144/77 and now 154/88. Her temperature this morning was 98.3.  Olivia Jean-Baptiste LPN..................2/28/2024   9:39 AM

## 2024-02-28 NOTE — TELEPHONE ENCOUNTER
After the patient's name and date of birth was verified, the patient was told the below information.  Olivia Jean-Baptiste LPN..................2/28/2024   12:30 PM

## 2024-03-07 ENCOUNTER — OFFICE VISIT (OUTPATIENT)
Dept: FAMILY MEDICINE | Facility: OTHER | Age: 84
End: 2024-03-07
Attending: SURGERY
Payer: COMMERCIAL

## 2024-03-07 ENCOUNTER — OFFICE VISIT (OUTPATIENT)
Dept: SURGERY | Facility: OTHER | Age: 84
End: 2024-03-07
Attending: SURGERY
Payer: MEDICARE

## 2024-03-07 VITALS
DIASTOLIC BLOOD PRESSURE: 80 MMHG | SYSTOLIC BLOOD PRESSURE: 142 MMHG | WEIGHT: 156.8 LBS | HEART RATE: 70 BPM | TEMPERATURE: 97.5 F | RESPIRATION RATE: 18 BRPM | BODY MASS INDEX: 28.68 KG/M2 | OXYGEN SATURATION: 96 %

## 2024-03-07 VITALS
RESPIRATION RATE: 16 BRPM | OXYGEN SATURATION: 100 % | TEMPERATURE: 97.1 F | HEIGHT: 62 IN | WEIGHT: 156.75 LBS | SYSTOLIC BLOOD PRESSURE: 134 MMHG | DIASTOLIC BLOOD PRESSURE: 72 MMHG | HEART RATE: 73 BPM | BODY MASS INDEX: 28.84 KG/M2

## 2024-03-07 DIAGNOSIS — Z98.890 POSTOPERATIVE STATE: Primary | ICD-10-CM

## 2024-03-07 DIAGNOSIS — I10 BENIGN ESSENTIAL HYPERTENSION: ICD-10-CM

## 2024-03-07 DIAGNOSIS — R05.1 ACUTE COUGH: Primary | ICD-10-CM

## 2024-03-07 PROCEDURE — 99213 OFFICE O/P EST LOW 20 MIN: CPT | Mod: 24 | Performed by: FAMILY MEDICINE

## 2024-03-07 PROCEDURE — 99024 POSTOP FOLLOW-UP VISIT: CPT | Performed by: SURGERY

## 2024-03-07 PROCEDURE — G0463 HOSPITAL OUTPT CLINIC VISIT: HCPCS

## 2024-03-07 PROCEDURE — G0463 HOSPITAL OUTPT CLINIC VISIT: HCPCS | Mod: 27

## 2024-03-07 RX ORDER — BENZONATATE 200 MG/1
200 CAPSULE ORAL 3 TIMES DAILY PRN
Qty: 30 CAPSULE | Refills: 1 | Status: SHIPPED | OUTPATIENT
Start: 2024-03-07

## 2024-03-07 ASSESSMENT — PAIN SCALES - GENERAL
PAINLEVEL: NO PAIN (0)
PAINLEVEL: NO PAIN (0)

## 2024-03-07 NOTE — PROGRESS NOTES
Maliha Rivera presents to clinic for follow up after undergoing laparoscopic revision of nissen fundoplication and lysis of adhesions. She is doing well. Denies dysphagia. Some foods are still going down slowly, but she denies nausea, regurgitation or reflux. She denies significant gas bloat. No issues with the incisions.   Patient complains of diminished hearing and voice change.       BP (!) 142/80 (BP Location: Right arm, Patient Position: Sitting, Cuff Size: Adult Regular)   Pulse 70   Temp 97.5  F (36.4  C) (Temporal)   Resp 18   Wt 71.1 kg (156 lb 12.8 oz)   LMP  (LMP Unknown)   SpO2 96%   BMI 28.68 kg/m       Abdomen is soft and non tender. Some bruising around the epigastgric incision, but otherwise all incisions are clean and intact.       Maliha Rivera is a 83F who is s/p aparoscopic revision of nissen fundoplication and lysis of adhesions. She appears to be doing well from a surgical standpoint. She is likely still experiencing some postoperative swelling, which should improve with time. I am not sure why she would be having diminished hearing or voice change. She is following up with PCP for elevated blood pressures. Will plan for the patient to follow up with me in two weeks and we will address the hearing, if it has not improved. Perhaps ENT referral for this and voice change. Would expect trauma from ET tube to be improving at this point. Unsure what may be causing hearing change.       Apolinar Martin MD

## 2024-03-07 NOTE — NURSING NOTE
"Chief Complaint   Patient presents with    Post-Op - General Surgery       Initial BP (!) 142/82 (BP Location: Right arm, Patient Position: Sitting, Cuff Size: Adult Regular)   Pulse 70   Temp 97.5  F (36.4  C) (Temporal)   Resp 18   Wt 71.1 kg (156 lb 12.8 oz)   LMP  (LMP Unknown)   SpO2 96%   BMI 28.68 kg/m   Estimated body mass index is 28.68 kg/m  as calculated from the following:    Height as of 2/21/24: 1.575 m (5' 2\").    Weight as of this encounter: 71.1 kg (156 lb 12.8 oz).    Medication Review: complete    The next two questions are to help us understand your food security.  If you are feeling you need any assistance in this area, we have resources available to support you today.          2/7/2024   SDOH- Food Insecurity   Within the past 12 months, did you worry that your food would run out before you got money to buy more? N   Within the past 12 months, did the food you bought just not last and you didn t have money to get more? N       Health Care Directive:  Patient does not have a Health Care Directive or Living Will: Discussed advance care planning with patient; however, patient declined at this time.    Marry Jackson LPN      "

## 2024-03-07 NOTE — PROGRESS NOTES
"Nursing Notes:   Olivia Jean-Baptiste LPN  3/7/2024 10:02 AM  Sign at exiting of workspace  Chief Complaint   Patient presents with    RECHECK     Elevated BP   She is here today to follow up on her elevated BP. She has also has a cough since she had surgery on February 21.  Olivia Jena-Baptiste LPN..................3/7/2024   10:02 AM      Initial /72   Pulse 73   Temp 97.1  F (36.2  C) (Temporal)   Resp 16   Ht 1.575 m (5' 2\")   Wt 71.1 kg (156 lb 12 oz)   LMP  (LMP Unknown)   SpO2 100%   Breastfeeding No   BMI 28.67 kg/m   Estimated body mass index is 28.67 kg/m  as calculated from the following:    Height as of this encounter: 1.575 m (5' 2\").    Weight as of this encounter: 71.1 kg (156 lb 12 oz).  Medication Review: complete    The next two questions are to help us understand your food security.  If you are feeling you need any assistance in this area, we have resources available to support you today.          2/7/2024   SDOH- Food Insecurity   Within the past 12 months, did you worry that your food would run out before you got money to buy more? N   Within the past 12 months, did the food you bought just not last and you didn t have money to get more? N         Health Care Directive:  Patient does not have a Health Care Directive or Living Will: Discussed advance care planning with patient; however, patient declined at this time.    Olivia Jean-Baptiste LPN      SUBJECTIVE:  Maliha Rivera  is a 83 year old female who was in the clinic today following up with her surgeon and they checked her blood pressure.  It has been okay.  She is on Cozaar 50 mg daily.  She had a bit of a cough and wondered if that was due to her blood pressure medicine.  She has had a cough since surgery.  She hears her breath breathing in through her nose.  She has had some low grade temp. Her cough is dry. She has no major drainage from her nose.  Her throat is scratchy.      Past Medical, Family, and Social History reviewed " and updated as noted below.   ROS is negative except as noted above       Allergies   Allergen Reactions    Cefazolin Nausea and Vomiting     Tolerated 7/19/23 without N/V    Atorvastatin Muscle Pain (Myalgia)   ,   Family History   Problem Relation Age of Onset    Heart Disease Mother         Heart Disease    Heart Disease Father         Heart Disease    Heart Disease Sister         Heart Disease    Cancer Other         Cancer,female cancer   ,   Current Outpatient Medications   Medication    benzonatate (TESSALON) 200 MG capsule    blood glucose (NO BRAND SPECIFIED) lancets standard    blood glucose (NO BRAND SPECIFIED) test strip    blood glucose monitoring (NO BRAND SPECIFIED) meter device kit    Blood Pressure Monitoring (ADULT BLOOD PRESSURE CUFF LG) KIT    losartan (COZAAR) 50 MG tablet    order for DME    order for DME    pravastatin (PRAVACHOL) 20 MG tablet     No current facility-administered medications for this visit.   ,   Past Medical History:   Diagnosis Date    Encounter for screening for cardiovascular disorders     4/8/15,Lexiscan cardiolite Phill Ruiz   ,   Patient Active Problem List    Diagnosis Date Noted    Gastroesophageal reflux disease without esophagitis 07/19/2023     Priority: Medium    Sliding hiatal hernia 03/29/2023     Priority: Medium    Postoperative anemia due to acute blood loss 04/19/2019     Priority: Medium    Benign essential hypertension 04/18/2018     Priority: Medium    Lumbar stenosis 03/01/2018     Priority: Medium    Lumbar radiculopathy 01/23/2018     Priority: Medium    Status post total bilateral knee replacement 03/31/2017     Priority: Medium    Primary osteoarthritis of left knee 03/23/2017     Priority: Medium    DM w/o complication type II (H) 09/22/2015     Priority: Medium     Overview:   3/2014:  A1c fine off metformin - will discontinue as patient does not want to take it.  She is taking fish oil + other ingredients she states helps with DM  On  ASA  Patient does not want to take lisinopril  Does not tolerate statins and has discontinued pravastatin; try niacin  No retinopathy on eye exam 4/2013 and 12/2014  Overview:   Overview:   3/2014:  A1c fine off metformin - will discontinue as patient does not want to take it.  She is taking fish oil + other ingredients she states helps with DM  On ASA  Patient does not want to take lisinopril  Does not tolerate statins and has discontinued pravastatin; try niacin  No retinopathy on eye exam 4/2013 and 12/2014      Neurofibroma of thigh 09/22/2015     Priority: Medium    Esophageal reflux 12/13/2013     Priority: Medium    Dysphagia 12/13/2013     Priority: Medium    Healthcare maintenance 03/26/2013     Priority: Medium     Overview:   Calcium: takes supplement  Colonoscopy: WNL 2011  DEXA: schedule - if has osteoporosis will have to look at risk of fosamax on her reflux.  Lipids:; declines statin - will try niacin and is on fish oil.  Mammogram:schedule  Pap: 3/2013 WNL - does not need again.  Tdap:3/2013  Vitamin D: takes supplement  pneumovax done.  Consider zoster vaccine.      Degenerative arthritis of knee 10/21/2010     Priority: Medium     Overview:   IMO Update 10/11      Cervicalgia 05/11/2010     Priority: Medium     Overview:   IMO Update 10/11      DDD (degenerative disc disease), lumbar 01/15/2010     Priority: Medium    Primary localized osteoarthrosis, lower leg 01/31/2008     Priority: Medium    Hyperlipidemia 07/22/2004     Priority: Medium     Overview:   IMO Update 10/11  Overview:   Overview:   IMO Update 10/11     ,   Past Surgical History:   Procedure Laterality Date    APPENDECTOMY OPEN      No Comments Provided    ARTHROSCOPY KNEE      2007,Joseluis Thomas M.D.    COLONOSCOPY      2003,2011,normal    ESOPHAGOSCOPY, GASTROSCOPY, DUODENOSCOPY (EGD), COMBINED      2/3/2016    ESOPHAGOSCOPY, GASTROSCOPY, DUODENOSCOPY (EGD), COMBINED N/A 3/28/2023    Procedure: Esophagoscopy, gastroscopy,  "duodenoscopy (EGD), combined;  Surgeon: Apolinar Martin MD;  Location:  OR    ESOPHAGOSCOPY, GASTROSCOPY, DUODENOSCOPY (EGD), COMBINED N/A 10/4/2023    Procedure: ESOPHAGOGASTRODUODENOSCOPY, WITH BIOPSY;  Surgeon: Apolinar Martin MD;  Location:  OR    LAPAROSCOPIC CHOLECYSTECTOMY      3/11/01,Dr. Salas    LAPAROSCOPIC NISSEN FUNDOPLICATION N/A 7/19/2023    Procedure: FUNDOPLICATION, NISSEN, LAPAROSCOPIC WITH LYSIS OF ADHESIONS;  Surgeon: Apolinar Martin MD;  Location: [facility] OR    LAPAROSCOPIC NISSEN FUNDOPLICATION N/A 2/21/2024    Procedure: FUNDOPLICATION, NISSEN, LAPAROSCOPIC - REVISION;  Surgeon: Apolinar Martin MD;  Location: [facility] OR    LAPAROSCOPIC TUBAL LIGATION      No Comments Provided    lumbar decompression L4-5  02/28/2018    Dr. France    OTHER SURGICAL HISTORY      1996,[id number],CATARACT EXTRACTION W/  INTRAOCULAR LENS IMPLANT,Bilateral    OTHER SURGICAL HISTORY      2001,23452.0,[facility] ERCP BILIARY OR PANC DUCT STENT EXCHANGE W DIL AND WIRE,stent removed.    OTHER SURGICAL HISTORY      2004,[id number],CARDIAC CATHETERIZATION,mild disease    OTHER SURGICAL HISTORY      1/31/07,[id number],OTHER,Left    OTHER SURGICAL HISTORY      02/27/2008,[id number],TOTAL KNEE ARTHROPLASTY,Right    OTHER SURGICAL HISTORY      2010,[id number],TOTAL KNEE ARTHROPLASTY,Left,Dr. Carson    OTHER SURGICAL HISTORY      06/05/2017,[id number],OTHER,Left,Dr. Joiner, [facility]    RELEASE CARPAL TUNNEL      2006    Revision right total knee arthroplasty   04/18/2019    Dr. Carson.  Due to loose implant tibial component    TONSILLECTOMY      age 12    and   Social History     Tobacco Use    Smoking status: Never     Passive exposure: Never    Smokeless tobacco: Never   Substance Use Topics    Alcohol use: No     OBJECTIVE:  /72   Pulse 73   Temp 97.1  F (36.2  C) (Temporal)   Resp 16   Ht 1.575 m (5' 2\")   Wt 71.1 kg (156 lb 12 oz)   LMP  (LMP Unknown)   SpO2 100%   Breastfeeding No   BMI 28.67 kg/m   "   EXAM:  Alert and cooperative, no distress.  Accompanied by her daughter.  Nose is slightly congested and TMs are clear.  Throat is clear.  Neck is supple without adenopathy.  Sinuses are nontender.  Lungs are clear, no rales rhonchi or wheezes are heard.  Cardiac RRR without murmur  ASSESSMENT/Plan :    Maliha was seen today for recheck.    Diagnoses and all orders for this visit:    Acute cough  -     benzonatate (TESSALON) 200 MG capsule; Take 1 capsule (200 mg) by mouth 3 times daily as needed for cough    Benign essential hypertension      I think she has a viral illness with a cough.  Will use some Tessalon Perles and symptomatic treatment.  Her cough certainly is not from her blood pressure medication.  Reviewed her log and I think her blood pressures are reasonable and should continue to come down over the next several weeks.  Blood pressure today is good.  Her blood pressure machine was checked today to make sure it was accurate.  She will continue to follow over time and let us know how she is doing.  Advise follow-up if worsening or not improving with regard to her viral illness.    Nicanor Mike MD

## 2024-03-07 NOTE — NURSING NOTE
"Chief Complaint   Patient presents with    RECHECK     Elevated BP   She is here today to follow up on her elevated BP. She has also has a cough since she had surgery on February 21.  Olivia Jean-Baptiste LPN..................3/7/2024   10:02 AM      Initial /72   Pulse 73   Temp 97.1  F (36.2  C) (Temporal)   Resp 16   Ht 1.575 m (5' 2\")   Wt 71.1 kg (156 lb 12 oz)   LMP  (LMP Unknown)   SpO2 100%   Breastfeeding No   BMI 28.67 kg/m   Estimated body mass index is 28.67 kg/m  as calculated from the following:    Height as of this encounter: 1.575 m (5' 2\").    Weight as of this encounter: 71.1 kg (156 lb 12 oz).  Medication Review: complete    The next two questions are to help us understand your food security.  If you are feeling you need any assistance in this area, we have resources available to support you today.          2/7/2024   SDOH- Food Insecurity   Within the past 12 months, did you worry that your food would run out before you got money to buy more? N   Within the past 12 months, did the food you bought just not last and you didn t have money to get more? N         Health Care Directive:  Patient does not have a Health Care Directive or Living Will: Discussed advance care planning with patient; however, patient declined at this time.    Olivia Jean-Baptiste LPN      "

## 2024-03-07 NOTE — PROGRESS NOTES
"      Ila Jett is a 83 year old, presenting for the following health issues:  RECHECK (Elevated BP)      3/7/2024     9:53 AM   Additional Questions   Roomed by Olivia Jean-Baptiste LPN     HPI                   Objective    /72   Pulse 73   Temp 97.1  F (36.2  C) (Temporal)   Resp 16   Ht 1.575 m (5' 2\")   Wt 71.1 kg (156 lb 12 oz)   LMP  (LMP Unknown)   SpO2 100%   Breastfeeding No   BMI 28.67 kg/m    Body mass index is 28.67 kg/m .  Physical Exam               Signed Electronically by: Nicanor Mike MD    "

## 2024-03-13 NOTE — DISCHARGE SUMMARY
[facility] Discharge Summary    Maliha Rivera MRN# [id number]   Age: 83 year old YOB: 1940     Date of Admission:  2/21/2024  Date of Discharge::  2/24/2024 12:05 PM  Admitting Physician:  Apolinar Martin MD  Discharge Physician:  Apolinar Martin MD     Home clinic: [facility]          Admission Diagnoses:   Gastroesophageal reflux disease without esophagitis [K21.9]          Discharge Diagnosis:     Gastroesophageal reflux disease without esophagitis [K21.9]  Thrombocytopenia           Procedures:     Procedure(s): Laparoscopic revision of nissen fundoplication                  Medications Prior to Admission:     No medications prior to admission.             Discharge Medications:     Discharge Medication List as of 2/24/2024 10:46 AM        START taking these medications    Details   pravastatin (PRAVACHOL) 20 MG tablet Take 1 tablet (20 mg) by mouth at bedtime, Disp-60 tablet, R-3, E-Prescribe           CONTINUE these medications which have NOT CHANGED    Details   blood glucose (NO BRAND SPECIFIED) lancets standard Use to test blood sugar once daily. Dispense as covered by insurance. Dx. Code: E11.9.Disp-100 each, [id number]      blood glucose (NO BRAND SPECIFIED) test strip Use to test blood sugar once daily. Dispense as covered by insurance. Dx. Code: E11.9., Disp-100 strip, R-11, E-Prescribe      blood glucose monitoring (NO BRAND SPECIFIED) meter device kit Use to test blood sugar 2 times dailyDisp-1 kit, [id number]      Blood Pressure Monitoring (ADULT BLOOD PRESSURE CUFF LG) KIT Use for home blood pressure monitoring as directed., Disp-1 kit, R-0, E-Prescribe      !! order for DME Equipment being ordered: Evaluate at [facility] for a knee braceDisp-1 Units, R-0, Local Print      !! order for DME Equipment being ordered: [facility] to evaluate for diabetic shoes and/or insolesDisp-1 Units, R-prn, Local Print       !! - Potential  duplicate medications found. Please discuss with provider.        STOP taking these medications       omeprazole (PRILOSEC) 40 MG DR capsule Comments:   Reason for Stopping:                     Consultations:   No consultations were requested during this admission          Brief History of Illness:   Reason for admission requiring a surgical or invasive procedure:   Gastroesophageal reflux disease without esophagitis [K21.9]   The patient underwent the following procedure(s):   Laparoscopic revision of nissen fundoplication   There were no immediate complications during this procedure.    Please refer to the full operative summary for details.             Hospital Course:   The patient's hospital course was grossly unremarkable. Fever work up on POD2-3 was negative. Upper contrast study was negative for leak. Patient tolerated soft foods and was discharged on POD#3 in good condition.             Discharge Instructions and Follow-Up:     Discharge diet: Soft mechanical   Discharge activity: No lifting, driving, or strenuous exercise for 4 week(s)   Discharge follow-up: Follow up with me in 10-14 days   Wound care: Keep wound clean and dry           Discharge Disposition:     Discharged to home      Attestation:  I have reviewed today's vital signs, notes, medications, labs and imaging.  Amount of time performed on this discharge summary: 15 minutes.    Apolinar Martin MD

## 2024-03-21 ENCOUNTER — OFFICE VISIT (OUTPATIENT)
Dept: SURGERY | Facility: OTHER | Age: 84
End: 2024-03-21
Attending: SURGERY
Payer: MEDICARE

## 2024-03-21 VITALS
HEART RATE: 67 BPM | TEMPERATURE: 98.4 F | OXYGEN SATURATION: 97 % | WEIGHT: 155.8 LBS | RESPIRATION RATE: 16 BRPM | BODY MASS INDEX: 28.67 KG/M2 | DIASTOLIC BLOOD PRESSURE: 62 MMHG | HEIGHT: 62 IN | SYSTOLIC BLOOD PRESSURE: 135 MMHG

## 2024-03-21 DIAGNOSIS — Z98.890 POSTOPERATIVE STATE: Primary | ICD-10-CM

## 2024-03-21 PROCEDURE — G0463 HOSPITAL OUTPT CLINIC VISIT: HCPCS

## 2024-03-21 PROCEDURE — 99024 POSTOP FOLLOW-UP VISIT: CPT | Performed by: SURGERY

## 2024-03-21 ASSESSMENT — PAIN SCALES - GENERAL: PAINLEVEL: NO PAIN (0)

## 2024-03-21 NOTE — NURSING NOTE
"Chief Complaint   Patient presents with    Surgical Followup       Initial /62 (BP Location: Right arm, Patient Position: Sitting, Cuff Size: Adult Large)   Pulse 67   Temp 98.4  F (36.9  C) (Tympanic)   Resp 16   Ht 1.575 m (5' 2\")   Wt 70.7 kg (155 lb 12.8 oz)   LMP  (LMP Unknown)   SpO2 97%   BMI 28.50 kg/m   Estimated body mass index is 28.5 kg/m  as calculated from the following:    Height as of this encounter: 1.575 m (5' 2\").    Weight as of this encounter: 70.7 kg (155 lb 12.8 oz).  Meds Reconciled: complete      Coni Alston RN     "

## 2024-03-21 NOTE — PROGRESS NOTES
"Maliha Rivera presents to clinic for follow up after undergoing laparoscopic revision of nissen fundoplication and lysis of adhesions. She is doing well. Denies dysphagia.  She is tolerating small bites of regular, not soft or mushy foods.  Patient has mild intermittent nausea, no retching or vomiting.  Occasionally gets bloating with eating, but this gets better quickly.  Bowel movements are normal.  Patient denies reflux or regurgitation.  Not taking PPI.  Patient states her voice is returning to normal timbre and feels like her hearing is improving as well, she is hearing some popping and thinks this is a sign of her hearing getting back to normal.  Patient states she continues to have high blood pressure readings at home.      /62 (BP Location: Right arm, Patient Position: Sitting, Cuff Size: Adult Large)   Pulse 67   Temp 98.4  F (36.9  C) (Tympanic)   Resp 16   Ht 1.575 m (5' 2\")   Wt 70.7 kg (155 lb 12.8 oz)   LMP  (LMP Unknown)   SpO2 97%   BMI 28.50 kg/m       Abdomen is soft and non tender. Some bruising around the epigastgric incision, but otherwise all incisions are clean and intact.       Maliha Rivera is a 83F who is s/p aparoscopic revision of nissen fundoplication and lysis of adhesions.  Patient is recovering as expected and there are no apparent surgical complications.  Ear and throat symptoms are improving and patient will continue to monitor these at home.  Will be sent to primary care doctor to notify of elevated blood pressures.  Follow-up in surgery clinic with concerns.       Apolinar Martin MD        "

## 2024-03-26 ENCOUNTER — TELEPHONE (OUTPATIENT)
Dept: SURGERY | Facility: OTHER | Age: 84
End: 2024-03-26
Payer: COMMERCIAL

## 2024-03-26 DIAGNOSIS — H91.90 HEARING LOSS, UNSPECIFIED HEARING LOSS TYPE, UNSPECIFIED LATERALITY: Primary | ICD-10-CM

## 2024-03-26 NOTE — TELEPHONE ENCOUNTER
Patient said they spoke about getting an ENT or audiology referral placed for her. Can you please place this so I can sent it to [facility] on 3/26/2024 at 11:43 AM

## 2024-03-26 NOTE — TELEPHONE ENCOUNTER
I spoke to pt and she states she lost her hearing during surgery and Dr. Martin said she should see ENT.  Pt would like a referral.  Pt is aware Nicanor Mike MD is out of the clinic until the April 1st.    Danyell Mccormack CMA ([facility])......................3/26/2024  1:19 PM

## 2024-04-18 ENCOUNTER — OFFICE VISIT (OUTPATIENT)
Dept: FAMILY MEDICINE | Facility: OTHER | Age: 84
End: 2024-04-18
Attending: PHYSICIAN ASSISTANT
Payer: COMMERCIAL

## 2024-04-18 VITALS
TEMPERATURE: 96.4 F | OXYGEN SATURATION: 99 % | HEART RATE: 91 BPM | BODY MASS INDEX: 28.45 KG/M2 | SYSTOLIC BLOOD PRESSURE: 134 MMHG | DIASTOLIC BLOOD PRESSURE: 56 MMHG | WEIGHT: 154.6 LBS | RESPIRATION RATE: 14 BRPM | HEIGHT: 62 IN

## 2024-04-18 DIAGNOSIS — I10 BENIGN ESSENTIAL HYPERTENSION: ICD-10-CM

## 2024-04-18 DIAGNOSIS — E78.5 HYPERLIPIDEMIA, UNSPECIFIED HYPERLIPIDEMIA TYPE: ICD-10-CM

## 2024-04-18 DIAGNOSIS — K21.9 GASTROESOPHAGEAL REFLUX DISEASE WITHOUT ESOPHAGITIS: ICD-10-CM

## 2024-04-18 DIAGNOSIS — Z78.0 POST-MENOPAUSAL: ICD-10-CM

## 2024-04-18 DIAGNOSIS — Z13.820 SCREENING FOR OSTEOPOROSIS: ICD-10-CM

## 2024-04-18 DIAGNOSIS — E11.9 TYPE 2 DIABETES MELLITUS WITHOUT COMPLICATION, WITHOUT LONG-TERM CURRENT USE OF INSULIN (H): Primary | ICD-10-CM

## 2024-04-18 PROCEDURE — 99214 OFFICE O/P EST MOD 30 MIN: CPT | Performed by: PHYSICIAN ASSISTANT

## 2024-04-18 PROCEDURE — G0463 HOSPITAL OUTPT CLINIC VISIT: HCPCS

## 2024-04-18 PROCEDURE — G2211 COMPLEX E/M VISIT ADD ON: HCPCS | Performed by: PHYSICIAN ASSISTANT

## 2024-04-18 ASSESSMENT — PAIN SCALES - GENERAL: PAINLEVEL: NO PAIN (0)

## 2024-04-18 NOTE — PROGRESS NOTES
"  Assessment & Plan     1. Type 2 diabetes mellitus without complication, without long-term current use of insulin (H)  Chronic, stable with dietary management.    2. Hyperlipidemia, unspecified hyperlipidemia type  Chronic, stable.  Continues on pravastatin.    3. Benign essential hypertension  Chronic, stable after discontinuation of losartan.  Continue to monitor at home with goal of 130s over 80s or less.    4. Gastroesophageal reflux disease without esophagitis  Chronic, improving with recent Nissen fundoplication on 2/21/2024.  Restarted omeprazole for short-term use.  Will taper off as tolerated.    5. Screening for osteoporosis  6. Post-menopausal  - DX Bone Density; Future      BMI  Estimated body mass index is 28.28 kg/m  as calculated from the following:    Height as of this encounter: 1.575 m (5' 2\").    Weight as of this encounter: 70.1 kg (154 lb 9.6 oz).   Weight management plan: Discussed healthy diet and exercise guidelines      No follow-ups on file.    Ila Jett is a 83 year old, presenting for the following health issues:  Hypertension    History of Present Illness       Reason for visit:  To see my new dr    She eats 2-3 servings of fruits and vegetables daily.She consumes 0 sweetened beverage(s) daily.She exercises with enough effort to increase her heart rate 60 or more minutes per day.  She exercises with enough effort to increase her heart rate 6 days per week.   She is taking medications regularly.     Patient is here to establish care.  She also wanted to follow-up on her blood pressure.  Blood pressure had been up-and-down after surgery.  She was started on losartan but since then has discontinued medication and blood pressure has been averaging in the 130s over 80s or less at home.  Patient also restarted her omeprazole after her fundoplication in February as this has allowed her stomach to calm down and be able to eat.  She has a goal of getting off this medication long-term. "  She continues on pravastatin for management of hyperlipidemia and has been stable.  She is due for DEXA screening.    PAST MEDICAL HISTORY:   Past Medical History:   Diagnosis Date    Encounter for screening for cardiovascular disorders     4/8/15,Lexiscan cardiolite [facility]       PAST SURGICAL HISTORY:   Past Surgical History:   Procedure Laterality Date    APPENDECTOMY OPEN      No Comments Provided    ARTHROSCOPY KNEE      2007,Joseluis Thomas M.D.    COLONOSCOPY      2003,2011,normal    ESOPHAGOSCOPY, GASTROSCOPY, DUODENOSCOPY (EGD), COMBINED      2/3/2016    ESOPHAGOSCOPY, GASTROSCOPY, DUODENOSCOPY (EGD), COMBINED N/A 3/28/2023    Procedure: Esophagoscopy, gastroscopy, duodenoscopy (EGD), combined;  Surgeon: Apolinar Martin MD;  Location:  OR    ESOPHAGOSCOPY, GASTROSCOPY, DUODENOSCOPY (EGD), COMBINED N/A 10/4/2023    Procedure: ESOPHAGOGASTRODUODENOSCOPY, WITH BIOPSY;  Surgeon: Apolinar Martin MD;  Location:  OR    LAPAROSCOPIC CHOLECYSTECTOMY      3/11/01,Dr. Salas    LAPAROSCOPIC NISSEN FUNDOPLICATION N/A 7/19/2023    Procedure: FUNDOPLICATION, NISSEN, LAPAROSCOPIC WITH LYSIS OF ADHESIONS;  Surgeon: Apolinar Martin MD;  Location:  OR    LAPAROSCOPIC NISSEN FUNDOPLICATION N/A 2/21/2024    Procedure: FUNDOPLICATION, NISSEN, LAPAROSCOPIC - REVISION;  Surgeon: Apolinar Martin MD;  Location: [facility] OR    LAPAROSCOPIC TUBAL LIGATION      No Comments Provided    lumbar decompression L4-5  02/28/2018    Dr. France    OTHER SURGICAL HISTORY      1996,[id number],CATARACT EXTRACTION W/  INTRAOCULAR LENS IMPLANT,Bilateral    OTHER SURGICAL HISTORY      2001,66052.0,[facility] ERCP BILIARY OR PANC DUCT STENT EXCHANGE W DIL AND WIRE,stent removed.    OTHER SURGICAL HISTORY      2004,[id number],CARDIAC CATHETERIZATION,mild disease    OTHER SURGICAL HISTORY      1/31/07,[id number],OTHER,Left    OTHER SURGICAL HISTORY      02/27/2008,[id number],TOTAL KNEE ARTHROPLASTY,Right    OTHER SURGICAL HISTORY       2010,[id number],TOTAL KNEE ARTHROPLASTY,Left,Dr. Carson    OTHER SURGICAL HISTORY      06/05/2017,[id number],OTHER,Left,Dr. Joiner, [facility]    RELEASE CARPAL TUNNEL      2006    Revision right total knee arthroplasty   04/18/2019    Dr. Carson.  Due to loose implant tibial component    TONSILLECTOMY      age 12       FAMILY HISTORY:   Family History   Problem Relation Age of Onset    Heart Disease Mother         Heart Disease    Heart Disease Father         Heart Disease    Heart Disease Sister         Heart Disease    Cancer Other         Cancer,female cancer       SOCIAL HISTORY:   Social History     Tobacco Use    Smoking status: Never     Passive exposure: Never    Smokeless tobacco: Never   Substance Use Topics    Alcohol use: No        Allergies   Allergen Reactions    Cefazolin Nausea and Vomiting     Tolerated 7/19/23 without N/V    Atorvastatin Muscle Pain (Myalgia)     Current Outpatient Medications   Medication Sig Dispense Refill    benzonatate (TESSALON) 200 MG capsule Take 1 capsule (200 mg) by mouth 3 times daily as needed for cough 30 capsule 1    blood glucose (NO BRAND SPECIFIED) lancets standard Use to test blood sugar once daily. Dispense as covered by insurance. Dx. Code: E11.9. 100 each 11    blood glucose (NO BRAND SPECIFIED) test strip Use to test blood sugar once daily. Dispense as covered by insurance. Dx. Code: E11.9. 100 strip 11    blood glucose monitoring (NO BRAND SPECIFIED) meter device kit Use to test blood sugar 2 times daily 1 kit 0    Blood Pressure Monitoring (ADULT BLOOD PRESSURE CUFF LG) KIT Use for home blood pressure monitoring as directed. 1 kit 0    losartan (COZAAR) 50 MG tablet Take 1 tablet (50 mg) by mouth daily 30 tablet 11    order for DME Equipment being ordered: Evaluate at [facility] for a knee brace 1 Units 0    order for DME Equipment being ordered: [facility] to [facility] for diabetic shoes and/or insoles 1 Units prn    pravastatin (PRAVACHOL) 20 MG  "tablet Take 1 tablet (20 mg) by mouth at bedtime 60 tablet 3     No current facility-administered medications for this visit.           Objective    /56   Pulse 91   Temp (!) 96.4  F (35.8  C)   Resp 14   Ht 1.575 m (5' 2\")   Wt 70.1 kg (154 lb 9.6 oz)   LMP  (LMP Unknown)   SpO2 99%   BMI 28.28 kg/m    Body mass index is 28.28 kg/m .  Physical Exam   General: Pleasant, in no apparent distress.  Cardiovascular: Regular rate and rhythm with S1 equal to S2. No murmurs, friction rubs, or gallops.   Respiratory: Lungs are resonant and clear to auscultation bilaterally. No wheezes, crackles, or rhonchi.  Psych: Appropriate mood and affect.      Signed Electronically by: Dilcia Tarango PA-C    "

## 2024-04-18 NOTE — NURSING NOTE
Patient presents to clinic for B/P check and to establish care.  Anahy Lemus LPN ....................  4/18/2024   1:57 PM  EXT [id number]

## 2024-05-16 ENCOUNTER — HOSPITAL ENCOUNTER (OUTPATIENT)
Dept: BONE DENSITY | Facility: OTHER | Age: 84
Discharge: HOME | End: 2024-05-16
Attending: PHYSICIAN ASSISTANT | Admitting: PHYSICIAN ASSISTANT
Payer: MEDICARE

## 2024-05-16 DIAGNOSIS — Z78.0 POST-MENOPAUSAL: ICD-10-CM

## 2024-05-16 DIAGNOSIS — Z13.820 SCREENING FOR OSTEOPOROSIS: ICD-10-CM

## 2024-05-16 PROCEDURE — 77080 DXA BONE DENSITY AXIAL: CPT

## 2024-06-03 ENCOUNTER — OFFICE VISIT (OUTPATIENT)
Dept: INTERNAL MEDICINE | Facility: OTHER | Age: 84
End: 2024-06-03
Attending: INTERNAL MEDICINE
Payer: COMMERCIAL

## 2024-06-03 VITALS
HEART RATE: 70 BPM | OXYGEN SATURATION: 98 % | DIASTOLIC BLOOD PRESSURE: 78 MMHG | TEMPERATURE: 98.8 F | BODY MASS INDEX: 27.23 KG/M2 | WEIGHT: 144.2 LBS | RESPIRATION RATE: 18 BRPM | HEIGHT: 61 IN | SYSTOLIC BLOOD PRESSURE: 124 MMHG

## 2024-06-03 DIAGNOSIS — R63.4 WEIGHT LOSS: ICD-10-CM

## 2024-06-03 DIAGNOSIS — R13.10 DYSPHAGIA, UNSPECIFIED TYPE: ICD-10-CM

## 2024-06-03 DIAGNOSIS — R10.13 EPIGASTRIC PAIN: Primary | ICD-10-CM

## 2024-06-03 DIAGNOSIS — Z98.890 S/P NISSEN FUNDOPLICATION (WITHOUT GASTROSTOMY TUBE) PROCEDURE: ICD-10-CM

## 2024-06-03 LAB
ALBUMIN SERPL BCG-MCNC: 4.5 G/DL (ref 3.5–5.2)
ALP SERPL-CCNC: 77 U/L (ref 40–150)
ALT SERPL W P-5'-P-CCNC: 15 U/L (ref 0–50)
ANION GAP SERPL CALCULATED.3IONS-SCNC: 10 MMOL/L (ref 7–15)
AST SERPL W P-5'-P-CCNC: 23 U/L (ref 0–45)
BILIRUB SERPL-MCNC: 0.4 MG/DL
BUN SERPL-MCNC: 8.2 MG/DL (ref 8–23)
CALCIUM SERPL-MCNC: 9.7 MG/DL (ref 8.8–10.2)
CHLORIDE SERPL-SCNC: 104 MMOL/L (ref 98–107)
CREAT SERPL-MCNC: 0.54 MG/DL (ref 0.51–0.95)
DEPRECATED HCO3 PLAS-SCNC: 26 MMOL/L (ref 22–29)
EGFRCR SERPLBLD CKD-EPI 2021: >90 ML/MIN/1.73M2
ERYTHROCYTE [DISTWIDTH] IN BLOOD BY AUTOMATED COUNT: 12.7 % (ref 10–15)
GLUCOSE SERPL-MCNC: 113 MG/DL (ref 70–99)
HCT VFR BLD AUTO: 38.1 % (ref 35–47)
HGB BLD-MCNC: 12.7 G/DL (ref 11.7–15.7)
MAGNESIUM SERPL-MCNC: 2 MG/DL (ref 1.7–2.3)
MCH RBC QN AUTO: 29.3 PG (ref 26.5–33)
MCHC RBC AUTO-ENTMCNC: 33.3 G/DL (ref 31.5–36.5)
MCV RBC AUTO: 88 FL (ref 78–100)
PLATELET # BLD AUTO: 194 10E3/UL (ref 150–450)
POTASSIUM SERPL-SCNC: 3.8 MMOL/L (ref 3.4–5.3)
PROT SERPL-MCNC: 7.7 G/DL (ref 6.4–8.3)
RBC # BLD AUTO: 4.33 10E6/UL (ref 3.8–5.2)
SODIUM SERPL-SCNC: 140 MMOL/L (ref 135–145)
TSH SERPL DL<=0.005 MIU/L-ACNC: 3.04 UIU/ML (ref 0.3–4.2)
WBC # BLD AUTO: 5.6 10E3/UL (ref 4–11)

## 2024-06-03 PROCEDURE — 83735 ASSAY OF MAGNESIUM: CPT | Mod: ZL | Performed by: INTERNAL MEDICINE

## 2024-06-03 PROCEDURE — 84443 ASSAY THYROID STIM HORMONE: CPT | Mod: ZL | Performed by: INTERNAL MEDICINE

## 2024-06-03 PROCEDURE — 84295 ASSAY OF SERUM SODIUM: CPT | Mod: ZL | Performed by: INTERNAL MEDICINE

## 2024-06-03 PROCEDURE — 85027 COMPLETE CBC AUTOMATED: CPT | Mod: ZL | Performed by: INTERNAL MEDICINE

## 2024-06-03 PROCEDURE — 84450 TRANSFERASE (AST) (SGOT): CPT | Mod: ZL | Performed by: INTERNAL MEDICINE

## 2024-06-03 PROCEDURE — 36415 COLL VENOUS BLD VENIPUNCTURE: CPT | Mod: ZL | Performed by: INTERNAL MEDICINE

## 2024-06-03 PROCEDURE — G0463 HOSPITAL OUTPT CLINIC VISIT: HCPCS

## 2024-06-03 PROCEDURE — 99214 OFFICE O/P EST MOD 30 MIN: CPT | Performed by: INTERNAL MEDICINE

## 2024-06-03 RX ORDER — FAMOTIDINE 20 MG/1
20 TABLET, FILM COATED ORAL 2 TIMES DAILY PRN
Qty: 60 TABLET | Refills: 3 | Status: SHIPPED | OUTPATIENT
Start: 2024-06-03 | End: 2024-09-11

## 2024-06-03 RX ORDER — SUCRALFATE 1 G/1
1 TABLET ORAL 4 TIMES DAILY
Qty: 120 TABLET | Refills: 3 | Status: SHIPPED | OUTPATIENT
Start: 2024-06-03 | End: 2024-07-10

## 2024-06-03 ASSESSMENT — PAIN SCALES - GENERAL: PAINLEVEL: MILD PAIN (2)

## 2024-06-03 NOTE — PROGRESS NOTES
Assessment & Plan     Epigastric pain  -At this time we will trial sucralfate and famotidine to see if this helps with discomfort.  We will also obtain an esophagram to better evaluate.  She will be contacted to see how she is doing following this.  - sucralfate (CARAFATE) 1 GM tablet; Take 1 tablet (1 g) by mouth 4 times daily Before each meals and at bedtime  - famotidine (PEPCID) 20 MG tablet; Take 1 tablet (20 mg) by mouth 2 times daily as needed (stomach pain)  - XR Esophagram; Future    S/P Nissen fundoplication (without gastrostomy tube) procedure  Given repeat emesis labs are obtained today to rule out any underlining electrolyte issues.  Results reveal that they are all reassuring.  - Comprehensive metabolic panel  - CBC with platelets  - Magnesium  - XR Esophagram; Future    Weight loss  -Suspect this is related to dysphagia and discomfort.  Continue to monitor closely.  - Comprehensive metabolic panel  - CBC with platelets  - Magnesium  - TSH with free T4 reflex  - XR Esophagram; Future    Dysphagia, unspecified type  Esophagram to be obtained.  She may need additional imaging depending on results.  - XR Esophagram; Future    Return in about 1 month (around 7/3/2024) for Recheck.    Ila Jett is a 83 year old, presenting for the following health issues:  Weight Loss        6/3/2024    10:22 AM   Additional Questions   Roomed by Gucci Cedeno LPN     Via the Health Maintenance questionnaire, the patient has reported the following services have been completed , this information has been sent to the abstraction team.  HPI     Patient reports that she has had ongoing issues with weight loss, difficulty swallowing and epigastric pain.  She had a Nissen fundoplication last summer.  She overall did okay until it appears to have slipped and she had repeat surgery in February, approximately 3 months ago.  Since her revision she has had ongoing issues with difficulty swallowing.  She has lost 30  "pounds.  She gets some midsternal pain with eating and has to stop after approximately a fourth of a burger. She finds that if she eats too much she will end up throwing up.  This is happening multiple times weekly.  She has had some diarrhea following her first surgery however has not had any blood in the stool or black stools.  She does feel all of her symptoms started immediately after her second surgery.    She does get some subjective fevers at times but has never seen an objective fever.      Objective    /78   Pulse 70   Temp 98.8  F (37.1  C)   Resp 18   Ht 1.549 m (5' 1\")   Wt 65.4 kg (144 lb 3.2 oz)   LMP  (LMP Unknown)   SpO2 98%   Breastfeeding No   BMI 27.25 kg/m    Body mass index is 27.25 kg/m .  Physical Exam   GEN: Vitals reviewed. Healthy appearing. Patient is in no acute distress. Cooperative with exam.  HEENT: Normocephalic atraumatic.  Eyes grossly normal to inspection.  No discharge or erythema, or obvious scleral/conjunctival abnormalities.   CV: Heart regular in rate and rhythm with no murmur.    LUNGS: No audible wheeze, cough, or visible cyanosis.  No visible retractions or increased work of breathing.  Lungs clear to auscultation bilaterally.    ABD: No significant distention.  Epigastric discomfort with palpation.  SKIN: Warm and dry to touch.  Visible skin clear. No significant rash, abnormal pigmentation or lesions.  EXT: No clubbing or cyanosis.  No peripheral edema.          Signed Electronically by: Melody Peterson,     "

## 2024-06-03 NOTE — NURSING NOTE
"Chief Complaint   Patient presents with    Weight Loss       Initial /78   Pulse 70   Temp 98.8  F (37.1  C)   Resp 18   Ht 1.549 m (5' 1\")   Wt 65.4 kg (144 lb 3.2 oz)   LMP  (LMP Unknown)   SpO2 98%   Breastfeeding No   BMI 27.25 kg/m   Estimated body mass index is 27.25 kg/m  as calculated from the following:    Height as of this encounter: 1.549 m (5' 1\").    Weight as of this encounter: 65.4 kg (144 lb 3.2 oz).  Medication Review: complete    The next two questions are to help us understand your food security.  If you are feeling you need any assistance in this area, we have resources available to support you today.          2/7/2024   SDOH- Food Insecurity   Within the past 12 months, did you worry that your food would run out before you got money to buy more? N   Within the past 12 months, did the food you bought just not last and you didn t have money to get more? N         Health Care Directive:  Patient does not have a Health Care Directive or Living Will: Discussed advance care planning with patient; however, patient declined at this time.    Che Cedeno LPN      "

## 2024-06-05 ENCOUNTER — HOSPITAL ENCOUNTER (OUTPATIENT)
Dept: GENERAL RADIOLOGY | Facility: OTHER | Age: 84
Discharge: HOME | End: 2024-06-05
Attending: INTERNAL MEDICINE | Admitting: INTERNAL MEDICINE
Payer: MEDICARE

## 2024-06-05 DIAGNOSIS — R13.10 DYSPHAGIA, UNSPECIFIED TYPE: ICD-10-CM

## 2024-06-05 DIAGNOSIS — R63.4 WEIGHT LOSS: ICD-10-CM

## 2024-06-05 DIAGNOSIS — Z98.890 S/P NISSEN FUNDOPLICATION (WITHOUT GASTROSTOMY TUBE) PROCEDURE: ICD-10-CM

## 2024-06-05 DIAGNOSIS — R10.13 EPIGASTRIC PAIN: ICD-10-CM

## 2024-06-05 PROCEDURE — 74220 X-RAY XM ESOPHAGUS 1CNTRST: CPT

## 2024-06-10 ENCOUNTER — TELEPHONE (OUTPATIENT)
Dept: INTERNAL MEDICINE | Facility: OTHER | Age: 84
End: 2024-06-10
Payer: COMMERCIAL

## 2024-06-10 DIAGNOSIS — R10.13 EPIGASTRIC PAIN: ICD-10-CM

## 2024-06-10 DIAGNOSIS — R63.4 WEIGHT LOSS: ICD-10-CM

## 2024-06-10 DIAGNOSIS — R13.10 DYSPHAGIA, UNSPECIFIED TYPE: ICD-10-CM

## 2024-06-10 DIAGNOSIS — Z98.890 S/P NISSEN FUNDOPLICATION (WITHOUT GASTROSTOMY TUBE) PROCEDURE: Primary | ICD-10-CM

## 2024-06-10 NOTE — TELEPHONE ENCOUNTER
Please call patient and see how she is doing after starting famotidine and sucralfate.  Hopefully she is having improvement in her symptoms.    Her esophagram showed continued acid reflux despite her prior surgery.  It also showed some difficulty with movement down the esophagus.  This would explain at least some of her symptoms.    At this time it would be reasonable to consider a second opinion and I can place a referral if she would like.  Alternatively we can give her medications 1 month if she is still having issues when she sees me in July consider additional evaluation and referral.

## 2024-06-12 NOTE — TELEPHONE ENCOUNTER
[facility], Tried both numbers for nurse, but no answer.      Sherlyn Mc on 6/12/2024 at 2:48 PM

## 2024-06-12 NOTE — TELEPHONE ENCOUNTER
Left message to call back.  if not available [facility] nurse ext  Eleanor Chou LPN 6/12/2024   2:35 PM

## 2024-06-12 NOTE — TELEPHONE ENCOUNTER
"Patient was notified of provider's message below.    Patient reports that at times she is \"feeling ok\" and not having any symptoms, and at other times she is not feeling well/having symptoms. This correlates to what she is eating, drinking, and the time of the day. Patient has been taking medications as instructed and as prescribed.     After consideration, patient would like a referral to be placed for a second opinion. She would like to get things going. Patient was instructed that provider would place this referral for her, and she would be getting a phone call to get this appt scheduled, would be called back prior to this appt being scheduled if any additional questions or any problems.     I did not pend a referral yet as I was not exactly sure what you had in mind, did review OV/notes.   Eleanor Chou LPN on 6/12/2024 at 3:12 PM    "

## 2024-07-10 DIAGNOSIS — R10.13 EPIGASTRIC PAIN: ICD-10-CM

## 2024-07-10 NOTE — TELEPHONE ENCOUNTER
[facility] sent Rx request for the following:      Requested Prescriptions   Pending Prescriptions Disp Refills    sucralfate (CARAFATE) 1 GM tablet 120 tablet 3     Sig: Take 1 tablet (1 g) by mouth 4 times daily Before each meals and at bedtime       Miscellaneous Gastrointestinal Agents Failed - 7/10/2024  4:28 PM   Last Prescription Date:   6/3/24  Last Fill Qty/Refills:         120, R-3    Last Office Visit:              6/3/24   Future Office visit:            none  Pharmacy comment:  COULD WE GET AN RX FOR 90 DAYS SUPPLY? 360 FOR SEPTEMBER FILL?  Tracy Isbell RN on 7/10/2024 at 4:29 PM

## 2024-07-11 RX ORDER — SUCRALFATE 1 G/1
1 TABLET ORAL 4 TIMES DAILY
Qty: 120 TABLET | Refills: 0 | Status: SHIPPED | OUTPATIENT
Start: 2024-07-11

## 2024-07-24 ENCOUNTER — TRANSFERRED RECORDS (OUTPATIENT)
Dept: HEALTH INFORMATION MANAGEMENT | Facility: OTHER | Age: 84
End: 2024-07-24
Payer: COMMERCIAL

## 2024-08-08 ENCOUNTER — TRANSFERRED RECORDS (OUTPATIENT)
Dept: HEALTH INFORMATION MANAGEMENT | Facility: OTHER | Age: 84
End: 2024-08-08
Payer: COMMERCIAL

## 2024-09-11 DIAGNOSIS — R10.13 EPIGASTRIC PAIN: ICD-10-CM

## 2024-09-11 NOTE — TELEPHONE ENCOUNTER
"Note from pharmacy:   \" Patient is trying to sync meds to fill together. Need new Rx for full amount. Thank you\"    Angela Brooks RN on 9/11/2024 at 9:12 AM    " Good

## 2024-09-15 ENCOUNTER — HEALTH MAINTENANCE LETTER (OUTPATIENT)
Age: 84
End: 2024-09-15

## 2024-09-17 RX ORDER — FAMOTIDINE 20 MG/1
20 TABLET, FILM COATED ORAL 2 TIMES DAILY PRN
Qty: 60 TABLET | Refills: 3 | Status: SHIPPED | OUTPATIENT
Start: 2024-09-17

## 2024-09-17 NOTE — TELEPHONE ENCOUNTER
[id number] sent Rx request for the following:      Requested Prescriptions   Pending Prescriptions Disp Refills    famotidine (PEPCID) 20 MG tablet 60 tablet 3     Sig: Take 1 tablet (20 mg) by mouth 2 times daily as needed (stomach pain).   Last Prescription Date:   6/3/24  Last Fill Qty/Refills:         60, R-3    Last Office Visit:                4/18/24 2/7/24 (preop)    Future Office visit:           None    Prescription refilled per RN Medication Refill Policy.................... Samira Montano RN ....................  9/17/2024   10:42 AM

## 2024-09-19 ENCOUNTER — TRANSFERRED RECORDS (OUTPATIENT)
Dept: HEALTH INFORMATION MANAGEMENT | Facility: OTHER | Age: 84
End: 2024-09-19

## 2024-10-08 NOTE — PROGRESS NOTES
Preoperative Evaluation  [facility]  [address]  [place] [address]  Phone: [phone]  Fax: [phone]  Primary Provider: Dilcia Tarango PA-C  Pre-op Performing Provider: Dilcia Tarango PA-C  Oct 10, 2024           10/10/2024   Surgical Information   What procedure is being done? office call   Facility or Hospital where procedure/surgery will be performed: [facility]   Who is doing the procedure / surgery? [facility]   Date of surgery / procedure: [id number]   Time of surgery / procedure: am   Where do you plan to recover after surgery? at home with family      Fax number for surgical facility: [phone]    Assessment & Plan     The proposed surgical procedure is considered INTERMEDIATE risk.    1. Preop general physical exam  2. Gastroparesis  3. Epigastric pain  Vitals and exam stable. Reviewed labs completed yesterday, slight decrease in hemoglobin, otherwise stable. BMP stable. EKG with sinus bradycardia, otherwise stable.   - EKG 12-lead, tracing only (Same Day)    4. Type 2 diabetes mellitus without complication, without long-term current use of insulin (H)  Chronic, stable. Managing with diet.     5. Hyperlipidemia, unspecified hyperlipidemia type  Chronic, stable. Managing with diet.     6. Benign essential hypertension  Chronic, slightly elevated in clinic, down to goal on recheck. Reports sporadic home check numbers are stable.        - No identified additional risk factors other than previously addressed    Antiplatelet or Anticoagulation Medication Instructions   - Patient is on no antiplatelet or anticoagulation medications.    Additional Medication Instructions  Take all scheduled medications on the day of surgery    Recommendation  Approval given to proceed with proposed procedure, without further diagnostic evaluation.    Ila Jett is a 84 year old, presenting for the following:  Pre-Op Exam        HPI related to upcoming procedure:           No data to display              Health Care Directive  Patient does not have a Health Care Directive or Living Will: Discussed advance care planning with patient; however, patient declined at this time.    Preoperative Review of    reviewed - no record of controlled substances prescribed.      Status of Chronic Conditions:  See problem list for active medical problems.  Problems all longstanding and stable, except as noted/documented.  See ROS for pertinent symptoms related to these conditions.    Patient Active Problem List    Diagnosis Date Noted    Gastroesophageal reflux disease without esophagitis 07/19/2023     Priority: Medium    Sliding hiatal hernia 03/29/2023     Priority: Medium    Postoperative anemia due to acute blood loss 04/19/2019     Priority: Medium    Benign essential hypertension 04/18/2018     Priority: Medium    Lumbar stenosis 03/01/2018     Priority: Medium    Lumbar radiculopathy 01/23/2018     Priority: Medium    Status post total bilateral knee replacement 03/31/2017     Priority: Medium    Primary osteoarthritis of left knee 03/23/2017     Priority: Medium    DM w/o complication type II (H) 09/22/2015     Priority: Medium     Overview:   3/2014:  A1c fine off metformin - will discontinue as patient does not want to take it.  She is taking fish oil + other ingredients she states helps with DM  On ASA  Patient does not want to take lisinopril  Does not tolerate statins and has discontinued pravastatin; try niacin  No retinopathy on eye exam 4/2013 and 12/2014  Overview:   Overview:   3/2014:  A1c fine off metformin - will discontinue as patient does not want to take it.  She is taking fish oil + other ingredients she states helps with DM  On ASA  Patient does not want to take lisinopril  Does not tolerate statins and has discontinued pravastatin; try niacin  No retinopathy on eye exam 4/2013 and 12/2014      Neurofibroma of thigh 09/22/2015     Priority: Medium    Esophageal reflux  12/13/2013     Priority: Medium    Dysphagia 12/13/2013     Priority: Medium    Healthcare maintenance 03/26/2013     Priority: Medium     Overview:   Calcium: takes supplement  Colonoscopy: WNL 2011  DEXA: schedule - if has osteoporosis will have to look at risk of fosamax on her reflux.  Lipids:; declines statin - will try niacin and is on fish oil.  Mammogram:schedule  Pap: 3/2013 WNL - does not need again.  Tdap:3/2013  Vitamin D: takes supplement  pneumovax done.  Consider zoster vaccine.      Degenerative arthritis of knee 10/21/2010     Priority: Medium     Overview:   IMO Update 10/11      Cervicalgia 05/11/2010     Priority: Medium     Overview:   IMO Update 10/11      DDD (degenerative disc disease), lumbar 01/15/2010     Priority: Medium    Primary localized osteoarthrosis, lower leg 01/31/2008     Priority: Medium    Hyperlipidemia 07/22/2004     Priority: Medium     Overview:   IMO Update 10/11  Overview:   Overview:   IMO Update 10/11        Past Medical History:   Diagnosis Date    Encounter for screening for cardiovascular disorders     4/8/15,Lexiscan cardiolite [facility]     Past Surgical History:   Procedure Laterality Date    APPENDECTOMY OPEN      No Comments Provided    ARTHROSCOPY KNEE      2007,Joseluis Thomas M.D.    COLONOSCOPY      2003,2011,normal    ESOPHAGOSCOPY, GASTROSCOPY, DUODENOSCOPY (EGD), COMBINED      2/3/2016    ESOPHAGOSCOPY, GASTROSCOPY, DUODENOSCOPY (EGD), COMBINED N/A 3/28/2023    Procedure: Esophagoscopy, gastroscopy, duodenoscopy (EGD), combined;  Surgeon: Apolinar Martin MD;  Location:  OR    ESOPHAGOSCOPY, GASTROSCOPY, DUODENOSCOPY (EGD), COMBINED N/A 10/4/2023    Procedure: ESOPHAGOGASTRODUODENOSCOPY, WITH BIOPSY;  Surgeon: Apolinar Martin MD;  Location:  OR    LAPAROSCOPIC CHOLECYSTECTOMY      3/11/01,Dr. Salas    LAPAROSCOPIC NISSEN FUNDOPLICATION N/A 7/19/2023    Procedure: FUNDOPLICATION, NISSEN, LAPAROSCOPIC WITH LYSIS OF  ADHESIONS;  Surgeon: Apolinar Martin MD;  Location: [facility] OR    LAPAROSCOPIC NISSEN FUNDOPLICATION N/A 2/21/2024    Procedure: FUNDOPLICATION, NISSEN, LAPAROSCOPIC - REVISION;  Surgeon: Apolinar Martin MD;  Location: [facility] OR    LAPAROSCOPIC TUBAL LIGATION      No Comments Provided    lumbar decompression L4-5  02/28/2018    Dr. France    OTHER SURGICAL HISTORY      1996,[id number],CATARACT EXTRACTION W/  INTRAOCULAR LENS IMPLANT,Bilateral    OTHER SURGICAL HISTORY      2001,04776.0,[facility] BILIARY OR PANC DUCT STENT EXCHANGE W DIL AND WIRE,stent removed.    OTHER SURGICAL HISTORY      2004,[id number],CARDIAC CATHETERIZATION,mild disease    OTHER SURGICAL HISTORY      1/31/07,[id number],OTHER,Left    OTHER SURGICAL HISTORY      02/27/2008,[id number],TOTAL KNEE ARTHROPLASTY,Right    OTHER SURGICAL HISTORY      2010,[id number],TOTAL KNEE ARTHROPLASTY,Left,Dr. Carson    OTHER SURGICAL HISTORY      06/05/2017,[id number],OTHER,Left,Dr. Joiner, [facility]    RELEASE CARPAL TUNNEL      2006    Revision right total knee arthroplasty   04/18/2019    Dr. Carson.  Due to loose implant tibial component    TONSILLECTOMY      age 12     Current Outpatient Medications   Medication Sig Dispense Refill    benzonatate (TESSALON) 200 MG capsule Take 1 capsule (200 mg) by mouth 3 times daily as needed for cough (Patient not taking: Reported on 10/10/2024) 30 capsule 1    blood glucose (NO BRAND SPECIFIED) lancets standard Use to test blood sugar once daily. Dispense as covered by insurance. Dx. Code: E11.9. (Patient not taking: Reported on 10/10/2024) 100 each 11    blood glucose (NO BRAND SPECIFIED) test strip Use to test blood sugar once daily. Dispense as covered by insurance. Dx. Code: E11.9. (Patient not taking: Reported on 10/10/2024) 100 strip 11    blood glucose monitoring (NO BRAND SPECIFIED) meter device kit Use to test blood sugar 2 times daily (Patient not taking: Reported on 10/10/2024) 1 kit 0    Blood Pressure Monitoring (ADULT BLOOD  "PRESSURE CUFF LG) KIT Use for home blood pressure monitoring as directed. (Patient not taking: Reported on 10/10/2024) 1 kit 0    famotidine (PEPCID) 20 MG tablet Take 1 tablet (20 mg) by mouth 2 times daily as needed (stomach pain). 60 tablet 3    losartan (COZAAR) 50 MG tablet Take 1 tablet (50 mg) by mouth daily (Patient not taking: Reported on 10/10/2024) 30 tablet 11    order for DME Equipment being ordered: [facility]uate at [facility] for a knee brace (Patient not taking: Reported on 10/10/2024) 1 Units 0    order for DME Equipment being ordered: [facility] to evaluate for diabetic shoes and/or insoles (Patient not taking: Reported on 10/10/2024) 1 Units prn    pravastatin (PRAVACHOL) 20 MG tablet Take 1 tablet (20 mg) by mouth at bedtime (Patient not taking: Reported on 10/10/2024) 60 tablet 3    sucralfate (CARAFATE) 1 GM tablet Take 1 tablet (1 g) by mouth 4 times daily Before each meals and at bedtime (Patient not taking: Reported on 10/10/2024) 120 tablet 0       Allergies   Allergen Reactions    Cefazolin Nausea and Vomiting     Tolerated 7/19/23 without N/V    Atorvastatin Muscle Pain (Myalgia)        Social History     Tobacco Use    Smoking status: Never     Passive exposure: Never    Smokeless tobacco: Never   Substance Use Topics    Alcohol use: No     Family History   Problem Relation Age of Onset    Heart Disease Mother         Heart Disease    Heart Disease Father         Heart Disease    Heart Disease Sister         Heart Disease    Cancer Other         Cancer,female cancer     History   Drug Use Unknown           Objective    /74   Pulse 60   Temp 97  F (36.1  C) (Tympanic)   Resp 18   Ht 1.549 m (5' 1\")   Wt 57.8 kg (127 lb 6.4 oz)   LMP  (LMP Unknown)   SpO2 100%   BMI 24.07 kg/m     Estimated body mass index is 24.07 kg/m  as calculated from the following:    Height as of this encounter: 1.549 m (5' 1\").    Weight as of this encounter: 57.8 kg (127 lb 6.4 " oz).  Physical Exam  GENERAL: alert and no distress  EYES: Eyes grossly normal to inspection, PERRL and conjunctivae and sclerae normal  RESP: lungs clear to auscultation - no rales, rhonchi or wheezes  CV: regular rate and rhythm, normal S1 S2, no S3 or S4, no murmur, click or rub, no peripheral edema  MS: no gross musculoskeletal defects noted, no edema  SKIN: no suspicious lesions or rashes  PSYCH: mentation appears normal, affect normal/bright    Recent Labs   Lab Test 06/03/24  1046 02/24/24  0621 02/23/24  2046 02/21/24  1624 02/07/24  1447   HGB 12.7  --  9.9*   < > 12.5    75* 66*   < > 236     --  137   < > 139   POTASSIUM 3.8  --  3.3*   < > 3.8   CR 0.54  --  0.47*   < > 0.58   A1C  --   --   --   --  6.2    < > = values in this interval not displayed.        Diagnostics  Recent Results (from the past 48 hour(s))   EKG 12-lead, tracing only (Same Day)    Collection Time: 10/10/24  9:18 AM   Result Value Ref Range    Systolic Blood Pressure  mmHg    Diastolic Blood Pressure  mmHg    Ventricular Rate 57 BPM    Atrial Rate 57 BPM    NY Interval 180 ms    QRS Duration 86 ms     ms    QTc 418 ms    P Axis 66 degrees    R AXIS 18 degrees    T Axis 27 degrees    Interpretation ECG       Sinus bradycardia  Otherwise normal ECG  When compared with ECG of 07-Feb-2024 15:20,  No significant change was found        EKG: appears normal, NSR, normal axis, normal intervals, no acute ST/T changes c/w ischemia, no LVH by voltage criteria, unchanged from previous tracings    Revised Cardiac Risk Index (RCRI)  The patient has the following serious cardiovascular risks for perioperative complications:   - No serious cardiac risks = 0 points     RCRI Interpretation: 0 points: Class I (very low risk - 0.4% complication rate)         Signed Electronically by: Dilcia Tarango PA-C  A copy of this evaluation report is provided to the requesting physician.

## 2024-10-10 ENCOUNTER — OFFICE VISIT (OUTPATIENT)
Dept: FAMILY MEDICINE | Facility: OTHER | Age: 84
End: 2024-10-10
Attending: PHYSICIAN ASSISTANT
Payer: COMMERCIAL

## 2024-10-10 VITALS
SYSTOLIC BLOOD PRESSURE: 134 MMHG | WEIGHT: 127.4 LBS | DIASTOLIC BLOOD PRESSURE: 74 MMHG | OXYGEN SATURATION: 100 % | BODY MASS INDEX: 24.05 KG/M2 | TEMPERATURE: 97 F | HEIGHT: 61 IN | RESPIRATION RATE: 18 BRPM | HEART RATE: 60 BPM

## 2024-10-10 DIAGNOSIS — R10.13 EPIGASTRIC PAIN: ICD-10-CM

## 2024-10-10 DIAGNOSIS — Z01.818 PREOP GENERAL PHYSICAL EXAM: Primary | ICD-10-CM

## 2024-10-10 DIAGNOSIS — I10 BENIGN ESSENTIAL HYPERTENSION: ICD-10-CM

## 2024-10-10 DIAGNOSIS — E78.5 HYPERLIPIDEMIA, UNSPECIFIED HYPERLIPIDEMIA TYPE: ICD-10-CM

## 2024-10-10 DIAGNOSIS — K31.84 GASTROPARESIS: ICD-10-CM

## 2024-10-10 DIAGNOSIS — E11.9 TYPE 2 DIABETES MELLITUS WITHOUT COMPLICATION, WITHOUT LONG-TERM CURRENT USE OF INSULIN (H): ICD-10-CM

## 2024-10-10 LAB
ATRIAL RATE - MUSE: 57 BPM
DIASTOLIC BLOOD PRESSURE - MUSE: NORMAL MMHG
INTERPRETATION ECG - MUSE: NORMAL
P AXIS - MUSE: 66 DEGREES
PR INTERVAL - MUSE: 180 MS
QRS DURATION - MUSE: 86 MS
QT - MUSE: 430 MS
QTC - MUSE: 418 MS
R AXIS - MUSE: 18 DEGREES
SYSTOLIC BLOOD PRESSURE - MUSE: NORMAL MMHG
T AXIS - MUSE: 27 DEGREES
VENTRICULAR RATE- MUSE: 57 BPM

## 2024-10-10 PROCEDURE — 99214 OFFICE O/P EST MOD 30 MIN: CPT | Performed by: PHYSICIAN ASSISTANT

## 2024-10-10 PROCEDURE — G0463 HOSPITAL OUTPT CLINIC VISIT: HCPCS

## 2024-10-10 PROCEDURE — 93005 ELECTROCARDIOGRAM TRACING: CPT | Performed by: PHYSICIAN ASSISTANT

## 2024-10-10 PROCEDURE — 93010 ELECTROCARDIOGRAM REPORT: CPT | Performed by: INTERNAL MEDICINE

## 2024-10-10 PROCEDURE — G2211 COMPLEX E/M VISIT ADD ON: HCPCS | Performed by: PHYSICIAN ASSISTANT

## 2024-10-10 ASSESSMENT — PAIN SCALES - GENERAL: PAINLEVEL: MILD PAIN (3)

## 2024-10-10 NOTE — NURSING NOTE
"Chief Complaint   Patient presents with    Pre-Op Exam   Patient presents to the clinic today for her pre-op exam.     Initial BP (!) 157/68 (BP Location: Right arm, Patient Position: Sitting, Cuff Size: Adult Regular)   Pulse 60   Temp 97  F (36.1  C) (Tympanic)   Resp 18   Ht 1.549 m (5' 1\")   Wt 57.8 kg (127 lb 6.4 oz)   LMP  (LMP Unknown)   SpO2 100%   BMI 24.07 kg/m   Estimated body mass index is 24.07 kg/m  as calculated from the following:    Height as of this encounter: 1.549 m (5' 1\").    Weight as of this encounter: 57.8 kg (127 lb 6.4 oz).  Medication Review: complete    The next two questions are to help us understand your food security.  If you are feeling you need any assistance in this area, we have resources available to support you today.          2/7/2024   SDOH- Food Insecurity   Within the past 12 months, did you worry that your food would run out before you got money to buy more? N   Within the past 12 months, did the food you bought just not last and you didn t have money to get more? N            Health Care Directive:  Patient does not have a Health Care Directive or Living Will: Discussed advance care planning with patient; however, patient declined at this time.    Jazz Bhagat      "

## 2024-10-17 ENCOUNTER — TRANSFERRED RECORDS (OUTPATIENT)
Dept: HEALTH INFORMATION MANAGEMENT | Facility: OTHER | Age: 84
End: 2024-10-17
Payer: COMMERCIAL

## 2024-10-31 ENCOUNTER — TRANSFERRED RECORDS (OUTPATIENT)
Dept: HEALTH INFORMATION MANAGEMENT | Facility: OTHER | Age: 84
End: 2024-10-31
Payer: COMMERCIAL

## 2024-12-30 ENCOUNTER — TRANSFERRED RECORDS (OUTPATIENT)
Dept: HEALTH INFORMATION MANAGEMENT | Facility: OTHER | Age: 84
End: 2024-12-30
Payer: COMMERCIAL

## 2025-03-30 ENCOUNTER — HEALTH MAINTENANCE LETTER (OUTPATIENT)
Age: 85
End: 2025-03-30

## 2025-05-06 ENCOUNTER — OFFICE VISIT (OUTPATIENT)
Dept: FAMILY MEDICINE | Facility: OTHER | Age: 85
End: 2025-05-06
Attending: FAMILY MEDICINE
Payer: MEDICARE

## 2025-05-06 VITALS
WEIGHT: 114.6 LBS | HEART RATE: 70 BPM | SYSTOLIC BLOOD PRESSURE: 114 MMHG | DIASTOLIC BLOOD PRESSURE: 64 MMHG | TEMPERATURE: 97.9 F | BODY MASS INDEX: 21.65 KG/M2 | OXYGEN SATURATION: 93 % | RESPIRATION RATE: 20 BRPM

## 2025-05-06 DIAGNOSIS — M19.012 PRIMARY OSTEOARTHRITIS OF LEFT SHOULDER: ICD-10-CM

## 2025-05-06 DIAGNOSIS — M25.512 ACUTE PAIN OF LEFT SHOULDER: Primary | ICD-10-CM

## 2025-05-06 PROCEDURE — G0463 HOSPITAL OUTPT CLINIC VISIT: HCPCS

## 2025-05-06 ASSESSMENT — PAIN SCALES - GENERAL: PAINLEVEL_OUTOF10: MILD PAIN (3)

## 2025-05-06 NOTE — NURSING NOTE
Patient here for ER follow up from fall on 04/21/2025. Left shoulder injury, facial contusion and left knee abrasion. Medication Reconciliation: complete.    Radha Stahl LPN  5/6/2025 1:48 PM

## 2025-05-08 PROBLEM — M19.012 DJD OF LEFT SHOULDER: Status: ACTIVE | Noted: 2025-05-08

## 2025-05-08 NOTE — PROGRESS NOTES
SUBJECTIVE:   Maliha Rivera is a 84 year old female who presents to clinic today for the following health issues:    History of Present Illness       Reason for visit:  Lfell  Symptom onset:  1-2 weeks ago  Symptoms include:  Dawn  Symptom intensity:  Moderate  Symptom progression:  Staying the same  Had these symptoms before:  No  What makes it worse:  Ice   She is taking medications regularly.    Patient arrives here for follow-up ER visit.  She was seen in the ER on the 21st due to a fall.  Reports that her shoe got caught in she fell hitting her face left shoulder and knee.  She reports an bruising of the face is improving.  The shoulder pain is also improving she is getting a little more range of motion back.  Patient Active Problem List    Diagnosis Date Noted    DJD of left shoulder 05/08/2025     Priority: Medium    Gastroesophageal reflux disease without esophagitis 07/19/2023     Priority: Medium    Sliding hiatal hernia 03/29/2023     Priority: Medium    Postoperative anemia due to acute blood loss 04/19/2019     Priority: Medium    Benign essential hypertension 04/18/2018     Priority: Medium    Lumbar stenosis 03/01/2018     Priority: Medium    Lumbar radiculopathy 01/23/2018     Priority: Medium    Status post total bilateral knee replacement 03/31/2017     Priority: Medium    Primary osteoarthritis of left knee 03/23/2017     Priority: Medium    Neurofibroma of thigh 09/22/2015     Priority: Medium    Dysphagia 12/13/2013     Priority: Medium    Healthcare maintenance 03/26/2013     Priority: Medium     Overview:   Calcium: takes supplement  Colonoscopy: WNL 2011  DEXA: schedule - if has osteoporosis will have to look at risk of fosamax on her reflux.  Lipids:; declines statin - will try niacin and is on fish oil.  Mammogram:schedule  Pap: 3/2013 WNL - does not need again.  Tdap:3/2013  Vitamin D: takes supplement  pneumovax done.  Consider zoster vaccine.      Degenerative arthritis of  knee 10/21/2010     Priority: Medium     Overview:   IMO Update 10/11      Cervicalgia 05/11/2010     Priority: Medium     Overview:   IMO Update 10/11      DDD (degenerative disc disease), lumbar 01/15/2010     Priority: Medium    Primary localized osteoarthrosis, lower leg 01/31/2008     Priority: Medium    Hyperlipidemia 07/22/2004     Priority: Medium     Overview:   IMO Update 10/11  Overview:   Overview:   IMO Update 10/11       Past Medical History:   Diagnosis Date    Encounter for screening for cardiovascular disorders     4/8/15,Lexiscan cardiolite [facility]        Review of Systems     OBJECTIVE:     /64   Pulse 70   Temp 97.9  F (36.6  C)   Resp 20   Wt 52 kg (114 lb 9.6 oz)   LMP  (LMP Unknown)   SpO2 93%   BMI 21.65 kg/m    Body mass index is 21.65 kg/m .  Physical Exam  Musculoskeletal:      Comments: Abduction is restricted to about 90 degrees on her left shoulder.  Bruising on her face is improving no hyphema present.   Skin:     General: Skin is warm.   Neurological:      Mental Status: She is alert.         Diagnostic Test Results:  none     ASSESSMENT/PLAN:         (M25.512) Acute pain of left shoulder  (primary encounter diagnosis)  Comment:   Plan:     (M19.012) Primary osteoarthritis of left shoulder  Comment:   Plan:    Reviewed ER note showing severe degenerative changes in the shoulder.  We discussed options.  Would recommend giving a little more time.  If she does not get complete improvement consider injections.  She will follow-up in a couple weeks if she would like to pursue this.    Shawn Gotti MD  [facility]

## (undated) DEVICE — SURGICEL HEMOSTAT 4X8" 1952

## (undated) DEVICE — VERRES NEEDLE 120MM DISPOSABLE 12/BX

## (undated) DEVICE — SUCTION MANIFOLD NEPTUNE 2 SYS 4 PORT 0702-020-000

## (undated) DEVICE — SUCTION STRYKERFLOW II 250-070-500

## (undated) DEVICE — DRAIN PENROSE 1/2X12" SILICONE GR103

## (undated) DEVICE — ENDO TROCAR SLEEVE KII 5X100MM CTR03

## (undated) DEVICE — GLOVE PROTEXIS POWDER FREE SMT 8.0  2D72PT80X

## (undated) DEVICE — PREP CHLORAPREP 26ML TINTED ORANGE  260815

## (undated) DEVICE — ESU HOLDER LAP INST DISP PURPLE LONG 330MM H-PRO-330

## (undated) DEVICE — ESU GROUND PAD ADULT W/CORD E7507

## (undated) DEVICE — PACK LAPAROSCOPY LF SBA15LPFCA

## (undated) DEVICE — Device

## (undated) DEVICE — ENDO FORCEP ENDOJAW BIOPSY 2.8MMX230CM FB-220U

## (undated) DEVICE — SLEEVE COMPRESSION SCD KNEE MED 74022

## (undated) DEVICE — SYR 50ML LL W/O NDL 309653

## (undated) DEVICE — ENDO BITE BLOCK 60 MAXI LF 00712804

## (undated) DEVICE — SU DERMABOND ADVANCED .7ML DNX12

## (undated) DEVICE — ENDO KIT COMPLIANCE DYKENDOCMPLY

## (undated) DEVICE — COVER LIGHT HANDLE LT-F02

## (undated) DEVICE — SOL WATER 1500ML

## (undated) DEVICE — SU ENDO STITICH SURGIDAC 0 ES-9 48"  173024

## (undated) DEVICE — LUBRICATING JELLY 2.7GM T00137

## (undated) DEVICE — ENDO SCOPE WARMER DUAL LAP TM500D

## (undated) DEVICE — CATH TRAY FOLEY SURESTEP 16FR W/URINE MTR STATLK LF A303416A

## (undated) DEVICE — ENDO TROCAR SLEEVE KII Z-THREADED 05X100MM CTS02

## (undated) DEVICE — ENDO TROCAR FIRST ENTRY KII FIOS Z-THRD 12X100MM CTF73

## (undated) DEVICE — DEVICE ENDO STITCH APPLIER 10MM 173016

## (undated) DEVICE — GLOVE BIOGEL PI INDICATOR 8.0 LF 41680

## (undated) DEVICE — SU MONOCRYL 4-0 PS-2 27" UND Y426H

## (undated) DEVICE — TUBING INSUFFLATOR W/FILTER OLYMPUS WA95005A

## (undated) DEVICE — ESU CORD MONOPOLAR 10'  E0510

## (undated) DEVICE — ESU HARMONIC ACE LAPAROSCOPIC SHEARS 5MMX36CM CVD HAR36

## (undated) DEVICE — SU VICRYL 0 UR-6 27" J603H

## (undated) DEVICE — TUBING SUCTION 10'X3/16" N510

## (undated) DEVICE — CLIP APPLIER ENDO ROTATING 10MM MED/LG ER320

## (undated) RX ORDER — LIDOCAINE HYDROCHLORIDE 10 MG/ML
INJECTION, SOLUTION INFILTRATION; PERINEURAL
Status: DISPENSED
Start: 2023-01-31

## (undated) RX ORDER — CEFAZOLIN SODIUM/WATER 2 G/20 ML
SYRINGE (ML) INTRAVENOUS
Status: DISPENSED
Start: 2023-07-19

## (undated) RX ORDER — ACETAMINOPHEN 325 MG/1
TABLET ORAL
Status: DISPENSED
Start: 2024-02-21

## (undated) RX ORDER — CEFAZOLIN SODIUM/WATER 2 G/20 ML
SYRINGE (ML) INTRAVENOUS
Status: DISPENSED
Start: 2024-02-21

## (undated) RX ORDER — FENTANYL CITRATE 50 UG/ML
INJECTION, SOLUTION INTRAMUSCULAR; INTRAVENOUS
Status: DISPENSED
Start: 2024-02-21

## (undated) RX ORDER — PROPOFOL 10 MG/ML
INJECTION, EMULSION INTRAVENOUS
Status: DISPENSED
Start: 2023-10-04

## (undated) RX ORDER — BUPIVACAINE HYDROCHLORIDE 5 MG/ML
INJECTION, SOLUTION EPIDURAL; INTRACAUDAL
Status: DISPENSED
Start: 2023-01-31

## (undated) RX ORDER — EPHEDRINE SULFATE 50 MG/ML
INJECTION, SOLUTION INTRAMUSCULAR; INTRAVENOUS; SUBCUTANEOUS
Status: DISPENSED
Start: 2024-02-21

## (undated) RX ORDER — ONDANSETRON 2 MG/ML
INJECTION INTRAMUSCULAR; INTRAVENOUS
Status: DISPENSED
Start: 2023-07-19

## (undated) RX ORDER — DIAZEPAM 5 MG
TABLET ORAL
Status: DISPENSED
Start: 2019-06-05

## (undated) RX ORDER — BUPIVACAINE HYDROCHLORIDE AND EPINEPHRINE 5; 5 MG/ML; UG/ML
INJECTION, SOLUTION EPIDURAL; INTRACAUDAL; PERINEURAL
Status: DISPENSED
Start: 2023-07-19

## (undated) RX ORDER — ACETAMINOPHEN 325 MG/1
TABLET ORAL
Status: DISPENSED
Start: 2023-07-19

## (undated) RX ORDER — TRIAMCINOLONE ACETONIDE 40 MG/ML
INJECTION, SUSPENSION INTRA-ARTICULAR; INTRAMUSCULAR
Status: DISPENSED
Start: 2023-01-31

## (undated) RX ORDER — PROPOFOL 10 MG/ML
INJECTION, EMULSION INTRAVENOUS
Status: DISPENSED
Start: 2023-07-19

## (undated) RX ORDER — DEXAMETHASONE SODIUM PHOSPHATE 4 MG/ML
INJECTION, SOLUTION INTRA-ARTICULAR; INTRALESIONAL; INTRAMUSCULAR; INTRAVENOUS; SOFT TISSUE
Status: DISPENSED
Start: 2023-07-19

## (undated) RX ORDER — PROPOFOL 10 MG/ML
INJECTION, EMULSION INTRAVENOUS
Status: DISPENSED
Start: 2024-01-16

## (undated) RX ORDER — PROPOFOL 10 MG/ML
INJECTION, EMULSION INTRAVENOUS
Status: DISPENSED
Start: 2023-03-28

## (undated) RX ORDER — FENTANYL CITRATE-0.9 % NACL/PF 10 MCG/ML
PLASTIC BAG, INJECTION (ML) INTRAVENOUS
Status: DISPENSED
Start: 2023-07-19

## (undated) RX ORDER — FENTANYL CITRATE 50 UG/ML
INJECTION, SOLUTION INTRAMUSCULAR; INTRAVENOUS
Status: DISPENSED
Start: 2023-07-19

## (undated) RX ORDER — BUPIVACAINE HYDROCHLORIDE 5 MG/ML
INJECTION, SOLUTION EPIDURAL; INTRACAUDAL
Status: DISPENSED
Start: 2024-02-21

## (undated) RX ORDER — LIDOCAINE HYDROCHLORIDE 10 MG/ML
INJECTION, SOLUTION EPIDURAL; INFILTRATION; INTRACAUDAL; PERINEURAL
Status: DISPENSED
Start: 2023-07-19

## (undated) RX ORDER — LIDOCAINE HYDROCHLORIDE 10 MG/ML
INJECTION, SOLUTION EPIDURAL; INFILTRATION; INTRACAUDAL; PERINEURAL
Status: DISPENSED
Start: 2024-01-16

## (undated) RX ORDER — ONDANSETRON 2 MG/ML
INJECTION INTRAMUSCULAR; INTRAVENOUS
Status: DISPENSED
Start: 2024-02-21

## (undated) RX ORDER — LIDOCAINE HYDROCHLORIDE 10 MG/ML
INJECTION, SOLUTION EPIDURAL; INFILTRATION; INTRACAUDAL; PERINEURAL
Status: DISPENSED
Start: 2024-02-21

## (undated) RX ORDER — LIDOCAINE HYDROCHLORIDE 10 MG/ML
INJECTION, SOLUTION EPIDURAL; INFILTRATION; INTRACAUDAL; PERINEURAL
Status: DISPENSED
Start: 2023-10-04